# Patient Record
Sex: FEMALE | Race: WHITE | NOT HISPANIC OR LATINO | Employment: OTHER | URBAN - METROPOLITAN AREA
[De-identification: names, ages, dates, MRNs, and addresses within clinical notes are randomized per-mention and may not be internally consistent; named-entity substitution may affect disease eponyms.]

---

## 2021-12-29 ENCOUNTER — OFFICE VISIT (OUTPATIENT)
Dept: PHYSICAL THERAPY | Facility: CLINIC | Age: 61
End: 2021-12-29
Payer: COMMERCIAL

## 2021-12-29 DIAGNOSIS — R26.89 IMPAIRMENT OF BALANCE: ICD-10-CM

## 2021-12-29 DIAGNOSIS — R29.898 WEAKNESS OF RIGHT LOWER EXTREMITY: ICD-10-CM

## 2021-12-29 DIAGNOSIS — R26.9 GAIT ABNORMALITY: Primary | ICD-10-CM

## 2021-12-29 DIAGNOSIS — G89.29 CHRONIC PAIN OF RIGHT KNEE: ICD-10-CM

## 2021-12-29 DIAGNOSIS — M25.561 CHRONIC PAIN OF RIGHT KNEE: ICD-10-CM

## 2021-12-29 PROCEDURE — 97162 PT EVAL MOD COMPLEX 30 MIN: CPT | Performed by: PHYSICAL THERAPIST

## 2022-01-03 ENCOUNTER — OFFICE VISIT (OUTPATIENT)
Dept: PHYSICAL THERAPY | Facility: CLINIC | Age: 62
End: 2022-01-03
Payer: COMMERCIAL

## 2022-01-03 DIAGNOSIS — G89.29 CHRONIC PAIN OF RIGHT KNEE: ICD-10-CM

## 2022-01-03 DIAGNOSIS — R26.89 IMPAIRMENT OF BALANCE: ICD-10-CM

## 2022-01-03 DIAGNOSIS — R29.898 WEAKNESS OF RIGHT LOWER EXTREMITY: ICD-10-CM

## 2022-01-03 DIAGNOSIS — M25.561 CHRONIC PAIN OF RIGHT KNEE: ICD-10-CM

## 2022-01-03 DIAGNOSIS — R26.9 GAIT ABNORMALITY: Primary | ICD-10-CM

## 2022-01-03 PROCEDURE — 97110 THERAPEUTIC EXERCISES: CPT | Performed by: PHYSICAL THERAPIST

## 2022-01-03 PROCEDURE — 97112 NEUROMUSCULAR REEDUCATION: CPT | Performed by: PHYSICAL THERAPIST

## 2022-01-03 NOTE — PROGRESS NOTES
Daily Note     Today's date: 1/3/2022  Patient name: Merline Severance  : 1960  MRN: [id number]  Referring provider: Evette Garcia, PT  Dx:   Encounter Diagnosis     ICD-10-CM    1  Gait abnormality  R26 9    2  Impairment of balance  R26 89    3  Chronic pain of right knee  M25 561     G89 29    4  Weakness of right lower extremity  R29 898                   Subjective: Pt reports that she is feeling better since starting HEP  She states that she is very motivated as she wants to be able to walk w/ her grandson and would be unable to hold him at this time since he is 2 month old  Objective: See treatment diary below      Assessment: Initiated pt's plan of care this visit w/ good tolerance  Pt w/ some c/o moderate knee pain during bike and some standing ex, but reports that it was tolerable  Practiced ambulation w/ RW focusing on marching to clear foot  Pt also needed cueing to remember to keep RW in front of COG during chair transfers  Improved STS technique w/ cueing and practice  Patient would benefit from continued PT      Plan: Continue per plan of care  Access Code: [email or web address]     Daily Treatment Diary    EPOC: 2022  Precautions: FALL RISK;  Hx of MS affecting RLE  Co- Morbidities: Hx of MS affecting RLE    Manual                                                                                                                                               Exercise Diary  12/29  1/3                 THEREX            Pt ed            Progress Note            Recumbent Bike    10'                 Gastroc/ Soleus Stretch    30"x3 ea b/l                  HR/TR    15x/15x                 SLR flex/abd            LAQ                                    NEURO RE-ED            WS to R - fwd/lat  10"x10/10"x10          Standing HS curls                    Standing Marches    15x ea b/l                 STS    10x                 SLS                     clamshells  YTB 15x          TB resisted hip strengthening            TB resisted sidestepping            FTEO                     FTEC                                                                     GAIT TRAINING                     RW --> SPC once able    RW x 5' w/ cueing to inc  Hip/knee flexion                 Stairs                                                                                                                                 Modalities  12/29 1/3                   CP to R knee PRN NP  10'

## 2022-01-05 ENCOUNTER — APPOINTMENT (OUTPATIENT)
Dept: PHYSICAL THERAPY | Facility: CLINIC | Age: 62
End: 2022-01-05
Payer: COMMERCIAL

## 2022-01-06 ENCOUNTER — APPOINTMENT (OUTPATIENT)
Dept: PHYSICAL THERAPY | Facility: CLINIC | Age: 62
End: 2022-01-06
Payer: COMMERCIAL

## 2022-01-07 ENCOUNTER — APPOINTMENT (OUTPATIENT)
Dept: PHYSICAL THERAPY | Facility: CLINIC | Age: 62
End: 2022-01-07
Payer: COMMERCIAL

## 2022-01-10 ENCOUNTER — OFFICE VISIT (OUTPATIENT)
Dept: PHYSICAL THERAPY | Facility: CLINIC | Age: 62
End: 2022-01-10
Payer: COMMERCIAL

## 2022-01-10 DIAGNOSIS — M25.561 CHRONIC PAIN OF RIGHT KNEE: ICD-10-CM

## 2022-01-10 DIAGNOSIS — R26.89 IMPAIRMENT OF BALANCE: ICD-10-CM

## 2022-01-10 DIAGNOSIS — G89.29 CHRONIC PAIN OF RIGHT KNEE: ICD-10-CM

## 2022-01-10 DIAGNOSIS — R26.9 GAIT ABNORMALITY: Primary | ICD-10-CM

## 2022-01-10 DIAGNOSIS — R29.898 WEAKNESS OF RIGHT LOWER EXTREMITY: ICD-10-CM

## 2022-01-10 PROCEDURE — 97110 THERAPEUTIC EXERCISES: CPT | Performed by: PHYSICAL THERAPIST

## 2022-01-10 PROCEDURE — 97116 GAIT TRAINING THERAPY: CPT | Performed by: PHYSICAL THERAPIST

## 2022-01-10 PROCEDURE — 97112 NEUROMUSCULAR REEDUCATION: CPT | Performed by: PHYSICAL THERAPIST

## 2022-01-10 NOTE — PROGRESS NOTES
Daily Note     Today's date: 1/10/2022  Patient name: Kate Yusuf  : 1960  MRN: [id number]  Referring provider: Isabelle Garcia, PT  Dx:   Encounter Diagnosis     ICD-10-CM    1  Gait abnormality  R26 9    2  Impairment of balance  R26 89    3  Chronic pain of right knee  M25 561     G89 29    4  Weakness of right lower extremity  R29 898                   Subjective: Pt w/o any complaints to report upon arrival tx session  Objective: See treatment diary below      Assessment: Progressed ex this visit as below- pt fatigued post tx session but seemed to tolerate well  Discussed that pt may need to take more rest breaks at home if she is experiencing excessive fatigue  Plan: Continue per plan of care  Access Code: [email or web address]     Daily Treatment Diary    EPOC: 2022  Precautions: FALL RISK;  Hx of MS affecting RLE  Co- Morbidities: Hx of MS affecting RLE    Manual                                                                                                                                               Exercise Diary  12/29  1/3  1/10         THEREX         Pt ed         Progress Note         Recumbent Bike    10'  NP- resume nv         Gastroc/ Soleus Stretch    30"x3 ea b/l   30"x ea         HR/TR    15x/15x  15x/15x         SLR flex/abd         LAQ   15x w/ 1-2" hold- 2#                        NEURO RE-ED         WS to R - fwd/lat  10"x10/10"x10 10"x10/10"x10      Standing HS curls              Standing Marches    15x ea b/l  15x ea b/l         STS    10x  10x         SLS               clamshells  YTB 15x YTB 15x/15x      TB resisted hip strengthening         TB resisted sidestepping         FTEO               FTEC                                                   GAIT TRAINING               RW --> SPC once able    RW x 5' w/ cueing to inc  Hip/knee flexion           Stairs    4"15x ea LE Modalities  12/29 1/3  1/10                 CP to R knee PRN NP  10'  NP

## 2022-01-12 ENCOUNTER — OFFICE VISIT (OUTPATIENT)
Dept: PHYSICAL THERAPY | Facility: CLINIC | Age: 62
End: 2022-01-12
Payer: COMMERCIAL

## 2022-01-12 DIAGNOSIS — R26.89 IMPAIRMENT OF BALANCE: Primary | ICD-10-CM

## 2022-01-12 DIAGNOSIS — R29.898 WEAKNESS OF RIGHT LOWER EXTREMITY: ICD-10-CM

## 2022-01-12 DIAGNOSIS — M25.561 CHRONIC PAIN OF RIGHT KNEE: ICD-10-CM

## 2022-01-12 DIAGNOSIS — G89.29 CHRONIC PAIN OF RIGHT KNEE: ICD-10-CM

## 2022-01-12 DIAGNOSIS — R26.9 GAIT ABNORMALITY: ICD-10-CM

## 2022-01-12 PROCEDURE — 97112 NEUROMUSCULAR REEDUCATION: CPT | Performed by: PHYSICAL THERAPIST

## 2022-01-12 PROCEDURE — 97116 GAIT TRAINING THERAPY: CPT | Performed by: PHYSICAL THERAPIST

## 2022-01-12 PROCEDURE — 97110 THERAPEUTIC EXERCISES: CPT | Performed by: PHYSICAL THERAPIST

## 2022-01-12 NOTE — PROGRESS NOTES
Daily Note     Today's date: 2022  Patient name: Merline Severance  : 1960  MRN: [id number]  Referring provider: Kate Lewis, PT  Dx:   Encounter Diagnosis     ICD-10-CM    1  Impairment of balance  R26 89    2  Gait abnormality  R26 9    3  Chronic pain of right knee  M25 561     G89 29    4  Weakness of right lower extremity  R29 898                   Subjective: Pt w/o any complaints of pain upon arrival to tx session  Objective: See treatment diary below      Assessment: Progressed ex this visit w/ very good tolerance  Pt able to perform gait training using SPC this visit w/ CS from PT  She did have difficulty w/ sequencing SPC and feet which improved w/ practice/cueing  Pt instructed to continue using RW at outside of tx sessions as she is not yet safe using SPC on her own  Pt challenged using total gym but able to safely transition to/from platform- pt has her own total gym at home and would like to start using  Plan: Continue per plan of care  Access Code: [email or web address]     Daily Treatment Diary    EPOC: 2022  Precautions: FALL RISK;  Hx of MS affecting RLE  Co- Morbidities: Hx of MS affecting RLE    Manual                                                                                                                                               Exercise Diary  12/29  1/3  1/10  1/12       THEREX         Pt ed         Progress Note         Recumbent Bike    10'  NP- resume nv  10'       Gastroc/ Soleus Stretch    30"x3 ea b/l   30"x ea  30"x3 ea b/l        HR/TR    15x/15x  15x/15x  15x/15x       SLR flex/abd         LAQ   15x w/ 1-2" hold- 2# HEP                       NEURO RE-ED         WS to R - fwd/lat  10"x10/10"x10 10"x10/10"x10      Standing HS curls              Standing Marches    15x ea b/l  15x ea b/l 15x ea b/l        STS    10x  10x  10x       SLS               clamshells  YTB 15x YTB 15x/15x      TB resisted hip strengthening         TB resisted sidestepping FTEO               FTEC               Total Gym Squats    15x b/l; 10x u/l R/L; L 17     Total Gym HR    10 b/l; 5x u/l ea L 17                       GAIT TRAINING               RW --> SPC once able    RW x 5' w/ cueing to inc  Hip/knee flexion    SPC 3 laps and ~15' to mat table w/ CS       Stairs    4"15x ea LE                                                                                       Modalities  12/29 1/3  1/10                 CP to R knee PRN NP  10'  NP

## 2022-01-17 ENCOUNTER — APPOINTMENT (OUTPATIENT)
Dept: PHYSICAL THERAPY | Facility: CLINIC | Age: 62
End: 2022-01-17
Payer: COMMERCIAL

## 2022-01-19 ENCOUNTER — OFFICE VISIT (OUTPATIENT)
Dept: PHYSICAL THERAPY | Facility: CLINIC | Age: 62
End: 2022-01-19
Payer: COMMERCIAL

## 2022-01-19 DIAGNOSIS — R29.898 WEAKNESS OF RIGHT LOWER EXTREMITY: ICD-10-CM

## 2022-01-19 DIAGNOSIS — G89.29 CHRONIC PAIN OF RIGHT KNEE: ICD-10-CM

## 2022-01-19 DIAGNOSIS — R26.89 IMPAIRMENT OF BALANCE: Primary | ICD-10-CM

## 2022-01-19 DIAGNOSIS — M25.561 CHRONIC PAIN OF RIGHT KNEE: ICD-10-CM

## 2022-01-19 DIAGNOSIS — R26.9 GAIT ABNORMALITY: ICD-10-CM

## 2022-01-19 PROCEDURE — 97112 NEUROMUSCULAR REEDUCATION: CPT | Performed by: PHYSICAL THERAPIST

## 2022-01-19 PROCEDURE — 97116 GAIT TRAINING THERAPY: CPT | Performed by: PHYSICAL THERAPIST

## 2022-01-19 NOTE — PROGRESS NOTES
Daily Note     Today's date: 2022  Patient name: Zahra Cuellar  : 1960  MRN: [id number]  Referring provider: Monae Reeys PT  Dx:   Encounter Diagnosis     ICD-10-CM    1  Impairment of balance  R26 89    2  Gait abnormality  R26 9    3  Chronic pain of right knee  M25 561     G89 29    4  Weakness of right lower extremity  R29 898                   Subjective: Pt w/o any complaints to report upon arrival to tx session  She states that she has been very compliant w/ HEP and has been using her home Total Gym  Objective: See treatment diary below      Assessment: Initiated tx session w/ TM w/u this visit w/ CG from PT and cueing to "march" - pt fatigued after approx 3 5 minutes which may be due to vacuuming prior to tx session as well  Practiced gait training more w/ SPC this visit- pt does require CG and intermittent cueing for sequencing of SPC to avoid LOB, but seemed to improve w/ practice  Pt w/ very good technique performing STS today  Plan: Continue per plan of care  Access Code: [email or web address]     Daily Treatment Diary    EPOC: 2022  Precautions: FALL RISK;  Hx of MS affecting RLE  Co- Morbidities: Hx of MS affecting RLE    Manual                                                                                                                                               Exercise Diary  12/29  1/3  1/10  1/12  1/19     THEREX         Pt ed         Progress Note         Treadmill     3'    Recumbent Bike    10'  NP- resume nv  10'  d/c     Gastroc/ Soleus Stretch    30"x3 ea b/l   30"x ea  30"x3 ea b/l        HR/TR    15x/15x  15x/15x  15x/15x       SLR flex/abd         LAQ   15x w/ 1-2" hold- 2# HEP 2# 15x ea w/ 1-2" hold                      NEURO RE-ED         WS to R - fwd/lat  10"x10/10"x10 10"x10/10"x10      Standing HS curls              Standing Marches    15x ea b/l  15x ea b/l 15x ea b/l   15x ea b/l seated w/ 2# weight     STS    10x  10x  10x  15x     SLS clamshellance  YTB 15x YTB 15x/15x  YTB 15x/15x    TB resisted hip strengthening         TB resisted sidestepping         FTEO               FTEC               Total Gym Squats    15x b/l; 10x u/l R/L; L 17 15x  B/l 20 L20; *attempted u/l @ L20 but need to dec nv    Total Gym HR    10 b/l; 5x u/l ea L 17 10x b/l L 20 (dec for u/l NV)                      GAIT TRAINING               RW --> SPC once able    RW x 5' w/ cueing to inc  Hip/knee flexion    SPC 3 laps @ // bars and ~15' to mat table w/ CS 10' RW to/from waiting room and down ramp to car; 5' w/ SPC to/from bar to total gym     Stairs    4"15x ea LE    NV                                                                                   Modalities  12/29 1/3  1/10                 CP to R knee PRN NP  10'  NP

## 2022-01-20 ENCOUNTER — APPOINTMENT (OUTPATIENT)
Dept: PHYSICAL THERAPY | Facility: CLINIC | Age: 62
End: 2022-01-20
Payer: COMMERCIAL

## 2022-01-24 ENCOUNTER — OFFICE VISIT (OUTPATIENT)
Dept: PHYSICAL THERAPY | Facility: CLINIC | Age: 62
End: 2022-01-24
Payer: COMMERCIAL

## 2022-01-24 DIAGNOSIS — G89.29 CHRONIC PAIN OF RIGHT KNEE: ICD-10-CM

## 2022-01-24 DIAGNOSIS — R29.898 WEAKNESS OF RIGHT LOWER EXTREMITY: ICD-10-CM

## 2022-01-24 DIAGNOSIS — R26.89 IMPAIRMENT OF BALANCE: Primary | ICD-10-CM

## 2022-01-24 DIAGNOSIS — R26.9 GAIT ABNORMALITY: ICD-10-CM

## 2022-01-24 DIAGNOSIS — M25.561 CHRONIC PAIN OF RIGHT KNEE: ICD-10-CM

## 2022-01-24 PROCEDURE — 97116 GAIT TRAINING THERAPY: CPT | Performed by: PHYSICAL THERAPIST

## 2022-01-24 PROCEDURE — 97110 THERAPEUTIC EXERCISES: CPT | Performed by: PHYSICAL THERAPIST

## 2022-01-24 PROCEDURE — 97112 NEUROMUSCULAR REEDUCATION: CPT | Performed by: PHYSICAL THERAPIST

## 2022-01-24 NOTE — PROGRESS NOTES
Daily Note     Today's date: 2022  Patient name: Merari Batista  : 1960  MRN: [id number]  Referring provider: Kika Whittaker PT  Dx:   Encounter Diagnosis     ICD-10-CM    1  Impairment of balance  R26 89    2  Gait abnormality  R26 9    3  Chronic pain of right knee  M25 561     G89 29    4  Weakness of right lower extremity  R29 898                   Subjective: Pt states that she has been practicing walking w/ SPC inside her home  Objective: See treatment diary below      Assessment: Had pt use SPC only during tx session today- pt did very well requiring CS - CGA from PT and seemed to demonstrate improved R foot clearance  Pt does have more difficulty clearing foot by the end of tx session when more fatigued  Remainder of ex tolerated well  Pt able to transition from sit to stand using low chair this visit vs low mat which was an improvement  Had thorough discussion w/ pt about pacing activity to avoid extreme highs and lows in energy levels and to improve overall functionality  Pt reports understanding  Plan: Continue per plan of care  Access Code: [email or web address]     Daily Treatment Diary    EPOC: 2022  Precautions: FALL RISK;  Hx of MS affecting RLE  Co- Morbidities: Hx of MS affecting RLE    Manual                                                                                                                                               Exercise Diary  12/29  1/3  1/10  1/12  1/19  1/24      THEREX            Pt ed            Progress Note            Treadmill     3'       Recumbent Bike    10'  NP- resume nv  10'  d/c        Gastroc/ Soleus Stretch    30"x3 ea b/l   30"x ea  30"x3 ea b/l     30"x3 ea b/l       HR/TR    15x/15x  15x/15x  15x/15x   15x/15x       SLR flex/abd            LAQ   15x w/ 1-2" hold- 2# HEP 2# 15x ea w/ 1-2" hold 2# 15x ea w /1-2" hold                              NEURO RE-ED            WS to R - fwd/lat  10"x10/10"x10 10"x10/10"x10         Standing HS curls                 Standing Marches    15x ea b/l  15x ea b/l 15x ea b/l   15x ea b/l seated w/ 2# weight       STS    10x  10x  10x  15x  15x from chair      SLS                  clamshells  YTB 15x YTB 15x/15x  YTB 15x/15x       TB resisted hip strengthening      15x ea dir b/l       TB resisted sidestepping            FTEO                  FTEC                  Total Gym Squats    15x b/l; 10x u/l R/L; L 17 15x  B/l 20 L20; *attempted u/l @ L20 but need to dec nv       Total Gym HR    10 b/l; 5x u/l ea L 17 10x b/l L 20 (dec for u/l NV)                               GAIT TRAINING                  RW --> SPC once able    RW x 5' w/ cueing to inc  Hip/knee flexion    SPC 3 laps @ // bars and ~15' to mat table w/ CS 10' RW to/from waiting room and down ramp to car; 5' w/ SPC to/from bar to total gym T/o tx session      Stairs    4"15x ea LE    NV  4" x15 ea b/l                                                                                                    Modalities  12/29 1/3  1/10                 CP to R knee PRN NP  10'  NP

## 2022-01-26 ENCOUNTER — OFFICE VISIT (OUTPATIENT)
Dept: PHYSICAL THERAPY | Facility: CLINIC | Age: 62
End: 2022-01-26
Payer: COMMERCIAL

## 2022-01-26 DIAGNOSIS — M25.561 CHRONIC PAIN OF RIGHT KNEE: ICD-10-CM

## 2022-01-26 DIAGNOSIS — R26.89 IMPAIRMENT OF BALANCE: Primary | ICD-10-CM

## 2022-01-26 DIAGNOSIS — R29.898 WEAKNESS OF RIGHT LOWER EXTREMITY: ICD-10-CM

## 2022-01-26 DIAGNOSIS — G89.29 CHRONIC PAIN OF RIGHT KNEE: ICD-10-CM

## 2022-01-26 DIAGNOSIS — R26.9 GAIT ABNORMALITY: ICD-10-CM

## 2022-01-26 PROCEDURE — 97112 NEUROMUSCULAR REEDUCATION: CPT | Performed by: PHYSICAL THERAPIST

## 2022-01-26 PROCEDURE — 97116 GAIT TRAINING THERAPY: CPT | Performed by: PHYSICAL THERAPIST

## 2022-01-26 PROCEDURE — 97110 THERAPEUTIC EXERCISES: CPT | Performed by: PHYSICAL THERAPIST

## 2022-01-26 NOTE — PROGRESS NOTES
Daily Note     Today's date: 2022  Patient name: Kaci Pike  : 1960  MRN: [id number]  Referring provider: Ezequiel Ortiz PT  Dx:   Encounter Diagnosis     ICD-10-CM    1  Impairment of balance  R26 89    2  Gait abnormality  R26 9    3  Chronic pain of right knee  M25 561     G89 29    4  Weakness of right lower extremity  R29 898                   Subjective: Pt brought SPC to tx session today in conjunction w/ RW  Pt reports feeling ok for the remainder of the day following her LV; however was very fatigued the following day and was unable to exercise  Pt has difficulty determining if this was related to tx session or lack of sleep  Objective: See treatment diary below      Assessment: Pt able to demonstrate safe "step through" motion while ambulating w/ SPC towards end of tx session which is progress as pt as doing "step to" technique previously  Remainder of ex tolerated well  Plan: Continue per plan of care  Access Code: [email or web address]     Daily Treatment Diary    EPOC: 2022  Precautions: FALL RISK;  Hx of MS affecting RLE  Co- Morbidities: Hx of MS affecting RLE    Manual                                                                                                                                               Exercise Diary  12/29  1/3  1/10  1/12  1/19  1/24 1/26     THEREX            Pt ed            Progress Note            Treadmill     3'       Recumbent Bike    10'  NP- resume nv  10'  d/c        Gastroc/ Soleus Stretch    30"x3 ea b/l   30"x ea  30"x3 ea b/l     30"x3 ea b/l  30"x3 ea b/l      HR/TR    15x/15x  15x/15x  15x/15x   15x/15x  15x/15x     SLR flex/abd            LAQ   15x w/ 1-2" hold- 2# HEP 2# 15x ea w/ 1-2" hold 2# 15x ea w /1-2" hold 2# 15x ea w/ 1-2" hold                             NEURO RE-ED            WS to R - fwd/lat  10"x10/10"x10 10"x10/10"x10         Standing HS curls                 Standing Marches    15x ea b/l  15x ea b/l 15x ea b/l 15x ea b/l seated w/ 2# weight       STS    10x  10x  10x  15x  15x from chair 15x from chair     SLS                  clamshells  YTB 15x YTB 15x/15x  YTB 15x/15x       TB resisted hip strengthening      15x ea dir b/l  15x ea dir b/l      TB resisted sidestepping            FTEO                  FTEC                  Total Gym Squats    15x b/l; 10x u/l R/L; L 17 15x  B/l 20 L20; *attempted u/l @ L20 but need to dec nv       Total Gym HR    10 b/l; 5x u/l ea L 17 10x b/l L 20 (dec for u/l NV)                               GAIT TRAINING                  RW --> SPC once able    RW x 5' w/ cueing to inc  Hip/knee flexion    SPC 3 laps @ // bars and ~15' to mat table w/ CS 10' RW to/from waiting room and down ramp to car; 5' w/ SPC to/from bar to total gym T/o tx session T/o tx session "step to" and "step through"     Stairs    4"15x ea LE    NV  4" x15 ea b/l  4"x 15 ea                                                                                                  Modalities  12/29 1/3  1/10                 CP to R knee PRN NP  10'  NP

## 2022-01-31 ENCOUNTER — APPOINTMENT (OUTPATIENT)
Dept: PHYSICAL THERAPY | Facility: CLINIC | Age: 62
End: 2022-01-31
Payer: COMMERCIAL

## 2022-02-02 ENCOUNTER — EVALUATION (OUTPATIENT)
Dept: PHYSICAL THERAPY | Facility: CLINIC | Age: 62
End: 2022-02-02
Payer: COMMERCIAL

## 2022-02-02 DIAGNOSIS — R26.89 IMPAIRMENT OF BALANCE: Primary | ICD-10-CM

## 2022-02-02 DIAGNOSIS — R29.898 WEAKNESS OF RIGHT LOWER EXTREMITY: ICD-10-CM

## 2022-02-02 DIAGNOSIS — R26.9 GAIT ABNORMALITY: ICD-10-CM

## 2022-02-02 DIAGNOSIS — G89.29 CHRONIC PAIN OF RIGHT KNEE: ICD-10-CM

## 2022-02-02 DIAGNOSIS — M25.561 CHRONIC PAIN OF RIGHT KNEE: ICD-10-CM

## 2022-02-02 PROCEDURE — 97116 GAIT TRAINING THERAPY: CPT | Performed by: PHYSICAL THERAPIST

## 2022-02-02 PROCEDURE — 97110 THERAPEUTIC EXERCISES: CPT | Performed by: PHYSICAL THERAPIST

## 2022-02-02 NOTE — LETTER
February 3, 2022    Rosaline Muse MD  [address] [facility]  [address] [facility] [address]    Patient: Preethi Gregory   YOB: 1960   Date of Visit: 2022     Encounter Diagnosis     ICD-10-CM    1  Impairment of balance  R26 89    2  Gait abnormality  R26 9    3  Chronic pain of right knee  M25 561     G89 29    4  Weakness of right lower extremity  R29 898        Dear Dr Laurel Urbina:    Thank you for your recent referral of Preethi Gregory  Please review the attached evaluation summary from Catina's recent visit  Please verify that you agree with the plan of care by signing the attached order  If you have any questions or concerns, please do not hesitate to call  I sincerely appreciate the opportunity to share in the care of one of your patients and hope to have another opportunity to work with you in the near future  Sincerely,    Rodríguez Garcia, PT      Referring Provider:      I certify that I have read the below Plan of Care and certify the need for these services furnished under this plan of treatment while under my care  Rosaline Muse MD  [address] [facility]  [address] [facility] [address]  Via Fax: [phone]          PT Re-Evaluation     Today's date: 2022  Patient name: Preethi Gregory  : 1960  MRN: [id number]  Referring provider: Cruz Bess, *  Dx:   Encounter Diagnosis     ICD-10-CM    1  Impairment of balance  R26 89    2  Gait abnormality  R26 9    3  Chronic pain of right knee  M25 561     G89 29    4  Weakness of right lower extremity  R29 898                   Assessment  Assessment details: Preethi Gregory is a 57 y o  female being treated as an outpatient to [facility] w/ initial c/o R knee pain and LE weakness (R worse vs L)   Since IE, pt has made gains in ROM, strength, ambulatory function/gait quality, transfer ability, and activity tolerance/function, but continues to remain limited in these areas and from max function  Pt will continue to benefit from skilled PT services in order to max function to allow pt to achieve goals in PT  Thank you  Impairments: abnormal gait, abnormal muscle tone, abnormal or restricted ROM, abnormal movement, activity intolerance, impaired balance, impaired physical strength, pain with function and safety issue  Understanding of Dx/Px/POC: good   Prognosis: good    Goals  ST  Pain decreased by 25% in 4-6 weeks  -- Not Met  2  Strength increased by 1/2 to 1 muscle grade in all deficient muscle groups in 4-6 weeks  - Partially Met  3  Pt will be independent w/ initial HEP in 1-2 visits  - Met    LT  Decrease pain to 1-2/10 at worst by d/c - Not met  2  Strength increased to 5 for all deficient muscle groups by d/c - Partially Met  3  IADL performance increased to max function by d/c - Not Met  4  Recreational performance increased to max function by d/c - Not Met  5  Pt will safely ambulate t/o clinic using SPC by d/c  - Partially Met  6  Pt will safely negotiate stairs using SPC by d/c  - Not Met  7  TUG decreased to < or = 14" by d/c  - Not Met  8  5xSTS decreased to < or = 14" by d/c -Not Met    Plan  Planned modality interventions: cryotherapy  Other planned modality interventions: other modalities PRN  Planned therapy interventions: abdominal trunk stabilization, ADL retraining, IADL retraining, balance, flexibility, functional ROM exercises, gait training, graded exercise, home exercise program, manual therapy, neuromuscular re-education, patient education, postural training, strengthening, therapeutic exercise, therapeutic activities and transfer training  Other planned therapy interventions: other interventions PRN  Frequency: 1-2x/week    Duration in weeks: 4  Plan of Care beginning date: 2022  Plan of Care expiration date: 3/2/2022  Treatment plan discussed with: patient        Subjective Evaluation    History of Present Illness  Mechanism of injury: CURRENT LEVEL (2/2/2022)  Pt reports that she has noticed improved ability to perform sit <-> stand transfers since starting skilled PT tx as she can intermittently transfer from couch to standing position w/o b/l UE support  She adds that her bed mobility has been getting easier when she plans ahead her movements  Since IE, pt has been able to transition from  to [facility] during entire tx session  However, she continues to be frustrated that her R foot catches the floor during ambulation especially when fatigued  Pt is in the process of looking into getting another MAFO as she no longer has her old one in possession  She reports that static standing is very difficult for her as she feels significant weakness still present in RLE  Pt reports strong compliance w/ HEP  INITIAL LEVEL (12/29/2021)  Pt reports to IE via Direct Access using RW w/ c/o R knee pain and overall weakness  She reports having a fall approx 5 years ago as a result of trip and fall over dog which resulted in fractured R ankle (tx'd conservatively), R lisfranc ligament repair, and R menisectomy (2018)  Pt reports having skilled PT tx at that time, but feels that she did not push herself enough at home and admits that she did not keep up w/ all exercises  She does regularly ride her stationary bike and intermittently does squats  Pt also w/ hx of MS for the past approximate 30 years which she states mostly affects RLE including feeling "like a sock is over leg and foot" when questioned about sensation  Pt reports that she has had a MAFO for R ankle in the past, but no longer has in her possession  Prior to approximately 1 year ago, pt did not need RW, but started using again as she started to notice more weakness/knee pain  Pt reports that she has had "stupid falls" in the past year (>2 times), but cannot recall exact number  Pt does not have any major injuries from falls       Pt reports the most difficulty transitioning to/from bed and to/from chair/toilet  She reports that she also had difficulty clearing R foot during ambulation  Pt states that her biggest complaint is that she is unable to walk w/ grandson (2 month old)  In addition to MS, pt also reports being "totally paralyzed" approx  30 years ago, but was able to fully recover  She is unsure of the cause  Pain  Pain scale: R knee: 0/10  Pain scale at highest: R knee: 5/10 on average, but 8/10 at worst   Location: R knee  Relieving factors: ice, rest and medications (OTC ibuprofen PRN)  Exacerbated by: transition to/from chair, transition to/from bed, walking      Social Support  Steps to enter house: yes (3 MARIUSZ w/ rail (alternates b/w STS and reciprocating fashion))  Stairs in house: yes (1 FF steps to basement to ride stationary bike (alternates/w STS and reciprocating fashion) )   Lives in: multiple-level home  Lives with: spouse    Employment status: not working (Pt has not worked for years due to [employer])  Patient Goals  Patient goals for therapy: increased strength and improved balance (Partially Met)  Patient goal: Walk without rolling walker (Partially Met)        Objective     Active Range of Motion     Right Knee   Flexion: 120 degrees   Extension: 12 degrees     Right Ankle/Foot   Dorsiflexion (ke): 4 degrees   Plantar flexion: 60 degrees   Inversion: 25 degrees   Eversion: 10 degrees     Passive Range of Motion     Right Knee   Flexion: 130 degrees     Mobility   Patellar Mobility:     Right Knee   WFL: medial, lateral, superior and inferior    Strength/Myotome Testing     Right Knee   Flexion: 5  Extension: 5  Quadriceps contraction: good    Right Ankle/Foot   Dorsiflexion: 4+  Plantar flexion: 5  Inversion: 5  Eversion: 4+    Additional Strength Details  Hip Strength (R):   Flexion (assessed in supine via SLR): 4        Tests     Additional Tests Details  Palpation:  tenderness w/ palpation to m/l knee joint and posterior joint (assessed at IE and not reassesseed 2/2 due to time constraints)    Knee Special Tests (R- assessed at IE):  Lachman: negative  Reverse Lachman: negative  Varus/valgus Stress: negative  Daniela: negative          Gait:   RW: Pt ambulates using RW w/ difficulty clearing R foot only as she fatigues  Pt no longer unsteady when negotiating turns w/ RW and is able to maintain COG w/in RYAN  Pt able to safely reach back for chair prior to sitting w/o cueing from PT      SPC Pt ambulates using SPC w/ difficulty clearing R foot usually only as she fatigues  Pt requires CG- CS to perform "step to" motion but has also been able to demonstrate "step through motion" for several minutes during tx sessions  No significant unsteadiness observed performing turns  TUG:   Rollator Walker: 29" w/ gait as described above    SPC: 36" w/ SPC - no unsteadiness turning around cone     5x STS: 38", but pt able to complete w/o UE support for 4/5 reps which she was unable to do previously  Pt did not demonstrate LOB w/ initial stand as she did at IE        Daily Treatment Diary    EPOC: 3/2/2022  Precautions: FALL RISK;  Hx of MS affecting RLE  Co- Morbidities: Hx of MS affecting RLE    Manual                                                                                                                                               Exercise Diary  1/12 1/19 1/24 1/26 2/2 (modified ex due to time constraints w/ progress note)    THEREX         Pt ed         Progress Note     25'+ discussion on progress, MAFO    Treadmill w/ CGA  3'       Recumbent Bike  10'  d/c        Gastroc/ Soleus Stretch  30"x3 ea b/l     30"x3 ea b/l  30"x3 ea b/l      HR/TR  15x/15x   15x/15x  15x/15x     SLR flex/abd     NV! (unless doing as part of HEP)    LAQ HEP 2# 15x ea w/ 1-2" hold 2# 15x ea w /1-2" hold 2# 15x ea w/ 1-2" hold     Heel slides     NV! (unless doing as part of HEP)    shailesh ESCALANTE    NEURO RE-ED         WS to R - fwd/lat         Standing HS curls           Standing Marches 15x ea b/l   15x ea b/l seated w/ 2# weight       STS  10x  15x  15x from chair 15x from chair     SLS            clamshells  YTB 15x/15x       TB resisted hip strengthening   15x ea dir b/l  15x ea dir b/l      TB resisted sidestepping         FTEO            FTEC            Total Gym Squats 15x b/l; 10x u/l R/L; L 17 15x  B/l 20 L20; *attempted u/l @ L20 but need to dec nv   Pt has at home    Total Gym HR 10 b/l; 5x u/l ea L 17 10x b/l L 20 (dec for u/l NV)   Pt has at home                      GAIT TRAINING            RW --> SPC once able  SPC 3 laps @ // bars and ~15' to mat table w/ CS 10' RW to/from waiting room and down ramp to car; 5' w/ SPC to/from bar to total gym T/o tx session T/o tx session "step to" and "step through" [facility]- t/o tx session " step" and "step through"    Stairs    NV  4" x15 ea b/l  4"x 15 ea W/ SPC NV                Therapeutic Activity            5x STS + set up        3'    TUG + set up ( and [facility])        4'                   Modalities  12/29 1/3  1/10                 CP to R knee PRN NP  10'  NP

## 2022-02-02 NOTE — PROGRESS NOTES
PT Re-Evaluation     Today's date: 2022  Patient name: Gregory Wright  : 1960  MRN: [id number]  Referring provider: Yael Salinas, *  Dx:   Encounter Diagnosis     ICD-10-CM    1  Impairment of balance  R26 89    2  Gait abnormality  R26 9    3  Chronic pain of right knee  M25 561     G89 29    4  Weakness of right lower extremity  R29 898                   Assessment  Assessment details: Gregory Wright is a 57 y o  female being treated as an outpatient to [facility] w/ initial c/o R knee pain and LE weakness (R worse vs L)  Since IE, pt has made gains in ROM, strength, ambulatory function/gait quality, transfer ability, and activity tolerance/function, but continues to remain limited in these areas and from max function  Pt will continue to benefit from skilled PT services in order to max function to allow pt to achieve goals in PT  Thank you  Impairments: abnormal gait, abnormal muscle tone, abnormal or restricted ROM, abnormal movement, activity intolerance, impaired balance, impaired physical strength, pain with function and safety issue  Understanding of Dx/Px/POC: good   Prognosis: good    Goals  ST  Pain decreased by 25% in 4-6 weeks  -- Not Met  2  Strength increased by 1/2 to 1 muscle grade in all deficient muscle groups in 4-6 weeks  - Partially Met  3  Pt will be independent w/ initial HEP in 1-2 visits  - Met    LT  Decrease pain to 1-2/10 at worst by d/c - Not met  2  Strength increased to 5 for all deficient muscle groups by d/c - Partially Met  3  IADL performance increased to max function by d/c - Not Met  4  Recreational performance increased to max function by d/c - Not Met  5  Pt will safely ambulate t/o clinic using SPC by d/c  - Partially Met  6  Pt will safely negotiate stairs using SPC by d/c  - Not Met  7  TUG decreased to < or = 14" by d/c  - Not Met  8   5xSTS decreased to < or = 14" by d/c -Not Met    Plan  Planned modality interventions: cryotherapy  Other planned modality interventions: other modalities PRN  Planned therapy interventions: abdominal trunk stabilization, ADL retraining, IADL retraining, balance, flexibility, functional ROM exercises, gait training, graded exercise, home exercise program, manual therapy, neuromuscular re-education, patient education, postural training, strengthening, therapeutic exercise, therapeutic activities and transfer training  Other planned therapy interventions: other interventions PRN  Frequency: 1-2x/week  Duration in weeks: 4  Plan of Care beginning date: 2/2/2022  Plan of Care expiration date: 3/2/2022  Treatment plan discussed with: patient        Subjective Evaluation    History of Present Illness  Mechanism of injury: CURRENT LEVEL (2/2/2022)  Pt reports that she has noticed improved ability to perform sit <-> stand transfers since starting skilled PT tx as she can intermittently transfer from couch to standing position w/o b/l UE support  She adds that her bed mobility has been getting easier when she plans ahead her movements  Since IE, pt has been able to transition from  to [facility] during entire tx session  However, she continues to be frustrated that her R foot catches the floor during ambulation especially when fatigued  Pt is in the process of looking into getting another MAFO as she no longer has her old one in possession  She reports that static standing is very difficult for her as she feels significant weakness still present in RLE  Pt reports strong compliance w/ HEP  INITIAL LEVEL (12/29/2021)  Pt reports to IE via Direct Access using RW w/ c/o R knee pain and overall weakness  She reports having a fall approx 5 years ago as a result of trip and fall over dog which resulted in fractured R ankle (tx'd conservatively), R lisfranc ligament repair, and R menisectomy (2018)      Pt reports having skilled PT tx at that time, but feels that she did not push herself enough at home and admits that she did not keep up w/ all exercises  She does regularly ride her stationary bike and intermittently does squats  Pt also w/ hx of MS for the past approximate 30 years which she states mostly affects RLE including feeling "like a sock is over leg and foot" when questioned about sensation  Pt reports that she has had a MAFO for R ankle in the past, but no longer has in her possession  Prior to approximately 1 year ago, pt did not need RW, but started using again as she started to notice more weakness/knee pain  Pt reports that she has had "stupid falls" in the past year (>2 times), but cannot recall exact number  Pt does not have any major injuries from falls  Pt reports the most difficulty transitioning to/from bed and to/from chair/toilet  She reports that she also had difficulty clearing R foot during ambulation  Pt states that her biggest complaint is that she is unable to walk w/ grandson (2 month old)  In addition to MS, pt also reports being "totally paralyzed" approx  30 years ago, but was able to fully recover  She is unsure of the cause  Pain  Pain scale: R knee: 0/10  Pain scale at highest: R knee: 5/10 on average, but 8/10 at worst   Location: R knee  Relieving factors: ice, rest and medications (OTC ibuprofen PRN)  Exacerbated by: transition to/from chair, transition to/from bed, walking      Social Support  Steps to enter house: yes (3 MARIUSZ w/ rail (alternates b/w STS and reciprocating fashion))  Stairs in house: yes (1 FF steps to basement to ride stationary bike (alternates/w STS and reciprocating fashion) )   Lives in: multiple-level home  Lives with: spouse    Employment status: not working (Pt has not worked for years due to [employer] 86)  Patient Goals  Patient goals for therapy: increased strength and improved balance (Partially Met)  Patient goal: Walk without rolling walker (Partially Met)        Objective     Active Range of Motion     Right Knee   Flexion: 120 degrees   Extension: 12 degrees     Right Ankle/Foot   Dorsiflexion (ke): 4 degrees   Plantar flexion: 60 degrees   Inversion: 25 degrees   Eversion: 10 degrees     Passive Range of Motion     Right Knee   Flexion: 130 degrees     Mobility   Patellar Mobility:     Right Knee   WFL: medial, lateral, superior and inferior    Strength/Myotome Testing     Right Knee   Flexion: 5  Extension: 5  Quadriceps contraction: good    Right Ankle/Foot   Dorsiflexion: 4+  Plantar flexion: 5  Inversion: 5  Eversion: 4+    Additional Strength Details  Hip Strength (R):   Flexion (assessed in supine via SLR): 4        Tests     Additional Tests Details  Palpation:  tenderness w/ palpation to m/l knee joint and posterior joint (assessed at IE and not reassesseed 2/2 due to time constraints)    Knee Special Tests (R- assessed at IE):  Lachman: negative  Reverse Lachman: negative  Varus/valgus Stress: negative  Daniela: negative          Gait:   RW: Pt ambulates using RW w/ difficulty clearing R foot only as she fatigues  Pt no longer unsteady when negotiating turns w/ RW and is able to maintain COG w/in RYAN  Pt able to safely reach back for chair prior to sitting w/o cueing from PT      SPC Pt ambulates using SPC w/ difficulty clearing R foot usually only as she fatigues  Pt requires CG- CS to perform "step to" motion but has also been able to demonstrate "step through motion" for several minutes during tx sessions  No significant unsteadiness observed performing turns  TUG:   Rollator Walker: 29" w/ gait as described above    SPC: 36" w/ SPC - no unsteadiness turning around cone     5x STS: 38", but pt able to complete w/o UE support for 4/5 reps which she was unable to do previously  Pt did not demonstrate LOB w/ initial stand as she did at IE        Daily Treatment Diary    EPOC: 3/2/2022  Precautions: FALL RISK;  Hx of MS affecting RLE  Co- Morbidities: Hx of MS affecting RLE    Manual Exercise Diary  1/12 1/19 1/24 1/26 2/2 (modified ex due to time constraints w/ progress note)    THEREX         Pt ed         Progress Note     25'+ discussion on progress, MAFO    Treadmill w/ CGA  3'       Recumbent Bike  10'  d/c        Gastroc/ Soleus Stretch  30"x3 ea b/l     30"x3 ea b/l  30"x3 ea b/l      HR/TR  15x/15x   15x/15x  15x/15x     SLR flex/abd     NV! (unless doing as part of HEP)    LAQ HEP 2# 15x ea w/ 1-2" hold 2# 15x ea w /1-2" hold 2# 15x ea w/ 1-2" hold     Heel slides     NV! (unless doing as part of HEP)    shailesh ESCALANTE    NEURO RE-ED         WS to R - fwd/lat         Standing HS curls           Standing Marches 15x ea b/l   15x ea b/l seated w/ 2# weight       STS  10x  15x  15x from chair 15x from chair     SLS            shailesh  YTB 15x/15x       TB resisted hip strengthening   15x ea dir b/l  15x ea dir b/l      TB resisted sidestepping         FTEO            FTEC            Total Gym Squats 15x b/l; 10x u/l R/L; L 17 15x  B/l 20 L20; *attempted u/l @ L20 but need to dec nv   Pt has at home    Total Gym HR 10 b/l; 5x u/l ea L 17 10x b/l L 20 (dec for u/l NV)   Pt has at home                      GAIT TRAINING            RW --> SPC once able  SPC 3 laps @ // bars and ~15' to mat table w/ CS 10' RW to/from waiting room and down ramp to car; 5' w/ SPC to/from bar to total gym T/o tx session T/o tx session "step to" and "step through" [facility]- t/o tx session " step" and "step through"    Stairs    NV  4" x15 ea b/l  4"x 15 ea W/ SPC NV                Therapeutic Activity            5x STS + set up        3'    TUG + set up ( and [facility])        4'                   Modalities  12/29 1/3  1/10                 CP to R knee PRN NP  10'  NP

## 2022-02-07 ENCOUNTER — OFFICE VISIT (OUTPATIENT)
Dept: PHYSICAL THERAPY | Facility: CLINIC | Age: 62
End: 2022-02-07
Payer: COMMERCIAL

## 2022-02-07 DIAGNOSIS — R29.898 WEAKNESS OF RIGHT LOWER EXTREMITY: ICD-10-CM

## 2022-02-07 DIAGNOSIS — G89.29 CHRONIC PAIN OF RIGHT KNEE: Primary | ICD-10-CM

## 2022-02-07 DIAGNOSIS — M25.561 CHRONIC PAIN OF RIGHT KNEE: Primary | ICD-10-CM

## 2022-02-07 DIAGNOSIS — R26.89 IMPAIRMENT OF BALANCE: ICD-10-CM

## 2022-02-07 DIAGNOSIS — R26.9 GAIT ABNORMALITY: ICD-10-CM

## 2022-02-07 PROCEDURE — 97116 GAIT TRAINING THERAPY: CPT

## 2022-02-07 PROCEDURE — 97112 NEUROMUSCULAR REEDUCATION: CPT

## 2022-02-07 PROCEDURE — 97110 THERAPEUTIC EXERCISES: CPT

## 2022-02-07 NOTE — PROGRESS NOTES
Daily Note     Today's date: 2022  Patient name: Maicol Rizvi  : 1960  MRN: [id number]  Referring provider: Cathie Radford, PT  Dx:   Encounter Diagnosis     ICD-10-CM    1  Chronic pain of right knee  M25 561     G89 29    2  Weakness of right lower extremity  R29 898    3  Impairment of balance  R26 89    4  Gait abnormality  R26 9        Start Time: 1231  Stop Time: 1315  Total time in clinic (min): 44 minutes    Subjective: patient ambulates into clinic w/o b/l crutches and demonstrates decreased heel strike on R LE  Objective: See treatment diary below      Assessment: Cues during gait to increase toe off and then increase heel strike resulted in safer gait with less dragging of R foot  Discussed use of MOFO or discussing with MD  Added lateral cone step overs to work on dorsiflexion and progressed balance to work on proprioception and having patient understanding COG/RYAN  CS-CG to ensure patient safety t/o visit  Cues during STS at low mat to glue great toe into ground to avoid only lateral aspect of R foot having contact with ground  Patient denied any excess fatigue post therapy session and continued to have improved foot clearance leaving therapy  Plan: Continue with current POC to address pt deficits        Exercise Diary   (modified ex due to time constraints w/ progress note)    THEREX         Pt ed         Progress Note     25'+ discussion on progress, MAFO    Treadmill w/ CGA  3'       Recumbent Bike  10'  d/c        Gastroc/ Soleus Stretch  30"x3 ea b/l     30"x3 ea b/l  30"x3 ea b/l      HR/TR  15x/15x   15x/15x  15x/15x     SLR flex/abd     NV! (unless doing as part of HEP) x15/x15   LAQ HEP 2# 15x ea w/ 1-2" hold 2# 15x ea w /1-2" hold 2# 15x ea w/ 1-2" hold  2# x15 ea w/1-2" hold    Heel slides     NV! (unless doing as part of HEP) x15x   clamshells     NV x15 hookling red    NEURO RE-ED         WS to R - fwd/lat         Standing HS curls Standing Marches 15x ea b/l   15x ea b/l seated w/ 2# weight       STS  10x  15x  15x from chair 15x from chair  x10 from low mat w/cues to root great toe into ground   SLS            clamshells  YTB 15x/15x       TB resisted hip strengthening   15x ea dir b/l  15x ea dir b/l      TB resisted sidestepping         Hurdles      1 sarah x10 lat emphasis on dorsiflexion   FTEO         FTEO 15"x2, mod tandem 10"x2 b/l    FTEC            Total Gym Squats 15x b/l; 10x u/l R/L; L 17 15x  B/l 20 L20; *attempted u/l @ L20 but need to dec nv   Pt has at home    Total Gym HR 10 b/l; 5x u/l ea L 17 10x b/l L 20 (dec for u/l NV)   Pt has at home                      GAIT TRAINING            RW --> SPC once able  SPC 3 laps @ // bars and ~15' to mat table w/ CS 10' RW to/from waiting room and down ramp to car; 5' w/ SPC to/from bar to total gym T/o tx session T/o tx session "step to" and "step through" [facility]- t/o tx session " step" and "step through" B/l canes x 2 laps + t/o visit   Stairs    NV  4" x15 ea b/l  4"x 15 ea W/ SPC NV 6" x15 w/SPC               Therapeutic Activity            5x STS + set up        3'    TUG + set up ( and [facility])        4'

## 2022-02-09 ENCOUNTER — OFFICE VISIT (OUTPATIENT)
Dept: PHYSICAL THERAPY | Facility: CLINIC | Age: 62
End: 2022-02-09
Payer: COMMERCIAL

## 2022-02-09 DIAGNOSIS — R29.898 WEAKNESS OF RIGHT LOWER EXTREMITY: ICD-10-CM

## 2022-02-09 DIAGNOSIS — R26.89 IMPAIRMENT OF BALANCE: ICD-10-CM

## 2022-02-09 DIAGNOSIS — M25.561 CHRONIC PAIN OF RIGHT KNEE: Primary | ICD-10-CM

## 2022-02-09 DIAGNOSIS — R26.9 GAIT ABNORMALITY: ICD-10-CM

## 2022-02-09 DIAGNOSIS — G89.29 CHRONIC PAIN OF RIGHT KNEE: Primary | ICD-10-CM

## 2022-02-09 PROCEDURE — 97112 NEUROMUSCULAR REEDUCATION: CPT | Performed by: PHYSICAL MEDICINE & REHABILITATION

## 2022-02-09 PROCEDURE — 97530 THERAPEUTIC ACTIVITIES: CPT | Performed by: PHYSICAL MEDICINE & REHABILITATION

## 2022-02-09 PROCEDURE — 97116 GAIT TRAINING THERAPY: CPT | Performed by: PHYSICAL MEDICINE & REHABILITATION

## 2022-02-09 NOTE — PROGRESS NOTES
Daily Note     Today's date: 2022  Patient name: Tatiana Siegel  : 1960  MRN: [id number]  Referring provider: Marisabel Santos, PT  Dx:   Encounter Diagnosis     ICD-10-CM    1  Chronic pain of right knee  M25 561     G89 29    2  Weakness of right lower extremity  R29 898    3  Impairment of balance  R26 89    4  Gait abnormality  R26 9                   Subjective: Patient presents with B walking sticks, decreased active DF control during ambulation  Objective: See treatment diary below    Assessment: Trialed DF assist with wrap, patient with improvement in gait post wrap application  Patient requires VCs throughout for form maintenance, momentary rest break to reset LE position and posture following intermittent balance instability during ambulation  Pt requires intermittent CGA/Vaishali during activity  Patient challenged with activity as charted  Discussed different options for DF bracing to reduce foot drop, patient reporting she already has a Ruben brace and will bring it next session  Patient with improvement in gait pattern at end of session, increased foot drop with fatigue from gym to waiting room  Assess response and continue as able nv  Plan: Continue with current POC to address pt deficits        Exercise Diary  (modified ex due to time constraints w/ progress note)    THEREX         Pt ed         Progress Note     25'+ discussion on progress, MAFO    Treadmill w/ CGA         Recumbent Bike          Gastroc/ Soleus Stretch    30"x3 ea b/l  30"x3 ea b/l      HR/TR   15x/15x  15x/15x     SLR flex/abd     NV! (unless doing as part of HEP) x15/x15   LAQ   2# 15x ea w /1-2" hold 2# 15x ea w/ 1-2" hold  2# x15 ea w/1-2" hold    Heel slides     NV! (unless doing as part of HEP) x15x   clamshells     NV x15 hookling red    NEURO RE-ED         WS to R - fwd/lat         Standing HS curls         Standing Marches         STS Low mat 10x, independent   15x from chair 15x from chair  x10 from low mat w/cues to root great toe into ground   SLS          clamshells         TB resisted hip strengthening   15x ea dir b/l  15x ea dir b/l      TB resisted sidestepping         Hurdles      1 sarah x10 lat emphasis on dorsiflexion   FTEO       FTEO 15"x2, mod tandem 10"x2 b/l    FTEC          Total Gym Squats     Pt has at home    Total Gym HR     Pt has at home     Foot placement onto step 6" 15x (int Vaishali)         Cone tap 15x         GAIT TRAINING 2/9         RW --> SPC once able BUE support, W/ DF wrap assist, throughout session  T/o tx session T/o tx session "step to" and "step through" [facility]- t/o tx session " step" and "step through" B/l canes x 2 laps + t/o visit   Stairs    4" x15 ea b/l  4"x 15 ea W/ SPC NV 6" x15 w/SPC             Therapeutic Activity          5x STS + set up        3'    TUG + set up ( and [facility])        4'

## 2022-02-10 ENCOUNTER — TELEPHONE (OUTPATIENT)
Dept: OBGYN CLINIC | Facility: HOSPITAL | Age: 62
End: 2022-02-10

## 2022-02-10 NOTE — TELEPHONE ENCOUNTER
Patient states she has been doing physical therapy for her right knee  She states she is in a great deal of pain and it is interfering with her walking  She wants to know if she can be seen on her 2/17 for her knee as well as her left pinky finger  I advised that typically doctors would like a separate appointment for a different body part  She states it is hard for her to get out so making a second appointment would be difficult  She would like me to ask you about seeing her for both problems on 2/17  Please advise  Pt's genia [phone]    Thank you

## 2022-02-14 ENCOUNTER — OFFICE VISIT (OUTPATIENT)
Dept: PHYSICAL THERAPY | Facility: CLINIC | Age: 62
End: 2022-02-14
Payer: COMMERCIAL

## 2022-02-14 DIAGNOSIS — R26.89 IMPAIRMENT OF BALANCE: ICD-10-CM

## 2022-02-14 DIAGNOSIS — R26.9 GAIT ABNORMALITY: ICD-10-CM

## 2022-02-14 DIAGNOSIS — R29.898 WEAKNESS OF RIGHT LOWER EXTREMITY: ICD-10-CM

## 2022-02-14 DIAGNOSIS — G89.29 CHRONIC PAIN OF RIGHT KNEE: Primary | ICD-10-CM

## 2022-02-14 DIAGNOSIS — M25.561 CHRONIC PAIN OF RIGHT KNEE: Primary | ICD-10-CM

## 2022-02-14 PROCEDURE — 97112 NEUROMUSCULAR REEDUCATION: CPT | Performed by: PHYSICAL MEDICINE & REHABILITATION

## 2022-02-14 PROCEDURE — 97530 THERAPEUTIC ACTIVITIES: CPT | Performed by: PHYSICAL MEDICINE & REHABILITATION

## 2022-02-14 PROCEDURE — 97116 GAIT TRAINING THERAPY: CPT | Performed by: PHYSICAL MEDICINE & REHABILITATION

## 2022-02-14 NOTE — PROGRESS NOTES
Daily Note     Today's date: 2022  Patient name: Tatiana Siegel  : 1960  MRN: [id number]  Referring provider: Marisabel Santos, PT  Dx:   Encounter Diagnosis     ICD-10-CM    1  Chronic pain of right knee  M25 561     G89 29    2  Weakness of right lower extremity  R29 898    3  Impairment of balance  R26 89    4  Gait abnormality  R26 9                   Subjective: Patient notes she was unable to find her DF brace at home  Increased fatigue/ perceived LE weakness    Objective: See treatment diary below    Assessment: Good tolerance to intervention as charted with increased fatigue today vs  Last session  VCs for form maintenance throughout with varying carryover  Improved form with increased attention and visual input required for proper foot placement  Plan: Continue with current POC to address pt deficits        Exercise Diary  (modified ex due to time constraints w/ progress note)    [facility]        Pt ed         Progress Note     25'+ discussion on progress, MAFO    Treadmill w/ CGA         Recumbent Bike         Gastroc/ Soleus Stretch    30"x3 ea b/l      HR/TR    15x/15x     SLR flex/abd     NV! (unless doing as part of HEP) x15/x15   LAQ    2# 15x ea w/ 1-2" hold  2# x15 ea w/1-2" hold    Heel slides     NV! (unless doing as part of HEP) x15x   clamshells     NV x15 hookling red    NEURO RE-ED        WS to R - fwd/lat  Lat, 10x5"       Standing HS curls         Standing Marches         STS Low mat 10x, independent Low mat, 2x5  15x from chair  x10 from low mat w/cues to root great toe into ground   SLS         clamshells         TB resisted hip strengthening    15x ea dir b/l      TB resisted sidestepping         Hurdles      1 sarah x10 lat emphasis on dorsiflexion   FTEO      FTEO 15"x2, mod tandem 10"x2 b/l    FTEC         Total Gym Squats     Pt has at home    Total Gym HR  Seated Foot placement onto 2" step placed laterally (flex/abd)   Pt has at home     Foot placement onto step 6" 15x (int Vaishali) 4" today, 10x stand        Cone tap 15x  Seated 15x       GAIT TRAINING 2/9 2/14        --> [facility] once able BUE support, W/ DF wrap assist, throughout session BUE support, W/ DF wrap assist, throughout session  T/o tx session "step to" and "step through" [facility]- t/o tx session " step" and "step through" B/l canes x 2 laps + t/o visit   Stairs    4"x 15 ea W/ SPC NV 6" x15 w/SPC            Therapeutic Activity         5x STS + set up       3'    TUG + set up ( and [facility])       4'

## 2022-02-16 ENCOUNTER — OFFICE VISIT (OUTPATIENT)
Dept: PHYSICAL THERAPY | Facility: CLINIC | Age: 62
End: 2022-02-16
Payer: COMMERCIAL

## 2022-02-16 DIAGNOSIS — R26.9 GAIT ABNORMALITY: ICD-10-CM

## 2022-02-16 DIAGNOSIS — M25.561 CHRONIC PAIN OF RIGHT KNEE: Primary | ICD-10-CM

## 2022-02-16 DIAGNOSIS — R29.898 WEAKNESS OF RIGHT LOWER EXTREMITY: ICD-10-CM

## 2022-02-16 DIAGNOSIS — R26.89 IMPAIRMENT OF BALANCE: ICD-10-CM

## 2022-02-16 DIAGNOSIS — G89.29 CHRONIC PAIN OF RIGHT KNEE: Primary | ICD-10-CM

## 2022-02-16 PROCEDURE — 97112 NEUROMUSCULAR REEDUCATION: CPT | Performed by: PHYSICAL MEDICINE & REHABILITATION

## 2022-02-16 PROCEDURE — 97530 THERAPEUTIC ACTIVITIES: CPT | Performed by: PHYSICAL MEDICINE & REHABILITATION

## 2022-02-16 PROCEDURE — 97116 GAIT TRAINING THERAPY: CPT | Performed by: PHYSICAL MEDICINE & REHABILITATION

## 2022-02-16 NOTE — PROGRESS NOTES
Daily Note     Today's date: 2022  Patient name: Liz Savage  : 1960  MRN: [id number]  Referring provider: Mert Gomez, PT  Dx:   Encounter Diagnosis     ICD-10-CM    1  Chronic pain of right knee  M25 561     G89 29    2  Weakness of right lower extremity  R29 898    3  Impairment of balance  R26 89    4  Gait abnormality  R26 9                   Subjective: Patient offers no new complaints to begin session  Objective: See treatment diary below    Assessment: Good tolerance to intervention as charted, improved ability to self-correct and pace during ambulation  Continued positive response to DF wrap  Improved elbow pain and posture with cueing for reduced forward reaching with walking sticks during ambulation  Good tolerance to standing balance at mirror without UE support, cueing to reduce R knee and hip flexion with fair carryover  Challenged with standing cone taps  Continue to progress as able  Plan: Continue with current POC to address pt deficits        Exercise Diary  (modified ex due to time constraints w/ progress note)    [facility]       Pt ed         Progress Note     25'+ discussion on progress, MAFO    Treadmill w/ CGA         Recumbent Bike         Gastroc/ Soleus Stretch         HR/TR         SLR flex/abd     NV! (unless doing as part of HEP) x15/x15   LAQ      2# x15 ea w/1-2" hold    Heel slides     NV! (unless doing as part of HEP) x15x   clamshells     NV x15 hookling red    NEURO RE-ED       WS to R - fwd/lat  Lat, 10x5"       Standing HS curls   Stading balance no UE support, 5' total      Standing Marches         STS Low mat 10x, independent Low mat, 2x5 Low mat 2x5   x10 from low mat w/cues to root great toe into ground   SLS         clamshells         TB resisted hip strengthening         TB resisted sidestepping         Hurdles      1 sarah x10 lat emphasis on dorsiflexion   FTEO      FTEO 15"x2, mod tandem 10"x2 b/l FTEC         Total Gym Squats     Pt has at home    Total Gym HR  Seated Foot placement onto 2" step placed laterally (flex/abd)   Pt has at home     Foot placement onto step 6" 15x (int Vaishali) 4" today, 10x stand        Cone tap 15x  Seated 15x Standing 7x      GAIT TRAINING 2/9 2/14 2/16       --> SPC once able BUE support, W/ DF wrap assist, throughout session BUE support, W/ DF wrap assist, throughout session VCs for AD placement throughout session  SPC- t/o tx session " step" and "step through" B/l canes x 2 laps + t/o visit   Stairs     W/ SPC NV 6" x15 w/SPC            Therapeutic Activity         5x STS + set up       3'    TUG + set up ( and [facility])       4'

## 2022-02-21 ENCOUNTER — OFFICE VISIT (OUTPATIENT)
Dept: PHYSICAL THERAPY | Facility: CLINIC | Age: 62
End: 2022-02-21
Payer: COMMERCIAL

## 2022-02-21 DIAGNOSIS — G89.29 CHRONIC PAIN OF RIGHT KNEE: Primary | ICD-10-CM

## 2022-02-21 DIAGNOSIS — R26.9 GAIT ABNORMALITY: ICD-10-CM

## 2022-02-21 DIAGNOSIS — R26.89 IMPAIRMENT OF BALANCE: ICD-10-CM

## 2022-02-21 DIAGNOSIS — R29.898 WEAKNESS OF RIGHT LOWER EXTREMITY: ICD-10-CM

## 2022-02-21 DIAGNOSIS — M25.561 CHRONIC PAIN OF RIGHT KNEE: Primary | ICD-10-CM

## 2022-02-21 PROCEDURE — 97112 NEUROMUSCULAR REEDUCATION: CPT | Performed by: PHYSICAL MEDICINE & REHABILITATION

## 2022-02-21 PROCEDURE — 97116 GAIT TRAINING THERAPY: CPT | Performed by: PHYSICAL MEDICINE & REHABILITATION

## 2022-02-21 PROCEDURE — 97530 THERAPEUTIC ACTIVITIES: CPT | Performed by: PHYSICAL MEDICINE & REHABILITATION

## 2022-02-21 NOTE — PROGRESS NOTES
Daily Note     Today's date: 2022  Patient name: Tatiana Siegel  : 1960  MRN: [id number]  Referring provider: Marisabel Santos, PT  Dx:   Encounter Diagnosis     ICD-10-CM    1  Chronic pain of right knee  M25 561     G89 29    2  Weakness of right lower extremity  R29 898    3  Impairment of balance  R26 89    4  Gait abnormality  R26 9                   Subjective: Patient notes increased fatigue today, presents with R ankle brace in place and RW  Objective: See treatment diary below    Assessment: Good tolerance to intervention as charted, increased fatigue throughout today  VCs for foot and walker placement to avoid tripping and anterior trunk lean  Continue nv as able per pt tolerance and presentation  Assisted pt down ramp and to car given uneven terrain  Plan: Continue with current POC to address pt deficits        Exercise Diary    THEREX      Pt ed         Progress Note         Treadmill w/ CGA         Recumbent Bike         Gastroc/ Soleus Stretch         HR/TR         SLR flex/abd      x15/x15   LAQ      2# x15 ea w/1-2" hold    Heel slides      x15x   clamshells      x15 hookling red    NEURO RE-ED      WS to R - fwd/lat  Lat, 10x5"       Standing HS curls   Stading balance no UE support, 5' total      Standing Marches         STS Low mat 10x, independent Low mat, 2x5 Low mat 2x5 Low mat 3x5  x10 from low mat w/cues to root great toe into ground   SLS         clamshells         TB resisted hip strengthening         TB resisted sidestepping         Hurdles      1 sarah x10 lat emphasis on dorsiflexion   FTEO      FTEO 15"x2, mod tandem 10"x2 b/l    FTEC         Total Gym Squats         Total Gym HR  Seated Foot placement onto 2" step placed laterally (flex/abd)  Seated 4" 15x no RUE support      Foot placement onto step 6" 15x (int Vaishali) 4" today, 10x stand  4" step, 10x ea seated/stand      Cone tap 15x  Seated 15x Standing 7x      GAIT TRAINING 2/9 2/14 2/16 2/21     RW --> SPC once able BUE support, W/ DF wrap assist, throughout session BUE support, W/ DF wrap assist, throughout session VCs for AD placement throughout session 3 laps in clinic throughout w/ RW  B/l canes x 2 laps + t/o visit   Stairs      6" x15 w/SPC            Therapeutic Activity         5x STS + set up           TUG + set up (RW and SPC)

## 2022-02-23 ENCOUNTER — APPOINTMENT (OUTPATIENT)
Dept: PHYSICAL THERAPY | Facility: CLINIC | Age: 62
End: 2022-02-23
Payer: COMMERCIAL

## 2022-02-24 ENCOUNTER — OFFICE VISIT (OUTPATIENT)
Dept: PHYSICAL THERAPY | Facility: CLINIC | Age: 62
End: 2022-02-24
Payer: COMMERCIAL

## 2022-02-24 DIAGNOSIS — M25.561 CHRONIC PAIN OF RIGHT KNEE: Primary | ICD-10-CM

## 2022-02-24 DIAGNOSIS — R29.898 WEAKNESS OF RIGHT LOWER EXTREMITY: ICD-10-CM

## 2022-02-24 DIAGNOSIS — G89.29 CHRONIC PAIN OF RIGHT KNEE: Primary | ICD-10-CM

## 2022-02-24 DIAGNOSIS — R26.9 GAIT ABNORMALITY: ICD-10-CM

## 2022-02-24 DIAGNOSIS — R26.89 IMPAIRMENT OF BALANCE: ICD-10-CM

## 2022-02-24 PROCEDURE — 97112 NEUROMUSCULAR REEDUCATION: CPT

## 2022-02-24 PROCEDURE — 97116 GAIT TRAINING THERAPY: CPT

## 2022-02-24 NOTE — PROGRESS NOTES
Daily Note     Today's date: 2022  Patient name: Maicol Rizvi  : 1960  MRN: [id number]  Referring provider: Cathie Radford, PT  Dx:   Encounter Diagnosis     ICD-10-CM    1  Chronic pain of right knee  M25 561     G89 29    2  Weakness of right lower extremity  R29 898    3  Impairment of balance  R26 89    4  Gait abnormality  R26 9                   Subjective: patient reports most difficulty with prolonged standing and feels she shifts most of her weight onto her L LE  She is waiting on a script to get a brace to help her L foot drop  Objective: See treatment diary below      Assessment: Added weight shifting onto Humac to work on being able to feel all parts of R foot with good tolerance; then performed static balance to ensure even weight distribution  CG throughout visit to ensure patient safety; adjusted cane to be height of other cane to improve gait  Plan: Continue with current POC to address pt deficits        Exercise Diary     THEREX      Pt ed         Progress Note         Treadmill w/ CGA         Recumbent Bike         Gastroc/ Soleus Stretch         HR/TR         SLR flex/abd         LAQ         Heel slides         clacarlos         NEURO RE-ED     WS to R - fwd/lat  Lat, 10x5"   On Humac: 5" x10 shift to R, static balance no UE support 10" x5     Standing HS curls   Stading balance no UE support, 5' total      Standing Marches         STS Low mat 10x, independent Low mat, 2x5 Low mat 2x5 Low mat 3x5 Low mat 3x5    SLS         clamshells         TB resisted hip strengthening         TB resisted sidestepping         Hurdles         FTEO         FTEC         Total Gym Squats         Total Gym HR  Seated Foot placement onto 2" step placed laterally (flex/abd)  Seated 4" 15x no RUE support Seated foot placement onto 4" step x15 no UE support      Foot placement onto step 6" 15x (int Vaishali) 4" today, 10x stand  4" step, 10x ea seated/stand Standing foot placement w/hands on bar 4" step x15     Cone tap 15x  Seated 15x Standing 7x      GAIT TRAINING 2/9 2/14 2/16 2/21 2/24    RW --> SPC once able BUE support, W/ DF wrap assist, throughout session BUE support, W/ DF wrap assist, throughout session VCs for AD placement throughout session 3 laps in clinic throughout w/ RW T/o +3 laps in clinic w/rahul canes, adjustment of L cane     Stairs                  Therapeutic Activity         5x STS + set up           TUG + set up (RW and SPC)

## 2022-02-28 ENCOUNTER — OFFICE VISIT (OUTPATIENT)
Dept: PHYSICAL THERAPY | Facility: CLINIC | Age: 62
End: 2022-02-28
Payer: COMMERCIAL

## 2022-02-28 DIAGNOSIS — R29.898 WEAKNESS OF RIGHT LOWER EXTREMITY: ICD-10-CM

## 2022-02-28 DIAGNOSIS — R26.9 GAIT ABNORMALITY: ICD-10-CM

## 2022-02-28 DIAGNOSIS — R26.89 IMPAIRMENT OF BALANCE: ICD-10-CM

## 2022-02-28 DIAGNOSIS — G89.29 CHRONIC PAIN OF RIGHT KNEE: Primary | ICD-10-CM

## 2022-02-28 DIAGNOSIS — M25.561 CHRONIC PAIN OF RIGHT KNEE: Primary | ICD-10-CM

## 2022-02-28 PROCEDURE — 97116 GAIT TRAINING THERAPY: CPT

## 2022-02-28 PROCEDURE — 97112 NEUROMUSCULAR REEDUCATION: CPT

## 2022-02-28 NOTE — PROGRESS NOTES
Daily Note     Today's date: 2022  Patient name: Gregory Wright  : 1960  MRN: [id number]  Referring provider: Yael Salinas, *  Dx:   Encounter Diagnosis     ICD-10-CM    1  Chronic pain of right knee  M25 561     G89 29    2  Weakness of right lower extremity  R29 898    3  Impairment of balance  R26 89    4  Gait abnormality  R26 9                   Subjective: patient ambulates into therapy with walker and knee brace  She reports her R knee has been more bothersome lately and reports 4/10 pain  She has f/u with ortho on Monday  Objective: See treatment diary below      Assessment: Added LOS on Humac this visit to work on proprioception/balance, posture; some increased knee pain by end of set therefore held at that point but will be beneficial in future visits depending on knee pain  Able to perform foot placement in standing with R foot onto 4 inch step with less supination  Cues to increase hip flexion during gait resulted in less dragging of R foot and able to strike heel  Patient would continue to benefit from skilled PT  Plan: Continue with current POC to address pt deficits        Exercise Diary    THEREX      Pt ed         Progress Note         Treadmill w/ CGA         Recumbent Bike         Gastroc/ Soleus Stretch         HR/TR         SLR flex/abd      abd-x10 ea b/l to ensure correct HEP technique   LAQ         Heel slides         clamshells         NEURO RE-ED    WS to R - fwd/lat  Lat, 10x5"   On Humac: 5" x10 shift to R, static balance no UE support 10" x5  On Humac 5" x10 shift to R, static balance no UE support 10"x5, LOS lvl 2 10" hold x1 bout   Standing HS curls   Stading balance no UE support, 5' total      Standing Marches         STS Low mat 10x, independent Low mat, 2x5 Low mat 2x5 Low mat 3x5 Low mat 3x5 Low mat 3x5   SLS         clamshells         TB resisted hip strengthening TB resisted sidestepping         Hurdles         FTEO         FTEC         Total Gym Squats         Total Gym HR  Seated Foot placement onto 2" step placed laterally (flex/abd)  Seated 4" 15x no RUE support Seated foot placement onto 4" step x15 no UE support  Seated foot placement onto 4" step x15 no UE support     Foot placement onto step 6" 15x (int Vaishali) 4" today, 10x stand  4" step, 10x ea seated/stand Standing foot placement w/hands on bar 4" step x15 Standing foot placement w/hands on bar 4" step x15    Cone tap 15x  Seated 15x Standing 7x      GAIT TRAINING 2/9 2/14 2/16 2/21 2/24 2/28   RW --> SPC once able BUE support, W/ DF wrap assist, throughout session BUE support, W/ DF wrap assist, throughout session VCs for AD placement throughout session 3 laps in clinic throughout w/ RW T/o +3 laps in clinic w/rahul canes, adjustment of L cane  T/o+3 laps in clinic with walker   Stairs                  Therapeutic Activity         5x STS + set up           TUG + set up (RW and SPC)

## 2022-03-02 ENCOUNTER — EVALUATION (OUTPATIENT)
Dept: PHYSICAL THERAPY | Facility: CLINIC | Age: 62
End: 2022-03-02
Payer: COMMERCIAL

## 2022-03-02 DIAGNOSIS — G89.29 CHRONIC PAIN OF RIGHT KNEE: Primary | ICD-10-CM

## 2022-03-02 DIAGNOSIS — R26.89 IMPAIRMENT OF BALANCE: ICD-10-CM

## 2022-03-02 DIAGNOSIS — R26.9 GAIT ABNORMALITY: ICD-10-CM

## 2022-03-02 DIAGNOSIS — R29.898 WEAKNESS OF RIGHT LOWER EXTREMITY: ICD-10-CM

## 2022-03-02 DIAGNOSIS — M25.561 CHRONIC PAIN OF RIGHT KNEE: Primary | ICD-10-CM

## 2022-03-02 PROCEDURE — 97112 NEUROMUSCULAR REEDUCATION: CPT | Performed by: PHYSICAL MEDICINE & REHABILITATION

## 2022-03-02 PROCEDURE — 97140 MANUAL THERAPY 1/> REGIONS: CPT | Performed by: PHYSICAL MEDICINE & REHABILITATION

## 2022-03-02 PROCEDURE — 97116 GAIT TRAINING THERAPY: CPT | Performed by: PHYSICAL MEDICINE & REHABILITATION

## 2022-03-02 NOTE — LETTER
2022    Nciole Centeno MD  [address] [place] [address]    Patient: Michael Zelaya   YOB: 1960   Date of Visit: 3/2/2022     Encounter Diagnosis     ICD-10-CM    1  Chronic pain of right knee  M25 561     G89 29    2  Weakness of right lower extremity  R29 898    3  Impairment of balance  R26 89    4  Gait abnormality  R26 9        Dear Dr Jonathan Yee:    Thank you for your recent referral of Michael Zelaya  Please review the attached evaluation summary from Catina's recent visit  Please verify that you agree with the plan of care by signing the attached order  If you have any questions or concerns, please do not hesitate to call  I sincerely appreciate the opportunity to share in the care of one of your patients and hope to have another opportunity to work with you in the near future  Sincerely,    Kiet Menon, PT      Referring Provider:      I certify that I have read the below Plan of Care and certify the need for these services furnished under this plan of treatment while under my care  Nicole Centeno MD  [address] [place] [address]  Via Fax: [phone]          PT Re-Evaluation     Today's date: 3/2/2022  Patient name: Michael Zelaya  : 1960  MRN: [id number]  Referring provider: Dat Serrato, *  Dx:   Encounter Diagnosis     ICD-10-CM    1  Chronic pain of right knee  M25 561     G89 29    2  Weakness of right lower extremity  R29 898    3  Impairment of balance  R26 89    4  Gait abnormality  R26 9                   Assessment  Assessment details: Patient has participated consistently in therapy and has made subjective and objective improvements in functional status  Patient continues with gait abnormality, R knee pain, decreased R ankle control, and is at risk for falls based on functional testing  Patient has script for [facility] and will be securing this in the near future   I believe that controlling ankle position will allow for more neutral knee position during functional tasks, therefore leading to decreased stress and sxs in the R knee  Patient would benefit from continued skilled intervention given increased risk for falls and continued gait abnormality  Impairments: abnormal gait, abnormal muscle tone, abnormal or restricted ROM, abnormal movement, activity intolerance, impaired balance, impaired physical strength, pain with function and safety issue  Understanding of Dx/Px/POC: good   Prognosis: good    Goals  ST  Pain decreased by 25% in 4-6 weeks  -- Not Met  2  Strength increased by 1/2 to 1 muscle grade in all deficient muscle groups in 4-6 weeks  - Partially Met  3  Pt will be independent w/ initial HEP in 1-2 visits  - Met    LT  Decrease pain to 1-2/10 at worst by d/c - Not met  2  Strength increased to 5 for all deficient muscle groups by d/c - Partially Met  3  IADL performance increased to max function by d/c - Not Met  4  Recreational performance increased to max function by d/c - Not Met  5  Pt will safely ambulate t/o clinic using SPC by d/c  - Partially Met  6  Pt will safely negotiate stairs using SPC by d/c  - Not Met  7  TUG decreased to < or = 14" by d/c  - Not Met  8  5xSTS decreased to < or = 14" by d/c -Not Met    Plan  Planned modality interventions: cryotherapy  Other planned modality interventions: other modalities PRN  Planned therapy interventions: abdominal trunk stabilization, ADL retraining, IADL retraining, balance, flexibility, functional ROM exercises, gait training, graded exercise, home exercise program, manual therapy, neuromuscular re-education, patient education, postural training, strengthening, therapeutic exercise, therapeutic activities and transfer training  Other planned therapy interventions: other interventions PRN  Frequency: 1-2x/week    Duration in weeks: 6  Plan of Care beginning date: 3/2/2022  Plan of Care expiration date: 4/13/2022  Treatment plan discussed with: patient        Subjective Evaluation    History of Present Illness  Mechanism of injury: CURRENT LEVEL (2/2/2022)  Pt reports that she has noticed improved ability to perform sit <-> stand transfers since starting skilled PT tx as she can intermittently transfer from couch to standing position w/o b/l UE support  She adds that her bed mobility has been getting easier when she plans ahead her movements  Since IE, pt has been able to transition from  to [facility] during entire tx session  However, she continues to be frustrated that her R foot catches the floor during ambulation especially when fatigued  Pt is in the process of looking into getting another MAFO as she no longer has her old one in possession  She reports that static standing is very difficult for her as she feels significant weakness still present in RLE  Pt reports strong compliance w/ HEP  INITIAL LEVEL (12/29/2021)  Pt reports to IE via Direct Access using RW w/ c/o R knee pain and overall weakness  She reports having a fall approx 5 years ago as a result of trip and fall over dog which resulted in fractured R ankle (tx'd conservatively), R lisfranc ligament repair, and R menisectomy (2018)  Pt reports having skilled PT tx at that time, but feels that she did not push herself enough at home and admits that she did not keep up w/ all exercises  She does regularly ride her stationary bike and intermittently does squats  Pt also w/ hx of MS for the past approximate 30 years which she states mostly affects RLE including feeling "like a sock is over leg and foot" when questioned about sensation  Pt reports that she has had a MAFO for R ankle in the past, but no longer has in her possession  Prior to approximately 1 year ago, pt did not need RW, but started using again as she started to notice more weakness/knee pain       Pt reports that she has had "stupid falls" in the past year (>2 times), but cannot recall exact number  Pt does not have any major injuries from falls  Pt reports the most difficulty transitioning to/from bed and to/from chair/toilet  She reports that she also had difficulty clearing R foot during ambulation  Pt states that her biggest complaint is that she is unable to walk w/ grandson (2 month old)  In addition to MS, pt also reports being "totally paralyzed" approx  30 years ago, but was able to fully recover  She is unsure of the cause  Pain  Pain scale: R knee: 0/10  Pain scale at highest: R knee: 5/10 on average, but 8/10 at worst   Location: R knee  Relieving factors: ice, rest and medications (OTC ibuprofen PRN)  Exacerbated by: transition to/from chair, transition to/from bed, walking      Social Support  Steps to enter house: yes (3 MARIUSZ w/ rail (alternates b/w STS and reciprocating fashion))  Stairs in house: yes (1 FF steps to basement to ride stationary bike (alternates/w STS and reciprocating fashion) )   Lives in: multiple-level home  Lives with: spouse    Employment status: not working (Pt has not worked for years due to [employer])  Patient Goals  Patient goals for therapy: increased strength and improved balance (Partially Met)  Patient goal: Walk without rolling walker (Partially Met)        Objective     Active Range of Motion     Right Knee   Flexion: 120 degrees   Extension: 5 degrees     Right Ankle/Foot   Dorsiflexion (ke): 4 degrees   Plantar flexion: 60 degrees   Inversion: 25 degrees   Eversion: 10 degrees     Passive Range of Motion     Right Knee   Flexion: 130 degrees   Extension: 7 degrees     Mobility   Patellar Mobility:     Right Knee   WFL: medial, lateral, superior and inferior    Strength/Myotome Testing     Right Knee   Flexion: 5  Extension: 5  Quadriceps contraction: good    Right Ankle/Foot   Dorsiflexion: 4+  Plantar flexion: 5  Inversion: 5  Eversion: 4+    Additional Strength Details  Hip Strength (R):   Flexion (assessed in supine via SLR): 4        Tests     Additional Tests Details  Palpation: TTP over adductor tubercle, pes anserine, over medial patellar space    (-) ligamentous laxity testing, however discomfort with varus/valgus stress at 30 deg          IE:  Gait:   3/2: with walking sticks patient with increased R foot drop increased with fatigue, frequent VCs for form maintenance and safety awareness during gait and transfers  Improved gait pattern and decreased forward trunk flexion, lateral weight shift with switch to using one stick on L    RW: Pt ambulates using RW w/ difficulty clearing R foot only as she fatigues  Pt no longer unsteady when negotiating turns w/ RW and is able to maintain COG w/in RYAN  Pt able to safely reach back for chair prior to sitting w/o cueing from PT      SPC Pt ambulates using SPC w/ difficulty clearing R foot usually only as she fatigues  Pt requires CG- CS to perform "step to" motion but has also been able to demonstrate "step through motion" for several minutes during tx sessions  No significant unsteadiness observed performing turns  TUG:   Rollator Walker: 29" w/ gait as described above  3/2: w/ walking sticks 35 sec, 33 sec    SPC: 36" w/ SPC - no unsteadiness turning around cone     5x STS: 38", but pt able to complete w/o UE support for 4/5 reps which she was unable to do previously  Pt did not demonstrate LOB w/ initial stand as she did at IE   3/2: 32" with intermittent UE assist  - Patient intermittently allows RLE to rest in ER during sit, leading to increased valgus position of knee        Daily Treatment Diary    EPOC: 3/2/2022  Precautions: FALL RISK;  Hx of MS affecting RLE  Co- Morbidities: Hx of MS affecting RLE      Exercise Diary 3/2  2/16 2/21 2/24 2/28   THEREX 3/2  2/16 2/21     Pt ed RE, LH        Progress Note         Treadmill w/ CGA         Recumbent Bike         Gastroc/ Soleus Stretch         HR/TR         SLR flex/abd      abd-x10 ea b/l to ensure correct HEP technique   LAQ         Heel slides         shailesh         NEURO RE-ED 3/2  2/16 2/21 2/24 2/28   WS to R - fwd/lat     On Humac: 5" x10 shift to R, static balance no UE support 10" x5  On Humac 5" x10 shift to R, static balance no UE support 10"x5, LOS lvl 2 10" hold x1 bout   Standing HS curls   Stading balance no UE support, 5' total      Standing Marches         STS   Low mat 2x5 Low mat 3x5 Low mat 3x5 Low mat 3x5   SLS         shailesh         TB resisted hip strengthening         TB resisted sidestepping         Hurdles         FTEO         FTEC         Total Gym Squats         Total Gym HR    Seated 4" 15x no RUE support Seated foot placement onto 4" step x15 no UE support  Seated foot placement onto 4" step x15 no UE support        4" step, 10x ea seated/stand Standing foot placement w/hands on bar 4" step x15 Standing foot placement w/hands on bar 4" step x15      Standing 7x      GAIT TRAINING   2/16 2/21 2/24 2/28   RW --> SPC once able   VCs for AD placement throughout session 3 laps in clinic throughout w/ RW T/o +3 laps in clinic w/rahul canes, adjustment of L cane  T/o+3 laps in clinic with walker   Stairs                  Therapeutic Activity         5x STS + set up         TUG + set up (RW and SPC)

## 2022-03-02 NOTE — PROGRESS NOTES
PT Re-Evaluation     Today's date: 3/2/2022  Patient name: Michael Zelaya  : 1960  MRN: [id number]  Referring provider: Dat Serrato, *  Dx:   Encounter Diagnosis     ICD-10-CM    1  Chronic pain of right knee  M25 561     G89 29    2  Weakness of right lower extremity  R29 898    3  Impairment of balance  R26 89    4  Gait abnormality  R26 9                   Assessment  Assessment details: Patient has participated consistently in therapy and has made subjective and objective improvements in functional status  Patient continues with gait abnormality, R knee pain, decreased R ankle control, and is at risk for falls based on functional testing  Patient has script for [facility] and will be securing this in the near future  I believe that controlling ankle position will allow for more neutral knee position during functional tasks, therefore leading to decreased stress and sxs in the R knee  Patient would benefit from continued skilled intervention given increased risk for falls and continued gait abnormality  Impairments: abnormal gait, abnormal muscle tone, abnormal or restricted ROM, abnormal movement, activity intolerance, impaired balance, impaired physical strength, pain with function and safety issue  Understanding of Dx/Px/POC: good   Prognosis: good    Goals  ST  Pain decreased by 25% in 4-6 weeks  -- Not Met  2  Strength increased by 1/2 to 1 muscle grade in all deficient muscle groups in 4-6 weeks  - Partially Met  3  Pt will be independent w/ initial HEP in 1-2 visits  - Met    LT  Decrease pain to 1-2/10 at worst by d/c - Not met  2  Strength increased to 5 for all deficient muscle groups by d/c - Partially Met  3  IADL performance increased to max function by d/c - Not Met  4  Recreational performance increased to max function by d/c - Not Met  5  Pt will safely ambulate t/o clinic using SPC by d/c  - Partially Met  6   Pt will safely negotiate stairs using SPC by d/c  - Not Met  7  TUG decreased to < or = 14" by d/c  - Not Met  8  5xSTS decreased to < or = 14" by d/c -Not Met    Plan  Planned modality interventions: cryotherapy  Other planned modality interventions: other modalities PRN  Planned therapy interventions: abdominal trunk stabilization, ADL retraining, IADL retraining, balance, flexibility, functional ROM exercises, gait training, graded exercise, home exercise program, manual therapy, neuromuscular re-education, patient education, postural training, strengthening, therapeutic exercise, therapeutic activities and transfer training  Other planned therapy interventions: other interventions PRN  Frequency: 1-2x/week  Duration in weeks: 6  Plan of Care beginning date: 3/2/2022  Plan of Care expiration date: 4/13/2022  Treatment plan discussed with: patient        Subjective Evaluation    History of Present Illness  Mechanism of injury: CURRENT LEVEL (2/2/2022)  Pt reports that she has noticed improved ability to perform sit <-> stand transfers since starting skilled PT tx as she can intermittently transfer from couch to standing position w/o b/l UE support  She adds that her bed mobility has been getting easier when she plans ahead her movements  Since IE, pt has been able to transition from  to [facility] during entire tx session  However, she continues to be frustrated that her R foot catches the floor during ambulation especially when fatigued  Pt is in the process of looking into getting another MAFO as she no longer has her old one in possession  She reports that static standing is very difficult for her as she feels significant weakness still present in RLE  Pt reports strong compliance w/ HEP  INITIAL LEVEL (12/29/2021)  Pt reports to IE via Direct Access using  w/ c/o R knee pain and overall weakness    She reports having a fall approx 5 years ago as a result of trip and fall over dog which resulted in fractured R ankle (tx'd conservatively), R lisfranc ligament repair, and R menisectomy (2018)  Pt reports having skilled PT tx at that time, but feels that she did not push herself enough at home and admits that she did not keep up w/ all exercises  She does regularly ride her stationary bike and intermittently does squats  Pt also w/ hx of MS for the past approximate 30 years which she states mostly affects RLE including feeling "like a sock is over leg and foot" when questioned about sensation  Pt reports that she has had a MAFO for R ankle in the past, but no longer has in her possession  Prior to approximately 1 year ago, pt did not need RW, but started using again as she started to notice more weakness/knee pain  Pt reports that she has had "stupid falls" in the past year (>2 times), but cannot recall exact number  Pt does not have any major injuries from falls  Pt reports the most difficulty transitioning to/from bed and to/from chair/toilet  She reports that she also had difficulty clearing R foot during ambulation  Pt states that her biggest complaint is that she is unable to walk w/ grandson (2 month old)  In addition to MS, pt also reports being "totally paralyzed" approx  30 years ago, but was able to fully recover  She is unsure of the cause  Pain  Pain scale: R knee: 0/10  Pain scale at highest: R knee: 5/10 on average, but 8/10 at worst   Location: R knee  Relieving factors: ice, rest and medications (OTC ibuprofen PRN)  Exacerbated by: transition to/from chair, transition to/from bed, walking      Social Support  Steps to enter house: yes (3 MARIUSZ w/ rail (alternates b/w STS and reciprocating fashion))  Stairs in house: yes (1 FF steps to basement to ride stationary bike (alternates/w STS and reciprocating fashion) )   Lives in: multiple-level home  Lives with: spouse    Employment status: not working (Pt has not worked for years due to [employer] 86)  Patient Goals  Patient goals for therapy: increased strength and improved balance (Partially Met)  Patient goal: Walk without rolling walker (Partially Met)        Objective     Active Range of Motion     Right Knee   Flexion: 120 degrees   Extension: 5 degrees     Right Ankle/Foot   Dorsiflexion (ke): 4 degrees   Plantar flexion: 60 degrees   Inversion: 25 degrees   Eversion: 10 degrees     Passive Range of Motion     Right Knee   Flexion: 130 degrees   Extension: 7 degrees     Mobility   Patellar Mobility:     Right Knee   WFL: medial, lateral, superior and inferior    Strength/Myotome Testing     Right Knee   Flexion: 5  Extension: 5  Quadriceps contraction: good    Right Ankle/Foot   Dorsiflexion: 4+  Plantar flexion: 5  Inversion: 5  Eversion: 4+    Additional Strength Details  Hip Strength (R):   Flexion (assessed in supine via SLR): 4        Tests     Additional Tests Details  Palpation: TTP over adductor tubercle, pes anserine, over medial patellar space    (-) ligamentous laxity testing, however discomfort with varus/valgus stress at 30 deg          IE:  Gait:   3/2: with walking sticks patient with increased R foot drop increased with fatigue, frequent VCs for form maintenance and safety awareness during gait and transfers  Improved gait pattern and decreased forward trunk flexion, lateral weight shift with switch to using one stick on L    RW: Pt ambulates using RW w/ difficulty clearing R foot only as she fatigues  Pt no longer unsteady when negotiating turns w/ RW and is able to maintain COG w/in RYAN  Pt able to safely reach back for chair prior to sitting w/o cueing from PT      SPC Pt ambulates using SPC w/ difficulty clearing R foot usually only as she fatigues  Pt requires CG- CS to perform "step to" motion but has also been able to demonstrate "step through motion" for several minutes during tx sessions  No significant unsteadiness observed performing turns       TUG:   Rollator Walker: 29" w/ gait as described above  3/2: w/ walking sticks 35 sec, 33 sec    SPC: 36" w/ SPC - no unsteadiness turning around cone     5x STS: 38", but pt able to complete w/o UE support for 4/5 reps which she was unable to do previously  Pt did not demonstrate LOB w/ initial stand as she did at IE   3/2: 32" with intermittent UE assist  - Patient intermittently allows RLE to rest in ER during sit, leading to increased valgus position of knee        Daily Treatment Diary    EPOC: 3/2/2022  Precautions: FALL RISK;  Hx of MS affecting RLE  Co- Morbidities: Hx of MS affecting RLE      Exercise Diary 3/2  2/16 2/21 2/24 2/28   THEREX 3/2  2/16 2/21     Pt ed RE, LH        Progress Note         Treadmill w/ CGA         Recumbent Bike         Gastroc/ Soleus Stretch         HR/TR         SLR flex/abd      abd-x10 ea b/l to ensure correct HEP technique   LAQ         Heel slides         clamshells         NEURO RE-ED 3/2  2/16 2/21 2/24 2/28   WS to R - fwd/lat     On Humac: 5" x10 shift to R, static balance no UE support 10" x5  On Humac 5" x10 shift to R, static balance no UE support 10"x5, LOS lvl 2 10" hold x1 bout   Standing HS curls   Stading balance no UE support, 5' total      Standing Marches         STS   Low mat 2x5 Low mat 3x5 Low mat 3x5 Low mat 3x5   SLS         clamshells         TB resisted hip strengthening         TB resisted sidestepping         Hurdles         FTEO         FTEC         Total Gym Squats         Total Gym HR    Seated 4" 15x no RUE support Seated foot placement onto 4" step x15 no UE support  Seated foot placement onto 4" step x15 no UE support        4" step, 10x ea seated/stand Standing foot placement w/hands on bar 4" step x15 Standing foot placement w/hands on bar 4" step x15      Standing 7x      GAIT TRAINING   2/16 2/21 2/24 2/28   RW --> SPC once able   VCs for AD placement throughout session 3 laps in clinic throughout w/ RW T/o +3 laps in clinic w/rahul canes, adjustment of L cane  T/o+3 laps in clinic with walker   Stairs                  Therapeutic Activity         5x STS + set up         TUG + set up (RW and SPC)

## 2022-03-08 ENCOUNTER — TELEPHONE (OUTPATIENT)
Dept: NEUROLOGY | Facility: CLINIC | Age: 62
End: 2022-03-08

## 2022-03-08 ENCOUNTER — APPOINTMENT (OUTPATIENT)
Dept: PHYSICAL THERAPY | Facility: CLINIC | Age: 62
End: 2022-03-08
Payer: COMMERCIAL

## 2022-03-08 NOTE — TELEPHONE ENCOUNTER
Patient called regarding medical records  She has a new patient consult appt w/Dr RAMESH on 3/17/22  She states we need to send letter requesting her medical records to [facility]  Called [facility]  (p) [phone]  Medical Records Dept  Spoke lila/Ese  She confirmed if they receive a letter from new provider's office requesting patient's medical records, they will release them w/o patient signing a Fyae Chiles form  Please fax letter to:    (f) [phone]    Dr Pascal Repress - are you agreeable to sending letter requesting new patient's medical records?

## 2022-03-09 ENCOUNTER — APPOINTMENT (OUTPATIENT)
Dept: PHYSICAL THERAPY | Facility: CLINIC | Age: 62
End: 2022-03-09
Payer: COMMERCIAL

## 2022-03-09 NOTE — TELEPHONE ENCOUNTER
Letter generated and faxed to [address] [place] [address] Dept @ [phone]    Patient made aware

## 2022-03-10 ENCOUNTER — APPOINTMENT (OUTPATIENT)
Dept: PHYSICAL THERAPY | Facility: CLINIC | Age: 62
End: 2022-03-10
Payer: COMMERCIAL

## 2022-03-14 ENCOUNTER — TELEPHONE (OUTPATIENT)
Dept: NEUROLOGY | Facility: CLINIC | Age: 62
End: 2022-03-14

## 2022-03-14 NOTE — TELEPHONE ENCOUNTER
Reminder appt call     Patient is scheduled on 3/17/2022 @ 200 pm with an arrival time  145 pm in the [facility] location with Dr Catarina Wright       Patient confirmed appt

## 2022-03-15 ENCOUNTER — OFFICE VISIT (OUTPATIENT)
Dept: PHYSICAL THERAPY | Facility: CLINIC | Age: 62
End: 2022-03-15
Payer: COMMERCIAL

## 2022-03-15 DIAGNOSIS — R29.898 WEAKNESS OF RIGHT LOWER EXTREMITY: ICD-10-CM

## 2022-03-15 DIAGNOSIS — G89.29 CHRONIC PAIN OF RIGHT KNEE: Primary | ICD-10-CM

## 2022-03-15 DIAGNOSIS — M25.561 CHRONIC PAIN OF RIGHT KNEE: Primary | ICD-10-CM

## 2022-03-15 PROCEDURE — 97112 NEUROMUSCULAR REEDUCATION: CPT

## 2022-03-15 PROCEDURE — 97116 GAIT TRAINING THERAPY: CPT

## 2022-03-15 PROCEDURE — 97110 THERAPEUTIC EXERCISES: CPT

## 2022-03-15 NOTE — PROGRESS NOTES
Daily Note     Today's date: 3/15/2022  Patient name: Maicol Rizvi  : 1960  MRN: [id number]  Referring provider: Nanette Wellington, *  Dx:   Encounter Diagnosis     ICD-10-CM    1  Chronic pain of right knee  M25 561     G89 29    2  Weakness of right lower extremity  R29 898        Start Time: 1145  Stop Time: 1230  Total time in clinic (min): 45 minutes    Subjective: Pt reports her knee is really painful for her today and she feels a little more unsteady  Pt has an apt to get AFO made at the end of the month  Objective: See treatment diary below      Assessment: Tolerated treatment well  Patient would benefit from continued PT  Pt had no knee pain once ACE bandage was used to mimic AFO  Upon ambulating into clinic, pt had a difficult time lifting leg and toe kept catching the floor  Improved ambulation post ACE bandage was applied  Discussed the need for the AFO to help improve ambulation and help reduce knee pain  Worked on [employer] today focusing of limits of stability and weight shifting  Pt demonstrated increased difficulty w/ shifting weight fwd and to the side  No LOB noted  Plan: Continue per plan of care        Exercise Diary 3/15   2/21 2/24 2/28   THEREX         Pt ed AFO/knee pain 10'        Progress Note         Treadmill w/ CGA         Recumbent Bike         Gastroc/ Soleus Stretch         HR/TR         SLR flex/abd      abd-x10 ea b/l to ensure correct HEP technique   LAQ         Heel slides         clamshells         NEURO RE-ED       WS to R - fwd/lat Humac limits of stability/ weight shifting 10'    On Humac: 5" x10 shift to R, static balance no UE support 10" x5  On Humac 5" x10 shift to R, static balance no UE support 10"x5, LOS lvl 2 10" hold x1 bout   Standing HS curls         Standing Marches         STS Low mat 3x5   Low mat 3x5 Low mat 3x5 Low mat 3x5   SLS         clamshells         TB resisted hip strengthening         TB resisted sidestepping Hurdles         FTEO         FTEC         Total Gym Squats         Total Gym HR Seated foot placement onto 4" step x15 no UE support    Seated 4" 15x no RUE support Seated foot placement onto 4" step x15 no UE support  Seated foot placement onto 4" step x15 no UE support     Standing foot placement w/hands on bar 4" step x15   4" step, 10x ea seated/stand Standing foot placement w/hands on bar 4" step x15 Standing foot placement w/hands on bar 4" step x15            GAIT TRAINING    2/21 2/24 2/28   RW --> SPC once able Walking sticks 3 laps   3 laps in clinic throughout w/ RW T/o +3 laps in clinic w/rahul canes, adjustment of L cane  T/o+3 laps in clinic with walker   Stairs                  Therapeutic Activity         5x STS + set up         TUG + set up (RW and SPC)

## 2022-03-17 ENCOUNTER — OFFICE VISIT (OUTPATIENT)
Dept: NEUROLOGY | Facility: CLINIC | Age: 62
End: 2022-03-17
Payer: COMMERCIAL

## 2022-03-17 VITALS
BODY MASS INDEX: 32.44 KG/M2 | HEIGHT: 64 IN | WEIGHT: 190 LBS | SYSTOLIC BLOOD PRESSURE: 122 MMHG | TEMPERATURE: 97.4 F | DIASTOLIC BLOOD PRESSURE: 78 MMHG | HEART RATE: 74 BPM

## 2022-03-17 DIAGNOSIS — G35 MS (MULTIPLE SCLEROSIS) (HCC): ICD-10-CM

## 2022-03-17 DIAGNOSIS — R26.2 AMBULATORY DYSFUNCTION: Primary | ICD-10-CM

## 2022-03-17 DIAGNOSIS — G81.91 HEMIPARESIS OF RIGHT DOMINANT SIDE, UNSPECIFIED HEMIPARESIS ETIOLOGY (HCC): ICD-10-CM

## 2022-03-17 PROCEDURE — 99205 OFFICE O/P NEW HI 60 MIN: CPT | Performed by: PSYCHIATRY & NEUROLOGY
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
## 2022-03-21 ENCOUNTER — APPOINTMENT (OUTPATIENT)
Dept: PHYSICAL THERAPY | Facility: CLINIC | Age: 62
End: 2022-03-21
Payer: COMMERCIAL

## 2022-03-23 ENCOUNTER — APPOINTMENT (OUTPATIENT)
Dept: PHYSICAL THERAPY | Facility: CLINIC | Age: 62
End: 2022-03-23
Payer: COMMERCIAL

## 2022-03-24 ENCOUNTER — APPOINTMENT (OUTPATIENT)
Dept: RADIOLOGY | Facility: CLINIC | Age: 62
End: 2022-03-24
Payer: COMMERCIAL

## 2022-03-24 ENCOUNTER — OFFICE VISIT (OUTPATIENT)
Dept: OBGYN CLINIC | Facility: CLINIC | Age: 62
End: 2022-03-24
Payer: COMMERCIAL

## 2022-03-24 VITALS
DIASTOLIC BLOOD PRESSURE: 80 MMHG | BODY MASS INDEX: 32.44 KG/M2 | HEART RATE: 77 BPM | SYSTOLIC BLOOD PRESSURE: 125 MMHG | WEIGHT: 190 LBS | HEIGHT: 64 IN

## 2022-03-24 DIAGNOSIS — S62.627A CLOSED DISPLACED FRACTURE OF MIDDLE PHALANX OF LEFT LITTLE FINGER, INITIAL ENCOUNTER: ICD-10-CM

## 2022-03-24 DIAGNOSIS — S63.637A SPRAIN OF INTERPHALANGEAL JOINT OF LEFT LITTLE FINGER, INITIAL ENCOUNTER: ICD-10-CM

## 2022-03-24 DIAGNOSIS — M17.11 PRIMARY OSTEOARTHRITIS OF RIGHT KNEE: Primary | ICD-10-CM

## 2022-03-24 DIAGNOSIS — M17.11 PRIMARY OSTEOARTHRITIS OF RIGHT KNEE: ICD-10-CM

## 2022-03-24 PROCEDURE — 99204 OFFICE O/P NEW MOD 45 MIN: CPT | Performed by: FAMILY MEDICINE

## 2022-03-24 PROCEDURE — 73564 X-RAY EXAM KNEE 4 OR MORE: CPT

## 2022-03-24 PROCEDURE — 73140 X-RAY EXAM OF FINGER(S): CPT

## 2022-03-24 RX ORDER — MELOXICAM 15 MG/1
15 TABLET ORAL DAILY
Qty: 30 TABLET | Refills: 2 | Status: SHIPPED | OUTPATIENT
Start: 2022-03-24

## 2022-03-24 NOTE — PROGRESS NOTES
Assessment/Plan:  Assessment/Plan   Diagnoses and all orders for this visit:    Primary osteoarthritis of right knee  -     meloxicam (Mobic) 15 mg tablet; Take 1 tablet (15 mg total) by mouth daily  -     XR knee 4+ vw right injury; Future  -     Injection Procedure Prior Authorization; Future    Closed displaced fracture of middle phalanx of left little finger, initial encounter  -     XR finger left fifth digit-livia; Future    Sprain of interphalangeal joint of left little finger, initial encounter        57year-old left-hand dominant female with left knee pain more than 1 year duration and left little finger pain 6 months duration  Discussed with patient physical exam, radiographs, impression and plan  X-rays of the right knee noted for degenerative changes  X-rays left hand noted for small fracture volar base of the 5th middle phalanx  Physical exam left little finger noted for swelling at the PIP joint  She has tenderness at the volar aspect and dorsum of the 5th PIP joint  She has intact flexion and extension of the digit  There is no appreciable collateral ligament laxity  Physical exam right knee noted for tenderness medial joint line  She has full extension and flexion to 110°  Clinical impression is that she sustained fracture and sprain to left little finger and she is symptomatic from degenerative changes of the right knee  Regards to her left little finger since she has intact flexion and extension mechanism advised against immobilization at this time and there is no need for invasive management  She is to apply topical diclofenac gel 3 to 4 times a day for the next 10 days to help with swelling and continue actively moving the finger to prevent stiffness  Regards to her right knee since she has been on NSAIDs and has had corticosteroid injection I offered patient Visco injection to which she agreed  We will request for one-shot Visco injection to the right knee    She will return for injection once approved  In the interim she is to start taking meloxicam 15 mg once daily with food for 30 days and during that time not to take any ibuprofen or Aleve, but may take Tylenol  She is to start taking tumeric at least 1000 mg daily, tart cherry at least 1000 mg daily, and glucosamine-chondroitin 2 to 3 times a day  She may also apply topical CBD  Subjective:   Patient ID: Liz Savage is a 57 y o  female  Chief Complaint   Patient presents with    Left Hand - Pain    Right Knee - Pain       69-year-old left hand dominant female presents for evaluation of left knee pain more than 1 year duration and left little finger pain 6 months duration  Reports having jammed her left finger about 6 months ago  She had pain described as sudden in onset, localized to the PIP joint of the 5th digit, throbbing, nonradiating, associated swelling, worse with movement and bending of the finger, and improved resting  She self-treated with a splint, icing, and NSAIDs  Intense symptoms subsided and she was able to move the finger  She has still been having bothersome pain particularly with bending and extending the digit  She denies any locking or catching  She has been experiencing right knee pain for quite some time reports having had corticosteroid injection 1 year ago without any improvement  She has had pain described as generalized knee but worse at the medial and lateral aspects, nonradiating, worse with bearing weight and ambulating, and improved with resting  Hand Pain  This is a new problem  The current episode started more than 1 month ago  The problem occurs daily  The problem has been waxing and waning  Associated symptoms include arthralgias and joint swelling  Pertinent negatives include no abdominal pain, chest pain, chills, fever, numbness, rash, sore throat or weakness  Exacerbated by: Gripping  She has tried rest, NSAIDs and immobilization for the symptoms   The treatment provided moderate relief  Knee Pain  This is a chronic problem  The current episode started more than 1 year ago  The problem occurs daily  The problem has been gradually worsening  Associated symptoms include arthralgias and joint swelling  Pertinent negatives include no abdominal pain, chest pain, chills, fever, numbness, rash, sore throat or weakness  The symptoms are aggravated by standing and walking  She has tried rest and NSAIDs (Corticosteroid injection) for the symptoms  The treatment provided mild relief  The following portions of the patient's history were reviewed and updated as appropriate: She  has a past medical history of Multiple sclerosis ([facility] [address] )  She  has a past surgical history that includes Tracheostomy;  section; Knee surgery; and Foot surgery (Right)  Her family history includes Cancer in her mother; Heart disease in her father; Hypertension in her mother; Kidney disease in her mother  She  reports that she has quit smoking  She has quit using smokeless tobacco  She reports previous alcohol use  She reports current drug use  Drug: Marijuana  She is allergic to levofloxacin, metronidazole, and other       Review of Systems   Constitutional: Negative for chills and fever  HENT: Negative for sore throat  Eyes: Negative for visual disturbance  Respiratory: Negative for shortness of breath  Cardiovascular: Negative for chest pain  Gastrointestinal: Negative for abdominal pain  Genitourinary: Negative for flank pain  Musculoskeletal: Positive for arthralgias and joint swelling  Skin: Negative for rash and wound  Neurological: Negative for weakness and numbness  Hematological: Does not bruise/bleed easily  Psychiatric/Behavioral: Negative for self-injury         Objective:  Vitals:    22 1110   BP: 125/80   Pulse: 77   Weight: 86 2 kg (190 lb)   Height: 5' 4" (1 626 m)     Right Knee Exam     Muscle Strength   The patient has normal right knee strength  Tenderness   The patient is experiencing tenderness in the medial joint line  Range of Motion   Extension: normal   Flexion: 110     Other   Swelling: none      Left Hand Exam     Muscle Strength   The patient has normal left wrist strength  Other   Sensation: normal  Pulse: present    Comments:  Little finger   -  swelling at the PIP joint  She has tenderness at the volar aspect and dorsum of the 5th PIP joint  She has intact flexion and extension of the digit  There is no appreciable collateral ligament laxity  Strength/Myotome Testing     Left Wrist/Hand   Normal wrist strength      Physical Exam  Vitals and nursing note reviewed  Constitutional:       General: She is not in acute distress  Appearance: She is well-developed  She is not ill-appearing or diaphoretic  HENT:      Head: Normocephalic  Right Ear: External ear normal       Left Ear: External ear normal    Eyes:      Conjunctiva/sclera: Conjunctivae normal    Neck:      Trachea: No tracheal deviation  Cardiovascular:      Rate and Rhythm: Normal rate  Pulmonary:      Effort: Pulmonary effort is normal  No respiratory distress  Abdominal:      General: There is no distension  Musculoskeletal:         General: Swelling and tenderness present  No deformity or signs of injury  Skin:     General: Skin is warm and dry  Coloration: Skin is not jaundiced or pale  Neurological:      Mental Status: She is alert and oriented to person, place, and time  Psychiatric:         Mood and Affect: Mood normal          Behavior: Behavior normal          Thought Content: Thought content normal          Judgment: Judgment normal          I have personally reviewed pertinent films in PACS and my interpretation is X-rays of the right knee noted for degenerative changes  X-rays left hand noted for small fracture volar base of the 5th middle phalanx  Lee Steele

## 2022-03-30 ENCOUNTER — OFFICE VISIT (OUTPATIENT)
Dept: PHYSICAL THERAPY | Facility: CLINIC | Age: 62
End: 2022-03-30
Payer: COMMERCIAL

## 2022-03-30 DIAGNOSIS — R29.898 WEAKNESS OF RIGHT LOWER EXTREMITY: ICD-10-CM

## 2022-03-30 DIAGNOSIS — M25.561 CHRONIC PAIN OF RIGHT KNEE: Primary | ICD-10-CM

## 2022-03-30 DIAGNOSIS — R26.89 IMPAIRMENT OF BALANCE: ICD-10-CM

## 2022-03-30 DIAGNOSIS — R26.9 GAIT ABNORMALITY: ICD-10-CM

## 2022-03-30 DIAGNOSIS — G89.29 CHRONIC PAIN OF RIGHT KNEE: Primary | ICD-10-CM

## 2022-03-30 PROCEDURE — 97116 GAIT TRAINING THERAPY: CPT | Performed by: PHYSICAL MEDICINE & REHABILITATION

## 2022-03-30 PROCEDURE — 97530 THERAPEUTIC ACTIVITIES: CPT | Performed by: PHYSICAL MEDICINE & REHABILITATION

## 2022-03-30 PROCEDURE — 97112 NEUROMUSCULAR REEDUCATION: CPT | Performed by: PHYSICAL MEDICINE & REHABILITATION

## 2022-03-30 NOTE — PROGRESS NOTES
Daily Note     Today's date: 3/30/2022  Patient name: Dolores Kanner  : 1960  MRN: [id number]  Referring provider: Esme Coyne, *  Dx:   Encounter Diagnosis     ICD-10-CM    1  Chronic pain of right knee  M25 561     G89 29    2  Weakness of right lower extremity  R29 898    3  Impairment of balance  R26 89    4  Gait abnormality  R26 9                   Subjective: Patient returns to therapy with increased balance difficulty, ambulating with walking sticks  Objective: See treatment diary below    Assessment: Tolerated treatment well overall  (+) LE fatigue with intervention as charted  Frequent cueing for RLE placement with fair carryover  Patient would benefit from continued PT  Continue as able with progression of dynamic balance  Plan: Continue per plan of care        Exercise Diary 3/15 3/30  2/21 2/24 2/28   THEREX         Pt ed AFO/knee pain 10'        Progress Note         Treadmill w/ CGA         Recumbent Bike         Gastroc/ Soleus Stretch         HR/TR         SLR flex/abd      abd-x10 ea b/l to ensure correct HEP technique   LAQ         Heel slides         [facility]         NEURO RE-ED  3/30  2/21 2/24 2/28   WS to R - fwd/lat Humac limits of stability/ weight shifting 10'    On Humac: 5" x10 shift to R, static balance no UE support 10" x5  On Humac 5" x10 shift to R, static balance no UE support 10"x5, LOS lvl 2 10" hold x1 bout   Standing HS curls         Standing Marches  20x       STS Low mat 3x5 Low mat 2x5  Low mat 3x5 Low mat 3x5 Low mat 3x5   SLS  Lateral stepping 3 laps       clamshells  Walking march 3 laps       TB resisted hip strengthening         TB resisted sidestepping         Hurdles         FTEO         FTEC         Total Gym Squats         Total Gym HR Seated foot placement onto 4" step x15 no UE support    Seated 4" 15x no RUE support Seated foot placement onto 4" step x15 no UE support  Seated foot placement onto 4" step x15 no UE support Standing foot placement w/hands on bar 4" step x15 Standing 6" 15x  4" step, 10x ea seated/stand Standing foot placement w/hands on bar 4" step x15 Standing foot placement w/hands on bar 4" step x15            GAIT TRAINING  3/30  2/21 2/24 2/28   RW --> SPC once able Walking sticks 3 laps 3 laps w/ ACE wrap, 1-2 sticks  3 laps in clinic throughout w/ RW T/o +3 laps in clinic w/rahul canes, adjustment of L cane  T/o+3 laps in clinic with walker   Stairs                  Therapeutic Activity         5x STS + set up         TUG + set up (RW and SPC)

## 2022-04-04 ENCOUNTER — OFFICE VISIT (OUTPATIENT)
Dept: PHYSICAL THERAPY | Facility: CLINIC | Age: 62
End: 2022-04-04
Payer: COMMERCIAL

## 2022-04-04 DIAGNOSIS — M25.561 CHRONIC PAIN OF RIGHT KNEE: Primary | ICD-10-CM

## 2022-04-04 DIAGNOSIS — R29.898 WEAKNESS OF RIGHT LOWER EXTREMITY: ICD-10-CM

## 2022-04-04 DIAGNOSIS — R26.9 GAIT ABNORMALITY: ICD-10-CM

## 2022-04-04 DIAGNOSIS — G89.29 CHRONIC PAIN OF RIGHT KNEE: Primary | ICD-10-CM

## 2022-04-04 DIAGNOSIS — R26.89 IMPAIRMENT OF BALANCE: ICD-10-CM

## 2022-04-04 PROCEDURE — 97116 GAIT TRAINING THERAPY: CPT | Performed by: PHYSICAL MEDICINE & REHABILITATION

## 2022-04-04 PROCEDURE — 97530 THERAPEUTIC ACTIVITIES: CPT | Performed by: PHYSICAL MEDICINE & REHABILITATION

## 2022-04-04 PROCEDURE — 97112 NEUROMUSCULAR REEDUCATION: CPT | Performed by: PHYSICAL MEDICINE & REHABILITATION

## 2022-04-04 NOTE — PROGRESS NOTES
Daily Note     Today's date: 2022  Patient name: Ana Maria Olson  : 1960  MRN: [id number]  Referring provider: Frida Gonzalez, *  Dx:   Encounter Diagnosis     ICD-10-CM    1  Chronic pain of right knee  M25 561     G89 29    2  Weakness of right lower extremity  R29 898    3  Impairment of balance  R26 89    4  Gait abnormality  R26 9                   Subjective: Patient notes fatigue over the weekend, was able to complete HEP twice yesterday  Objective: See treatment diary below    Assessment: Tolerated treatment well overall  Decreased frequency of near-LOB this session vs  Last  Patient would benefit from continued PT  Continue as able with progression of dynamic balance  Plan to include Humac next visit for balance training  Plan: Continue per plan of care        Exercise Diary 3/15 3/30 4/4  2/24 2/28   THEREX         Pt ed AFO/knee pain 10'        Progress Note         Treadmill w/ CGA         Recumbent Bike         Gastroc/ Soleus Stretch         HR/TR         SLR flex/abd      abd-x10 ea b/l to ensure correct HEP technique   LAQ         Heel slides         [facility]         NE[facility]O RE-ED  3/30 4/4  2/24 2/28   WS to R - fwd/lat Humac limits of stability/ weight shifting 10'    On Humac: 5" x10 shift to R, static balance no UE support 10" x5  On Humac 5" x10 shift to R, static balance no UE support 10"x5, LOS lvl 2 10" hold x1 bout   Standing HS curls         Standing March  20x 10x ea      STS Low mat 3x5 Low mat 2x5 Low mat 2x5  Low mat 3x5 Low mat 3x5   SLS  Lateral stepping 3 laps       clamshells  Walking march 3 laps       TB resisted hip strengthening   Palloff press YTB 2x10 ea      TB resisted sidestepping   Leg press      Hurdles         FTEO         FTEC         Total Gym Squats         Total Gym HR Seated foot placement onto 4" step x15 no UE support     Seated foot placement onto 4" step x15 no UE support  Seated foot placement onto 4" step x15 no UE support Standing foot placement w/hands on bar 4" step x15 Standing 6" 15x 6" 10x  Standing foot placement w/hands on bar 4" step x15 Standing foot placement w/hands on bar 4" step x15            GAIT TRAINING  3/30 4/4  2/24 2/28   RW --> SPC once able Walking sticks 3 laps 3 laps w/ ACE wrap, 1-2 sticks   T/o +3 laps in clinic w/rahul canes, adjustment of L cane  T/o+3 laps in clinic with walker   Stairs                  Therapeutic Activity         5x STS + set up         TUG + set up (RW and SPC)

## 2022-04-06 ENCOUNTER — OFFICE VISIT (OUTPATIENT)
Dept: PHYSICAL THERAPY | Facility: CLINIC | Age: 62
End: 2022-04-06
Payer: COMMERCIAL

## 2022-04-06 DIAGNOSIS — M25.561 CHRONIC PAIN OF RIGHT KNEE: Primary | ICD-10-CM

## 2022-04-06 DIAGNOSIS — R29.898 WEAKNESS OF RIGHT LOWER EXTREMITY: ICD-10-CM

## 2022-04-06 DIAGNOSIS — R26.89 IMPAIRMENT OF BALANCE: ICD-10-CM

## 2022-04-06 DIAGNOSIS — R26.9 GAIT ABNORMALITY: ICD-10-CM

## 2022-04-06 DIAGNOSIS — G89.29 CHRONIC PAIN OF RIGHT KNEE: Primary | ICD-10-CM

## 2022-04-06 PROCEDURE — 97110 THERAPEUTIC EXERCISES: CPT | Performed by: PHYSICAL MEDICINE & REHABILITATION

## 2022-04-06 PROCEDURE — 97112 NEUROMUSCULAR REEDUCATION: CPT | Performed by: PHYSICAL MEDICINE & REHABILITATION

## 2022-04-06 PROCEDURE — 97530 THERAPEUTIC ACTIVITIES: CPT | Performed by: PHYSICAL MEDICINE & REHABILITATION

## 2022-04-06 NOTE — PROGRESS NOTES
Daily Note     Today's date: 2022  Patient name: Ana Maria Olson  : 1960  MRN: [id number]  Referring provider: Frida Gonzalez, *  Dx:   Encounter Diagnosis     ICD-10-CM    1  Chronic pain of right knee  M25 561     G89 29    2  Weakness of right lower extremity  R29 898    3  Impairment of balance  R26 89    4  Gait abnormality  R26 9                   Subjective: Patient offers no new complaints to begin session  Objective: See treatment diary below    Assessment: Tolerated treatment well overall  Positive response in terms of WBing with Humac activity  % WBing reveals increased weight shift to R vs  L, which when combined with increased R trunk shift during swing phase likely further reduced foot clearance  Frequent VCs during ambulation and dynamic balance activity for form maintenance ans safety awareness  (+) fatigue at end of session evidenced by decreased RLE control  Patient would benefit from continued PT  Continue as able  Plan: Continue per plan of care        Exercise Diary 3/15 3/30 4/4 4/6  2/28   THEREX         Pt ed AFO/knee pain 10'        Progress Note         Treadmill w/ CGA         Recumbent Bike         Gastroc/ Soleus Stretch         HR/TR         SLR flex/abd      abd-x10 ea b/l to ensure correct HEP technique   LAQ         Heel slides         clamshells         NEURO RE-ED  3/30 4/4 4/6  2/28   WS to R - fwd/lat Humac limits of stability/ weight shifting 10'   LOS, Weight shift,   On Humac 5" x10 shift to R, static balance no UE support 10"x5, LOS lvl 2 10" hold x1 bout   Standing HS curls    Rockerboard 20 taps A/P     Standing Marches  20x 10x ea      STS Low mat 3x5 Low mat 2x5 Low mat 2x5 Low mat 2x5  Low mat 3x5   SLS  Lateral stepping 3 laps  2 laps     clamshells  Walking march 3 laps  2 laps     TB resisted hip strengthening   Palloff press YTB 2x10 ea YTB 2x10 ea     TB resisted sidestepping   Leg press 75# 4x10     Hurdles         FTEO         FTEC Total Gym Squats         Total Gym HR Seated foot placement onto 4" step x15 no UE support      Seated foot placement onto 4" step x15 no UE support     Standing foot placement w/hands on bar 4" step x15 Standing 6" 15x 6" 10x   Standing foot placement w/hands on bar 4" step x15            GAIT TRAINING  3/30 4/4 4/6  2/28   RW --> SPC once able Walking sticks 3 laps 3 laps w/ ACE wrap, 1-2 sticks  throughout w/ 1 stick on L  T/o+3 laps in clinic with walker   Stairs                  Therapeutic Activity         5x STS + set up         TUG + set up (RW and SPC)

## 2022-04-11 ENCOUNTER — OFFICE VISIT (OUTPATIENT)
Dept: PHYSICAL THERAPY | Facility: CLINIC | Age: 62
End: 2022-04-11
Payer: COMMERCIAL

## 2022-04-11 DIAGNOSIS — M25.561 CHRONIC PAIN OF RIGHT KNEE: Primary | ICD-10-CM

## 2022-04-11 DIAGNOSIS — R26.89 IMPAIRMENT OF BALANCE: ICD-10-CM

## 2022-04-11 DIAGNOSIS — R29.898 WEAKNESS OF RIGHT LOWER EXTREMITY: ICD-10-CM

## 2022-04-11 DIAGNOSIS — G89.29 CHRONIC PAIN OF RIGHT KNEE: Primary | ICD-10-CM

## 2022-04-11 DIAGNOSIS — R26.9 GAIT ABNORMALITY: ICD-10-CM

## 2022-04-11 PROCEDURE — 97530 THERAPEUTIC ACTIVITIES: CPT | Performed by: PHYSICAL MEDICINE & REHABILITATION

## 2022-04-11 PROCEDURE — 97112 NEUROMUSCULAR REEDUCATION: CPT | Performed by: PHYSICAL MEDICINE & REHABILITATION

## 2022-04-11 PROCEDURE — 97110 THERAPEUTIC EXERCISES: CPT | Performed by: PHYSICAL MEDICINE & REHABILITATION

## 2022-04-11 NOTE — PROGRESS NOTES
Daily Note     Today's date: 2022  Patient name: Kizzy Archer  : 1960  MRN: [id number]  Referring provider: Prakash Perez, *  Dx:   Encounter Diagnosis     ICD-10-CM    1  Chronic pain of right knee  M25 561     G89 29    2  Weakness of right lower extremity  R29 898    3  Impairment of balance  R26 89    4  Gait abnormality  R26 9                   Subjective: Patient notes fall on Saturday, reports no pain or residual effects today  Objective: See treatment diary below      Assessment: Tolerated treatment well  Continued positive response to balance training  Patient demonstrated fatigue post treatment, exhibited good technique with therapeutic exercises and would benefit from continued PT  Continue as able  Plan: Continue per plan of care  Exercise Diary 3/15 3/30 4/4 4/6 4/11    THEREX         Pt ed AFO/knee pain 10'        Progress Note         Treadmill w/ CGA         Recumbent Bike         Gastroc/ Soleus Stretch         HR/TR         SLR flex/abd         LAQ         Heel slides         clamshells         NEURO RE-ED  3/30 4/4 4/6 4/11    WS to R - fwd/lat Humac limits of stability/ weight shifting 10'   LOS, Weight shift,  LOS, wt  Shift   WBing, 20 min total    Standing HS curls    Rockerboard 20 taps A/P 20 taps A/P    Standing Marches  20x 10x ea      STS Low mat 3x5 Low mat 2x5 Low mat 2x5 Low mat 2x5 Low mat 2x5    SLS  Lateral stepping 3 laps  2 laps     clamshells  Walking march 3 laps  2 laps     TB resisted hip strengthening   Palloff press YTB 2x10 ea YTB 2x10 ea     TB resisted sidestepping   Leg press 75# 4x10 85# 4x10    Hurdles         FTEO         FTEC         Total Gym Squats         Total Gym HR Seated foot placement onto 4" step x15 no UE support          Standing foot placement w/hands on bar 4" step x15 Standing 6" 15x 6" 10x  6" alt 15x ea             GAIT TRAINING  3/30 4/4 4/6 4/11    RW --> SPC once able Walking sticks 3 laps 3 laps w/ ACE wrap, 1-2 sticks  throughout w/ 1 stick on L throughout    Stairs                  Therapeutic Activity         5x STS + set up         TUG + set up (RW and SPC)

## 2022-04-13 ENCOUNTER — OFFICE VISIT (OUTPATIENT)
Dept: PHYSICAL THERAPY | Facility: CLINIC | Age: 62
End: 2022-04-13
Payer: COMMERCIAL

## 2022-04-13 DIAGNOSIS — G89.29 CHRONIC PAIN OF RIGHT KNEE: Primary | ICD-10-CM

## 2022-04-13 DIAGNOSIS — M25.561 CHRONIC PAIN OF RIGHT KNEE: Primary | ICD-10-CM

## 2022-04-13 DIAGNOSIS — R26.89 IMPAIRMENT OF BALANCE: ICD-10-CM

## 2022-04-13 DIAGNOSIS — R29.898 WEAKNESS OF RIGHT LOWER EXTREMITY: ICD-10-CM

## 2022-04-13 DIAGNOSIS — R26.9 GAIT ABNORMALITY: ICD-10-CM

## 2022-04-13 PROCEDURE — 97116 GAIT TRAINING THERAPY: CPT | Performed by: PHYSICAL MEDICINE & REHABILITATION

## 2022-04-13 PROCEDURE — 97112 NEUROMUSCULAR REEDUCATION: CPT | Performed by: PHYSICAL MEDICINE & REHABILITATION

## 2022-04-13 PROCEDURE — 97110 THERAPEUTIC EXERCISES: CPT | Performed by: PHYSICAL MEDICINE & REHABILITATION

## 2022-04-13 NOTE — PROGRESS NOTES
Daily Note     Today's date: 2022  Patient name: Washington Anaya  : 1960  MRN: [id number]  Referring provider: Ambrocio Hayes, *  Dx:   Encounter Diagnosis     ICD-10-CM    1  Chronic pain of right knee  M25 561     G89 29    2  Weakness of right lower extremity  R29 898    3  Impairment of balance  R26 89    4  Gait abnormality  R26 9                   Subjective: Patient notes some fatigue today  Objective: See treatment diary below      Assessment: Tolerated treatment well  Increased rest breaks secondary to LE fatigue today  Patient demonstrated fatigue post treatment, exhibited good technique with therapeutic exercises and would benefit from continued PT  Continue as able  Plan: Continue per plan of care  Exercise Diary 3/15 3/30 4/4 4/6 4/11 4/13   THEREX         Pt ed AFO/knee pain 10'        Progress Note         Treadmill w/ CGA         Recumbent Bike         Gastroc/ Soleus Stretch         HR/TR         SLR flex/abd         LAQ         Heel slides         clamshells         NEURO RE-ED  3/30 4/4 4/6 4/11 4/13   WS to R - fwd/lat Humac limits of stability/ weight shifting 10'   LOS, Weight shift,  LOS, wt  Shift  WBing, 20 min total LOS, wt  Shift   WBing, 20 min total   Standing HS curls    Rockerboard 20 taps A/P 20 taps A/P 20 taps   Standing Marches  20x 10x ea      STS Low mat 3x5 Low mat 2x5 Low mat 2x5 Low mat 2x5 Low mat 2x5 2x5   SLS  Lateral stepping 3 laps  2 laps     clamshells  Walking march 3 laps  2 laps     TB resisted hip strengthening   Palloff press YTB 2x10 ea YTB 2x10 ea     TB resisted sidestepping   Leg press 75# 4x10 85# 4x10 85# 4x10   Hurdles         FTEO         FTEC         Total Gym Squats         Total Gym HR Seated foot placement onto 4" step x15 no UE support          Standing foot placement w/hands on bar 4" step x15 Standing 6" 15x 6" 10x  6" alt 15x ea             GAIT TRAINING  3/30 4/4 4/6 4/11 4/13   RW --> SPC once able Walking sticks 3 laps 3 laps w/ ACE wrap, 1-2 sticks  throughout w/ 1 stick on L throughout throughout   Stairs                  Therapeutic Activity         5x STS + set up         TUG + set up (RW and SPC)

## 2022-04-18 ENCOUNTER — APPOINTMENT (OUTPATIENT)
Dept: PHYSICAL THERAPY | Facility: CLINIC | Age: 62
End: 2022-04-18
Payer: COMMERCIAL

## 2022-04-19 ENCOUNTER — PROCEDURE VISIT (OUTPATIENT)
Dept: OBGYN CLINIC | Facility: CLINIC | Age: 62
End: 2022-04-19
Payer: COMMERCIAL

## 2022-04-19 VITALS
WEIGHT: 190 LBS | HEART RATE: 73 BPM | BODY MASS INDEX: 32.44 KG/M2 | DIASTOLIC BLOOD PRESSURE: 80 MMHG | SYSTOLIC BLOOD PRESSURE: 133 MMHG | HEIGHT: 64 IN

## 2022-04-19 DIAGNOSIS — M17.11 PRIMARY OSTEOARTHRITIS OF RIGHT KNEE: Primary | ICD-10-CM

## 2022-04-19 PROCEDURE — 20610 DRAIN/INJ JOINT/BURSA W/O US: CPT | Performed by: FAMILY MEDICINE

## 2022-04-19 RX ORDER — BUPIVACAINE HYDROCHLORIDE 2.5 MG/ML
1 INJECTION, SOLUTION INFILTRATION; PERINEURAL
Status: COMPLETED | OUTPATIENT
Start: 2022-04-19 | End: 2022-04-19

## 2022-04-19 RX ORDER — LIDOCAINE HYDROCHLORIDE 10 MG/ML
3 INJECTION, SOLUTION INFILTRATION; PERINEURAL
Status: COMPLETED | OUTPATIENT
Start: 2022-04-19 | End: 2022-04-19

## 2022-04-19 RX ADMIN — BUPIVACAINE HYDROCHLORIDE 1 ML: 2.5 INJECTION, SOLUTION INFILTRATION; PERINEURAL at 13:33

## 2022-04-19 RX ADMIN — LIDOCAINE HYDROCHLORIDE 3 ML: 10 INJECTION, SOLUTION INFILTRATION; PERINEURAL at 13:33

## 2022-04-19 NOTE — PROGRESS NOTES
Assessment/Plan:  Assessment/Plan   Diagnoses and all orders for this visit:    Primary osteoarthritis of right knee  -     Large joint arthrocentesis: R knee      76-year-old female with osteoarthritis of right knee right knee pain more than 1 year duration  Clinical impression is that she is symptomatic from degenerative changes of the knee  She agreed to proceed with viscosupplementation  I administered Synvisc-One to the right knee without complication  She is advised to allow 3-4 weeks before she may notice full effects of Visco supplement  If Visco injection is to be repeated we must wait at least 6 months, however she may receive corticosteroid injections that are at least 3 months apart from each other  She will follow up as needed  Subjective:   Patient ID: Meg Kim is a 57 y o  female  Chief Complaint   Patient presents with    Right Knee - Follow-up, Pain       76-year-old female with osteoarthritis of right knee following for right knee pain more than 1 year duration  She was last seen by me nearly 4 weeks ago which point we requested for one-shot Visco injection  She was prescribed meloxicam 15 mg once daily and advised on taking supplements  She presents today for Visco injection to the right knee  She has pain that is generalized to the knee but worse at the medial and lateral aspects, nonradiating, worse with bearing weight and ambulating, and improved with resting  Knee Pain  This is a chronic problem  The current episode started more than 1 year ago  The problem occurs daily  The problem has been unchanged  Associated symptoms include arthralgias  Pertinent negatives include no joint swelling, numbness or weakness  The symptoms are aggravated by standing, walking and bending  She has tried rest (Supplements, topical diclofenac) for the symptoms  The treatment provided mild relief  Review of Systems   Musculoskeletal: Positive for arthralgias   Negative for joint swelling  Neurological: Negative for weakness and numbness  Objective:  Vitals:    04/19/22 1306   BP: 133/80   Pulse: 73   Weight: 86 2 kg (190 lb)   Height: 5' 4" (1 626 m)     Right Knee Exam     Other   Swelling: none            Physical Exam  Vitals and nursing note reviewed  Constitutional:       General: She is not in acute distress  Appearance: She is well-developed  She is not ill-appearing or diaphoretic  HENT:      Head: Normocephalic  Right Ear: External ear normal       Left Ear: External ear normal    Eyes:      Conjunctiva/sclera: Conjunctivae normal    Neck:      Trachea: No tracheal deviation  Cardiovascular:      Rate and Rhythm: Normal rate  Pulmonary:      Effort: Pulmonary effort is normal  No respiratory distress  Abdominal:      General: There is no distension  Skin:     General: Skin is warm and dry  Coloration: Skin is not jaundiced or pale  Neurological:      Mental Status: She is alert and oriented to person, place, and time  Psychiatric:         Mood and Affect: Mood normal          Behavior: Behavior normal          Thought Content: Thought content normal          Judgment: Judgment normal                  Large joint arthrocentesis: R knee  Universal Protocol:  Consent: Verbal consent obtained  Risks and benefits: risks, benefits and alternatives were discussed  Consent given by: patient  Time out: Immediately prior to procedure a "time out" was called to verify the correct patient, procedure, equipment, support staff and site/side marked as required    Patient understanding: patient states understanding of the procedure being performed  Patient consent: the patient's understanding of the procedure matches consent given  Procedure consent: procedure consent matches procedure scheduled  Relevant documents: relevant documents present and verified  Test results: test results available and properly labeled  Site marked: the operative site was marked  Radiology Images displayed and confirmed   If images not available, report reviewed: imaging studies available  Required items: required blood products, implants, devices, and special equipment available  Patient identity confirmed: verbally with patient    Supporting Documentation  Indications: pain   Procedure Details  Location: knee - R knee  Preparation: Patient was prepped and draped in the usual sterile fashion  Needle gauge: 21G 2"  Ultrasound guidance: no  Approach: anterolateral  Medications administered: 1 mL bupivacaine 0 25 %; 3 mL lidocaine 1 %; 48 mg hylan 48 MG/6ML  Specialty Pharmacy Supplied: received medications from pharmacy  Patient tolerance: patient tolerated the procedure well with no immediate complications  Dressing:  Sterile dressing applied

## 2022-04-25 ENCOUNTER — OFFICE VISIT (OUTPATIENT)
Dept: PHYSICAL THERAPY | Facility: CLINIC | Age: 62
End: 2022-04-25
Payer: COMMERCIAL

## 2022-04-25 DIAGNOSIS — G89.29 CHRONIC PAIN OF RIGHT KNEE: Primary | ICD-10-CM

## 2022-04-25 DIAGNOSIS — M25.561 CHRONIC PAIN OF RIGHT KNEE: Primary | ICD-10-CM

## 2022-04-25 DIAGNOSIS — R29.898 WEAKNESS OF RIGHT LOWER EXTREMITY: ICD-10-CM

## 2022-04-25 PROCEDURE — 97112 NEUROMUSCULAR REEDUCATION: CPT

## 2022-04-25 PROCEDURE — 97116 GAIT TRAINING THERAPY: CPT

## 2022-04-25 NOTE — PROGRESS NOTES
Daily Note     Today's date: 2022  Patient name: Naresh Caruso  : 1960  MRN: [id number]  Referring provider: Fide Valero, *  Dx:   Encounter Diagnosis     ICD-10-CM    1  Chronic pain of right knee  M25 561     G89 29    2  Weakness of right lower extremity  R29 898                   Subjective: pt reports her knee is bothering her and little bit of elbow  Objective: See treatment diary below      Assessment: Tolerated treatment well  Patient would benefit from continued PT  Pt had difficulty with R knee flexion and R ankle dorsiflexion during step taps on 8"  Pt ambulated 46' w/ SPC and CGA for stability and balance  Pt lost balance a few times, but was able to self-correct  Pt was able to complete a few HUMAC exercises w/ minimal UE support  Plan: Continue per plan of care  Exercise Diary    THEREX         Pt ed         Progress Note         Treadmill w/ CGA         Recumbent Bike         Gastroc/ Soleus Stretch         HR/TR         SLR flex/abd         LAQ         Heel slides         clamsjaclynls         NEURO RE-ED         WS to R - fwd/lat      LOS, wt  Shift  WBing, 20 min total   Standing HS curls 20 taps     20 taps   Standing Marches         STS 3x5     2x5   SLS         clamshells         TB resisted hip strengthening         TB resisted sidestepping 95# 4x10 Leg press     85# 4x10   Hurdles         FTEO         FTEC         Total Gym Squats         Total Gym HR         HUMAC 25' LOS, WS R-L, Mobility, WB                    GAIT TRAINING         RW --> SPC once able Throughout     throughout   Stairs                  Therapeutic Activity         5x STS + set up         TUG + set up (RW and SPC)

## 2022-04-27 ENCOUNTER — OFFICE VISIT (OUTPATIENT)
Dept: PHYSICAL THERAPY | Facility: CLINIC | Age: 62
End: 2022-04-27
Payer: COMMERCIAL

## 2022-04-27 DIAGNOSIS — R29.898 WEAKNESS OF RIGHT LOWER EXTREMITY: ICD-10-CM

## 2022-04-27 DIAGNOSIS — R26.9 GAIT ABNORMALITY: ICD-10-CM

## 2022-04-27 DIAGNOSIS — R26.89 IMPAIRMENT OF BALANCE: ICD-10-CM

## 2022-04-27 DIAGNOSIS — M25.561 CHRONIC PAIN OF RIGHT KNEE: Primary | ICD-10-CM

## 2022-04-27 DIAGNOSIS — G89.29 CHRONIC PAIN OF RIGHT KNEE: Primary | ICD-10-CM

## 2022-04-27 PROCEDURE — 97112 NEUROMUSCULAR REEDUCATION: CPT | Performed by: PHYSICAL MEDICINE & REHABILITATION

## 2022-04-27 PROCEDURE — 97110 THERAPEUTIC EXERCISES: CPT | Performed by: PHYSICAL MEDICINE & REHABILITATION

## 2022-04-27 NOTE — PROGRESS NOTES
Daily Note     Today's date: 2022  Patient name: Eben Alvarenga  : 1960  MRN: [id number]  Referring provider: Saniya Martínez, *  Dx:   Encounter Diagnosis     ICD-10-CM    1  Chronic pain of right knee  M25 561     G89 29    2  Weakness of right lower extremity  R29 898    3  Impairment of balance  R26 89    4  Gait abnormality  R26 9                   Subjective: Patient notes continued R elbow discomfort, fatigue, some R knee discomfort following injection last week  Objective: See treatment diary below    Assessment: Tolerated treatment well  Continued challenge with balance activity, fair carryover with more even WBing following Humac  Pt continues to require VCs for form maintenance and safety awareness  Patient would benefit from continued PT  Plan: Continue per plan of care  Exercise Diary    THEREX         Pt ed         Progress Note         Treadmill w/ CGA         Recumbent Bike         Gastroc/ Soleus Stretch         HR/TR         SLR flex/abd         LAQ         Heel slides         clamshells         NEURO RE-ED     WS to R - fwd/lat      LOS, wt  Shift   WBing, 20 min total   Standing HS curls 20 taps     20 taps   Standing Marches         STS 3x5     2x5   SLS         clamshells         TB resisted hip strengthening         TB resisted sidestepping 95# 4x10 Leg press 95# 4x10    85# 4x10   Hurdles         FTEO         FTEC         Total Gym Squats         Total Gym HR         HUMAC 25' LOS, WS R-L, Mobility, WB   25'                GAIT TRAINING         RW --> SPC once able Throughout     throughout   Stairs                  Therapeutic Activity         5x STS + set up         TUG + set up (RW and SPC)

## 2022-05-02 ENCOUNTER — APPOINTMENT (OUTPATIENT)
Dept: PHYSICAL THERAPY | Facility: CLINIC | Age: 62
End: 2022-05-02
Payer: COMMERCIAL

## 2022-05-04 ENCOUNTER — OFFICE VISIT (OUTPATIENT)
Dept: PHYSICAL THERAPY | Facility: CLINIC | Age: 62
End: 2022-05-04
Payer: COMMERCIAL

## 2022-05-04 DIAGNOSIS — R29.898 WEAKNESS OF RIGHT LOWER EXTREMITY: ICD-10-CM

## 2022-05-04 DIAGNOSIS — M25.561 CHRONIC PAIN OF RIGHT KNEE: Primary | ICD-10-CM

## 2022-05-04 DIAGNOSIS — R26.9 GAIT ABNORMALITY: ICD-10-CM

## 2022-05-04 DIAGNOSIS — G89.29 CHRONIC PAIN OF RIGHT KNEE: Primary | ICD-10-CM

## 2022-05-04 DIAGNOSIS — R26.89 IMPAIRMENT OF BALANCE: ICD-10-CM

## 2022-05-04 PROCEDURE — 97110 THERAPEUTIC EXERCISES: CPT | Performed by: PHYSICAL MEDICINE & REHABILITATION

## 2022-05-04 PROCEDURE — 97112 NEUROMUSCULAR REEDUCATION: CPT | Performed by: PHYSICAL MEDICINE & REHABILITATION

## 2022-05-04 NOTE — PROGRESS NOTES
Daily Note     Today's date: 2022  Patient name: Timbo Henry  : 1960  MRN: [id number]  Referring provider: Nichole Hendrix, *  Dx:   Encounter Diagnosis     ICD-10-CM    1  Chronic pain of right knee  M25 561     G89 29    2  Weakness of right lower extremity  R29 898    3  Impairment of balance  R26 89    4  Gait abnormality  R26 9                   Subjective: Patient presents with R ankle brace in place, one walking stick of ambulation held on L  Patient offers no new complaints to begin session  Objective: See treatment diary below    Assessment: Tolerated treatment well  Improved balance control initially on Humac, increased challenge with introduction of weight shift to L/R to allow march with contralateral LE  Increased frequency of near-LOB during ambulation at end of session, requiring intermittent CGA/Vaishali to avoid fall  Able to maintain form with ambulation for about 5 steps prior to decreased RLE control  Patient would benefit from continued PT  Plan: Continue per plan of care  Exercise Diary    THEREX         Pt ed         Progress Note         Treadmill w/ CGA         Recumbent Bike         Gastroc/ Soleus Stretch         HR/TR         SLR flex/abd         LAQ         Heel slides         clamshells         NEURO RE-ED     WS to R - fwd/lat      LOS, wt  Shift  WBing, 20 min total   Standing HS curls 20 taps     20 taps   Standing Marches         STS 3x5     2x5   SLS         clamshells         TB resisted hip strengthening         TB resisted sidestepping 95# 4x10 Leg press 95# 4x10 105# 3x10   85# 4x10   Hurdles         FTEO         FTEC         Total Gym Squats         Total Gym HR         HUMAC 25' LOS, WS R-L, Mobility, WB   25' 30' LOS, Wt shift, % WBing, wt   Shift with alt march               GAIT TRAINING      RW --> SPC once able Throughout  Throughout, 1 walking stick and CGA/Vaishali   throughout   Stairs Therapeutic Activity         5x STS + set up         TUG + set up (RW and SPC)

## 2022-05-13 ENCOUNTER — TELEPHONE (OUTPATIENT)
Dept: NEUROLOGY | Facility: CLINIC | Age: 62
End: 2022-05-13

## 2022-05-13 NOTE — TELEPHONE ENCOUNTER
Patient left vm stating she is having weakness in right leg and difficulty walking  Need to triage          Called and Left a message on pt's answering machine for a call back

## 2022-05-17 NOTE — TELEPHONE ENCOUNTER
Called and Left a message on pt's answering machine for a call back      Need to triage  Will try again

## 2022-05-19 NOTE — TELEPHONE ENCOUNTER
Spoke w/patient  She was having difficulty walking but she thinks it was because she was very fatigued  Patient states she feels better now and feels that she is at her baseline  Patient also has a UTI  She is currently on abx (cipro)  Discussed pseudo relapse due to current infection  Patient verbalized understanding and agreement       Patient advised we will be receiving a request for a MAFO brace from [id number]    Dr Esme Velez

## 2022-05-23 ENCOUNTER — EVALUATION (OUTPATIENT)
Dept: PHYSICAL THERAPY | Facility: CLINIC | Age: 62
End: 2022-05-23
Payer: COMMERCIAL

## 2022-05-23 DIAGNOSIS — R29.898 WEAKNESS OF RIGHT LOWER EXTREMITY: ICD-10-CM

## 2022-05-23 DIAGNOSIS — G89.29 CHRONIC PAIN OF RIGHT KNEE: Primary | ICD-10-CM

## 2022-05-23 DIAGNOSIS — M25.561 CHRONIC PAIN OF RIGHT KNEE: Primary | ICD-10-CM

## 2022-05-23 DIAGNOSIS — R26.9 GAIT ABNORMALITY: ICD-10-CM

## 2022-05-23 DIAGNOSIS — R26.89 IMPAIRMENT OF BALANCE: ICD-10-CM

## 2022-05-23 PROCEDURE — 97116 GAIT TRAINING THERAPY: CPT | Performed by: PHYSICAL MEDICINE & REHABILITATION

## 2022-05-23 PROCEDURE — 97112 NEUROMUSCULAR REEDUCATION: CPT | Performed by: PHYSICAL MEDICINE & REHABILITATION

## 2022-05-23 NOTE — PROGRESS NOTES
PT Re-Evaluation     Today's date: 2022  Patient name: Lincoln Hathaway  : 1960  MRN: [id number]  Referring provider: Shahid Resendiz, *  Dx:   Encounter Diagnosis     ICD-10-CM    1  Chronic pain of right knee  M25 561     G89 29    2  Weakness of right lower extremity  R29 898    3  Impairment of balance  R26 89    4  Gait abnormality  R26 9                 Assessment  Assessment details: Patient has participated consistently in therapy and had been making steady improvement towards functional goals prior to recent hiatus secondary to sickness, MS flare, decreased activity tolerance with fatigue  Patient HEP updated to reflect current mobility deficits as addressed with [facility] system over the past few weeks  Patient would benefit from continued intervention to address remaining mobility deficits and reduce risk for falls  Patient continues to be at significant risk for falls based on functional testing performed today with scores showing decline vs  Previous measurements  Impairments: abnormal gait, abnormal muscle tone, abnormal or restricted ROM, abnormal movement, activity intolerance, impaired balance, impaired physical strength, pain with function and safety issue  Understanding of Dx/Px/POC: good   Prognosis: good    Goals  ST  Pain decreased by 25% in 4-6 weeks  -- Not Met  2  Strength increased by 1/2 to 1 muscle grade in all deficient muscle groups in 4-6 weeks  - Partially Met  3  Pt will be independent w/ initial HEP in 1-2 visits  - Met    LT  Decrease pain to 1-2/10 at worst by d/c - Not met  2  Strength increased to 5 for all deficient muscle groups by d/c - Partially Met  3  IADL performance increased to max function by d/c - Not Met  4  Recreational performance increased to max function by d/c - Not Met  5  Pt will safely ambulate t/o clinic using SPC by d/c  - Partially Met  6  Pt will safely negotiate stairs using SPC by d/c  - Not Met  7   TUG decreased to < or = 14" by d/c  - Not Met  8  5xSTS decreased to < or = 14" by d/c -Not Met    Plan  Planned modality interventions: cryotherapy  Other planned modality interventions: other modalities PRN  Planned therapy interventions: abdominal trunk stabilization, ADL retraining, IADL retraining, balance, flexibility, functional ROM exercises, gait training, graded exercise, home exercise program, manual therapy, neuromuscular re-education, patient education, postural training, strengthening, therapeutic exercise, therapeutic activities and transfer training  Other planned therapy interventions: other interventions PRN  Frequency: 1-2x/week  Duration in weeks: 6  Plan of Care beginning date: 5/23/2022  Plan of Care expiration date: 7/4/2022  Treatment plan discussed with: patient      Subjective Evaluation    History of Present Illness  Mechanism of injury: CURRENT LEVEL  5/23: Patient notes improvement in R knee pain since injection  Intermittent 4/10 knee pain, decreased pain frequency overall  Patient notes increased safety awareness with mobility within home since beginning therapy  Patient notes recent perceived decline in function secondary to recent sickness and decreased frequency of HEP performance  Patient notes fall last night, this was the first fall she's had in 4 days  Unsure if she will proceed with custom MAFO at this time       (2/2/2022)  Pt reports that she has noticed improved ability to perform sit <-> stand transfers since starting skilled PT tx as she can intermittently transfer from couch to standing position w/o b/l UE support  She adds that her bed mobility has been getting easier when she plans ahead her movements  Since IE, pt has been able to transition from  to [facility] during entire tx session  However, she continues to be frustrated that her R foot catches the floor during ambulation especially when fatigued    Pt is in the process of looking into getting another MAFO as she no longer has her old one in possession  She reports that static standing is very difficult for her as she feels significant weakness still present in RLE  Pt reports strong compliance w/ HEP  INITIAL LEVEL (2021)  Pt reports to IE via Direct Access using RW w/ c/o R knee pain and overall weakness  She reports having a fall approx 5 years ago as a result of trip and fall over dog which resulted in fractured R ankle (tx'd conservatively), R lisfranc ligament repair, and R menisectomy (2018)  Pt reports having skilled PT tx at that time, but feels that she did not push herself enough at home and admits that she did not keep up w/ all exercises  She does regularly ride her stationary bike and intermittently does squats  Pt also w/ hx of MS for the past approximate 30 years which she states mostly affects RLE including feeling "like a sock is over leg and foot" when questioned about sensation  Pt reports that she has had a MAFO for R ankle in the past, but no longer has in her possession  Prior to approximately 1 year ago, pt did not need RW, but started using again as she started to notice more weakness/knee pain  Pt reports that she has had "stupid falls" in the past year (>2 times), but cannot recall exact number  Pt does not have any major injuries from falls  Pt reports the most difficulty transitioning to/from bed and to/from chair/toilet  She reports that she also had difficulty clearing R foot during ambulation  Pt states that her biggest complaint is that she is unable to walk w/ grandson (2 month old)  In addition to MS, pt also reports being "totally paralyzed" approx  30 years ago, but was able to fully recover  She is unsure of the cause  Pain  Current pain ratin  At worst pain ratin  Relieving factors: ice, rest and medications (OTC ibuprofen PRN)  Exacerbated by: transition to/from chair, transition to/from bed, walking      Social Support  Steps to enter house: yes (3 MARIUSZ w/ rail (alternates b/w STS and reciprocating fashion))  Stairs in house: yes (1 FF steps to basement to ride stationary bike (alternates/w STS and reciprocating fashion) )   Lives in: multiple-level home  Lives with: spouse    Employment status: not working (Pt has not worked for years due to [employer])  Patient Goals  Patient goals for therapy: increased strength and improved balance (Partially Met)  Patient goal: Walk without rolling walker (Partially Met)        Objective     Active Range of Motion     Right Knee   Flexion: 120 degrees   Extension: 5 degrees     Right Ankle/Foot   Dorsiflexion (ke): 4 degrees   Plantar flexion: 60 degrees   Inversion: 25 degrees   Eversion: 10 degrees     Passive Range of Motion     Right Knee   Flexion: 130 degrees   Extension: 7 degrees     Mobility   Patellar Mobility:     Right Knee   WFL: medial, lateral, superior and inferior    Strength/Myotome Testing     Right Knee   Flexion: 5  Extension: 5  Quadriceps contraction: good    Right Ankle/Foot   Dorsiflexion: 4+  Plantar flexion: 5  Inversion: 5  Eversion: 4+    Additional Strength Details  Hip Strength (R):   Flexion (assessed in supine via SLR): 4      Formal ankle strength testing np today 5/23 secondary to fatigue with functional testing    Tests     Additional Tests Details  Palpation: TTP over adductor tubercle, pes anserine, over medial patellar space- minimal TTP 5/23    (-) ligamentous laxity testing, however discomfort with varus/valgus stress at 30 deg- minimal discomfort 5/23          IE:  Gait:   5/23: Decreased speed and overall safety awareness, decreased R foot clearance overall, intermittent stepping while lifting walking stick    3/2: with walking sticks patient with increased R foot drop increased with fatigue, frequent VCs for form maintenance and safety awareness during gait and transfers   Improved gait pattern and decreased forward trunk flexion, lateral weight shift with switch to using one stick on L    RW: Pt ambulates using RW w/ difficulty clearing R foot only as she fatigues  Pt no longer unsteady when negotiating turns w/ RW and is able to maintain COG w/in RYAN  Pt able to safely reach back for chair prior to sitting w/o cueing from PT      SPC Pt ambulates using SPC w/ difficulty clearing R foot usually only as she fatigues  Pt requires CG- CS to perform "step to" motion but has also been able to demonstrate "step through motion" for several minutes during tx sessions  No significant unsteadiness observed performing turns  TUG:   Rollator Walker: 29" w/ gait as described above  3/2: w/ walking sticks 35 sec, 33 sec  5/23: 52 sec w/ walking sticks, 61 sec trial 2    SPC: 36" w/ SPC - no unsteadiness turning around cone     5x STS: 38", but pt able to complete w/o UE support for 4/5 reps which she was unable to do previously  Pt did not demonstrate LOB w/ initial stand as she did at IE   3/2: 32" with intermittent UE assist  - Patient intermittently allows RLE to rest in ER during sit, leading to increased valgus position of knee     5/23: Low mat table 17"  No UE use, decreased eccentric control  From chair: 64" use of UEs for last STS      General Comments: Ankle/Foot Comments   Balance: improved baseline WBing distribution on [employer] system, improved weight shift vs  Beginning sessions  - continues to require cueing for weight shift off on RLE during swing phase     Daily Treatment Diary    EPOC: 3/2/2022  Precautions: FALL RISK; Hx of MS affecting RLE  Co- Morbidities: Hx of MS affecting RLE    Exercise Diary 4/25 4/27 5/4 5/23 4/13   THEREX             RE, [facility] RE-ED  4/27 5/4 5/23 4/13   WS to R - fwd/lat      LOS, wt  Shift   WBing, 20 min total   Standing HS curls 20 taps     20 taps   Standing Marches         STS 3x5     2x5   SLS         clamshells         TB resisted hip strengthening         TB resisted sidestepping 95# 4x10 Leg press 95# 4x10 105# 3x10   85# 4x10   Hurdles         FTEO         FTEC         Total Gym Squats         Total Gym HR         HUMAC 25' LOS, WS R-L, Mobility, WB   25' 30' LOS, Wt shift, % WBing, wt   Shift with alt march 15' LOS, wt shift, % WBing, Shift to L w/ R march              GAIT TRAINING   5/4 4/13   RW --> SPC once able Throughout  Throughout, 1 walking stick and CGA/Vaishali   throughout   Stairs                  Therapeutic Activity         5x STS + set up         TUG + set up (RW and SPC)

## 2022-05-23 NOTE — LETTER
May 25, 2022    Heather Diez MD  [address]    Patient: Prashant Dela Cruz   YOB: 1960   Date of Visit: 2022     Encounter Diagnosis     ICD-10-CM    1  Chronic pain of right knee  M25 561     G89 29    2  Weakness of right lower extremity  R29 898    3  Impairment of balance  R26 89    4  Gait abnormality  R26 9        Dear Dr Benny Chin: Thank you for your recent referral of Prashant Dela Cruz  Please review the attached evaluation summary from Catina's recent visit  Please verify that you agree with the plan of care by signing the attached order  If you have any questions or concerns, please do not hesitate to call  I sincerely appreciate the opportunity to share in the care of one of your patients and hope to have another opportunity to work with you in the near future  Sincerely,    Jolene Hansen, PT      Referring Provider:      I certify that I have read the below Plan of Care and certify the need for these services furnished under this plan of treatment while under my care  Haether Diez MD  [address] [id number]  [place] [place]          PT Re-Evaluation     Today's date: 2022  Patient name: Prashant Dela Cruz  : 1960  MRN: [id number]  Referring provider: Gadiel Michel, *  Dx:   Encounter Diagnosis     ICD-10-CM    1  Chronic pain of right knee  M25 561     G89 29    2  Weakness of right lower extremity  R29 898    3  Impairment of balance  R26 89    4  Gait abnormality  R26 9                 Assessment  Assessment details: Patient has participated consistently in therapy and had been making steady improvement towards functional goals prior to recent hiatus secondary to sickness, MS flare, decreased activity tolerance with fatigue  Patient HEP updated to reflect current mobility deficits as addressed with [facility] system over the past few weeks   Patient would benefit from continued intervention to address remaining mobility deficits and reduce risk for falls  Patient continues to be at significant risk for falls based on functional testing performed today with scores showing decline vs  Previous measurements  Impairments: abnormal gait, abnormal muscle tone, abnormal or restricted ROM, abnormal movement, activity intolerance, impaired balance, impaired physical strength, pain with function and safety issue  Understanding of Dx/Px/POC: good   Prognosis: good    Goals  ST  Pain decreased by 25% in 4-6 weeks  -- Not Met  2  Strength increased by 1/2 to 1 muscle grade in all deficient muscle groups in 4-6 weeks  - Partially Met  3  Pt will be independent w/ initial HEP in 1-2 visits  - Met    LT  Decrease pain to 1-2/10 at worst by d/c - Not met  2  Strength increased to 5 for all deficient muscle groups by d/c - Partially Met  3  IADL performance increased to max function by d/c - Not Met  4  Recreational performance increased to max function by d/c - Not Met  5  Pt will safely ambulate t/o clinic using SPC by d/c  - Partially Met  6  Pt will safely negotiate stairs using SPC by d/c  - Not Met  7  TUG decreased to < or = 14" by d/c  - Not Met  8  5xSTS decreased to < or = 14" by d/c -Not Met    Plan  Planned modality interventions: cryotherapy  Other planned modality interventions: other modalities PRN  Planned therapy interventions: abdominal trunk stabilization, ADL retraining, IADL retraining, balance, flexibility, functional ROM exercises, gait training, graded exercise, home exercise program, manual therapy, neuromuscular re-education, patient education, postural training, strengthening, therapeutic exercise, therapeutic activities and transfer training  Other planned therapy interventions: other interventions PRN  Frequency: 1-2x/week    Duration in weeks: 6  Plan of Care beginning date: 2022  Plan of Care expiration date: 2022  Treatment plan discussed with: patient      Subjective Evaluation    History of Present Illness  Mechanism of injury: CURRENT LEVEL  5/23: Patient notes improvement in R knee pain since injection  Intermittent 4/10 knee pain, decreased pain frequency overall  Patient notes increased safety awareness with mobility within home since beginning therapy  Patient notes recent perceived decline in function secondary to recent sickness and decreased frequency of HEP performance  Patient notes fall last night, this was the first fall she's had in 4 days  Unsure if she will proceed with custom MAFO at this time       (2/2/2022)  Pt reports that she has noticed improved ability to perform sit <-> stand transfers since starting skilled PT tx as she can intermittently transfer from couch to standing position w/o b/l UE support  She adds that her bed mobility has been getting easier when she plans ahead her movements  Since IE, pt has been able to transition from  to [facility] during entire tx session  However, she continues to be frustrated that her R foot catches the floor during ambulation especially when fatigued  Pt is in the process of looking into getting another MAFO as she no longer has her old one in possession  She reports that static standing is very difficult for her as she feels significant weakness still present in RLE  Pt reports strong compliance w/ HEP  INITIAL LEVEL (12/29/2021)  Pt reports to IE via Direct Access using RW w/ c/o R knee pain and overall weakness  She reports having a fall approx 5 years ago as a result of trip and fall over dog which resulted in fractured R ankle (tx'd conservatively), R lisfranc ligament repair, and R menisectomy (2018)  Pt reports having skilled PT tx at that time, but feels that she did not push herself enough at home and admits that she did not keep up w/ all exercises  She does regularly ride her stationary bike and intermittently does squats         Pt also w/ hx of MS for the past approximate 30 years which she states mostly affects RLE including feeling "like a sock is over leg and foot" when questioned about sensation  Pt reports that she has had a MAFO for R ankle in the past, but no longer has in her possession  Prior to approximately 1 year ago, pt did not need RW, but started using again as she started to notice more weakness/knee pain  Pt reports that she has had "stupid falls" in the past year (>2 times), but cannot recall exact number  Pt does not have any major injuries from falls  Pt reports the most difficulty transitioning to/from bed and to/from chair/toilet  She reports that she also had difficulty clearing R foot during ambulation  Pt states that her biggest complaint is that she is unable to walk w/ grandson (2 month old)  In addition to MS, pt also reports being "totally paralyzed" approx  30 years ago, but was able to fully recover  She is unsure of the cause  Pain  Current pain ratin  At worst pain ratin  Relieving factors: ice, rest and medications (OTC ibuprofen PRN)  Exacerbated by: transition to/from chair, transition to/from bed, walking      Social Support  Steps to enter house: yes (3 MARIUSZ w/ rail (alternates b/w STS and reciprocating fashion))  Stairs in house: yes (1 FF steps to basement to ride stationary bike (alternates/w STS and reciprocating fashion) )   Lives in: multiple-level home  Lives with: spouse    Employment status: not working (Pt has not worked for years due to [employer] 86)  Patient Goals  Patient goals for therapy: increased strength and improved balance (Partially Met)  Patient goal: Walk without rolling walker (Partially Met)        Objective     Active Range of Motion     Right Knee   Flexion: 120 degrees   Extension: 5 degrees     Right Ankle/Foot   Dorsiflexion (ke): 4 degrees   Plantar flexion: 60 degrees   Inversion: 25 degrees   Eversion: 10 degrees     Passive Range of Motion     Right Knee   Flexion: 130 degrees Extension: 7 degrees     Mobility   Patellar Mobility:     Right Knee   WFL: medial, lateral, superior and inferior    Strength/Myotome Testing     Right Knee   Flexion: 5  Extension: 5  Quadriceps contraction: good    Right Ankle/Foot   Dorsiflexion: 4+  Plantar flexion: 5  Inversion: 5  Eversion: 4+    Additional Strength Details  Hip Strength (R):   Flexion (assessed in supine via SLR): 4      Formal ankle strength testing np today 5/23 secondary to fatigue with functional testing    Tests     Additional Tests Details  Palpation: TTP over adductor tubercle, pes anserine, over medial patellar space- minimal TTP 5/23    (-) ligamentous laxity testing, however discomfort with varus/valgus stress at 30 deg- minimal discomfort 5/23          IE:  Gait:   5/23: Decreased speed and overall safety awareness, decreased R foot clearance overall, intermittent stepping while lifting walking stick    3/2: with walking sticks patient with increased R foot drop increased with fatigue, frequent VCs for form maintenance and safety awareness during gait and transfers  Improved gait pattern and decreased forward trunk flexion, lateral weight shift with switch to using one stick on L    RW: Pt ambulates using RW w/ difficulty clearing R foot only as she fatigues  Pt no longer unsteady when negotiating turns w/ RW and is able to maintain COG w/in RYAN  Pt able to safely reach back for chair prior to sitting w/o cueing from PT      SPC Pt ambulates using SPC w/ difficulty clearing R foot usually only as she fatigues  Pt requires CG- CS to perform "step to" motion but has also been able to demonstrate "step through motion" for several minutes during tx sessions  No significant unsteadiness observed performing turns       TUG:   Rollator Walker: 29" w/ gait as described above  3/2: w/ walking sticks 35 sec, 33 sec  5/23: 52 sec w/ walking sticks, 61 sec trial 2    SPC: 36" w/ SPC - no unsteadiness turning around cone     5x STS: 38", but pt able to complete w/o UE support for 4/5 reps which she was unable to do previously  Pt did not demonstrate LOB w/ initial stand as she did at IE   3/2: 32" with intermittent UE assist  - Patient intermittently allows RLE to rest in ER during sit, leading to increased valgus position of knee     5/23: Low mat table 17"  No UE use, decreased eccentric control  From chair: 64" use of UEs for last STS      General Comments: Ankle/Foot Comments   Balance: improved baseline WBing distribution on HUMAC system, improved weight shift vs  Beginning sessions  - continues to require cueing for weight shift off on RLE during swing phase     Daily Treatment Diary    EPOC: 3/2/2022  Precautions: FALL RISK; Hx of MS affecting RLE  Co- Morbidities: Hx of MS affecting RLE    Exercise Diary 4/25 4/27 5/4 5/23 4/13   THEREX             RE, 36 [facility] RE-ED  4/27 5/4 5/23 4/13   WS to R - fwd/lat      LOS, wt  Shift  WBing, 20 min total   Standing HS curls 20 taps     20 taps   Standing Marches         STS 3x5     2x5   SLS         clamshells         TB resisted hip strengthening         TB resisted sidestepping 95# 4x10 Leg press 95# 4x10 105# 3x10   85# 4x10   Hurdles         FTEO         FTEC         Total Gym Squats         Total Gym HR         HUMAC 25' LOS, WS R-L, Mobility, WB   25' 30' LOS, Wt shift, % WBing, wt   Shift with alt march 15' LOS, wt shift, % WBing, Shift to L w/ R march              GAIT TRAINING   5/4 4/13   RW --> SPC once able Throughout  Throughout, 1 walking stick and CGA/Vaishali   throughout   Stairs                  Therapeutic Activity         5x STS + set up         TUG + set up (RW and SPC)

## 2022-05-25 ENCOUNTER — APPOINTMENT (OUTPATIENT)
Dept: PHYSICAL THERAPY | Facility: CLINIC | Age: 62
End: 2022-05-25
Payer: COMMERCIAL

## 2022-06-06 ENCOUNTER — TELEPHONE (OUTPATIENT)
Dept: OTHER | Facility: OTHER | Age: 62
End: 2022-06-06

## 2022-06-06 ENCOUNTER — TELEPHONE (OUTPATIENT)
Dept: NEUROLOGY | Facility: CLINIC | Age: 62
End: 2022-06-06

## 2022-06-06 NOTE — TELEPHONE ENCOUNTER
ADD-ON: 06/07/22 @ 9:30 AM with Dr Micaela Luu in [place]  Pt is requesting for sooner appointment

## 2022-06-07 ENCOUNTER — EVALUATION (OUTPATIENT)
Dept: PHYSICAL THERAPY | Facility: CLINIC | Age: 62
End: 2022-06-07
Payer: COMMERCIAL

## 2022-06-07 DIAGNOSIS — G35 MULTIPLE SCLEROSIS (HCC): Primary | ICD-10-CM

## 2022-06-07 DIAGNOSIS — G89.29 CHRONIC PAIN OF RIGHT KNEE: ICD-10-CM

## 2022-06-07 DIAGNOSIS — M25.561 CHRONIC PAIN OF RIGHT KNEE: ICD-10-CM

## 2022-06-07 DIAGNOSIS — R26.9 GAIT ABNORMALITY: ICD-10-CM

## 2022-06-07 DIAGNOSIS — R26.89 IMPAIRMENT OF BALANCE: ICD-10-CM

## 2022-06-07 PROCEDURE — 97530 THERAPEUTIC ACTIVITIES: CPT

## 2022-06-07 NOTE — TELEPHONE ENCOUNTER
Called patient  LMOM to give us a call back at the office to reschedule her appointment with Dr Trung Torres

## 2022-06-07 NOTE — TELEPHONE ENCOUNTER
Patient called to cancel her appt w/ Dr Justina España @ [phone]  Would like a call back on her home phone to reschedule

## 2022-06-07 NOTE — PROGRESS NOTES
PT Re-Evaluation     Today's date: 2022  Patient name: Flakita Moore  : 1960  MRN: [id number]  Referring provider: Cathy Marx, *  Dx:   Encounter Diagnosis     ICD-10-CM    1  Multiple sclerosis ([address] )  G35    2  Chronic pain of right knee  M25 561     G89 29    3  Impairment of balance  R26 89    4  Gait abnormality  R26 9        Start Time: 1545  Stop Time: 1630  Total time in clinic (min): 45 minutes    Assessment  Assessment details: Flakita Moore is a 57 y o  female presenting as a transfer from PT at [address] with long-term diagnosis of MS (>30 years)  Pt presents with associated impairments, including reduced gait speed and endurance, frequent falls and impaired balance, and reduced LE strength/power  Pt's ambulation is significantly impaired by R foot drop, and she would benefit from AFO usage  Next visit will include further functional mobility assessments and LE MMT assessment  Will benefit from skilled PT interventions for strength training, endurance/ambulation training, balance training, and functional task training to improve safety with functional mobility  Impairments: abnormal gait, abnormal movement, activity intolerance, impaired balance, impaired physical strength, lacks appropriate home exercise program, pain with function, safety issue and poor body mechanics    Symptom irritability: highUnderstanding of Dx/Px/POC: good   Prognosis: good    Goals    Short Term Goals: In 5 weeks, the patient will:  1  Initiate walking with SPC in PT/practice at home   2  Improve time for 5x STS by 5 seconds for improved functional mobility   3  Perform home exercise program with min(A) or supervision   4  Initiate practice walking with no AD     Long Term Goals: In 10 weeks, the patient will:  1  Improve score on FGA by 5 points to indicate clinically significant improvement in functional mobility   2   Improve gait speed for 0 1 m/s for improved community mobility   3  Increase FOTO to greater than predicted value  4  Demonstrate ability to perform home exercise program independently to maintain/continue progress towards goals         Plan  Planned therapy interventions: activity modification, ADL retraining, balance, balance/weight bearing training, body mechanics training, breathing training, coordination, functional ROM exercises, gait training, graded exercise, home exercise program, transfer training, therapeutic exercise, therapeutic activities, strengthening, self care, patient education, postural training and neuromuscular re-education  Frequency: 2x week  Duration in weeks: 10  Treatment plan discussed with: patient        Subjective  HPI: Pt decided to transfer from [facility] because they felt that a consultation from a neuro PT may be beneficial  Her MS/mobility was worsened due to a prior R leg injury [facility] fracture, etc )  She notes that when she is tired, she has difficulty picking up her feet  She notes that about 1 year ago she was walking without a walker  She recently got worse again due to illness that kept her from exercising  She also notes frequent falls but few injuries from these falls  Time since diagnosis/onset: Pt was diagnosed >30 years ago   Prior/current AD use: Pt uses a rolling walker  Falls: Frequent falls, pt notes she doesn't hurt herself  Pt notes she's usually rushing   Pain: R knee pain     Home Setup: Pt has a ranch house with a basement, but she has a main floor setup and her  goes downstairs if she needs something  Pt has a scooter that she uses at home  Social Support: Pt lives with her    ADL Assistance: Pt performs all hygenic tasks and dressing herself  Pt requires help with meal preparation due to limitations in standing tolerance         Objective       FGA  12/30   2 Minute Walk Test (ft): 123 ft    Gait Speed (ft/s): 6m/21s = 0 29 m/s   5x Sit To Stand (s): 22 75s, no UE   TUG: NV Transfers    Sit To Stand  (I)    Sit To Supine (I)         Gait Assessment: Pt ambulates with BUE reliance on RW, forward flexed posture, forefoot initial contact with occasional toe catching (R>L), increase supination/tibial internal rotation, and B reduced step length          Precautions: Fall risk        6/7            Manuals                                                                 Neuro Re-Ed                                                                                                        Ther Ex                                                                                                                     Ther Activity             Reassessment of objective measures, discussion of POC  40'                          Gait Training                                       Modalities

## 2022-06-20 ENCOUNTER — OFFICE VISIT (OUTPATIENT)
Dept: PHYSICAL THERAPY | Facility: CLINIC | Age: 62
End: 2022-06-20
Payer: COMMERCIAL

## 2022-06-20 DIAGNOSIS — M25.561 CHRONIC PAIN OF RIGHT KNEE: ICD-10-CM

## 2022-06-20 DIAGNOSIS — R26.89 IMPAIRMENT OF BALANCE: ICD-10-CM

## 2022-06-20 DIAGNOSIS — G89.29 CHRONIC PAIN OF RIGHT KNEE: ICD-10-CM

## 2022-06-20 DIAGNOSIS — R29.898 WEAKNESS OF RIGHT LOWER EXTREMITY: ICD-10-CM

## 2022-06-20 DIAGNOSIS — R26.9 GAIT ABNORMALITY: ICD-10-CM

## 2022-06-20 DIAGNOSIS — G35 MULTIPLE SCLEROSIS (HCC): Primary | ICD-10-CM

## 2022-06-20 PROCEDURE — 97110 THERAPEUTIC EXERCISES: CPT

## 2022-06-20 PROCEDURE — 97530 THERAPEUTIC ACTIVITIES: CPT

## 2022-06-20 PROCEDURE — 97112 NEUROMUSCULAR REEDUCATION: CPT

## 2022-06-20 NOTE — PROGRESS NOTES
Daily Note     Today's date: 2022  Patient name: Sulma Galicia  : 1960  MRN: [id number]  Referring provider: Shahid Sherman, *  Dx:   Encounter Diagnosis     ICD-10-CM    1  Multiple sclerosis ([address] )  G35    2  Chronic pain of right knee  M25 561     G89 29    3  Impairment of balance  R26 89    4  Weakness of right lower extremity  R29 898    5  Gait abnormality  R26 9        Start Time: 1212  Stop Time: 1305  Total time in clinic (min): 53 minutes    Subjective: Pt reported to her session using walking sticks  She apologized for missing her last appointment  She notes that she's been working on her core  Objective: See treatment diary below    TU 6s, performed with B walking sticks  - 36s with Coban wrap       Assessment: Tolerated treatment well  Discussed pt's current home exercises, and reviewed additional exercises that pt would be adding to her HEP (seated DF/eversion, STS)  Also discussed potential benefit to pt using an AFO, as she continues to be significantly unsafe during ambulation with large contribution to R toe drag  TUG assessment performed at this visit indicates fall risk and reduced functional mobility  Patient would benefit from continued PT to continue to improve strength, endurance, and functional mobility to improve safety at home and with ADL performance  Plan: Continue per plan of care        Precautions: Fall risk                   Manuals                                                                 Neuro Re-Ed             Seated DF   15x, HEP           Seated eversion  15x, HEP           Standing rows   NV           Overhead press  NV                                                  Ther Ex             Ambulation with walking sticks   15'            Sit to stand   1x5                                                                                          Ther Activity             Reassessment of objective measures, discussion of POC  36' 15'                         Gait Training                                       Modalities

## 2022-06-22 ENCOUNTER — OFFICE VISIT (OUTPATIENT)
Dept: PHYSICAL THERAPY | Facility: CLINIC | Age: 62
End: 2022-06-22
Payer: COMMERCIAL

## 2022-06-22 DIAGNOSIS — G89.29 CHRONIC PAIN OF RIGHT KNEE: ICD-10-CM

## 2022-06-22 DIAGNOSIS — M25.561 CHRONIC PAIN OF RIGHT KNEE: ICD-10-CM

## 2022-06-22 DIAGNOSIS — R26.89 IMPAIRMENT OF BALANCE: ICD-10-CM

## 2022-06-22 DIAGNOSIS — R29.898 WEAKNESS OF RIGHT LOWER EXTREMITY: ICD-10-CM

## 2022-06-22 DIAGNOSIS — R26.9 GAIT ABNORMALITY: ICD-10-CM

## 2022-06-22 DIAGNOSIS — G35 MULTIPLE SCLEROSIS (HCC): Primary | ICD-10-CM

## 2022-06-22 PROCEDURE — 97112 NEUROMUSCULAR REEDUCATION: CPT

## 2022-06-22 PROCEDURE — 97110 THERAPEUTIC EXERCISES: CPT

## 2022-06-22 NOTE — PROGRESS NOTES
Daily Note     Today's date: 2022  Patient name: Washington Anaya  : 1960  MRN: [id number]  Referring provider: Sudheer Gonsales, *  Dx:   Encounter Diagnosis     ICD-10-CM    1  Multiple sclerosis ([facility] [address] )  G35    2  Chronic pain of right knee  M25 561     G89 29    3  Impairment of balance  R26 89    4  Gait abnormality  R26 9    5  Weakness of right lower extremity  R29 898        Start Time: 1445  Stop Time: 1515  Total time in clinic (min): 30 minutes    Subjective: Pt reported that she was feeling a bit more stable today  Objective: See treatment diary below    TUG with walker: 31 9s       Assessment: Tolerated treatment well  Pt was able to demonstrate improved stability with RW vs walking sticks from last visit  She demonstrates persistent LLE weakness with significant R hip drop during L stance phase  Pt demonstrated some improvement in ambulation pattern with R DF assist during session  Patient demonstrated fatigue post treatment and would benefit from continued PT to continue to improve safety with functional mobility and reduce frequency of falls  Plan: Continue per plan of care        Precautions: Fall risk                  Manuals                                                                 Neuro Re-Ed             Seated DF   15x, HEP           Seated eversion  15x, HEP           Standing rows   NV           Overhead press  NV           Walking with reduced support    6x8 ft rail + HHA from PT                                    Ther Ex             Ambulation with walking sticks   15'            Sit to stand   1x5  1x10 with foam no UE           Standing hip abduction   2x10 B                                                                           Ther Activity             Reassessment of objective measures, discussion of POC  36'  15'                         Gait Training                                       Modalities

## 2022-06-27 ENCOUNTER — OFFICE VISIT (OUTPATIENT)
Dept: PHYSICAL THERAPY | Facility: CLINIC | Age: 62
End: 2022-06-27
Payer: COMMERCIAL

## 2022-06-27 DIAGNOSIS — R26.89 IMPAIRMENT OF BALANCE: ICD-10-CM

## 2022-06-27 DIAGNOSIS — R29.898 WEAKNESS OF RIGHT LOWER EXTREMITY: ICD-10-CM

## 2022-06-27 DIAGNOSIS — G35 MULTIPLE SCLEROSIS (HCC): Primary | ICD-10-CM

## 2022-06-27 DIAGNOSIS — R26.9 GAIT ABNORMALITY: ICD-10-CM

## 2022-06-27 DIAGNOSIS — M25.561 CHRONIC PAIN OF RIGHT KNEE: ICD-10-CM

## 2022-06-27 DIAGNOSIS — G89.29 CHRONIC PAIN OF RIGHT KNEE: ICD-10-CM

## 2022-06-27 PROCEDURE — 97110 THERAPEUTIC EXERCISES: CPT

## 2022-06-27 PROCEDURE — 97112 NEUROMUSCULAR REEDUCATION: CPT

## 2022-06-27 NOTE — PROGRESS NOTES
Daily Note     Today's date: 2022  Patient name: Reilly Stringer  : 1960  MRN: [id number]  Referring provider: Luis Galvan, *  Dx:   Encounter Diagnosis     ICD-10-CM    1  Multiple sclerosis ([facility] [address] )  G35    2  Weakness of right lower extremity  R29 898    3  Chronic pain of right knee  M25 561     G89 29    4  Impairment of balance  R26 89    5  Gait abnormality  R26 9        Start Time: 1230  Stop Time: 1315  Total time in clinic (min): 45 minutes    Subjective: Pt reported that she's been well, but she is having problems with her elbow  Pt reported that she hasn't had any falls since her last appointment  She has been trying to take a walk every day and feels like she's walking better  Objective: See treatment diary below      Assessment: Tolerated treatment well  She was able to demonstrate improved performance on STS, and responded well to cues to bring her RLE more posterior as she had tendency to place this foot more anteriorly and reduce WB  She was instructed in scapular and shoulder strengthening exercises to aid in reducing shoulder pain  Discussed that pt may benefit from OT for her elbow ROM deficits  Patient would benefit from continued PT to continue increasing safe ambulatory capacity  Plan: Continue per plan of care        Precautions: Fall risk                 Manuals                                                                 Neuro Re-Ed             Seated DF   15x, HEP  15x B          Seated eversion  15x, HEP           Standing rows   NV  2x10 BTB         Overhead press  NV           Walking with reduced support    6x8 ft rail + HHA from PT          Standing marches     2x20          Ther Ex             Ambulation with walking sticks   15'            Sit to stand   1x5  1x10 with foam no UE  2x8 with foam and no UE          Standing hip abduction   2x10 B 10x B         Shoulder IR/ER    8x B GTB Ther Activity             Reassessment of objective measures, discussion of POC  36'  15'                         Gait Training                                       Modalities                                         HEP: STS, walking, seated DF, standing marches, standing hip abduction

## 2022-06-30 ENCOUNTER — OFFICE VISIT (OUTPATIENT)
Dept: PHYSICAL THERAPY | Facility: CLINIC | Age: 62
End: 2022-06-30
Payer: COMMERCIAL

## 2022-06-30 DIAGNOSIS — M25.561 CHRONIC PAIN OF RIGHT KNEE: ICD-10-CM

## 2022-06-30 DIAGNOSIS — G89.29 CHRONIC PAIN OF RIGHT KNEE: ICD-10-CM

## 2022-06-30 DIAGNOSIS — R29.898 WEAKNESS OF RIGHT LOWER EXTREMITY: ICD-10-CM

## 2022-06-30 DIAGNOSIS — R26.89 IMPAIRMENT OF BALANCE: ICD-10-CM

## 2022-06-30 DIAGNOSIS — G35 MULTIPLE SCLEROSIS (HCC): Primary | ICD-10-CM

## 2022-06-30 DIAGNOSIS — R26.9 GAIT ABNORMALITY: ICD-10-CM

## 2022-06-30 PROCEDURE — 97110 THERAPEUTIC EXERCISES: CPT

## 2022-06-30 PROCEDURE — 97112 NEUROMUSCULAR REEDUCATION: CPT

## 2022-06-30 PROCEDURE — 97116 GAIT TRAINING THERAPY: CPT

## 2022-06-30 NOTE — PROGRESS NOTES
Daily Note     Today's date: 2022  Patient name: Alex Colon  : 1960  MRN: [id number]  Referring provider: Fitz Tirado, *  Dx:   Encounter Diagnosis     ICD-10-CM    1  Multiple sclerosis ([facility] [address] )  G35    2  Weakness of right lower extremity  R29 898    3  Chronic pain of right knee  M25 561     G89 29    4  Gait abnormality  R26 9    5  Impairment of balance  R26 89        Start Time: 1750  Stop Time: 1830  Total time in clinic (min): 40 minutes    Subjective: Pt reported that she's doing well  She reported that she hasn't had any falls since her last appointment, which makes at least a week since her last fall  Objective: See treatment diary below      Assessment: Today's session had large focus on ambulation pattern and education  Pt has tendency to ambulate with RW too far anterior, causing difficulty lifting RLE, instability, and increased fatigue  She was able to significantly improve gait pattern with cues during session  Her ambulation with UUE support improved significantly at this visit as well  Patient would benefit from continued PT to continue to improve safety with functional mobility  Plan: Continue per plan of care        Precautions: Fall risk                Manuals                                                                 Neuro Re-Ed             Seated DF   15x, HEP  15x B  20x B         Seated eversion  15x, HEP           Standing rows   NV  2x10 BTB         Overhead press  NV           Walking with reduced support    6x8 ft rail + HHA from PT  6x8 ft with railing        Standing marches     2x20          Ther Ex             Ambulation with walking sticks   15'            Sit to stand   1x5  1x10 with foam no UE  2x8 with foam and no UE  2x20 with foam and no UE         Standing hip abduction   2x10 B 10x B 3x10 B         Shoulder IR/ER    8x B GTB                                                             Ther Activity Reassessment of objective measures, discussion of POC  36'  15'                         Gait Training             Ambulation with RW      8'                      Modalities                                         HEP: STS, walking, seated DF, standing marches, standing hip abduction

## 2022-07-06 ENCOUNTER — APPOINTMENT (OUTPATIENT)
Dept: PHYSICAL THERAPY | Facility: CLINIC | Age: 62
End: 2022-07-06
Payer: COMMERCIAL

## 2022-07-07 ENCOUNTER — APPOINTMENT (OUTPATIENT)
Dept: PHYSICAL THERAPY | Facility: CLINIC | Age: 62
End: 2022-07-07
Payer: COMMERCIAL

## 2022-07-11 ENCOUNTER — OFFICE VISIT (OUTPATIENT)
Dept: PHYSICAL THERAPY | Facility: CLINIC | Age: 62
End: 2022-07-11
Payer: COMMERCIAL

## 2022-07-11 DIAGNOSIS — M25.561 CHRONIC PAIN OF RIGHT KNEE: ICD-10-CM

## 2022-07-11 DIAGNOSIS — G89.29 CHRONIC PAIN OF RIGHT KNEE: ICD-10-CM

## 2022-07-11 DIAGNOSIS — R26.9 GAIT ABNORMALITY: ICD-10-CM

## 2022-07-11 DIAGNOSIS — R29.898 WEAKNESS OF RIGHT LOWER EXTREMITY: ICD-10-CM

## 2022-07-11 DIAGNOSIS — R26.89 IMPAIRMENT OF BALANCE: ICD-10-CM

## 2022-07-11 DIAGNOSIS — G35 MULTIPLE SCLEROSIS (HCC): Primary | ICD-10-CM

## 2022-07-11 PROCEDURE — 97110 THERAPEUTIC EXERCISES: CPT

## 2022-07-11 PROCEDURE — 97112 NEUROMUSCULAR REEDUCATION: CPT

## 2022-07-11 NOTE — PROGRESS NOTES
Daily Note     Today's date: 2022  Patient name: Deepika Davis  : 1960  MRN: [id number]  Referring provider: Denny Carvalho, *  Dx:   Encounter Diagnosis     ICD-10-CM    1  Multiple sclerosis ([facility] [place] [address] )  G35    2  Weakness of right lower extremity  R29 898    3  Chronic pain of right knee  M25 561     G89 29    4  Impairment of balance  R26 89    5  Gait abnormality  R26 9        Start Time: 1052  Stop Time: 1145  Total time in clinic (min): 53 minutes    Subjective: Pt reported that she had a bit of a weird weekend  She does feel like her R leg is getting better, and getting a bit stronger  Objective: See treatment diary below      Assessment: Tolerated treatment well  Today's session included extensive conversations regarding pt's POC and as well as realistic short/long term goals  Pt has been noting some functional improvements and demonstrated significantly improved ease with STS transfers (as she is able to perform with no hands from regular chair height now), but sometimes feels like mentally she doesn't see the progress  Pt's HEP was reviewed for clarity and prioritization  She noted new goal of increasing the amount of time that she can stand  Patient would benefit from continued PT to continue increasing strength, static/dynamic balance, and safety with functional mobility  Plan: Continue per plan of care        Precautions: Fall risk               Manuals                                                                 Neuro Re-Ed             Seated DF   15x, HEP  15x B  20x B  10x B        Seated eversion  15x, HEP           Standing rows   NV  2x10 BTB         Overhead press  NV           Walking with reduced support    6x8 ft rail + HHA from PT  6x8 ft with railing        Standing marches     2x20          Review of HEP and discussion of goals/POC       SM 15'        Standing cone stacking       4'        Ther Ex             Ambulation with walking sticks   15'            Sit to stand   1x5  1x10 with foam no UE  2x8 with foam and no UE  2x20 with foam and no UE  3x8, no foam, no UE        Standing hip abduction   2x10 B 10x B 3x10 B  2x10 B        Shoulder IR/ER    8x B GTB         Seated bike       10'                                               Ther Activity                          Gait Training             Ambulation with RW      8'                      Modalities                                         HEP: STS, walking, seated DF, standing marches, standing hip abduction, seated bike

## 2022-07-13 ENCOUNTER — OFFICE VISIT (OUTPATIENT)
Dept: PHYSICAL THERAPY | Facility: CLINIC | Age: 62
End: 2022-07-13
Payer: COMMERCIAL

## 2022-07-13 DIAGNOSIS — G35 MULTIPLE SCLEROSIS (HCC): Primary | ICD-10-CM

## 2022-07-13 DIAGNOSIS — R26.9 GAIT ABNORMALITY: ICD-10-CM

## 2022-07-13 DIAGNOSIS — R29.898 WEAKNESS OF RIGHT LOWER EXTREMITY: ICD-10-CM

## 2022-07-13 DIAGNOSIS — R26.89 IMPAIRMENT OF BALANCE: ICD-10-CM

## 2022-07-13 DIAGNOSIS — M25.561 CHRONIC PAIN OF RIGHT KNEE: ICD-10-CM

## 2022-07-13 DIAGNOSIS — G89.29 CHRONIC PAIN OF RIGHT KNEE: ICD-10-CM

## 2022-07-13 PROCEDURE — 97110 THERAPEUTIC EXERCISES: CPT

## 2022-07-13 PROCEDURE — 97112 NEUROMUSCULAR REEDUCATION: CPT

## 2022-07-13 NOTE — PROGRESS NOTES
Daily Note     Today's date: 2022  Patient name: Gregory Wright  : 1960  MRN: [id number]  Referring provider: Lissett Casas, *  Dx:   Encounter Diagnosis     ICD-10-CM    1  Multiple sclerosis ([facility] [address] )  G35    2  Gait abnormality  R26 9    3  Weakness of right lower extremity  R29 898    4  Chronic pain of right knee  M25 561     G89 29    5  Impairment of balance  R26 89        Start Time: 1100  Stop Time: 1145  Total time in clinic (min): 45 minutes    Subjective: Pt was unsure if she had any falls or not since her last appointment, but continues to note that she's falling less  She also notes improved ability to DF her R toe (improved since just last week)  Objective: See treatment diary below      Assessment: Tolerated treatment well  This session focused lots on glute med strength and activation, with hip hike and standing hip abduction performed  She was able to significantly improve her gait pattern while walking by making an intentional effort to engage her contralateral glute muscles before picking up her leg (most notable difference with L glute squeeze and RLE advancement)  She notes she feels she hasn't walked without an AD with that much stability in years  Patient would benefit from continued PT to continue improving strength, balance, gait pattern, and safety with functional mobility  Plan: Continue per plan of care        Precautions: Fall risk              Manuals                                                                 Neuro Re-Ed             Seated DF   15x, HEP  15x B  20x B  10x B  20x B       Seated eversion  15x, HEP           Standing rows   NV  2x10 BTB         Overhead press  NV           Walking with reduced support    6x8 ft rail + HHA from PT  6x8 ft with railing  6x8 ft with railing       Standing marches     2x20          Review of HEP and discussion of goals/POC       SM 15'        Hip hike        30x B attempted Standing cone stacking       4'        Ambulation no AD       5' 3      Ther Ex             Ambulation with walking sticks   15'            Sit to stand   1x5  1x10 with foam no UE  2x8 with foam and no UE  2x20 with foam and no UE  3x8, no foam, no UE  3x8       Standing hip abduction   2x10 B 10x B 3x10 B  2x10 B  2x10 B      Shoulder IR/ER    8x B GTB         Seated bike       10'  10'                                              Ther Activity                          Gait Training             Ambulation with RW      8'                      Modalities                                         HEP: STS, walking, seated DF, standing marches, standing hip abduction, seated bike

## 2022-07-18 ENCOUNTER — APPOINTMENT (OUTPATIENT)
Dept: PHYSICAL THERAPY | Facility: CLINIC | Age: 62
End: 2022-07-18
Payer: COMMERCIAL

## 2022-07-20 ENCOUNTER — APPOINTMENT (OUTPATIENT)
Dept: PHYSICAL THERAPY | Facility: CLINIC | Age: 62
End: 2022-07-20
Payer: COMMERCIAL

## 2022-07-21 ENCOUNTER — APPOINTMENT (OUTPATIENT)
Dept: PHYSICAL THERAPY | Facility: CLINIC | Age: 62
End: 2022-07-21
Payer: COMMERCIAL

## 2022-07-25 ENCOUNTER — OFFICE VISIT (OUTPATIENT)
Dept: PHYSICAL THERAPY | Facility: CLINIC | Age: 62
End: 2022-07-25
Payer: COMMERCIAL

## 2022-07-25 DIAGNOSIS — R26.9 GAIT ABNORMALITY: ICD-10-CM

## 2022-07-25 DIAGNOSIS — G89.29 CHRONIC PAIN OF RIGHT KNEE: ICD-10-CM

## 2022-07-25 DIAGNOSIS — R26.89 IMPAIRMENT OF BALANCE: ICD-10-CM

## 2022-07-25 DIAGNOSIS — M25.561 CHRONIC PAIN OF RIGHT KNEE: ICD-10-CM

## 2022-07-25 DIAGNOSIS — G35 MULTIPLE SCLEROSIS (HCC): Primary | ICD-10-CM

## 2022-07-25 DIAGNOSIS — R29.898 WEAKNESS OF RIGHT LOWER EXTREMITY: ICD-10-CM

## 2022-07-25 PROCEDURE — 97110 THERAPEUTIC EXERCISES: CPT

## 2022-07-25 PROCEDURE — 97112 NEUROMUSCULAR REEDUCATION: CPT

## 2022-07-25 NOTE — PROGRESS NOTES
Daily Note     Today's date: 2022  Patient name: Sienna Beltran  : 1960  MRN: [id number]  Referring provider: Shira Freedman, *  Dx:   Encounter Diagnosis     ICD-10-CM    1  Multiple sclerosis ([facility] [address] )  G35    2  Impairment of balance  R26 89    3  Gait abnormality  R26 9    4  Weakness of right lower extremity  R29 898    5  Chronic pain of right knee  M25 561     G89 29        Start Time: 1100  Stop Time: 1145  Total time in clinic (min): 45 minutes    Subjective: Pt reported that her walking has significantly been impacted by the heat  Pt reported that she did have several falls (2ish) over the past week due to her impaired gait  Objective: See treatment diary below       Assessment: Tolerated treatment well  Significant time spent this session working on gait mechanics with glute activation  She was able to improve this with practice throughout the session  She did demonstrate improved ROM with standing hip abduction  She was able to initiate leg press at this visit, noting more difficulty with L than R  Patient would benefit from continued PT to continue elucidating current deficits and improving safety with ambulation and functional task performance  Plan: Continue per plan of care        Precautions: Fall risk             Manuals                                                                 Neuro Re-Ed             Seated DF   15x, HEP  15x B  20x B  10x B  20x B       Seated eversion  15x, HEP           Standing rows   NV  2x10 BTB         Overhead press  NV           Walking with reduced support    6x8 ft rail + HHA from PT  6x8 ft with railing  6x8 ft with railing  15' with railing      Standing marches     2x20          Review of HEP and discussion of goals/POC       SM 15'        Hip hike        30x B attempted 20x B      Standing cone stacking       4'        Ambulation no AD       5' 3       Ther Ex             Ambulation with walking sticks   15'            Sit to stand   1x5  1x10 with foam no UE  2x8 with foam and no UE  2x20 with foam and no UE  3x8, no foam, no UE  3x8  2x8      Standing hip abduction   2x10 B 10x B 3x10 B  2x10 B  2x10 B 2x10 B     Shoulder IR/ER    8x B GTB         Seated bike       10'  10'  10'                                             Ther Activity                          Gait Training             Ambulation with RW      8'                      Modalities                                         HEP: STS, walking, seated DF, standing marches, standing hip abduction, seated bike

## 2022-07-27 ENCOUNTER — OFFICE VISIT (OUTPATIENT)
Dept: PHYSICAL THERAPY | Facility: CLINIC | Age: 62
End: 2022-07-27
Payer: COMMERCIAL

## 2022-07-27 DIAGNOSIS — R26.9 GAIT ABNORMALITY: ICD-10-CM

## 2022-07-27 DIAGNOSIS — M25.561 CHRONIC PAIN OF RIGHT KNEE: ICD-10-CM

## 2022-07-27 DIAGNOSIS — R26.89 IMPAIRMENT OF BALANCE: ICD-10-CM

## 2022-07-27 DIAGNOSIS — G89.29 CHRONIC PAIN OF RIGHT KNEE: ICD-10-CM

## 2022-07-27 DIAGNOSIS — R29.898 WEAKNESS OF RIGHT LOWER EXTREMITY: ICD-10-CM

## 2022-07-27 DIAGNOSIS — G35 MULTIPLE SCLEROSIS (HCC): Primary | ICD-10-CM

## 2022-07-27 PROCEDURE — 97112 NEUROMUSCULAR REEDUCATION: CPT

## 2022-07-27 PROCEDURE — 97110 THERAPEUTIC EXERCISES: CPT

## 2022-07-27 NOTE — PROGRESS NOTES
Daily Note     Today's date: 2022  Patient name: Preethi Gregory  : 1960  MRN: [id number]  Referring provider: Emeli Monet, *  Dx:   Encounter Diagnosis     ICD-10-CM    1  Multiple sclerosis ([facility] [address] )  G35    2  Chronic pain of right knee  M25 561     G89 29    3  Impairment of balance  R26 89    4  Gait abnormality  R26 9    5  Weakness of right lower extremity  R29 898        Start Time: 1100  Stop Time: 1145  Total time in clinic (min): 45 minutes    Subjective: Pt reported that she feels like she's picking up her R leg better  She notes that many things are getting better at home, but some things are still hard (like making a bed)  She hasn't fallen at all since her last appointment  Objective: See treatment diary below      Assessment:  Pt tolerated treatment well  She was able to initiate step taps to work on functional stability and BLE hip flexion and clearance  She continues to struggle with glute activation prior to stepping to improve stance stability, but has improved her ability/AROM when completing hip hikes and standing hip abduction  Pt requested to continue working on trunk stability exercises to improve functional tasks such as making her bed, so this activity will be resumed next visit  Patient would benefit from continued PT to continue improving strength, coordination, and functional mobility capacity  Plan: Continue per plan of care        Precautions: Fall risk            Manuals                                                                 Neuro Re-Ed             Seated DF   15x, HEP  15x B  20x B  10x B  20x B       Seated eversion  15x, HEP           Standing rows   NV  2x10 BTB         Overhead press  NV           Walking with reduced support    6x8 ft rail + HHA from PT  6x8 ft with railing  6x8 ft with railing  15' with railing      Standing marches     2x20          Review of HEP and discussion of goals/POC SM 15'        Hip hike        30x B attempted 20x B  2x10 B     Standing cone stacking       4'    NV    Ambulation no AD       5' 3   50 ft     Step taps          2x20 B 6"    Ther Ex             Ambulation with walking sticks   15'            Sit to stand   1x5  1x10 with foam no UE  2x8 with foam and no UE  2x20 with foam and no UE  3x8, no foam, no UE  3x8  2x8  2x5     Standing hip abduction   2x10 B 10x B 3x10 B  2x10 B  2x10 B 2x10 B 2x10 B     Shoulder IR/ER    8x B GTB         Seated bike       10'  10'  10'  10'                                            Ther Activity                          Gait Training             Ambulation with RW      8'                      Modalities                                         HEP: STS, walking, seated DF, standing marches, standing hip abduction, seated bike

## 2022-08-01 ENCOUNTER — OFFICE VISIT (OUTPATIENT)
Dept: PHYSICAL THERAPY | Facility: CLINIC | Age: 62
End: 2022-08-01
Payer: COMMERCIAL

## 2022-08-01 DIAGNOSIS — M25.561 CHRONIC PAIN OF RIGHT KNEE: ICD-10-CM

## 2022-08-01 DIAGNOSIS — R26.89 IMPAIRMENT OF BALANCE: ICD-10-CM

## 2022-08-01 DIAGNOSIS — G89.29 CHRONIC PAIN OF RIGHT KNEE: ICD-10-CM

## 2022-08-01 DIAGNOSIS — R29.898 WEAKNESS OF RIGHT LOWER EXTREMITY: ICD-10-CM

## 2022-08-01 DIAGNOSIS — R26.9 GAIT ABNORMALITY: ICD-10-CM

## 2022-08-01 DIAGNOSIS — G35 MULTIPLE SCLEROSIS (HCC): Primary | ICD-10-CM

## 2022-08-01 PROCEDURE — 97112 NEUROMUSCULAR REEDUCATION: CPT

## 2022-08-01 PROCEDURE — 97110 THERAPEUTIC EXERCISES: CPT

## 2022-08-01 NOTE — PROGRESS NOTES
Daily Note     Today's date: 2022  Patient name: Varun Zuniga  : 1960  MRN: [id number]  Referring provider: Doreen Don, *  Dx:   Encounter Diagnosis     ICD-10-CM    1  Multiple sclerosis ([facility] [address] )  G35    2  Weakness of right lower extremity  R29 898    3  Chronic pain of right knee  M25 561     G89 29    4  Impairment of balance  R26 89    5  Gait abnormality  R26 9        Start Time: 1130  Stop Time: 1200  Total time in clinic (min): 30 minutes    Subjective: Pt reported that she was doing well  Objective: See treatment diary below       Assessment: Tolerated treatment well  She was able to incorporate more independent exercise at this visit  She continues to improve her ability to perform hip hikes and standing hip abduction  She also initiated additional practice of standing activities (simulating tasks like cooking/cleaning) as pt notes standing tolerance limits her with functional activities  Patient would benefit from continued PT to continue increasing strength, balance, endurance, and safety with functional activity performance  Plan: Continue per plan of care        Precautions: Fall risk           Manuals                                                                 Neuro Re-Ed             Seated DF   15x, HEP  15x B  20x B  10x B  20x B       Seated eversion  15x, HEP           Standing rows   NV  2x10 BTB         Overhead press  NV           Walking with reduced support    6x8 ft rail + HHA from PT  6x8 ft with railing  6x8 ft with railing  15' with railing      Standing marches     2x20          Review of HEP and discussion of goals/POC       SM 15'        Hip hike        30x B attempted 20x B  2x10 B  2x15 B    Standing cone stacking       4'    NV 4x10 cones   Ambulation no AD       5' 3   50 ft  10 ft   Step taps          2x20 B 6"    Standing table slide           2x10    Ther Ex             Ambulation with walking sticks   15'            Sit to stand   1x5  1x10 with foam no UE  2x8 with foam and no UE  2x20 with foam and no UE  3x8, no foam, no UE  3x8  2x8  2x5  2x5   Standing hip abduction   2x10 B 10x B 3x10 B  2x10 B  2x10 B 2x10 B 2x10 B  2x10 B   Shoulder IR/ER    8x B GTB         Seated bike       10'  10'  10'  10'  15'   LAQ          3x10 4#                             Ther Activity                          Gait Training             Ambulation with RW      8'                      Modalities                                         HEP: STS, walking, seated DF, standing marches, standing hip abduction, seated bike

## 2022-08-03 ENCOUNTER — OFFICE VISIT (OUTPATIENT)
Dept: PHYSICAL THERAPY | Facility: CLINIC | Age: 62
End: 2022-08-03
Payer: COMMERCIAL

## 2022-08-03 DIAGNOSIS — R26.9 GAIT ABNORMALITY: ICD-10-CM

## 2022-08-03 DIAGNOSIS — M25.561 CHRONIC PAIN OF RIGHT KNEE: ICD-10-CM

## 2022-08-03 DIAGNOSIS — G35 MULTIPLE SCLEROSIS (HCC): Primary | ICD-10-CM

## 2022-08-03 DIAGNOSIS — R29.898 WEAKNESS OF RIGHT LOWER EXTREMITY: ICD-10-CM

## 2022-08-03 DIAGNOSIS — G89.29 CHRONIC PAIN OF RIGHT KNEE: ICD-10-CM

## 2022-08-03 DIAGNOSIS — R26.89 IMPAIRMENT OF BALANCE: ICD-10-CM

## 2022-08-03 PROCEDURE — 97112 NEUROMUSCULAR REEDUCATION: CPT

## 2022-08-03 PROCEDURE — 97110 THERAPEUTIC EXERCISES: CPT

## 2022-08-03 NOTE — PROGRESS NOTES
Daily Note     Today's date: 8/3/2022  Patient name: Michael Zelaya  : 1960  MRN: [id number]  Referring provider: Rock Stiles, *  Dx:   Encounter Diagnosis     ICD-10-CM    1  Multiple sclerosis ([facility] [address] )  G35    2  Weakness of right lower extremity  R29 898    3  Chronic pain of right knee  M25 561     G89 29    4  Gait abnormality  R26 9    5  Impairment of balance  R26 89        Start Time: 1100  Stop Time: 1155  Total time in clinic (min): 55 minutes    Subjective: Pt reported that she had one fall after her last visit  Objective: See treatment diary below      Assessment: Tolerated treatment well  She was able to incorporate more practice of walking with no AD at this visit  Her performance with hip hikes continues to improve between visits  She was able to spend a more significant duration of time standing during this appointment to continue to work on standing endurance  Her ambulation became increasingly more unstable as she fatigued, with one LOB near end of session requiring min(A) to recover  Patient would benefit from continued PT to continue improving balance, endurance, gait pattern, and safety with functional mobility  Plan: Continue per plan of care        Precautions: Fall risk        6/7 6/13 6/22 6/27 6/30 7/11 7/13 7/25 7/27 8/1 8/3   Manuals                            Neuro Re-Ed              Seated DF   15x, HEP  15x B  20x B  10x B  20x B        Seated eversion  15x, HEP            Standing rows   NV  2x10 BTB       3x10 BTB   Overhead press  NV            Walking with reduced support    6x8 ft rail + HHA from PT  6x8 ft with railing  6x8 ft with railing  15' with railing       Standing marches     2x20           Review of HEP and discussion of goals/POC       SM 15'         Hip hike        30x B attempted 20x B  2x10 B  2x15 B  2x15 B   Standing cone stacking       4'    NV 4x10 cones 5'    Ambulation no AD       5' 3   50 ft  10 ft 35 ft x 4    Step taps 2x20 B 6"     Standing table slide           2x10     Ther Ex              Ambulation with walking sticks   15'             Sit to stand   1x5  1x10 with foam no UE  2x8 with foam and no UE  2x20 with foam and no UE  3x8, no foam, no UE  3x8  2x8  2x5  2x5 1x10    Standing hip abduction   2x10 B 10x B 3x10 B  2x10 B  2x10 B 2x10 B 2x10 B  2x10 B 2x10 B   Shoulder IR/ER    8x B GTB          Seated bike       10'  10'  10'  10'  15' 10'    LAQ          3x10 4#                                Ther Activity                            Gait Training              Ambulation with RW      8'                        Modalities                                            HEP: STS, walking, seated DF, standing marches, standing hip abduction, seated bike

## 2022-08-05 ENCOUNTER — EVALUATION (OUTPATIENT)
Dept: PHYSICAL THERAPY | Facility: CLINIC | Age: 62
End: 2022-08-05
Payer: COMMERCIAL

## 2022-08-05 DIAGNOSIS — M25.561 CHRONIC PAIN OF RIGHT KNEE: ICD-10-CM

## 2022-08-05 DIAGNOSIS — G89.29 CHRONIC PAIN OF RIGHT KNEE: ICD-10-CM

## 2022-08-05 DIAGNOSIS — R26.9 GAIT ABNORMALITY: ICD-10-CM

## 2022-08-05 DIAGNOSIS — R29.898 WEAKNESS OF RIGHT LOWER EXTREMITY: ICD-10-CM

## 2022-08-05 DIAGNOSIS — G35 MULTIPLE SCLEROSIS (HCC): Primary | ICD-10-CM

## 2022-08-05 DIAGNOSIS — R26.89 IMPAIRMENT OF BALANCE: ICD-10-CM

## 2022-08-05 PROCEDURE — 97110 THERAPEUTIC EXERCISES: CPT

## 2022-08-05 PROCEDURE — 97530 THERAPEUTIC ACTIVITIES: CPT

## 2022-08-05 NOTE — PROGRESS NOTES
Daily Note     Today's date: 2022  Patient name: Lavelle Zhao  : 1960  MRN: [id number]  Referring provider: Rosas Johnson, *  Dx:   Encounter Diagnosis     ICD-10-CM    1  Multiple sclerosis ([address] )  G35    2  Weakness of right lower extremity  R29 898    3  Impairment of balance  R26 89    4  Chronic pain of right knee  M25 561     G89 29    5  Gait abnormality  R26 9        Start Time: 1115  Stop Time: 1200  Total time in clinic (min): 45 minutes    Subjective: Pt reported that she is having lots of fatigue because she was dealing with her dog after it had surgery, and she was also noting some new L breast pain  Objective: See treatment diary below             FGA  NV   2 Minute Walk Test (ft): 123 ft  124 ft    Gait Speed (ft/s): 6m/21s = 0 29 m/s with RW 6m/20s = 0 3 m/s   5x Sit To Stand (s): 22 75s, no UE 19 55s   TUG: TU 6s, performed with B walking sticks RW: 27 1s   No AD: 55 9s         Assessment: Reassessment performed at this visit indicates that pt is continuing to make functional progress, with most notable progress in domains of functional mobility and fall risk (per TUG)  Additionally, she was able to perform some functional mobility assessments with no AD, which she was unable to do safely before  She continues to have persistent endurance deficits, largely due to R foot drag as she walks longer distances  Patient would benefit from continued PT for an additional 8 weeks to continue improving LE strength, endurance, gait pattern, and safety with functional mobility  Plan: Continue per plan of care        Precautions: Fall risk        6/7 6/13 6/22 6/27 6/30 7/11 7/13 7/25 7/27 8/1 8/3 8/5   Manuals                              Neuro Re-Ed               Seated DF   15x, HEP  15x B  20x B  10x B  20x B         Seated eversion  15x, HEP             Standing rows   NV  2x10 BTB       3x10 BTB    Overhead press  NV             Walking with reduced support    6x8 ft rail + HHA from PT  6x8 ft with railing  6x8 ft with railing  15' with railing        Standing marches     2x20            Review of HEP and discussion of goals/POC        15'          Hip hike        30x B attempted 20x B  2x10 B  2x15 B  2x15 B 3x5 B   Standing cone stacking       4'    NV 4x10 cones 5'     Ambulation no AD       5' 3   50 ft  10 ft 35 ft x 4     Step taps          2x20 B 6"      Standing table slide           2x10      Ther Ex               Ambulation with walking sticks   15'              Sit to stand   1x5  1x10 with foam no UE  2x8 with foam and no UE  2x20 with foam and no UE  3x8, no foam, no UE  3x8  2x8  2x5  2x5 1x10     Standing hip abduction   2x10 B 10x B 3x10 B  2x10 B  2x10 B 2x10 B 2x10 B  2x10 B 2x10 B 2x10 B   Shoulder IR/ER    8x B GTB           Seated bike       10'  10'  10'  10'  15' 10'     LAQ          3x10 4#                                   Ther Activity               Functional mobility assessments                Gait Training               Ambulation with RW      8'                          Modalities                                               HEP: STS, walking, seated DF, standing marches, standing hip abduction, seated bike

## 2022-08-08 ENCOUNTER — OFFICE VISIT (OUTPATIENT)
Dept: PHYSICAL THERAPY | Facility: CLINIC | Age: 62
End: 2022-08-08
Payer: COMMERCIAL

## 2022-08-08 DIAGNOSIS — G89.29 CHRONIC PAIN OF RIGHT KNEE: ICD-10-CM

## 2022-08-08 DIAGNOSIS — R26.89 IMPAIRMENT OF BALANCE: ICD-10-CM

## 2022-08-08 DIAGNOSIS — G35 MULTIPLE SCLEROSIS (HCC): Primary | ICD-10-CM

## 2022-08-08 DIAGNOSIS — M25.561 CHRONIC PAIN OF RIGHT KNEE: ICD-10-CM

## 2022-08-08 DIAGNOSIS — R29.898 WEAKNESS OF RIGHT LOWER EXTREMITY: ICD-10-CM

## 2022-08-08 DIAGNOSIS — R26.9 GAIT ABNORMALITY: ICD-10-CM

## 2022-08-08 PROCEDURE — 97112 NEUROMUSCULAR REEDUCATION: CPT

## 2022-08-08 PROCEDURE — 97110 THERAPEUTIC EXERCISES: CPT

## 2022-08-08 NOTE — PROGRESS NOTES
Daily Note     Today's date: 2022  Patient name: Concepción Ferrari  : 1960  MRN: [id number]  Referring provider: Georgiann Bamberger, *  Dx:   Encounter Diagnosis     ICD-10-CM    1  Multiple sclerosis ([facility] [address] )  G35    2  Weakness of right lower extremity  R29 898    3  Gait abnormality  R26 9    4  Impairment of balance  R26 89    5  Chronic pain of right knee  M25 561     G89 29        Start Time: 1105  Stop Time: 1150  Total time in clinic (min): 45 minutes    Subjective: Pt reported that she had been really fatigued this weekend from the heat and being outside  She noted she's had no falls since her session on Friday  Objective: See treatment diary below      Assessment: Tolerated treatment well  She continues to improve her ambulation with no AD as well as her ability to perform hip hikes/standing hip abduction  Her form with STS transfers continues to improve in stability as well  She initiated the leg press at this visit (unilaterally) to continue to challenge LE strengthening  She would benefit from re-introduction of standing marches at next session to continue to challenge LE flexion strength and single leg stability  Patient would benefit from continued PT to continue to improve safety and endurance with functional mobility capacity  Plan: Continue per plan of care        Precautions: Fall risk        6/30 7/11 7/13 7/25 7/27 8/1 8/3 8/5 8/8   Manuals                        Neuro Re-Ed            Seated DF  20x B  10x B  20x B          Seated eversion            Standing rows        3x10 BTB  3x10 BTB   Overhead press            Walking with reduced support  6x8 ft with railing  6x8 ft with railing  15' with railing         Standing marches          NV    Review of HEP and discussion of goals/POC   SM 15'           Hip hike    30x B attempted 20x B  2x10 B  2x15 B  2x15 B 3x5 B 2x10 B   Standing cone stacking   4'    NV 4x10 cones 5'      Ambulation no AD   5' 3   50 ft  10 ft 35 ft x 4      Step taps      2x20 B 6"       Standing table slide       2x10       Ther Ex            Ambulation with walking sticks             Sit to stand  2x20 with foam and no UE  3x8, no foam, no UE  3x8  2x8  2x5  2x5 1x10   2x10    Standing hip abduction 3x10 B  2x10 B  2x10 B 2x10 B 2x10 B  2x10 B 2x10 B 2x10 B 2x10 B   Shoulder IR/ER            Seated bike   10'  10'  10'  10'  15' 10'   7'    LAQ      3x10 4#      Leg press          2x12 B (RLE 85#, LLE 75#)               Ther Activity            Functional mobility assessments         SM    Gait Training            Ambulation with RW  8'                        Modalities                                      HEP: STS, walking, seated DF, standing marches, standing hip abduction, seated bike

## 2022-08-10 ENCOUNTER — OFFICE VISIT (OUTPATIENT)
Dept: PHYSICAL THERAPY | Facility: CLINIC | Age: 62
End: 2022-08-10
Payer: COMMERCIAL

## 2022-08-10 DIAGNOSIS — R26.9 GAIT ABNORMALITY: ICD-10-CM

## 2022-08-10 DIAGNOSIS — G35 MULTIPLE SCLEROSIS (HCC): Primary | ICD-10-CM

## 2022-08-10 DIAGNOSIS — M25.561 CHRONIC PAIN OF RIGHT KNEE: ICD-10-CM

## 2022-08-10 DIAGNOSIS — R29.898 WEAKNESS OF RIGHT LOWER EXTREMITY: ICD-10-CM

## 2022-08-10 DIAGNOSIS — G89.29 CHRONIC PAIN OF RIGHT KNEE: ICD-10-CM

## 2022-08-10 DIAGNOSIS — R26.89 IMPAIRMENT OF BALANCE: ICD-10-CM

## 2022-08-10 PROCEDURE — 97112 NEUROMUSCULAR REEDUCATION: CPT

## 2022-08-10 PROCEDURE — 97110 THERAPEUTIC EXERCISES: CPT

## 2022-08-10 NOTE — PROGRESS NOTES
Daily Note     Today's date: 8/10/2022  Patient name: Conor Dueñas  : 1960  MRN: [id number]  Referring provider: Marilou Schmitt, *  Dx:   Encounter Diagnosis     ICD-10-CM    1  Multiple sclerosis ([facility] [address] )  G35    2  Chronic pain of right knee  M25 561     G89 29    3  Weakness of right lower extremity  R29 898    4  Gait abnormality  R26 9    5  Impairment of balance  R26 89        Start Time: 1104  Stop Time: 1145  Total time in clinic (min): 41 minutes    Subjective: Pt reported that she's continuing to feel stronger  Objective: See treatment diary below      Assessment: Tolerated treatment well  She continues to note subjective improvements and is able to perform most exercises with increasing stability  She progressed resistance for standing rows while maintaining good form  Leg press continues to achieve adequate challenge and fatigue at current level  Standing marches were resumed, with addition of standing hamstring curl  Pt was instructed to take breaks as form deteriorated with RLE when fatigue increased  Patient would benefit from continued PT to continue strengthening, gait training, balance/postural stability training, and ADL training to assist in improving safety with functional mobility  Plan: Continue per plan of care        Precautions: Fall risk        6/30 7/11 7/13 7/25 7/27 8/1 8/3 8/5 8/8 8/10   Manuals                          Neuro Re-Ed             Seated DF  20x B  10x B  20x B           Seated eversion             Standing rows        3x10 BTB  3x10 BTB 3x10 black TB   Overhead press             Walking with reduced support  6x8 ft with railing  6x8 ft with railing  15' with railing          Standing marches          NV  2x8 B   Standing hamstring curls          2x8 B   Review of HEP and discussion of goals/POC   SM 15'            Hip hike    30x B attempted 20x B  2x10 B  2x15 B  2x15 B 3x5 B 2x10 B 2x10 B   Standing cone stacking   4'    NV 4x10 cones 5' 5'    Ambulation no AD   5' 3   50 ft  10 ft 35 ft x 4       Step taps      2x20 B 6"        Standing table slide       2x10        Ther Ex             Ambulation with walking sticks              Sit to stand  2x20 with foam and no UE  3x8, no foam, no UE  3x8  2x8  2x5  2x5 1x10   2x10  2x10    Standing hip abduction 3x10 B  2x10 B  2x10 B 2x10 B 2x10 B  2x10 B 2x10 B 2x10 B 2x10 B 2x10 B   Shoulder IR/ER             Seated bike   10'  10'  10'  10'  15' 10'   7'  10'    LAQ      3x10 4#       Leg press          2x12 B (RLE 85#, LLE 75#) 2x12 B (RLE 85#, LLE 75#)                Ther Activity             Functional mobility assessments         SM     Gait Training             Ambulation with RW  8'                          Modalities                                         HEP: STS, walking, seated DF, standing marches, standing hip abduction, seated bike

## 2022-08-12 ENCOUNTER — OFFICE VISIT (OUTPATIENT)
Dept: PHYSICAL THERAPY | Facility: CLINIC | Age: 62
End: 2022-08-12
Payer: COMMERCIAL

## 2022-08-12 DIAGNOSIS — M25.561 CHRONIC PAIN OF RIGHT KNEE: ICD-10-CM

## 2022-08-12 DIAGNOSIS — R26.9 GAIT ABNORMALITY: ICD-10-CM

## 2022-08-12 DIAGNOSIS — R26.89 IMPAIRMENT OF BALANCE: ICD-10-CM

## 2022-08-12 DIAGNOSIS — G35 MULTIPLE SCLEROSIS (HCC): Primary | ICD-10-CM

## 2022-08-12 DIAGNOSIS — G89.29 CHRONIC PAIN OF RIGHT KNEE: ICD-10-CM

## 2022-08-12 DIAGNOSIS — R29.898 WEAKNESS OF RIGHT LOWER EXTREMITY: ICD-10-CM

## 2022-08-12 PROCEDURE — 97112 NEUROMUSCULAR REEDUCATION: CPT

## 2022-08-12 PROCEDURE — 97110 THERAPEUTIC EXERCISES: CPT

## 2022-08-12 NOTE — PROGRESS NOTES
Daily Note     Today's date: 2022  Patient name: Maicol Rizvi  : 1960  MRN: [id number]  Referring provider: Ben Barakat, *  Dx:   Encounter Diagnosis     ICD-10-CM    1  Multiple sclerosis ([facility] [address] )  G35    2  Impairment of balance  R26 89    3  Chronic pain of right knee  M25 561     G89 29    4  Weakness of right lower extremity  R29 898    5  Gait abnormality  R26 9        Start Time: 1115  Stop Time: 1200  Total time in clinic (min): 45 minutes    Subjective: Pt reported that she has been doing well  She noted feeling poor in the mornings most mornings but it improves throughout the day  Objective: See treatment diary below      Assessment: Tolerated treatment well  She was able to continue practice of ambulation with no AD as well as increasing the weight of the leg press  Her form for hip hikes and standing hip abduction continues to improve, and concentrated hip strengthening should be continued  Patient would benefit from continued PT to continue strengthening, neuromuscular reeducation, gait training, and functional task practice to improve capacity for/safety with functional mobility  Plan: Continue per plan of care        Precautions: Fall risk         8/3 8 8/8 8/10 8/12   Manuals                            Neuro Re-Ed              Seated DF  20x B  10x B  20x B            Seated eversion              Standing rows        3x10 BTB  3x10 BTB 3x10 black TB    Overhead press              Walking with reduced support  6x8 ft with railing  6x8 ft with railing  15' with railing           Standing marches          NV  2x8 B    Standing hamstring curls          2x8 B    Review of HEP and discussion of goals/POC   SM 15'             Hip hike    30x B attempted 20x B  2x10 B  2x15 B  2x15 B 3x5 B 2x10 B 2x10 B 2x10 B   Standing cone stacking   4'    NV 4x10 cones 5'    5'     Ambulation no AD   5' 3   50 ft  10 ft 35 ft x 4     70 ft    Step taps 2x20 B 6"         Standing table slide       2x10         Ther Ex              Ambulation with walking sticks               Sit to stand  2x20 with foam and no UE  3x8, no foam, no UE  3x8  2x8  2x5  2x5 1x10   2x10  2x10  2x10    Standing hip abduction 3x10 B  2x10 B  2x10 B 2x10 B 2x10 B  2x10 B 2x10 B 2x10 B 2x10 B 2x10 B 2x10 B   Shoulder IR/ER              Seated bike   10'  10'  10'  10'  15' 10'   7'  10'  10'    LAQ      3x10 4#        Leg press          2x12 B (RLE 85#, LLE 75#) 2x12 B (RLE 85#, LLE 75#) 2x10 B (RLE 1x10 85#, 1x10 95#, LLE 2x10 95#)                 Ther Activity              Functional mobility assessments         SM      Gait Training              Ambulation with RW  8'                            Modalities                                            HEP: STS, walking, seated DF, standing marches, standing hip abduction, seated bike

## 2022-08-15 ENCOUNTER — TELEPHONE (OUTPATIENT)
Dept: NEUROLOGY | Facility: CLINIC | Age: 62
End: 2022-08-15

## 2022-08-15 NOTE — TELEPHONE ENCOUNTER
Provider will be out of the office on 08/19/2022  Please reschedule appt  Provider no longer goes to [facility]

## 2022-08-17 ENCOUNTER — APPOINTMENT (OUTPATIENT)
Dept: PHYSICAL THERAPY | Facility: CLINIC | Age: 62
End: 2022-08-17
Payer: COMMERCIAL

## 2022-08-22 ENCOUNTER — OFFICE VISIT (OUTPATIENT)
Dept: PHYSICAL THERAPY | Facility: CLINIC | Age: 62
End: 2022-08-22
Payer: COMMERCIAL

## 2022-08-22 ENCOUNTER — TELEPHONE (OUTPATIENT)
Dept: NEUROLOGY | Facility: CLINIC | Age: 62
End: 2022-08-22

## 2022-08-22 DIAGNOSIS — R26.2 AMBULATORY DYSFUNCTION: ICD-10-CM

## 2022-08-22 DIAGNOSIS — M25.561 CHRONIC PAIN OF RIGHT KNEE: ICD-10-CM

## 2022-08-22 DIAGNOSIS — R26.89 IMPAIRMENT OF BALANCE: ICD-10-CM

## 2022-08-22 DIAGNOSIS — G35 MULTIPLE SCLEROSIS (HCC): Primary | ICD-10-CM

## 2022-08-22 DIAGNOSIS — R29.898 WEAKNESS OF RIGHT LOWER EXTREMITY: ICD-10-CM

## 2022-08-22 DIAGNOSIS — R20.0 NUMBNESS AND TINGLING OF BOTH FEET: ICD-10-CM

## 2022-08-22 DIAGNOSIS — G89.29 CHRONIC PAIN OF RIGHT KNEE: ICD-10-CM

## 2022-08-22 DIAGNOSIS — M70.31 BURSITIS OF OTHER BURSA OF RIGHT ELBOW: ICD-10-CM

## 2022-08-22 DIAGNOSIS — R26.9 GAIT ABNORMALITY: ICD-10-CM

## 2022-08-22 DIAGNOSIS — R20.2 NUMBNESS AND TINGLING OF BOTH FEET: ICD-10-CM

## 2022-08-22 DIAGNOSIS — G35 MS (MULTIPLE SCLEROSIS) (HCC): Primary | ICD-10-CM

## 2022-08-22 PROCEDURE — 97112 NEUROMUSCULAR REEDUCATION: CPT

## 2022-08-22 PROCEDURE — 97530 THERAPEUTIC ACTIVITIES: CPT

## 2022-08-22 PROCEDURE — 97110 THERAPEUTIC EXERCISES: CPT

## 2022-08-22 RX ORDER — DIAZEPAM 5 MG/1
TABLET ORAL
Qty: 4 TABLET | Refills: 0 | Status: SHIPPED | OUTPATIENT
Start: 2022-08-22

## 2022-08-22 NOTE — TELEPHONE ENCOUNTER
Patient left vm stating she was having symptoms  Need to triage  Spoke w/patient  She reports increased weakness w/right leg  This has been occurring since Saturday  Patient does have hx of injury on right side  Going to PT for this injury  States she is making progress w/PT  Last PT 8/12/22  She has next PT session this AM  Patient not sure if weakness d/t previous injury or MS  Intermittent numbness of left thumb  Increased b/l foot numbness  L > R  Varies in severity  Denies any new fatigue  Denies bowel/bladder changes  Denies recent illness  UTI appx 4 months ago  Does have hx of UTIs  Denies drug use  No diabetes hx  DMT - none; recently taken off of Gilen[place] (4/2022)     MRI b/t/c - no imaging in years per patient    [phone] to leave detailed msg  Dr Samara Lloyd - please advise

## 2022-08-22 NOTE — TELEPHONE ENCOUNTER
Called CS w/patient on the line  Maria Dolores w/[facility]  Patient scheduled for MRI b/c on 8/23/22 @ 3pm at [facility]

## 2022-08-22 NOTE — PROGRESS NOTES
Daily Note     Today's date: 2022  Patient name: Amy Escudero  : 1960  MRN: [id number]  Referring provider: Christina Upton, *  Dx:   Encounter Diagnosis     ICD-10-CM    1  Multiple sclerosis ([facility] [address] )  G35    2  Impairment of balance  R26 89    3  Chronic pain of right knee  M25 561     G89 29    4  Weakness of right lower extremity  R29 898    5  Gait abnormality  R26 9                   Subjective: Pt notes increased R sided symptoms which she believes is related to MS  Pt has spoken w/ neurologist regarding this  Objective: See treatment diary below      Assessment: Tolerated treatment well  Pt w/ questions regarding difficulty donning pants  Upon quick movement assessment determined likely source of difficulty is weak R hip ER; given hip ER strengthening exercises as a result of these findings, included repeated movement and bridges  Pt also noted to have increased R lateral shift/lean w/ standing and more pronounced w/ ambulation and TB rows  Tactile postural cues given during standing TB rows today and posture retraining at wall also completed  Patient demonstrated fatigue post treatment and would benefit from continued PT      Plan: Continue per plan of care        Precautions: Fall risk         8/3 8 8/8 8/10 8/12   Manuals                            Neuro Re-Ed              Seated DF   10x B  20x B            Seated eversion              Standing rows  3x10 Blue TB       3x10 BTB  3x10 BTB 3x10 black TB    Overhead press              Walking with reduced support    6x8 ft with railing  15' with railing           Standing marches          NV  2x8 B    Standing hamstring curls          2x8 B    Review of HEP and discussion of goals/POC   SM 15'             Hip hike  2x10 B  30x B attempted 20x B  2x10 B  2x15 B  2x15 B 3x5 B 2x10 B 2x10 B 2x10 B   Standing cone stacking   4'    NV 4x10 cones 5'    5'     Ambulation no AD T/o session  5' 3   50 ft  10 ft 35 ft x 4     70 ft    Step taps      2x20 B 6"         Lateral trunk glides  5"x15             Standing table slide       2x10         Ther Ex              Ambulation with walking sticks               Sit to stand  2x10 0 UE 3x8, no foam, no UE  3x8  2x8  2x5  2x5 1x10   2x10  2x10  2x10    Standing hip abduction 2x10 B 2x10 B  2x10 B 2x10 B 2x10 B  2x10 B 2x10 B 2x10 B 2x10 B 2x10 B 2x10 B   Shoulder IR/ER              Seated bike  10' 10'  10'  10'  10'  15' 10'   7'  10'  10'    LAQ      3x10 4#        Leg press          2x12 B (RLE 85#, LLE 75#) 2x12 B (RLE 85#, LLE 75#) 2x10 B (RLE 1x10 85#, 1x10 95#, LLE 2x10 95#)   Seated hip ER 2x10              Lunge on foam to tap knee to mirror 2x10                           Ther Activity              Functional mobility assessments         SM      Gait Training              Ambulation with RW                             Modalities                                            HEP: STS, walking, seated DF, standing marches, standing hip abduction, seated bike

## 2022-08-22 NOTE — TELEPHONE ENCOUNTER
Considering patient described new symptoms since LOV, MRI brain and C-spine will be offered in STAT manner   Diazepam 10 mg was offered for spasms/claustrophobia    Please help scheduling     Lumbar x-ray will be offered for described numbness

## 2022-08-22 NOTE — TELEPHONE ENCOUNTER
Spoke w/patient  Advised her of note below  Patient verbalized understanding  She is unable to schedule STAT MRIs at this time  She is currently not driving and has to make sure  will be available to drive her to appt  Patient requested I call back in one hour (3pm)

## 2022-08-23 ENCOUNTER — HOSPITAL ENCOUNTER (OUTPATIENT)
Dept: MRI IMAGING | Facility: HOSPITAL | Age: 62
Discharge: HOME | End: 2022-08-23
Attending: PSYCHIATRY & NEUROLOGY
Payer: COMMERCIAL

## 2022-08-23 DIAGNOSIS — G35 MS (MULTIPLE SCLEROSIS) (HCC): ICD-10-CM

## 2022-08-23 PROCEDURE — G1004 CDSM NDSC: HCPCS

## 2022-08-23 PROCEDURE — 70553 MRI BRAIN STEM W/O & W/DYE: CPT

## 2022-08-23 PROCEDURE — 72156 MRI NECK SPINE W/O & W/DYE: CPT

## 2022-08-23 PROCEDURE — A9585 GADOBUTROL INJECTION: HCPCS | Performed by: PSYCHIATRY & NEUROLOGY

## 2022-08-23 RX ADMIN — GADOBUTROL 8 ML: 604.72 INJECTION INTRAVENOUS at 15:13

## 2022-08-24 ENCOUNTER — OFFICE VISIT (OUTPATIENT)
Dept: PHYSICAL THERAPY | Facility: CLINIC | Age: 62
End: 2022-08-24
Payer: COMMERCIAL

## 2022-08-24 DIAGNOSIS — G35 MULTIPLE SCLEROSIS (HCC): Primary | ICD-10-CM

## 2022-08-24 DIAGNOSIS — R26.9 GAIT ABNORMALITY: ICD-10-CM

## 2022-08-24 DIAGNOSIS — R29.898 WEAKNESS OF RIGHT LOWER EXTREMITY: ICD-10-CM

## 2022-08-24 DIAGNOSIS — M25.561 CHRONIC PAIN OF RIGHT KNEE: ICD-10-CM

## 2022-08-24 DIAGNOSIS — G89.29 CHRONIC PAIN OF RIGHT KNEE: ICD-10-CM

## 2022-08-24 DIAGNOSIS — R26.89 IMPAIRMENT OF BALANCE: ICD-10-CM

## 2022-08-24 PROCEDURE — 97112 NEUROMUSCULAR REEDUCATION: CPT

## 2022-08-24 PROCEDURE — 97110 THERAPEUTIC EXERCISES: CPT

## 2022-08-24 NOTE — TELEPHONE ENCOUNTER
Alondra Aparicio MD  P Neurology [facility] Clinical Team 3  Please review abnormal MRI with the patient- no acute demyelination, no signs of stroke or other pathology described, will discuss in detail during follow up visit

## 2022-08-24 NOTE — PROGRESS NOTES
Daily Note     Today's date: 2022  Patient name: America Chaidez  : 1960  MRN: [id number]  Referring provider: Lizeth Hayes, *  Dx:   Encounter Diagnosis     ICD-10-CM    1  Multiple sclerosis ([address] )  G35    2  Impairment of balance  R26 89    3  Chronic pain of right knee  M25 561     G89 29    4  Weakness of right lower extremity  R29 898    5  Gait abnormality  R26 9                   Subjective: Pt pleased w/ MRI results showing no progression in MS anatomically  Objective: See treatment diary below      Assessment: Tolerated treatment well  Pt w/ increased fatigue this afternoon compared to previous sessions  Performed TB rows and shoulder extension seated at end of session as RLE fatigue became more pronounced  Improved performance w/ UE exercises in sitting compared to standing  Patient demonstrated fatigue post treatment and would benefit from continued PT      Plan: Continue per plan of care  Precautions: Fall risk        8/22 8/24 7/13 7/25 7/27 8/1 8/3 8/5 8/8 8/10 8/12   Manuals                            Neuro Re-Ed              Seated DF    20x B            Seated eversion              Standing rows  3x10 Blue TB  seated 3x10 GTB     3x10 BTB  3x10 BTB 3x10 black TB    Shoulder ext    seated 3x10 R YTB L GTB            Overhead press              Walking with reduced support    6x8 ft with railing  15' with railing           Standing marches   2x10 B       NV  2x8 B    Standing hamstring curls  2x10 B        2x8 B    Review of HEP and discussion of goals/POC               Hip hike  2x10 B  30x B attempted 20x B  2x10 B  2x15 B  2x15 B 3x5 B 2x10 B 2x10 B 2x10 B   Standing cone stacking      NV 4x10 cones 5'    5'     Ambulation no AD T/o session Not safe to perform today 5' 3   50 ft  10 ft 35 ft x 4     70 ft    Step taps   x20 B   2x20 B 6"         Lateral trunk glides  5"x15             Standing table slide       2x10         Ther Ex              Ambulation with walking sticks               Sit to stand  2x10 0 UE  3x8  2x8  2x5  2x5 1x10   2x10  2x10  2x10    Standing hip abduction 2x10 B  2x10 B 2x10 B 2x10 B  2x10 B 2x10 B 2x10 B 2x10 B 2x10 B 2x10 B   Shoulder IR/ER              Seated bike  10' 10' w/ R foot wrapped 10'  10'  10'  15' 10'   7'  10'  10'    LAQ      3x10 4#        Leg press          2x12 B (RLE 85#, LLE 75#) 2x12 B (RLE 85#, LLE 75#) 2x10 B (RLE 1x10 85#, 1x10 95#, LLE 2x10 95#)   Seated hip ER 2x10              Lunge on foam to tap knee to mirror 2x10             TKE  2x10 Black TB R only            Ther Activity              Functional mobility assessments         SM      Gait Training              Ambulation with RW                             Modalities                                            HEP: STS, walking, seated DF, standing marches, standing hip abduction, seated bike

## 2022-08-24 NOTE — TELEPHONE ENCOUNTER
Spoke w/patient  Advised her of results and recommendations below  Patient verbalized understanding  Patient currently in PT  She has bursitis in right elbow  PT said they could address elbow issue but would require a referral for OT  Dr Royal Flores - are you agreeable to referral for OT?

## 2022-08-29 ENCOUNTER — APPOINTMENT (OUTPATIENT)
Dept: PHYSICAL THERAPY | Facility: CLINIC | Age: 62
End: 2022-08-29
Payer: COMMERCIAL

## 2022-08-31 ENCOUNTER — APPOINTMENT (OUTPATIENT)
Dept: PHYSICAL THERAPY | Facility: CLINIC | Age: 62
End: 2022-08-31
Payer: COMMERCIAL

## 2022-09-06 ENCOUNTER — OFFICE VISIT (OUTPATIENT)
Dept: PHYSICAL THERAPY | Facility: CLINIC | Age: 62
End: 2022-09-06
Payer: COMMERCIAL

## 2022-09-06 DIAGNOSIS — G35 MULTIPLE SCLEROSIS (HCC): Primary | ICD-10-CM

## 2022-09-06 DIAGNOSIS — R29.898 WEAKNESS OF RIGHT LOWER EXTREMITY: ICD-10-CM

## 2022-09-06 DIAGNOSIS — G89.29 CHRONIC PAIN OF RIGHT KNEE: ICD-10-CM

## 2022-09-06 DIAGNOSIS — R26.9 GAIT ABNORMALITY: ICD-10-CM

## 2022-09-06 DIAGNOSIS — M25.561 CHRONIC PAIN OF RIGHT KNEE: ICD-10-CM

## 2022-09-06 DIAGNOSIS — R26.89 IMPAIRMENT OF BALANCE: ICD-10-CM

## 2022-09-06 PROCEDURE — 97112 NEUROMUSCULAR REEDUCATION: CPT

## 2022-09-06 PROCEDURE — 97530 THERAPEUTIC ACTIVITIES: CPT

## 2022-09-06 PROCEDURE — 97110 THERAPEUTIC EXERCISES: CPT

## 2022-09-06 NOTE — PROGRESS NOTES
Daily Note     Today's date: 2022  Patient name: Belén Westbrook  : 1960  MRN: [id number]  Referring provider: Nilesh Paez, *  Dx:   Encounter Diagnosis     ICD-10-CM    1  Multiple sclerosis ([facility] [address] )  G35    2  Impairment of balance  R26 89    3  Chronic pain of right knee  M25 561     G89 29    4  Weakness of right lower extremity  R29 898    5  Gait abnormality  R26 9                   Subjective: Notes having "bad day" yesterday  Had to bump down the stairs on buttock while visiting son  Objective: See treatment diary below      Assessment: Tolerated treatment well  Pt w/ improved tolerance for therapy compared to LV  Pt continues w/ questions and concerns regarding "backslide" w/ symptoms and "having good days and bad days"; continued w/ pt education in these areas  Session today focused heavily on quality of gait and interventions targeted increasing R knee flexion and heel strike  Concluded w/ seated postural strengthening tasks  Patient demonstrated fatigue post treatment and would benefit from continued PT      Plan: Continue per plan of care  Precautions: Fall risk        8/22 8/24 9/6 7/25 7/27 8/1 8/3 8/5 8/8 8/10 8/12   Manuals                            Neuro Re-Ed              Seated DF               Seated eversion              Standing rows  3x10 Blue TB  seated 3x10 GTB seated on foam 3x10 BTB    3x10 BTB  3x10 BTB 3x10 black TB    Shoulder ext    seated 3x10 R YTB L GTB seated on foam 3x10 BTB           Overhead press              Walking with reduced support     15' with railing           Standing marches   2x10 B       NV  2x8 B    Standing hamstring curls  2x10 B        2x8 B    Review of HEP and discussion of goals/POC    10'           Hip hike  2x10 B   20x B  2x10 B  2x15 B  2x15 B 3x5 B 2x10 B 2x10 B 2x10 B   Standing cone stacking      NV 4x10 cones 5'    5'     Ambulation no AD T/o session Not safe to perform today    50 ft  10 ft 35 ft x 4     70 ft    Step taps   x20 B x20 B  2x20 B 6"         Lateral trunk glides  5"x15             Over raised cane, stationary knee flexion, heel strike, rock forward, pull back   x20 B w/ support @ bar @ mirror           Stepping over raised canes    x5 laps           Standing table slide       2x10         Ther Ex              Ambulation with walking sticks               Sit to stand  2x10 0 UE   2x8  2x5  2x5 1x10   2x10  2x10  2x10    Standing hip abduction 2x10 B   2x10 B 2x10 B  2x10 B 2x10 B 2x10 B 2x10 B 2x10 B 2x10 B   Shoulder IR/ER              Seated bike  10' 10' w/ R foot wrapped 12' w/ R foot wrapped 10'  10'  15' 10'   7'  10'  10'    LAQ      3x10 4#        Leg press          2x12 B (RLE 85#, LLE 75#) 2x12 B (RLE 85#, LLE 75#) 2x10 B (RLE 1x10 85#, 1x10 95#, LLE 2x10 95#)   Seated hip ER 2x10              Lunge on foam to tap knee to mirror 2x10             TKE  2x10 Black TB R only            Ther Activity              Functional mobility assessments         SM      Gait Training              Ambulation with RW    100' w/ emphasis on R heel strike and knee flexion                         Modalities                                            HEP: STS, walking, seated DF, standing marches, standing hip abduction, seated bike

## 2022-09-07 ENCOUNTER — APPOINTMENT (OUTPATIENT)
Dept: OCCUPATIONAL THERAPY | Facility: CLINIC | Age: 62
End: 2022-09-07
Payer: COMMERCIAL

## 2022-09-07 ENCOUNTER — APPOINTMENT (OUTPATIENT)
Dept: PHYSICAL THERAPY | Facility: CLINIC | Age: 62
End: 2022-09-07
Payer: COMMERCIAL

## 2022-09-12 ENCOUNTER — OFFICE VISIT (OUTPATIENT)
Dept: PHYSICAL THERAPY | Facility: CLINIC | Age: 62
End: 2022-09-12
Payer: COMMERCIAL

## 2022-09-12 DIAGNOSIS — R26.89 IMPAIRMENT OF BALANCE: ICD-10-CM

## 2022-09-12 DIAGNOSIS — M25.561 CHRONIC PAIN OF RIGHT KNEE: ICD-10-CM

## 2022-09-12 DIAGNOSIS — R26.9 GAIT ABNORMALITY: ICD-10-CM

## 2022-09-12 DIAGNOSIS — G89.29 CHRONIC PAIN OF RIGHT KNEE: ICD-10-CM

## 2022-09-12 DIAGNOSIS — G35 MULTIPLE SCLEROSIS (HCC): Primary | ICD-10-CM

## 2022-09-12 DIAGNOSIS — R29.898 WEAKNESS OF RIGHT LOWER EXTREMITY: ICD-10-CM

## 2022-09-12 PROCEDURE — 97530 THERAPEUTIC ACTIVITIES: CPT

## 2022-09-12 PROCEDURE — 97112 NEUROMUSCULAR REEDUCATION: CPT

## 2022-09-12 PROCEDURE — 97110 THERAPEUTIC EXERCISES: CPT

## 2022-09-12 NOTE — PROGRESS NOTES
Daily Note     Today's date: 2022  Patient name: Conor Dueñas  : 1960  MRN: [id number]  Referring provider: Marilou Schmitt, *  Dx:   Encounter Diagnosis     ICD-10-CM    1  Multiple sclerosis ([facility] [address] )  G35    2  Impairment of balance  R26 89    3  Chronic pain of right knee  M25 561     G89 29    4  Weakness of right lower extremity  R29 898    5  Gait abnormality  R26 9                   Subjective: Pt w/ no new complaints  Objective: See treatment diary below      Assessment: Tolerated treatment well  Session focused heavily on B quad strengthening  Identified HA and gastroc tightness w/ split stance exercise and performed stretches accordingly  Handout for stretches to be given NV  Patient demonstrated fatigue post treatment and would benefit from continued PT      Plan: Continue per plan of care  Precautions: Fall risk        8/22 8/24 9/6 9/12 7/27 8/1 8/3 8/5 8/8 8/10 8/12   Manuals                            Neuro Re-Ed              Seated DF               Seated eversion              Standing rows  3x10 Blue TB  seated 3x10 GTB seated on foam 3x10 BTB resume NV   3x10 BTB  3x10 BTB 3x10 black TB    Shoulder ext    seated 3x10 R YTB L GTB seated on foam 3x10 BTB resume NV          Overhead press              Walking with reduced support               Standing marches   2x10 B       NV  2x8 B    Standing hamstring curls  2x10 B        2x8 B    Review of HEP and discussion of goals/POC    10'           Hip hike  2x10 B    2x10 B  2x15 B  2x15 B 3x5 B 2x10 B 2x10 B 2x10 B   Standing cone stacking      NV 4x10 cones 5'    5'     Ambulation no AD T/o session Not safe to perform today   50 ft  10 ft 35 ft x 4     70 ft    Step taps   x20 B x20 B  2x20 B 6"         Lateral trunk glides  5"x15             Over raised cane, stationary knee flexion, heel strike, rock forward, pull back   x20 B w/ support @ bar @ mirror           Stepping over raised canes    x5 laps           Standing table slide       2x10         Ther Ex              Ambulation with walking sticks               Sit to stand  2x10 0 UE   x10 0UE 2x5  2x5 1x10   2x10  2x10  2x10    Standing hip abduction 2x10 B    2x10 B  2x10 B 2x10 B 2x10 B 2x10 B 2x10 B 2x10 B   Shoulder IR/ER              Seated bike  10' 10' w/ R foot wrapped 12' w/ R foot wrapped 12' no strap needed 10'  15' 10'   7'  10'  10'    LAQ      3x10 4#        Leg press     DL 3x10 85#     2x12 B (RLE 85#, LLE 75#) 2x12 B (RLE 85#, LLE 75#) 2x10 B (RLE 1x10 85#, 1x10 95#, LLE 2x10 95#)   Seated hip ER 2x10              Split stance/semi-lunge w/ knee bend    2x10 B          Lunge on foam to tap knee to mirror 2x10             Seated HS stretch    2x30" B          Calf stretch    runner's stretch 2x30"          TKE  2x10 Black TB R only  NV          Ther Activity              Functional mobility assessments         SM      Gait Training              Ambulation with RW    100' w/ emphasis on R heel strike and knee flexion 100' w/ emphasis on R heel strike and knee flexion                        Modalities                                            HEP: STS, walking, seated DF, standing marches, standing hip abduction, seated bike

## 2022-09-14 ENCOUNTER — OFFICE VISIT (OUTPATIENT)
Dept: PHYSICAL THERAPY | Facility: CLINIC | Age: 62
End: 2022-09-14
Payer: COMMERCIAL

## 2022-09-14 DIAGNOSIS — M25.561 CHRONIC PAIN OF RIGHT KNEE: ICD-10-CM

## 2022-09-14 DIAGNOSIS — R26.9 GAIT ABNORMALITY: ICD-10-CM

## 2022-09-14 DIAGNOSIS — R26.89 IMPAIRMENT OF BALANCE: ICD-10-CM

## 2022-09-14 DIAGNOSIS — G35 MULTIPLE SCLEROSIS (HCC): Primary | ICD-10-CM

## 2022-09-14 DIAGNOSIS — R29.898 WEAKNESS OF RIGHT LOWER EXTREMITY: ICD-10-CM

## 2022-09-14 DIAGNOSIS — G89.29 CHRONIC PAIN OF RIGHT KNEE: ICD-10-CM

## 2022-09-14 PROCEDURE — 97112 NEUROMUSCULAR REEDUCATION: CPT

## 2022-09-14 PROCEDURE — 97110 THERAPEUTIC EXERCISES: CPT

## 2022-09-14 PROCEDURE — 97530 THERAPEUTIC ACTIVITIES: CPT

## 2022-09-14 NOTE — PROGRESS NOTES
PT Discharge    Today's date: 2022  Patient name: Sienna Beltran  : 1960  MRN: [id number]  Referring provider: Shira Freedman, *  Dx:   Encounter Diagnosis     ICD-10-CM    1  Multiple sclerosis ([facility] [address] )  G35    2  Impairment of balance  R26 89    3  Chronic pain of right knee  M25 561     G89 29    4  Weakness of right lower extremity  R29 898    5  Gait abnormality  R26 9                   Assessment/Plan     ASSESSMENT   Pt presents to PT for scheduled treatment session  Following discussion w/ patient regarding scheduled RE next week and POC decision made to d/c following this visit  Pt has been consistently attending therapy at various [facility] locations for approx  9 months  Improvements made to balance, gait speed, and functional mobility over this course of time, however progress had begun to slow over last 2 months with no significant changes in objective measures since last PN/RE  Given these findings and length of time pt has been in therapy she is recommended for d/c to HEP  Pt agreeable and plans to join gym to work on her strength  GOALS  Short Term Goals: In 5 weeks, the patient will:  1  Initiate walking with SPC in PT/practice at home      Partially MET  2  Improve time for 5x STS by 5 seconds for improved functional mobility    MET  3  Perform home exercise program with min(A) or supervision    MET  4  Initiate practice walking with no AD   MET    Long Term Goals: In 10 weeks, the patient will:  1  Improve score on FGA by 5 points to indicate clinically significant improvement in functional mobility   NOT MET  2  Improve gait speed for 0 1 m/s for improved community mobility   MET  3  Increase FOTO to greater than predicted value  MET  4   Demonstrate ability to perform home exercise program independently to maintain/continue progress towards goals    MET    Subjective    Objective      Outcome measure    FGA  NV    2 Minute Walk Test (ft): 123 ft 124 ft  106 ft   Gait Speed (ft/s): 6m/21s = 0 29 m/s with RW 6m/20s = 0 3 m/s 6m/20s = 0 3 m/s   5x Sit To Stand (s): 22 75s, no UE 19 55s 23 3 sec   TUG: TU 6s, performed with B walking sticks RW: 27 1s   No AD: 55 9s  RW: 27 3sec   No AD: NT          Precautions: Fall risk        8/22 8/24 9/6 9/12 9/14 8/1 8/3 8/5 8/8 8/10 8/12   Manuals                            Neuro Re-Ed              Seated DF               Seated eversion              Standing rows  3x10 Blue TB  seated 3x10 GTB seated on foam 3x10 BTB resume NV   3x10 BTB  3x10 BTB 3x10 black TB    Shoulder ext    seated 3x10 R YTB L GTB seated on foam 3x10 BTB resume NV          Overhead press              Walking with reduced support               Standing marches   2x10 B       NV  2x8 B    Standing hamstring curls  2x10 B        2x8 B    Review of HEP and discussion of goals/POC    10'           Hip hike  2x10 B     2x15 B  2x15 B 3x5 B 2x10 B 2x10 B 2x10 B   Standing cone stacking       4x10 cones 5'    5'     Ambulation no AD T/o session Not safe to perform today    10 ft 35 ft x 4     70 ft    Step taps   x20 B x20 B           Lateral trunk glides  5"x15             Over raised cane, stationary knee flexion, heel strike, rock forward, pull back   x20 B w/ support @ bar @ mirror           Stepping over raised canes    x5 laps           Standing table slide       2x10         Ther Ex              Ambulation with walking sticks               Sit to stand  2x10 0 UE   x10 0UE  2x5 1x10   2x10  2x10  2x10    Standing hip abduction 2x10 B     2x10 B 2x10 B 2x10 B 2x10 B 2x10 B 2x10 B   Shoulder IR/ER              Seated bike  10' 10' w/ R foot wrapped 12' w/ R foot wrapped 12' no strap needed 10' no strap 15' 10'   7'  10'  10'    LAQ      3x10 4#        Leg press     DL 3x10 85#     2x12 B (RLE 85#, LLE 75#) 2x12 B (RLE 85#, LLE 75#) 2x10 B (RLE 1x10 85#, 1x10 95#, LLE 2x10 95#)   Seated hip ER 2x10              Split stance/semi-lunge w/ knee bend 2x10 B          Lunge on foam to tap knee to mirror 2x10             Seated HS stretch    2x30" B          Calf stretch    runner's stretch 2x30"          TKE  2x10 Black TB R only  NV          Ther Activity              Functional mobility assessments      30'   SM      Gait Training              Ambulation with RW    100' w/ emphasis on R heel strike and knee flexion 100' w/ emphasis on R heel strike and knee flexion                        Modalities                                            HEP: STS, walking, seated DF, standing marches, standing hip abduction, seated bike

## 2022-09-19 ENCOUNTER — APPOINTMENT (OUTPATIENT)
Dept: PHYSICAL THERAPY | Facility: CLINIC | Age: 62
End: 2022-09-19
Payer: COMMERCIAL

## 2022-09-21 ENCOUNTER — APPOINTMENT (OUTPATIENT)
Dept: PHYSICAL THERAPY | Facility: CLINIC | Age: 62
End: 2022-09-21
Payer: COMMERCIAL

## 2022-09-26 ENCOUNTER — APPOINTMENT (OUTPATIENT)
Dept: PHYSICAL THERAPY | Facility: CLINIC | Age: 62
End: 2022-09-26
Payer: COMMERCIAL

## 2022-09-29 ENCOUNTER — APPOINTMENT (OUTPATIENT)
Dept: PHYSICAL THERAPY | Facility: CLINIC | Age: 62
End: 2022-09-29
Payer: COMMERCIAL

## 2022-10-17 ENCOUNTER — OFFICE VISIT (OUTPATIENT)
Dept: OBGYN CLINIC | Facility: CLINIC | Age: 62
End: 2022-10-17
Payer: MEDICARE

## 2022-10-17 VITALS
BODY MASS INDEX: 32.44 KG/M2 | HEART RATE: 68 BPM | HEIGHT: 64 IN | DIASTOLIC BLOOD PRESSURE: 84 MMHG | WEIGHT: 190 LBS | SYSTOLIC BLOOD PRESSURE: 123 MMHG

## 2022-10-17 DIAGNOSIS — M17.11 PRIMARY OSTEOARTHRITIS OF RIGHT KNEE: Primary | ICD-10-CM

## 2022-10-17 DIAGNOSIS — M22.2X1 PATELLOFEMORAL SYNDROME OF RIGHT KNEE: ICD-10-CM

## 2022-10-17 PROCEDURE — 99213 OFFICE O/P EST LOW 20 MIN: CPT | Performed by: FAMILY MEDICINE

## 2022-10-17 RX ORDER — MELOXICAM 15 MG/1
15 TABLET ORAL DAILY
Qty: 30 TABLET | Refills: 5 | Status: SHIPPED | OUTPATIENT
Start: 2022-10-17

## 2022-10-17 NOTE — PROGRESS NOTES
Assessment/Plan:  Assessment/Plan   Diagnoses and all orders for this visit:    Primary osteoarthritis of right knee  -     meloxicam (Mobic) 15 mg tablet; Take 1 tablet (15 mg total) by mouth daily  -     Injection Procedure Prior Authorization; Future    Patellofemoral syndrome of right knee  -     Brace      59-year-old female with osteoarthritis of right knee with right knee pain more than 18 months duration  Discussed with patient physical exam, impression and plan  She has full extension and flexion to 110°  Clinical impression is that she is symptomatic from degenerative changes  She has noted improvement with Visco injection lasting more than 5 months  I Offered patient repeat Visco injection to which she agreed  We will request for one-shot Visco injection to right knee  I  Offered patient anesthetic/steroid injection in the interim which she declined  She may continue with taking meloxicam 15 mg once daily  I will refer for patellar stabilizing brace to wear during physical activity  She will return for Visco injection once approved  Subjective:   Patient ID: Tatiana Siegel is a 57 y o  female  Chief Complaint   Patient presents with   • Right Knee - Follow-up, Pain       59-year-old female with osteoarthritis of right knee following up for right knee pain more than 18 months duration  She was last seen by me nearly 6 months ago which point she was given Synvisc-One to the right knee  She reports having had significant improvement and was without pain for more than 5 months  Pain in right knee has been gradually worsening over the past few weeks  She has pain described as generalized to the knee but worse at the medial and lateral aspects, nonradiating, worse with bearing weight and ambulating, associated with weakness and instability, and improved with resting  She has been applying topical diclofenac gel to help with pain  Knee Pain  This is a chronic problem   The current episode started more than 1 year ago  The problem occurs daily  The problem has been gradually worsening  Associated symptoms include arthralgias and joint swelling  Pertinent negatives include no numbness or weakness  The symptoms are aggravated by standing, walking and twisting  She has tried rest and position changes (Topical diclofenac) for the symptoms  The treatment provided mild relief  Review of Systems   Musculoskeletal: Positive for arthralgias and joint swelling  Neurological: Negative for weakness and numbness  Objective:  Vitals:    10/17/22 1349   BP: 123/84   Pulse: 68   Weight: 86 2 kg (190 lb)   Height: 5' 4" (1 626 m)     Right Knee Exam     Range of Motion   Extension: normal   Flexion: 110     Other   Swelling: mild            Physical Exam  Vitals and nursing note reviewed  Constitutional:       General: She is not in acute distress  Appearance: She is well-developed  She is not ill-appearing or diaphoretic  HENT:      Head: Normocephalic  Right Ear: External ear normal       Left Ear: External ear normal    Eyes:      Conjunctiva/sclera: Conjunctivae normal    Neck:      Trachea: No tracheal deviation  Cardiovascular:      Rate and Rhythm: Normal rate  Pulmonary:      Effort: Pulmonary effort is normal  No respiratory distress  Abdominal:      General: There is no distension  Skin:     General: Skin is warm and dry  Coloration: Skin is not jaundiced or pale  Neurological:      Mental Status: She is alert and oriented to person, place, and time  Psychiatric:         Mood and Affect: Mood normal          Behavior: Behavior normal          Thought Content:  Thought content normal          Judgment: Judgment normal

## 2022-10-26 ENCOUNTER — OFFICE VISIT (OUTPATIENT)
Dept: NEUROLOGY | Facility: CLINIC | Age: 62
End: 2022-10-26
Payer: MEDICARE

## 2022-10-26 VITALS
BODY MASS INDEX: 31.65 KG/M2 | HEART RATE: 72 BPM | SYSTOLIC BLOOD PRESSURE: 120 MMHG | DIASTOLIC BLOOD PRESSURE: 80 MMHG | WEIGHT: 190 LBS | HEIGHT: 65 IN | TEMPERATURE: 97.7 F

## 2022-10-26 DIAGNOSIS — G35 MS (MULTIPLE SCLEROSIS) (HCC): Primary | ICD-10-CM

## 2022-10-26 DIAGNOSIS — F43.23 ADJUSTMENT DISORDER WITH MIXED ANXIETY AND DEPRESSED MOOD: ICD-10-CM

## 2022-10-26 DIAGNOSIS — R26.2 AMBULATORY DYSFUNCTION: ICD-10-CM

## 2022-10-26 DIAGNOSIS — G81.91 HEMIPARESIS OF RIGHT DOMINANT SIDE, UNSPECIFIED HEMIPARESIS ETIOLOGY (HCC): ICD-10-CM

## 2022-10-26 PROCEDURE — 99215 OFFICE O/P EST HI 40 MIN: CPT | Performed by: PSYCHIATRY & NEUROLOGY
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
## 2022-10-31 ENCOUNTER — TELEPHONE (OUTPATIENT)
Dept: OBGYN CLINIC | Facility: CLINIC | Age: 62
End: 2022-10-31

## 2022-10-31 NOTE — TELEPHONE ENCOUNTER
I received an email from out Arnel Lin team that I can schedule her R  KNEE, SYNVISC-ONE, BUY AND BILL  I l/m for her to please return my call if she would like to schedule an appt  ,Waiting for a call back

## 2022-11-01 ENCOUNTER — TELEPHONE (OUTPATIENT)
Dept: NEUROLOGY | Facility: CLINIC | Age: 62
End: 2022-11-01

## 2022-11-01 NOTE — TELEPHONE ENCOUNTER
Elke Leiva  I need a prescription for my AFO to go to the [facility]  My phone number is [phone]  Thank you  AFO script faxed to [phone]    Pt made aware  She is also calling re: the psychology referral  Advised to call Sin Trinh at [phone] to schedule an appt     Referral faxed to [phone]

## 2022-11-02 NOTE — TELEPHONE ENCOUNTER
I left another message for her to see if she would like to schedule her VISCO inj   Waiting for a call back

## 2022-11-07 NOTE — TELEPHONE ENCOUNTER
Pt left VM  We are at the [facility] and they don't have the prescription,if you can resented,I would appreciate    # [phone]

## 2022-11-08 NOTE — TELEPHONE ENCOUNTER
She l/m on my answer machine to call her  I did and and the person hung up on me, I called back and I Left another message to see if she would like to schedule her VISCO inj   Waiting for a call back

## 2022-12-06 ENCOUNTER — PROCEDURE VISIT (OUTPATIENT)
Dept: OBGYN CLINIC | Facility: CLINIC | Age: 62
End: 2022-12-06

## 2022-12-06 VITALS
DIASTOLIC BLOOD PRESSURE: 79 MMHG | SYSTOLIC BLOOD PRESSURE: 143 MMHG | WEIGHT: 190 LBS | HEART RATE: 68 BPM | BODY MASS INDEX: 31.65 KG/M2 | HEIGHT: 65 IN

## 2022-12-06 DIAGNOSIS — M17.11 PRIMARY OSTEOARTHRITIS OF RIGHT KNEE: Primary | ICD-10-CM

## 2022-12-06 RX ORDER — BUPIVACAINE HYDROCHLORIDE 2.5 MG/ML
1 INJECTION, SOLUTION INFILTRATION; PERINEURAL
Status: COMPLETED | OUTPATIENT
Start: 2022-12-06 | End: 2022-12-06

## 2022-12-06 RX ADMIN — BUPIVACAINE HYDROCHLORIDE 1 ML: 2.5 INJECTION, SOLUTION INFILTRATION; PERINEURAL at 11:23

## 2022-12-06 NOTE — PROGRESS NOTES
Assessment/Plan:  Assessment/Plan   Diagnoses and all orders for this visit:    Primary osteoarthritis of right knee  -     Large joint arthrocentesis: R knee        68-year-old female with arthritis of the right knee with right knee pain more than 20 months duration  Clinical impression is that she is manic from degenerative changes  She agreed to proceed with viscosupplementation  I administered Synvisc 1 to the right knee without complication  She will follow-up as needed  Subjective:   Patient ID: Conor Dueñas is a 57 y o  female  Chief Complaint   Patient presents with   • Right Knee - Pain, Follow-up       68-year-old female with osteoarthritis of the right knee following up for right knee pain more than 20 months duration  She was last seen by me more than 7 weeks ago at which point we requested for Visco injection  She presents today for Visco injection of the right knee  She has been on meloxicam 15 mg once daily  She has been symptomatic with pain described as generalized to the knee but worse at the medial and lateral aspects, generalized to the knee worse at the medial and lateral aspects, none radiating, worse with bearing weight and ambulating, and improved with resting  Knee Pain  This is a chronic problem  The current episode started more than 1 year ago  The problem occurs daily  The problem has been gradually worsening  Associated symptoms include arthralgias and joint swelling  Pertinent negatives include no numbness or weakness  The symptoms are aggravated by standing and walking  She has tried rest and NSAIDs for the symptoms  The treatment provided mild relief  Review of Systems   Musculoskeletal: Positive for arthralgias and joint swelling  Neurological: Negative for weakness and numbness         Objective:  Vitals:    12/06/22 1055   BP: 143/79   Pulse: 68   Weight: 86 2 kg (190 lb)   Height: 5' 5" (1 651 m)     Ortho Exam    Physical Exam  Vitals and nursing note reviewed  Constitutional:       General: She is not in acute distress  Appearance: She is well-developed  She is not ill-appearing or diaphoretic  HENT:      Head: Normocephalic  Right Ear: External ear normal       Left Ear: External ear normal    Eyes:      Conjunctiva/sclera: Conjunctivae normal    Neck:      Trachea: No tracheal deviation  Cardiovascular:      Rate and Rhythm: Normal rate  Pulmonary:      Effort: Pulmonary effort is normal  No respiratory distress  Abdominal:      General: There is no distension  Musculoskeletal:         General: Tenderness present  Skin:     General: Skin is warm and dry  Coloration: Skin is not jaundiced or pale  Neurological:      Mental Status: She is alert and oriented to person, place, and time  Psychiatric:         Mood and Affect: Mood and affect and mood normal          Behavior: Behavior normal          Thought Content: Thought content normal          Judgment: Judgment normal              Large joint arthrocentesis: R knee  Universal Protocol:  Consent: Verbal consent obtained  Risks and benefits: risks, benefits and alternatives were discussed  Consent given by: patient  Time out: Immediately prior to procedure a "time out" was called to verify the correct patient, procedure, equipment, support staff and site/side marked as required  Patient understanding: patient states understanding of the procedure being performed  Patient consent: the patient's understanding of the procedure matches consent given  Procedure consent: procedure consent matches procedure scheduled  Relevant documents: relevant documents present and verified  Test results: test results available and properly labeled  Site marked: the operative site was marked  Radiology Images displayed and confirmed   If images not available, report reviewed: imaging studies available  Required items: required blood products, implants, devices, and special equipment available  Patient identity confirmed: verbally with patient    Supporting Documentation  Indications: pain   Procedure Details  Location: knee - R knee  Preparation: Patient was prepped and draped in the usual sterile fashion  Needle gauge: 21G 2"  Ultrasound guidance: no  Approach: anterolateral  Medications administered: 48 mg hylan 48 MG/6ML; 1 mL bupivacaine 0 25 % (3 mL mepivacaine 1%, NDC 60042-430-27, LOT [id number], EXP 05/2025)    Patient tolerance: patient tolerated the procedure well with no immediate complications  Dressing:  Sterile dressing applied

## 2023-01-11 ENCOUNTER — TELEPHONE (OUTPATIENT)
Dept: NEUROLOGY | Facility: CLINIC | Age: 63
End: 2023-01-11

## 2023-01-11 NOTE — TELEPHONE ENCOUNTER
Called and spoke with patient  Said that she understands and is going to find a new neurologist  Patient disconnected the call

## 2023-01-11 NOTE — TELEPHONE ENCOUNTER
Pt ;eft vm stating that she called this am for an antibiotic that she needs desperately   Asking if we got in touch with the dr to prescribe it  [phone]

## 2023-01-11 NOTE — TELEPHONE ENCOUNTER
Spoke with pt, advised that our office does not treat UTIs  Advised pt to go to PCP or urgent care  Pt then states at last office visit Dr George told her she would treat UTI for pt if needed until pt gets established with new PCP  Pt states she moved recently and has not yet gotten new PCP  Asking for Dr George to prescribe antibiotics  Please advise

## 2023-01-11 NOTE — TELEPHONE ENCOUNTER
Reviewed Dr Darcy Martinez response with the patient  Pt became very upset and began to raise her voice  States Dr George told her during her last visit that she will prescribe an antibiotic  States she now has a fever  States it is ridiculous as she was told at her last visit that she would prescribe antibiotics  States she will need to find a new doctor (neurologist)  Pt notes she is unhappy that she is just now being advised to go to urgent care  States she should have been told earlier today  I did advise that per our original discussion I did advise her to see UC or PCP for this  Pt requested message to Dr George at that time  I also advised pt that I called her at 12:19pm and left voicemail as no one picked up the phone  Pt denied receiving this and states she would prefer call instead of voicemail in the future  I advised we did call and will call in the future but voicemail is left if pt does not  the phone  Pt states due to the time our office got back to her, it is too late to go to urgent care  States her fever is now up to 80 F and she cannot be expected to go out with a bladder infection and MS "in this climate " States there is no reason Dr George cannot prescribe her antibiotics  I did advise several times that Dr George is a neurologist and does not treat bladder infections  Also advised that in order to treat a bladder infection, testing is needed first to confirm infection prior to prescribing antibiotics  Pt states she has had these infections for over 30 years  Also used at home test strips which confirmed infection  Pt is requesting antibiotics to be prescribed tonight  Asking for 2-5 days of antibiotics and she will plan on urgent care evaluation tomorrow  Pt again states she does not appreciate a physician that tells her one thing and does another   States she specifically asked about being prescribed antibiotics at last office visit and Dr George said yes  I do not see this mentioned in office note  Dr George- Pt is requesting that you prescribe antibiotics today for at least 2-5 days  States it is too late to go to urgent care  Please see above  Pt would like call back today  TT sent to provider

## 2023-01-11 NOTE — TELEPHONE ENCOUNTER
I would offer evaluation at [facility] where patient can receive UA again and based on results may proceed with Antibiotics for UTI if cliniclally advised    Please help guiding the patient with PCP as it takes no longer than 2 weeks to find PCP in the are

## 2023-01-11 NOTE — TELEPHONE ENCOUNTER
Patient is to proceed with ER/UC as now patient is with a fever - concern for sepsis is raising  I will not be able to offer any further recommendations or treatment options as patient needs a full blood work, including blood and urine cultures to provide correct antibacterial regimen

## 2023-01-11 NOTE — TELEPHONE ENCOUNTER
Received VM transcription:    Jena Bakari [birth year]  I was up all night with a bladder infection  I really need some Cipro, if you could call me back, I'd appreciate it  [phone]  Thank you

## 2023-01-11 NOTE — TELEPHONE ENCOUNTER
Brock cardona: This is Ana Maria Soto  Birth date is [birth year]  I left you a message  I have MS  I have a bladder infection which is bad  So if you could call me back, I would appreciate it  Thank you   Elie   [phone]

## 2023-01-14 ENCOUNTER — OFFICE VISIT (OUTPATIENT)
Dept: URGENT CARE | Facility: CLINIC | Age: 63
End: 2023-01-14

## 2023-01-14 VITALS
TEMPERATURE: 96.4 F | BODY MASS INDEX: 33.28 KG/M2 | HEART RATE: 78 BPM | RESPIRATION RATE: 18 BRPM | OXYGEN SATURATION: 98 % | WEIGHT: 200 LBS

## 2023-01-14 DIAGNOSIS — N39.0 URINARY TRACT INFECTION WITHOUT HEMATURIA, SITE UNSPECIFIED: Primary | ICD-10-CM

## 2023-01-14 LAB
SL AMB  POCT GLUCOSE, UA: ABNORMAL
SL AMB LEUKOCYTE ESTERASE,UA: ABNORMAL
SL AMB POCT BILIRUBIN,UA: ABNORMAL
SL AMB POCT BLOOD,UA: ABNORMAL
SL AMB POCT CLARITY,UA: ABNORMAL
SL AMB POCT COLOR,UA: YELLOW
SL AMB POCT KETONES,UA: ABNORMAL
SL AMB POCT NITRITE,UA: ABNORMAL
SL AMB POCT PH,UA: 6
SL AMB POCT SPECIFIC GRAVITY,UA: 1.02
SL AMB POCT URINE PROTEIN: ABNORMAL
SL AMB POCT UROBILINOGEN: ABNORMAL

## 2023-01-14 RX ORDER — CIPROFLOXACIN 500 MG/1
500 TABLET, FILM COATED ORAL EVERY 12 HOURS SCHEDULED
Qty: 20 TABLET | Refills: 0 | Status: SHIPPED | OUTPATIENT
Start: 2023-01-14 | End: 2023-01-24

## 2023-01-14 NOTE — PROGRESS NOTES
[id number]        NAME: Steven Kamara is a 60 y o  female  : 1960    MRN: [id number]  DATE: 2023  TIME: 3:10 PM    Assessment and Plan   Urinary tract infection without hematuria, site unspecified [N39 0]  1  Urinary tract infection with hematuria  POCT urine dip    ciprofloxacin (CIPRO) 500 mg tablet        Urine dip is (+) for  Large amount of leukocytes and blood   Urine culture will be sent    Patient Instructions   Patient Instructions   1  Drink lots of fluids, especially cranberry juice  2  F/u with PCP in 7-14 days; you should have a repeat urinalysis  3  Proceed to the ER if symptoms get worse, fever, chills, vomiting, etc   4  Take Cipro as directed    Patient states that the antibiotic that usually works for her is Cipro and she is requesting that at this time  Follow up with PCP in 3-5 days  Proceed to  ER if symptoms worsen  Chief Complaint     Chief Complaint   Patient presents with   • Possible UTI     UTI symptoms started 3 days, burning, urgency, fever, abdominal pain, nauseous  Had urine dip done at home this morning  States it was positive for leukocytes  History of Present Illness       59-year-old female with a chief complaint of pressure and burning on urination over the past several days  Patient has a history of MS and states she gets UTIs once or twice a year  Patient denies any fever, chills, vomiting, nausea, back pain or other complaints  Review of Systems   Review of Systems   Constitutional: Negative for chills and fever  HENT: Negative for congestion and rhinorrhea  Eyes: Negative for discharge and visual disturbance  Respiratory: Negative for shortness of breath and wheezing  Cardiovascular: Negative for chest pain and palpitations  Gastrointestinal: Negative for abdominal pain and vomiting  Endocrine: Negative for polydipsia and polyuria     Genitourinary: Positive for decreased urine volume, difficulty urinating, dysuria, frequency, hematuria and urgency  Musculoskeletal: Negative for arthralgias, gait problem and neck stiffness  Skin: Negative for rash and wound  Neurological: Negative for dizziness and headaches  Psychiatric/Behavioral: Negative for confusion and suicidal ideas           Current Medications       Current Outpatient Medications:   •  ciprofloxacin (CIPRO) 500 mg tablet, Take 1 tablet (500 mg total) by mouth every 12 (twelve) hours for 10 days, Disp: 20 tablet, Rfl: 0  •  meloxicam (Mobic) 15 mg tablet, Take 1 tablet (15 mg total) by mouth daily, Disp: 30 tablet, Rfl: 5  •  Cholecalciferol 25 MCG (1000 UT) tablet, Take by mouth (Patient not taking: Reported on 1/14/2023), Disp: , Rfl:   •  diazepam (VALIUM) 5 mg tablet, Take 2 tab 60 min prior to MRI with 1-2 tabs 20 min prior to scheduled imaging (Patient not taking: Reported on 10/26/2022), Disp: 4 tablet, Rfl: 0  •  fingolimod (GILENYA) 0 5 MG capsule, Take 0 5 mg by mouth daily (Patient not taking: Reported on 10/17/2022), Disp: , Rfl:   •  IBUPROFEN PO, Take by mouth (Patient not taking: Reported on 1/14/2023), Disp: , Rfl:   •  MULTIPLE VITAMIN PO, Take by mouth (Patient not taking: Reported on 10/26/2022), Disp: , Rfl:   •  Multiple Vitamins-Minerals (PRESERVISION AREDS 2+MULTI VIT PO), Take 1 capsule by mouth (Patient not taking: Reported on 1/14/2023), Disp: , Rfl:     Current Allergies     Allergies as of 01/14/2023 - Reviewed 01/14/2023   Allergen Reaction Noted   • Levofloxacin Nausea Only 05/05/2021   • Metronidazole Nausea Only 05/05/2021   • Other Allergic Rhinitis 12/29/2021            The following portions of the patient's history were reviewed and updated as appropriate: allergies, current medications, past family history, past medical history, past social history, past surgical history and problem list      Past Medical History:   Diagnosis Date   • Multiple sclerosis ([facility] [address] )        Past Surgical History:   Procedure Laterality Date   •  SECTION     • FOOT SURGERY Right    • KNEE SURGERY      R meniscus   • TRACHEOSTOMY         Family History   Problem Relation Age of Onset   • Cancer Mother    • Kidney disease Mother    • Hypertension Mother    • Heart disease Father          Medications have been verified  Objective   Pulse 78   Temp (!) 96 4 °F (35 8 °C)   Resp 18   Wt 90 7 kg (200 lb)   SpO2 98%   BMI 33 28 kg/m²        Physical Exam     Physical Exam  Vitals and nursing note reviewed  Constitutional:       Appearance: Normal appearance  HENT:      Head: Normocephalic and atraumatic  Eyes:      Extraocular Movements: Extraocular movements intact  Cardiovascular:      Rate and Rhythm: Normal rate  Pulmonary:      Effort: Pulmonary effort is normal    Abdominal:      General: Abdomen is flat  Palpations: Abdomen is soft  Tenderness: There is no right CVA tenderness or left CVA tenderness  Musculoskeletal:      Cervical back: Normal range of motion  Skin:     General: Skin is warm and dry  Neurological:      Mental Status: She is alert     Psychiatric:         Mood and Affect: Mood normal

## 2023-01-14 NOTE — PATIENT INSTRUCTIONS
Drink lots of fluids, especially cranberry juice  F/u with PCP in 7-14 days; you should have a repeat urinalysis  Proceed to the ER if symptoms get worse, fever, chills, vomiting, etc   Take Cipro as directed

## 2023-02-27 ENCOUNTER — OFFICE VISIT (OUTPATIENT)
Dept: URGENT CARE | Facility: CLINIC | Age: 63
End: 2023-02-27

## 2023-02-27 VITALS
TEMPERATURE: 97.6 F | HEART RATE: 97 BPM | BODY MASS INDEX: 32.62 KG/M2 | OXYGEN SATURATION: 97 % | WEIGHT: 196 LBS | RESPIRATION RATE: 20 BRPM

## 2023-02-27 DIAGNOSIS — J40 BRONCHITIS: Primary | ICD-10-CM

## 2023-02-27 RX ORDER — AZITHROMYCIN 250 MG/1
TABLET, FILM COATED ORAL
Qty: 6 TABLET | Refills: 0 | Status: SHIPPED | OUTPATIENT
Start: 2023-02-27 | End: 2023-03-03

## 2023-02-27 RX ORDER — ALBUTEROL SULFATE 90 UG/1
2 AEROSOL, METERED RESPIRATORY (INHALATION) EVERY 6 HOURS PRN
Qty: 8.5 G | Refills: 0 | Status: SHIPPED | OUTPATIENT
Start: 2023-02-27 | End: 2023-03-10 | Stop reason: SDUPTHER

## 2023-02-27 RX ORDER — PREDNISONE 20 MG/1
20 TABLET ORAL DAILY
Qty: 5 TABLET | Refills: 0 | Status: SHIPPED | OUTPATIENT
Start: 2023-02-27 | End: 2023-03-04

## 2023-02-27 NOTE — PROGRESS NOTES
[id number]        NAME: Janneth Doran is a 60 y o  female  : 1960    MRN: [id number]  DATE: 2023  TIME: 1:16 PM    Assessment and Plan   Bronchitis [J40]  1  Bronchitis  azithromycin (ZITHROMAX) 250 mg tablet    predniSONE 20 mg tablet    albuterol (ProAir HFA) 90 mcg/act inhaler            Patient Instructions       Follow up with PCP in 3-5 days  Proceed to  ER if symptoms worsen  Chief Complaint     Chief Complaint   Patient presents with   • Cough     Bronchitis since few days         History of Present Illness       Patient is a 61 yo female who presents for evaluation of cough x 5 or so days  She has been taking OTC cough suppressants without much relief  She states she has been wheezing at night  She denies any fevers, SOB or difficulty breathing, chest pain or chest tightness, weakness, fatigue  Review of Systems   Review of Systems   Constitutional: Negative for chills, fatigue and fever  HENT: Negative for congestion, ear discharge, ear pain, postnasal drip, rhinorrhea, sinus pressure, sinus pain and sore throat  Respiratory: Positive for cough  Negative for chest tightness, shortness of breath and wheezing  Cardiovascular: Negative for chest pain and palpitations  Musculoskeletal: Negative for arthralgias and myalgias  Neurological: Negative for weakness  Psychiatric/Behavioral: Negative for confusion           Current Medications       Current Outpatient Medications:   •  albuterol (ProAir HFA) 90 mcg/act inhaler, Inhale 2 puffs every 6 (six) hours as needed for wheezing, Disp: 8 5 g, Rfl: 0  •  azithromycin (ZITHROMAX) 250 mg tablet, Take 2 tablets today then 1 tablet daily x 4 days, Disp: 6 tablet, Rfl: 0  •  predniSONE 20 mg tablet, Take 1 tablet (20 mg total) by mouth daily for 5 days, Disp: 5 tablet, Rfl: 0  •  Cholecalciferol 25 MCG (1000 UT) tablet, Take by mouth (Patient not taking: Reported on 2023), Disp: , Rfl:   •  diazepam (VALIUM) 5 mg tablet, Take 2 tab 60 min prior to MRI with 1-2 tabs 20 min prior to scheduled imaging (Patient not taking: Reported on 10/26/2022), Disp: 4 tablet, Rfl: 0  •  fingolimod (GILENYA) 0 5 MG capsule, Take 0 5 mg by mouth daily (Patient not taking: Reported on 10/17/2022), Disp: , Rfl:   •  IBUPROFEN PO, Take by mouth (Patient not taking: Reported on 2023), Disp: , Rfl:   •  meloxicam (Mobic) 15 mg tablet, Take 1 tablet (15 mg total) by mouth daily, Disp: 30 tablet, Rfl: 5  •  MULTIPLE VITAMIN PO, Take by mouth (Patient not taking: Reported on 10/26/2022), Disp: , Rfl:   •  Multiple Vitamins-Minerals (PRESERVISION AREDS 2+MULTI VIT PO), Take 1 capsule by mouth (Patient not taking: Reported on 2023), Disp: , Rfl:     Current Allergies     Allergies as of 2023 - Reviewed 2023   Allergen Reaction Noted   • Levofloxacin Nausea Only 2021   • Metronidazole Nausea Only 2021   • Other Allergic Rhinitis 2021            The following portions of the patient's history were reviewed and updated as appropriate: allergies, current medications, past family history, past medical history, past social history, past surgical history and problem list      Past Medical History:   Diagnosis Date   • Multiple sclerosis ([address] )        Past Surgical History:   Procedure Laterality Date   •  SECTION     • FOOT SURGERY Right    • KNEE SURGERY      R meniscus   • TRACHEOSTOMY         Family History   Problem Relation Age of Onset   • Cancer Mother    • Kidney disease Mother    • Hypertension Mother    • Heart disease Father          Medications have been verified  Objective   Pulse 97   Temp 97 6 °F (36 4 °C)   Resp 20   Wt 88 9 kg (196 lb)   SpO2 97%   BMI 32 62 kg/m²        Physical Exam     Physical Exam  Constitutional:       General: She is not in acute distress  Appearance: She is not toxic-appearing  HENT:      Nose: Nose normal  No congestion        Mouth/Throat: Mouth: Mucous membranes are moist       Pharynx: Oropharynx is clear  No posterior oropharyngeal erythema  Cardiovascular:      Rate and Rhythm: Normal rate  Heart sounds: Normal heart sounds  Pulmonary:      Effort: Pulmonary effort is normal       Breath sounds: Normal breath sounds  Comments: Scattered expiratory rhonchi in bilateral upper lung fields  No wheezing  SPO2 97%  Skin:     General: Skin is warm and dry  Neurological:      Mental Status: She is alert

## 2023-03-06 ENCOUNTER — OFFICE VISIT (OUTPATIENT)
Dept: FAMILY MEDICINE CLINIC | Facility: CLINIC | Age: 63
End: 2023-03-06

## 2023-03-06 ENCOUNTER — TELEPHONE (OUTPATIENT)
Dept: ADMINISTRATIVE | Facility: OTHER | Age: 63
End: 2023-03-06

## 2023-03-06 ENCOUNTER — APPOINTMENT (OUTPATIENT)
Dept: RADIOLOGY | Facility: CLINIC | Age: 63
End: 2023-03-06

## 2023-03-06 VITALS
WEIGHT: 207.5 LBS | TEMPERATURE: 97.5 F | HEART RATE: 86 BPM | OXYGEN SATURATION: 97 % | HEIGHT: 63 IN | DIASTOLIC BLOOD PRESSURE: 74 MMHG | BODY MASS INDEX: 36.77 KG/M2 | SYSTOLIC BLOOD PRESSURE: 120 MMHG

## 2023-03-06 DIAGNOSIS — Z76.89 ENCOUNTER TO ESTABLISH CARE: Primary | ICD-10-CM

## 2023-03-06 DIAGNOSIS — Z13.29 THYROID DISORDER SCREEN: ICD-10-CM

## 2023-03-06 DIAGNOSIS — M25.571 CHRONIC PAIN OF RIGHT ANKLE: ICD-10-CM

## 2023-03-06 DIAGNOSIS — Z12.11 COLON CANCER SCREENING: ICD-10-CM

## 2023-03-06 DIAGNOSIS — J40 BRONCHITIS: ICD-10-CM

## 2023-03-06 DIAGNOSIS — G89.29 CHRONIC PAIN OF RIGHT ANKLE: ICD-10-CM

## 2023-03-06 DIAGNOSIS — Z12.31 ENCOUNTER FOR SCREENING MAMMOGRAM FOR MALIGNANT NEOPLASM OF BREAST: ICD-10-CM

## 2023-03-06 DIAGNOSIS — Z11.59 NEED FOR HEPATITIS C SCREENING TEST: ICD-10-CM

## 2023-03-06 DIAGNOSIS — F41.1 GENERALIZED ANXIETY DISORDER: ICD-10-CM

## 2023-03-06 DIAGNOSIS — E55.9 VITAMIN D DEFICIENCY: ICD-10-CM

## 2023-03-06 DIAGNOSIS — F33.9 RECURRENT MAJOR DEPRESSIVE DISORDER, REMISSION STATUS UNSPECIFIED (HCC): ICD-10-CM

## 2023-03-06 DIAGNOSIS — G35 MULTIPLE SCLEROSIS (HCC): ICD-10-CM

## 2023-03-06 DIAGNOSIS — G81.91 HEMIPARESIS OF RIGHT DOMINANT SIDE, UNSPECIFIED HEMIPARESIS ETIOLOGY (HCC): ICD-10-CM

## 2023-03-06 DIAGNOSIS — Z11.4 SCREENING FOR HIV (HUMAN IMMUNODEFICIENCY VIRUS): ICD-10-CM

## 2023-03-06 PROBLEM — E78.5 HYPERLIPIDEMIA: Status: ACTIVE | Noted: 2021-07-28

## 2023-03-06 PROBLEM — F41.9 ANXIETY: Status: ACTIVE | Noted: 2021-05-05

## 2023-03-06 PROBLEM — F32.A DEPRESSION: Status: ACTIVE | Noted: 2021-05-05

## 2023-03-06 RX ORDER — DEXTROMETHORPHAN HYDROBROMIDE AND PROMETHAZINE HYDROCHLORIDE 15; 6.25 MG/5ML; MG/5ML
5 SOLUTION ORAL 4 TIMES DAILY PRN
Qty: 240 ML | Refills: 0 | Status: SHIPPED | OUTPATIENT
Start: 2023-03-06

## 2023-03-06 RX ORDER — CIPROFLOXACIN 500 MG/1
500 TABLET, FILM COATED ORAL EVERY 12 HOURS SCHEDULED
Qty: 14 TABLET | Refills: 0 | Status: SHIPPED | OUTPATIENT
Start: 2023-03-06 | End: 2023-03-13

## 2023-03-06 RX ORDER — METHYLPREDNISOLONE 4 MG/1
TABLET ORAL
Qty: 21 EACH | Refills: 0 | Status: SHIPPED | OUTPATIENT
Start: 2023-03-06

## 2023-03-06 RX ORDER — BUPROPION HYDROCHLORIDE 150 MG/1
150 TABLET ORAL EVERY MORNING
Qty: 30 TABLET | Refills: 5 | Status: SHIPPED | OUTPATIENT
Start: 2023-03-06 | End: 2023-09-02

## 2023-03-06 NOTE — ASSESSMENT & PLAN NOTE
Referral to in network neurologist given  Patient reports that she has not been on any medications for her MS for some time

## 2023-03-06 NOTE — TELEPHONE ENCOUNTER
----- Message from Raymundo Jenkins MA sent at 3/6/2023  9:58 AM EST -----  Regarding: Mammo  03/06/23 9:58 AM    Hello, our patient attached above has had Mammogram completed/performed  Please assist in updating the patient chart by pulling the Care Everywhere (CE) document  The date of service is 9/1/22       Thank you,  Vidhya Lopez  [facility]

## 2023-03-06 NOTE — ASSESSMENT & PLAN NOTE
Patient is a 11-year-old female with past medical history of depression, MS and vitamin D deficiency presenting today to establish care  Concerns today regarding recent diagnosis of bronchitis and worsening symptoms over the weekend

## 2023-03-06 NOTE — PROGRESS NOTES
Name: Deshawn Villarreal      : 1960      MRN: [id number]  Encounter Provider: Kris Townsend DO  Encounter Date: 3/6/2023   Encounter department: [facility]     1  Encounter to establish care  Assessment & Plan:  Patient is a 79-year-old female with past medical history of depression, MS and vitamin D deficiency presenting today to establish care  Concerns today regarding recent diagnosis of bronchitis and worsening symptoms over the weekend  2  Colon cancer screening  -     Occult Blood, Fecal Immunochemical; Future    3  Need for hepatitis C screening test  -     Hepatitis C Antibody ([facility]); Future    4  Screening for HIV (human immunodeficiency virus)  -     HIV 1/2 AG/AB w Reflex [facility] for 2 yr old and above; Future    5  Encounter for screening mammogram for malignant neoplasm of breast  -     Mammo screening bilateral w 3d & cad; Future; Expected date: 2023    6  Chronic pain of right ankle  -     Ambulatory Referral to Physical Therapy; Future    7  Generalized anxiety disorder  -     buPROPion (WELLBUTRIN XL) 150 mg 24 hr tablet; Take 1 tablet (150 mg total) by mouth every morning    8  Thyroid disorder screen  -     TSH, 3rd generation with Free T4 reflex; Future  -     Lipid panel; Future    9  Vitamin D deficiency  Assessment & Plan:  Check vitamin D level  Orders:  -     Vitamin D 25 hydroxy; Future    10  Multiple sclerosis [facility])  Assessment & Plan:  Referral to in network neurologist given  Patient reports that she has not been on any medications for her MS for some time  Orders:  -     Ambulatory Referral to Neurology; Future    11  Bronchitis  Assessment & Plan:  Start Cipro 500 mg twice daily x7 days  Medrol Dosepak  Phenergan for cough      Orders:  -     ciprofloxacin (CIPRO) 500 mg tablet;  Take 1 tablet (500 mg total) by mouth every 12 (twelve) hours for 7 days  -     XR chest pa & lateral; Future; Expected date: 03/06/2023  -     methylPREDNISolone 4 MG tablet therapy pack; Use as directed on package  -     Promethazine-DM (PHENERGAN-DM) 6 25-15 mg/5 mL oral syrup; Take 5 mL by mouth 4 (four) times a day as needed for cough    12  Recurrent major depressive disorder, remission status unspecified ([facility] )  Assessment & Plan:  PHQ-9 score elevated today to 26  Patient does report a lot of her feelings surround poor function as her health has declined over the past few years    Past patient on Wellbutrin, will restart Wellbutrin today at 150 mg daily, will address titrating up at next visit  13  Hemiparesis of right dominant side, unspecified hemiparesis etiology ([facility] )    BMI Counseling: Body mass index is 36 76 kg/m²  The BMI is above normal  Nutrition recommendations include moderation in carbohydrate intake  Exercise recommendations include strength training exercises  Rationale for BMI follow-up plan is due to patient being overweight or obese  Depression Screening and Follow-up Plan: Patient's depression screening was positive with a PHQ-2 score of 5  Their PHQ-9 score was 26  Patient assessed for underlying major depression  Brief counseling provided and recommend additional follow-up/re-evaluation next office visit  Depression likely due to other medical condition  Will treat underlying condition  Subjective      Patient is a 23-year-old female with past medical history of MS presents today to establish care  Patient reports that her MS has been in remission and she has not been on medication for some time  Previously had neurologist locally but would like referral to someone within network  She reports that she has been recently been sick with UTI and bronchitis  Went to urgent care and given antibiotic as well as steroid, patient reports that her symptoms have not improved at all and she has been feeling worse  Still having cough and congestion      Review of Systems   Constitutional: Negative for chills and fever  HENT: Positive for congestion  Respiratory: Positive for cough  Negative for shortness of breath  Cardiovascular: Negative for chest pain and palpitations  Gastrointestinal: Negative for abdominal pain, constipation, diarrhea, nausea and vomiting  Neurological: Negative for dizziness and headaches  Psychiatric/Behavioral:        Depressed Mood       Current Outpatient Medications on File Prior to Visit   Medication Sig   • albuterol (ProAir HFA) 90 mcg/act inhaler Inhale 2 puffs every 6 (six) hours as needed for wheezing   • Cholecalciferol 25 MCG (1000 UT) tablet Take by mouth   • IBUPROFEN PO Take by mouth   • meloxicam (Mobic) 15 mg tablet Take 1 tablet (15 mg total) by mouth daily   • fingolimod (GILENYA) 0 5 MG capsule Take 0 5 mg by mouth daily (Patient not taking: Reported on 10/17/2022)   • [DISCONTINUED] diazepam (VALIUM) 5 mg tablet Take 2 tab 60 min prior to MRI with 1-2 tabs 20 min prior to scheduled imaging (Patient not taking: Reported on 10/26/2022)   • [DISCONTINUED] MULTIPLE VITAMIN PO Take by mouth (Patient not taking: Reported on 10/26/2022)   • [DISCONTINUED] Multiple Vitamins-Minerals (PRESERVISION AREDS 2+MULTI VIT PO) Take 1 capsule by mouth (Patient not taking: Reported on 1/14/2023)       Objective     /74 (BP Location: Left arm, Patient Position: Sitting, Cuff Size: Large)   Pulse 86   Temp 97 5 °F (36 4 °C) (Temporal)   Ht 5' 3" (1 6 m)   Wt 94 1 kg (207 lb 8 oz)   SpO2 97%   BMI 36 76 kg/m²     Physical Exam  Vitals reviewed  Constitutional:       Appearance: Normal appearance  HENT:      Head: Normocephalic and atraumatic  Mouth/Throat:      Mouth: Mucous membranes are moist       Pharynx: No oropharyngeal exudate or posterior oropharyngeal erythema  Eyes:      Extraocular Movements: Extraocular movements intact  Cardiovascular:      Rate and Rhythm: Normal rate and regular rhythm        Heart sounds: Normal heart sounds  Pulmonary:      Breath sounds: Wheezing and rhonchi present  Comments: Coughing exacerbated by deep breathing   Musculoskeletal:      Comments: Brace on right ankle, patient ambulating with walker  Neurological:      General: No focal deficit present  Mental Status: She is alert and oriented to person, place, and time     Psychiatric:         Mood and Affect: Mood normal          Behavior: Behavior normal           Del Smith DO

## 2023-03-06 NOTE — ASSESSMENT & PLAN NOTE
PHQ-9 score elevated today to 26  Patient does report a lot of her feelings surround poor function as her health has declined over the past few years    Past patient on Wellbutrin, will restart Wellbutrin today at 150 mg daily, will address titrating up at next visit

## 2023-03-07 NOTE — TELEPHONE ENCOUNTER
Upon review of the In Basket request we were able to locate, review, and update the patient chart as requested for Mammogram     Any additional questions or concerns should be emailed to the Practice Liaisons via the appropriate education email address, please do not reply via In Basket      Thank you  Gage Ibrahim MA

## 2023-03-08 ENCOUNTER — TELEPHONE (OUTPATIENT)
Dept: FAMILY MEDICINE CLINIC | Facility: CLINIC | Age: 63
End: 2023-03-08

## 2023-03-08 NOTE — TELEPHONE ENCOUNTER
Patient informed     ----- Message from Shruthi Matt DO sent at 3/8/2023 11:25 AM EST -----  CXR looks good, no signs of Pneumonia  Patient should continue prescribed medications for bronchitis

## 2023-03-10 DIAGNOSIS — J40 BRONCHITIS: ICD-10-CM

## 2023-03-10 RX ORDER — ALBUTEROL SULFATE 90 UG/1
2 AEROSOL, METERED RESPIRATORY (INHALATION) EVERY 6 HOURS PRN
Qty: 8.5 G | Refills: 0 | Status: SHIPPED | OUTPATIENT
Start: 2023-03-10

## 2023-03-15 ENCOUNTER — APPOINTMENT (OUTPATIENT)
Dept: LAB | Facility: CLINIC | Age: 63
End: 2023-03-15

## 2023-03-15 DIAGNOSIS — Z12.11 COLON CANCER SCREENING: ICD-10-CM

## 2023-03-15 LAB — HEMOCCULT STL QL IA: NEGATIVE

## 2023-03-16 ENCOUNTER — TELEPHONE (OUTPATIENT)
Dept: FAMILY MEDICINE CLINIC | Facility: CLINIC | Age: 63
End: 2023-03-16

## 2023-03-16 NOTE — TELEPHONE ENCOUNTER
Patient informed     ----- Message from Yvonne Montgomery DO sent at 3/16/2023  7:24 AM EDT -----  Fecal occult negative, please remind patient to complete blood work before next visit

## 2023-03-20 ENCOUNTER — APPOINTMENT (OUTPATIENT)
Dept: LAB | Facility: CLINIC | Age: 63
End: 2023-03-20

## 2023-03-20 ENCOUNTER — TELEPHONE (OUTPATIENT)
Dept: OTHER | Facility: OTHER | Age: 63
End: 2023-03-20

## 2023-03-20 DIAGNOSIS — E55.9 VITAMIN D DEFICIENCY: ICD-10-CM

## 2023-03-20 DIAGNOSIS — Z11.4 SCREENING FOR HIV (HUMAN IMMUNODEFICIENCY VIRUS): ICD-10-CM

## 2023-03-20 DIAGNOSIS — Z13.29 THYROID DISORDER SCREEN: ICD-10-CM

## 2023-03-20 DIAGNOSIS — Z11.59 NEED FOR HEPATITIS C SCREENING TEST: ICD-10-CM

## 2023-03-20 LAB
25(OH)D3 SERPL-MCNC: 20 NG/ML (ref 30–100)
CHOLEST SERPL-MCNC: 227 MG/DL
HCV AB SER QL: NORMAL
HDLC SERPL-MCNC: 49 MG/DL
HIV 1+2 AB+HIV1 P24 AG SERPL QL IA: NORMAL
HIV 2 AB SERPL QL IA: NORMAL
HIV1 AB SERPL QL IA: NORMAL
HIV1 P24 AG SERPL QL IA: NORMAL
LDLC SERPL CALC-MCNC: 159 MG/DL (ref 0–100)
NONHDLC SERPL-MCNC: 178 MG/DL
TRIGL SERPL-MCNC: 93 MG/DL
TSH SERPL DL<=0.05 MIU/L-ACNC: 2.71 UIU/ML (ref 0.45–4.5)

## 2023-03-21 ENCOUNTER — OFFICE VISIT (OUTPATIENT)
Dept: FAMILY MEDICINE CLINIC | Facility: CLINIC | Age: 63
End: 2023-03-21

## 2023-03-21 ENCOUNTER — EVALUATION (OUTPATIENT)
Dept: PHYSICAL THERAPY | Facility: CLINIC | Age: 63
End: 2023-03-21

## 2023-03-21 VITALS
DIASTOLIC BLOOD PRESSURE: 72 MMHG | SYSTOLIC BLOOD PRESSURE: 118 MMHG | BODY MASS INDEX: 36.68 KG/M2 | WEIGHT: 207 LBS | HEART RATE: 79 BPM | HEIGHT: 63 IN | OXYGEN SATURATION: 98 % | TEMPERATURE: 97.8 F

## 2023-03-21 DIAGNOSIS — E55.9 VITAMIN D DEFICIENCY: ICD-10-CM

## 2023-03-21 DIAGNOSIS — G89.29 CHRONIC PAIN OF RIGHT ANKLE: Primary | ICD-10-CM

## 2023-03-21 DIAGNOSIS — Z00.00 MEDICARE ANNUAL WELLNESS VISIT, SUBSEQUENT: Primary | ICD-10-CM

## 2023-03-21 DIAGNOSIS — F32.A DEPRESSION, UNSPECIFIED DEPRESSION TYPE: ICD-10-CM

## 2023-03-21 DIAGNOSIS — M25.571 CHRONIC PAIN OF RIGHT ANKLE: Primary | ICD-10-CM

## 2023-03-21 RX ORDER — ERGOCALCIFEROL 1.25 MG/1
50000 CAPSULE ORAL WEEKLY
Qty: 4 CAPSULE | Refills: 1 | Status: SHIPPED | OUTPATIENT
Start: 2023-03-21

## 2023-03-21 NOTE — ASSESSMENT & PLAN NOTE
Patient to return for Pap smear in 2 weeks, fecal occult negative, mammogram up-to-date  Routine blood work also up-to-date

## 2023-03-21 NOTE — PROGRESS NOTES
Assessment and Plan:     Problem List Items Addressed This Visit        Other    Depression     Patient's mood and thoughts related to decrease in functional status given medical history and recent relocation to this area  She is currently on Wellbutrin 150 mg, reports improvement in symptoms at this time although it has only been 2 weeks  Patient to follow-up at 4 to 6 weeks for dose increase  Vitamin D deficiency    Relevant Medications    ergocalciferol (VITAMIN D2) 50,000 units    Medicare annual wellness visit, subsequent - Primary     Patient to return for Pap smear in 2 weeks, fecal occult negative, mammogram up-to-date  Routine blood work also up-to-date  Depression Screening and Follow-up Plan: Their PHQ-9 score was 15  Patient assessed for underlying major depression  Brief counseling provided and recommend additional follow-up/re-evaluation next office visit  Depression likely due to other medical condition  Will treat underlying condition  Preventive health issues were discussed with patient, and age appropriate screening tests were ordered as noted in patient's After Visit Summary  Personalized health advice and appropriate referrals for health education or preventive services given if needed, as noted in patient's After Visit Summary  History of Present Illness:     Patient presents for a Medicare Wellness Visit    Patient presents today for Medicare annual wellness visit  Reports that she is not happy regarding her overall functional status given her diagnosis of MS and decreased functionality  Patient states that she will be trialing physical therapy starting today and is hoping for success  Patient Care Team:  Meri [place], DO as PCP - General (Family Medicine)     Review of Systems:     Review of Systems   Constitutional: Negative for chills and fever  HENT: Negative for congestion and rhinorrhea      Respiratory: Negative for cough and shortness of breath  Cardiovascular: Negative for chest pain and palpitations  Gastrointestinal: Negative for abdominal pain, constipation, diarrhea, nausea and vomiting  Neurological: Negative for dizziness and headaches  Problem List:     Patient Active Problem List   Diagnosis   • Multiple sclerosis ([place] [address] )   • Ambulatory dysfunction   • Hemiparesis of right dominant side (HCC)   • Anxiety   • Depression   • Generalized anxiety disorder   • Hyperlipidemia   • Hypothyroid   • Vitamin D deficiency   • Recurrent major depressive disorder, remission status unspecified ([place] [address] )   • Medicare annual wellness visit, subsequent   • Bronchitis      Past Medical and Surgical History:     Past Medical History:   Diagnosis Date   • Multiple sclerosis ([place] [address] )      Past Surgical History:   Procedure Laterality Date   •  SECTION     • FOOT SURGERY Right    • KNEE SURGERY      R meniscus   • TRACHEOSTOMY        Family History:     Family History   Problem Relation Age of Onset   • Cancer Mother    • Kidney disease Mother    • Hypertension Mother    • Heart disease Father       Social History:     Social History     Socioeconomic History   • Marital status: /Civil Union     Spouse name: None   • Number of children: None   • Years of education: None   • Highest education level: None   Occupational History   • None   Tobacco Use   • Smoking status: Former     Passive exposure: Past   • Smokeless tobacco: Former   Vaping Use   • Vaping Use: Former   • Substances: THC, CBD   Substance and Sexual Activity   • Alcohol use: Not Currently   • Drug use: Yes     Types: Marijuana   • Sexual activity: None   Other Topics Concern   • None   Social History Narrative   • None     Social Determinants of Health     Financial Resource Strain: Low Risk    • Difficulty of Paying Living Expenses: Not hard at all   Food Insecurity: Not on file   Transportation Needs: No Transportation Needs   • Lack of Transportation (Medical):  No   • Lack of Transportation (Non-Medical): No   Physical Activity: Not on file   Stress: Not on file   Social Connections: Not on file   Intimate Partner Violence: Not on file   Housing Stability: Not on file      Medications and Allergies:     Current Outpatient Medications   Medication Sig Dispense Refill   • albuterol (ProAir HFA) 90 mcg/act inhaler Inhale 2 puffs every 6 (six) hours as needed for wheezing 8 5 g 0   • buPROPion (WELLBUTRIN XL) 150 mg 24 hr tablet Take 1 tablet (150 mg total) by mouth every morning 30 tablet 5   • Cholecalciferol 25 MCG (1000 UT) tablet Take by mouth     • ergocalciferol (VITAMIN D2) 50,000 units Take 1 capsule (50,000 Units total) by mouth once a week 4 capsule 1   • IBUPROFEN PO Take by mouth     • meloxicam (Mobic) 15 mg tablet Take 1 tablet (15 mg total) by mouth daily 30 tablet 5   • Promethazine-DM (PHENERGAN-DM) 6 25-15 mg/5 mL oral syrup Take 5 mL by mouth 4 (four) times a day as needed for cough 240 mL 0   • fingolimod (GILENYA) 0 5 MG capsule Take 0 5 mg by mouth daily (Patient not taking: Reported on 10/17/2022)       No current facility-administered medications for this visit       Allergies   Allergen Reactions   • Levofloxacin Nausea Only   • Metronidazole Nausea Only   • Other Allergic Rhinitis     seasonal      Immunizations:     Immunization History   Administered Date(s) Administered   • COVID-19 MODERNA VACC 0 5 ML IM 03/17/2021, 04/14/2021   • INFLUENZA 09/08/2016, 09/28/2017, 10/02/2018, 10/01/2019, 09/17/2020   • Pneumococcal Polysaccharide PPV23 03/08/2017   • Tdap 03/26/2018      Health Maintenance:         Topic Date Due   • Cervical Cancer Screening  Never done   • Breast Cancer Screening: Mammogram  09/01/2023   • Colorectal Cancer Screening  03/15/2024   • HIV Screening  Completed   • Hepatitis C Screening  Completed         Topic Date Due   • COVID-19 Vaccine (3 - Booster for Moderna series) 06/09/2021   • Influenza Vaccine (1) 09/01/2022      Medicare Screening Tests and Risk Assessments:     Ramo Ham is here for her Subsequent Wellness visit  Health Risk Assessment:   Patient rates overall health as excellent  Patient feels that their physical health rating is slightly better  Patient is dissatisfied with their life  Eyesight was rated as same  Hearing was rated as same  Patient feels that their emotional and mental health rating is slightly worse  Patients states they are never, rarely angry  Patient states they are often unusually tired/fatigued  Pain experienced in the last 7 days has been some  Patient's pain rating has been 4/10  Patient states that she has experienced no weight loss or gain in last 6 months  Depression Screening:   PHQ-9 Score: 15      Fall Risk Screening: In the past year, patient has experienced: history of falling in past year    Number of falls: 2 or more  Injured during fall?: No    Feels unsteady when standing or walking?: Yes    Worried about falling?: No      Urinary Incontinence Screening:   Patient has leaked urine accidently in the last six months  Home Safety:  Patient has trouble with stairs inside or outside of their home  Patient has working smoke alarms and has working carbon monoxide detector  Home safety hazards include: none  Nutrition:   Current diet is Limited junk food  Medications:   Patient is currently taking over-the-counter supplements  OTC medications include: see medication list  Patient is able to manage medications  Activities of Daily Living (ADLs)/Instrumental Activities of Daily Living (IADLs):   Walk and transfer into and out of bed and chair?: Yes  Dress and groom yourself?: Yes    Bathe or shower yourself?: Yes    Feed yourself?  Yes  Do your laundry/housekeeping?: Yes  Manage your money, pay your bills and track your expenses?: Yes  Make your own meals?: Yes    Do your own shopping?: Yes    Previous Hospitalizations:   Any hospitalizations or ED visits within the last 12 months?: No Advance Care Planning:   Living will: No    Advanced directive: No    Five wishes given: Yes      PREVENTIVE SCREENINGS      Cardiovascular Screening:    General: Screening Not Indicated and History Lipid Disorder      Diabetes Screening:     General: Screening Not Indicated      Colorectal Cancer Screening:     General: Screening Current      Breast Cancer Screening:     General: Screening Current      Cervical Cancer Screening:    General: Risks and Benefits Discussed    Due for: Cervical Pap Smear      Osteoporosis Screening:    General: Screening Not Indicated      Abdominal Aortic Aneurysm (AAA) Screening:        General: Screening Not Indicated      Lung Cancer Screening:     General: Screening Not Indicated      Hepatitis C Screening:    General: Screening Current    Screening, Brief Intervention, and Referral to Treatment (SBIRT)    Screening      AUDIT-C Screenin) How often did you have a drink containing alcohol in the past year? 2 to 4 times a month  2) How many drinks did you have on a typical day when you were drinking in the past year? 1 to 2  3) How often did you have 6 or more drinks on one occasion in the past year? never    AUDIT-C Score: 2  Interpretation: Score 0-2 (female): Negative screen for alcohol misuse    No results found  Physical Exam:     /72 (BP Location: Left arm, Patient Position: Sitting, Cuff Size: Large)   Pulse 79   Temp 97 8 °F (36 6 °C) (Temporal)   Ht 5' 3" (1 6 m)   Wt 93 9 kg (207 lb)   SpO2 98%   BMI 36 67 kg/m²     Physical Exam  Vitals reviewed  Constitutional:       Appearance: Normal appearance  HENT:      Head: Normocephalic and atraumatic  Eyes:      Extraocular Movements: Extraocular movements intact  Cardiovascular:      Rate and Rhythm: Normal rate and regular rhythm  Heart sounds: Normal heart sounds  Pulmonary:      Effort: Pulmonary effort is normal    Neurological:      Mental Status: She is alert        Motor: Weakness present  Gait: Gait abnormal       Comments: Right-sided weakness, ambulating with walker     Psychiatric:         Mood and Affect: Mood normal          Behavior: Behavior normal           Silverio Painting DO

## 2023-03-21 NOTE — PATIENT INSTRUCTIONS
Medicare Preventive Visit Patient Instructions  Thank you for completing your Welcome to Medicare Visit or Medicare Annual Wellness Visit today  Your next wellness visit will be due in one year (3/21/2024)  The screening/preventive services that you may require over the next 5-10 years are detailed below  Some tests may not apply to you based off risk factors and/or age  Screening tests ordered at today's visit but not completed yet may show as past due  Also, please note that scanned in results may not display below  Preventive Screenings:  Service Recommendations Previous Testing/Comments   Colorectal Cancer Screening  * Colonoscopy    * Fecal Occult Blood Test (FOBT)/Fecal Immunochemical Test (FIT)  * Fecal DNA/Cologuard Test  * Flexible Sigmoidoscopy Age: 39-70 years old   Colonoscopy: every 10 years (may be performed more frequently if at higher risk)  OR  FOBT/FIT: every 1 year  OR  Cologuard: every 3 years  OR  Sigmoidoscopy: every 5 years  Screening may be recommended earlier than age 39 if at higher risk for colorectal cancer  Also, an individualized decision between you and your healthcare provider will decide whether screening between the ages of 74-80 would be appropriate  Colonoscopy: Not on file  FOBT/FIT: 03/15/2023  Cologuard: Not on file  Sigmoidoscopy: Not on file    Screening Current     Breast Cancer Screening Age: 36 years old  Frequency: every 1-2 years  Not required if history of left and right mastectomy Mammogram: 09/01/2022    Screening Current   Cervical Cancer Screening Between the ages of 21-29, pap smear recommended once every 3 years  Between the ages of 33-67, can perform pap smear with HPV co-testing every 5 years     Recommendations may differ for women with a history of total hysterectomy, cervical cancer, or abnormal pap smears in past  Pap Smear: Not on file        Hepatitis C Screening Once for adults born between 1945 and 1965  More frequently in patients at high risk for Hepatitis C Hep C Antibody: 03/20/2023    Screening Current   Diabetes Screening 1-2 times per year if you're at risk for diabetes or have pre-diabetes Fasting glucose: No results in last 5 years (No results in last 5 years)  A1C: No results in last 5 years (No results in last 5 years)      Cholesterol Screening Once every 5 years if you don't have a lipid disorder  May order more often based on risk factors  Lipid panel: 03/20/2023    Screening Not Indicated  History Lipid Disorder     Other Preventive Screenings Covered by Medicare:  1  Abdominal Aortic Aneurysm (AAA) Screening: covered once if your at risk  You're considered to be at risk if you have a family history of AAA  2  Lung Cancer Screening: covers low dose CT scan once per year if you meet all of the following conditions: (1) Age 50-69; (2) No signs or symptoms of lung cancer; (3) Current smoker or have quit smoking within the last 15 years; (4) You have a tobacco smoking history of at least 20 pack years (packs per day multiplied by number of years you smoked); (5) You get a written order from a healthcare provider  3  Glaucoma Screening: covered annually if you're considered high risk: (1) You have diabetes OR (2) Family history of glaucoma OR (3)  aged 48 and older OR (3)  American aged 72 and older  3  Osteoporosis Screening: covered every 2 years if you meet one of the following conditions: (1) You're estrogen deficient and at risk for osteoporosis based off medical history and other findings; (2) Have a vertebral abnormality; (3) On glucocorticoid therapy for more than 3 months; (4) Have primary hyperparathyroidism; (5) On osteoporosis medications and need to assess response to drug therapy  · Last bone density test (DXA Scan): Not on file  5  HIV Screening: covered annually if you're between the age of 12-76  Also covered annually if you are younger than 13 and older than 72 with risk factors for HIV infection   For pregnant patients, it is covered up to 3 times per pregnancy  Immunizations:  Immunization Recommendations   Influenza Vaccine Annual influenza vaccination during flu season is recommended for all persons aged >= 6 months who do not have contraindications   Pneumococcal Vaccine   * Pneumococcal conjugate vaccine = PCV13 (Prevnar 13), PCV15 (Vaxneuvance), PCV20 (Prevnar 20)  * Pneumococcal polysaccharide vaccine = PPSV23 (Pneumovax) Adults 25-60 years old: 1-3 doses may be recommended based on certain risk factors  Adults 72 years old: 1-2 doses may be recommended based off what pneumonia vaccine you previously received   Hepatitis B Vaccine 3 dose series if at intermediate or high risk (ex: diabetes, end stage renal disease, liver disease)   Tetanus (Td) Vaccine - COST NOT COVERED BY MEDICARE PART B Following completion of primary series, a booster dose should be given every 10 years to maintain immunity against tetanus  Td may also be given as tetanus wound prophylaxis  Tdap Vaccine - COST NOT COVERED BY MEDICARE PART B Recommended at least once for all adults  For pregnant patients, recommended with each pregnancy  Shingles Vaccine (Shingrix) - COST NOT COVERED BY MEDICARE PART B  2 shot series recommended in those aged 48 and above     Health Maintenance Due:      Topic Date Due   • Cervical Cancer Screening  Never done   • Breast Cancer Screening: Mammogram  09/01/2023   • Colorectal Cancer Screening  03/15/2024   • HIV Screening  Completed   • Hepatitis C Screening  Completed     Immunizations Due:      Topic Date Due   • COVID-19 Vaccine (3 - Booster for Moderna series) 06/09/2021   • Influenza Vaccine (1) 09/01/2022     Advance Directives   What are advance directives? Advance directives are legal documents that state your wishes and plans for medical care  These plans are made ahead of time in case you lose your ability to make decisions for yourself   Advance directives can apply to any medical decision, such as the treatments you want, and if you want to donate organs  What are the types of advance directives? There are many types of advance directives, and each state has rules about how to use them  You may choose a combination of any of the following:  · Living will: This is a written record of the treatment you want  You can also choose which treatments you do not want, which to limit, and which to stop at a certain time  This includes surgery, medicine, IV fluid, and tube feedings  · Durable power of  for healthcare [facility] [place]): This is a written record that states who you want to make healthcare choices for you when you are unable to make them for yourself  This person, called a proxy, is usually a family member or a friend  You may choose more than 1 proxy  · Do not resuscitate (DNR) order:  A DNR order is used in case your heart stops beating or you stop breathing  It is a request not to have certain forms of treatment, such as CPR  A DNR order may be included in other types of advance directives  · Medical directive: This covers the care that you want if you are in a coma, near death, or unable to make decisions for yourself  You can list the treatments you want for each condition  Treatment may include pain medicine, surgery, blood transfusions, dialysis, IV or tube feedings, and a ventilator (breathing machine)  · Values history: This document has questions about your views, beliefs, and how you feel and think about life  This information can help others choose the care that you would choose  Why are advance directives important? An advance directive helps you control your care  Although spoken wishes may be used, it is better to have your wishes written down  Spoken wishes can be misunderstood, or not followed  Treatments may be given even if you do not want them  An advance directive may make it easier for your family to make difficult choices about your care     Urinary Incontinence   Urinary incontinence (UI)  is when you lose control of your bladder  UI develops because your bladder cannot store or empty urine properly  The 3 most common types of UI are stress incontinence, urge incontinence, or both  Medicines:   · May be given to help strengthen your bladder control  Report any side effects of medication to your healthcare provider  Do pelvic muscle exercises often:  Your pelvic muscles help you stop urinating  Squeeze these muscles tight for 5 seconds, then relax for 5 seconds  Gradually work up to squeezing for 10 seconds  Do 3 sets of 15 repetitions a day, or as directed  This will help strengthen your pelvic muscles and improve bladder control  Train your bladder:  Go to the bathroom at set times, such as every 2 hours, even if you do not feel the urge to go  You can also try to hold your urine when you feel the urge to go  For example, hold your urine for 5 minutes when you feel the urge to go  As that becomes easier, hold your urine for 10 minutes  Self-care:   · Keep a UI record  Write down how often you leak urine and how much you leak  Make a note of what you were doing when you leaked urine  · Drink liquids as directed  You may need to limit the amount of liquid you drink to help control your urine leakage  Do not drink any liquid right before you go to bed  Limit or do not have drinks that contain caffeine or alcohol  · Prevent constipation  Eat a variety of high-fiber foods  Good examples are high-fiber cereals, beans, vegetables, and whole-grain breads  Walking is the best way to trigger your intestines to have a bowel movement  · Exercise regularly and maintain a healthy weight  Weight loss and exercise will decrease pressure on your bladder and help you control your leakage  · Use a catheter as directed  to help empty your bladder  A catheter is a tiny, plastic tube that is put into your bladder to drain your urine     · Go to behavior therapy as directed  Behavior therapy may be used to help you learn to control your urge to urinate  Weight Management   Why it is important to manage your weight:  Being overweight increases your risk of health conditions such as heart disease, high blood pressure, type 2 diabetes, and certain types of cancer  It can also increase your risk for osteoarthritis, sleep apnea, and other respiratory problems  Aim for a slow, steady weight loss  Even a small amount of weight loss can lower your risk of health problems  How to lose weight safely:  A safe and healthy way to lose weight is to eat fewer calories and get regular exercise  You can lose up about 1 pound a week by decreasing the number of calories you eat by 500 calories each day  Healthy meal plan for weight management:  A healthy meal plan includes a variety of foods, contains fewer calories, and helps you stay healthy  A healthy meal plan includes the following:  · Eat whole-grain foods more often  A healthy meal plan should contain fiber  Fiber is the part of grains, fruits, and vegetables that is not broken down by your body  Whole-grain foods are healthy and provide extra fiber in your diet  Some examples of whole-grain foods are whole-wheat breads and pastas, oatmeal, brown rice, and bulgur  · Eat a variety of vegetables every day  Include dark, leafy greens such as spinach, kale, amador greens, and mustard greens  Eat yellow and orange vegetables such as carrots, sweet potatoes, and winter squash  · Eat a variety of fruits every day  Choose fresh or canned fruit (canned in its own juice or light syrup) instead of juice  Fruit juice has very little or no fiber  · Eat low-fat dairy foods  Drink fat-free (skim) milk or 1% milk  Eat fat-free yogurt and low-fat cottage cheese  Try low-fat cheeses such as mozzarella and other reduced-fat cheeses  · Choose meat and other protein foods that are low in fat    Choose beans or other legumes such as split peas or lentils  Choose fish, skinless poultry (chicken or turkey), or lean cuts of red meat (beef or pork)  Before you cook meat or poultry, cut off any visible fat  · Use less fat and oil  Try baking foods instead of frying them  Add less fat, such as margarine, sour cream, regular salad dressing and mayonnaise to foods  Eat fewer high-fat foods  Some examples of high-fat foods include french fries, doughnuts, ice cream, and cakes  · Eat fewer sweets  Limit foods and drinks that are high in sugar  This includes candy, cookies, regular soda, and sweetened drinks  Exercise:  Exercise at least 30 minutes per day on most days of the week  Some examples of exercise include walking, biking, dancing, and swimming  You can also fit in more physical activity by taking the stairs instead of the elevator or parking farther away from stores  Ask your healthcare provider about the best exercise plan for you  © Copyright [employer] 2018 Information is for End User's use only and may not be sold, redistributed or otherwise used for commercial purposes   All illustrations and images included in CareNotes® are the copyrighted property of [employer]  or [address] [place], [place] [address]

## 2023-03-21 NOTE — ASSESSMENT & PLAN NOTE
Patient's mood and thoughts related to decrease in functional status given medical history and recent relocation to this area  She is currently on Wellbutrin 150 mg, reports improvement in symptoms at this time although it has only been 2 weeks  Patient to follow-up at 4 to 6 weeks for dose increase

## 2023-03-21 NOTE — PROGRESS NOTES
PT Evaluation     Today's date: 3/21/2023  Patient name: Lg Baron  : 1960  MRN: [id number]  Referring provider: Namita Subramanian,*  Dx:   Encounter Diagnosis     ICD-10-CM    1  Chronic pain of right ankle  M25 571 Ambulatory Referral to Physical Therapy    G89 29           Start Time: 1500  Stop Time: 1545  Total time in clinic (min): 45 minutes    Assessment  Assessment details: Pt is a 59 y/o female presenting to physical therapy with R ankle pain and stiffness  Upon examination, pt presents with impairments of decreased strength, decreased ROM, and increased pain of pt's R ankle resulting in limitations of self-care/ADLs, household activities, ambulation, and stair negotiation  Pt plan of care will focus on improving strength and ROM of pt's R ankle as well as decreasing pain and improving tolerance to functional activities  Pt plan will also focus on normalizing pt's gait pattern with use of least restrictive device  Pt would benefit from skilled physical therapy to improve pt's overall functional mobility  Impairments: abnormal gait, abnormal or restricted ROM, activity intolerance, impaired physical strength, lacks appropriate home exercise program and pain with function  Functional limitations: self-care/ADLs, household activities, ambulation, and stair negotiationUnderstanding of Dx/Px/POC: good   Prognosis: fair    Goals  STG - 4 weeks  1  Pt will demonstrate WFL AROM of her R ankle in all planes to facilitate performance of ambuation  2  Pt will demonstrate 3/5 gross strength or better of her R ankle in all planes to facilitate a return to her prior level of function  LTG - 8 weeks  1  Pt will be able to ambulate 200' with least restrictive to improve pt overall functional mobility  2  Pt's FOTO score will improve from 34 to 55 or better to facilitate a return to pt's prior level of function        Plan  Patient would benefit from: PT eval and skilled physical therapy  Planned modality interventions: cryotherapy, thermotherapy: hydrocollator packs and unattended electrical stimulation  Planned therapy interventions: activity modification, ADL training, behavior modification, flexibility, functional ROM exercises, gait training, graded exercise, home exercise program, joint mobilization, manual therapy, massage, Degroot taping, neuromuscular re-education, patient education, self care, strengthening, stretching, therapeutic activities and therapeutic exercise  Frequency: 2x week  Duration in weeks: 8  Plan of Care beginning date: 3/21/2023  Plan of Care expiration date: 2023  Treatment plan discussed with: patient        Subjective Evaluation    History of Present Illness  Mechanism of injury: Pt is a 61 y/o female presenting to physical therapy with R ankle pain and stiffness  Pt reports her R ankle issues started in  when her dog caused her to fracture her R ankle and lisfranc  Pt reports having surgery on her lisfranc and to repair her arch in   Pt reports going to physical therapy at [facility] to work on her walking which helped but she stopped in August  Pt reports she is having difficulty maintaining contact of the inside of the foot on the ground overall  Pt reports she is having difficulty driving and walking because of being unable to lift her foot  Pt reports she is also having difficulty getting out of the car and bath  Pt reports she is unable to move her ankle normally due to stiffness  Pt reports she is having difficulty with her balance and requires use of a walker     Pain  Current pain ratin  At best pain ratin  At worst pain ratin  Location: R ankle  Relieving factors: rest  Aggravating factors: standing, walking and stair climbing  Progression: worsening    Treatments  Previous treatment: physical therapy  Current treatment: physical therapy  Patient Goals  Patient goal: return to normalcy with her life        Objective Tenderness     Additional Tenderness Details  No tenderness to palpation    Active Range of Motion     Right Ankle/Foot   Dorsiflexion (ke): 0 degrees   Plantar flexion: 20 degrees   Inversion: 15 degrees   Eversion: 10 degrees     Strength/Myotome Testing     Right Hip   Planes of Motion   Flexion: 2+  Extension: 3-  Abduction: 3-  Adduction: 3-    Right Knee   Flexion: 3-  Extension: 3-    Right Ankle/Foot   Dorsiflexion: 2+  Plantar flexion: 3-  Inversion: 3-  Eversion: 3-  Great toe flexion: 4  Great toe extension: 4    General Comments: Ankle/Foot Comments   Pt currently ambulating with toe dragging of her R ankle and decreased R foot clearance   Pt also ambulating with a RW             Precautions: Multiple Sclerosis      Manuals 3/21            PROM R ankle MATT            Subtalar mobs MATT                                      Neuro Re-Ed             Education on POC, diagnosis, and HEP 5'            Ankle MREs 1x10 ea                                                                             Ther Ex             Ankle tband PF 2x10 grn            Ankle circles 20x                                                                                          Ther Activity                                       Gait Training                                       Modalities

## 2023-03-27 ENCOUNTER — OFFICE VISIT (OUTPATIENT)
Dept: PHYSICAL THERAPY | Facility: CLINIC | Age: 63
End: 2023-03-27

## 2023-03-27 DIAGNOSIS — G89.29 CHRONIC PAIN OF RIGHT ANKLE: Primary | ICD-10-CM

## 2023-03-27 DIAGNOSIS — M25.571 CHRONIC PAIN OF RIGHT ANKLE: Primary | ICD-10-CM

## 2023-03-27 NOTE — PROGRESS NOTES
"Daily Note     Today's date: 3/27/2023  Patient name: Emma Farley  : 1960  MRN: [id number]  Referring provider: Abbie Shah,*  Dx:   Encounter Diagnosis     ICD-10-CM    1  Chronic pain of right ankle  M25 571     G89 29           Start Time: 930  Stop Time: 1015  Total time in clinic (min): 45 minutes    Subjective: Pt reports she continues to have difficulty picking up her R foot when she is walking  Objective: See treatment diary below      Assessment: Tolerated treatment well  Patient demonstrated fatigue post treatment, exhibited good technique with therapeutic exercises and would benefit from continued PT  Initiated hip strengthening into pt's program today secondary to hip weakness causing the pt to toe drag during ambulation  Pt demonstrated significantly during standing marches at today's session  Pt required min verbal cues to facilitate performance of proper technique  Assess pt response to treatment at next visit  Plan: Continue per plan of care        Precautions: Multiple Sclerosis      Manuals 3/21 3/27           PROM R ankle MATT MATT           Subtalar mobs MATT MATT                                     Neuro Re-Ed             Pt education 5' 4'           Ankle MREs 1x10 ea 1x10 ea           Foot taps on box  2x10 4\"           BAPS board (fwd/bwd, inv/ev, circles)  20x ea           Seated heel raise  2x10                                     Ther Ex             Ankle tband PF 2x10 grn 2x10 grn           Ankle circles 20x 20x           Seated marches  2x10           Standing marches  2x10           Standing hip abd  2x10                                                  Ther Activity                                       Gait Training                                       Modalities                                            "

## 2023-03-28 ENCOUNTER — APPOINTMENT (OUTPATIENT)
Dept: PHYSICAL THERAPY | Facility: CLINIC | Age: 63
End: 2023-03-28

## 2023-04-03 ENCOUNTER — OFFICE VISIT (OUTPATIENT)
Dept: PHYSICAL THERAPY | Facility: CLINIC | Age: 63
End: 2023-04-03

## 2023-04-03 DIAGNOSIS — G89.29 CHRONIC PAIN OF RIGHT ANKLE: Primary | ICD-10-CM

## 2023-04-03 DIAGNOSIS — M25.571 CHRONIC PAIN OF RIGHT ANKLE: Primary | ICD-10-CM

## 2023-04-03 NOTE — PROGRESS NOTES
"Daily Note     Today's date: 4/3/2023  Patient name: Bianca Whittaker  : 1960  MRN: [id number]  Referring provider: Lg Pang,*  Dx:   Encounter Diagnosis     ICD-10-CM    1  Chronic pain of right ankle  M25 571     G89 29                      Subjective: Patient presents to PT wearing an ankle brace but not AFO  She states she is slowly feeling a little stronger  Objective: See treatment diary below      Assessment: Tolerated treatment well  Progressed MRE to PRE with dorsiflex and inv  Patient cont to lack full eversion due to weakness so cont with MRE  Frequent reminders to keep walker close to her and utilize for safety  STS introduced today for functional strengthening  She was able to complete with Vaishali-CGA for slow ecc control and safety  L lateral lean with standing hip abd in order to perform motion  Patient demonstrated fatigue post treatment and would benefit from continued PT      Plan: Progress treatment as tolerated  Patient could benefit from lateral him strengthening to reduce gait compensations        Precautions: Multiple Sclerosis      Manuals 3/21 3/27 4/3          PROM R ankle MATT MATT CARLY          Subtalar mobs MATT MATT                                     Neuro Re-Ed             Pt education 5' 4'           Ankle MREs 1x10 ea 1x10 ea 1x10 eversion          Foot taps on box  2x10 4\" 2x10 4\" L only          BAPS board (fwd/bwd, inv/ev, circles)  20x ea 20x ea fwd/bwd inv/ev          Seated heel raise  2x10 2x10                                    Ther Ex             Ankle tband PF 2x10 grn 2x10 grn 2x10 grn           Ankle TB dorsiflex   2x10  OTB          Ankle TB inv/eversion   2x10 inv only 2x10          Ankle circles 20x 20x 20x ea          Seated marches  2x10 2x10           Standing marches  2x10 1x10 B           Standing hip abd  2x10 2x10          Supine SLR   HOT elevated 2x5          Clamshells    NV                       Ther Activity             STS    2x5  Foam " No UE                        Gait Training                                       Modalities

## 2023-04-04 ENCOUNTER — ANNUAL EXAM (OUTPATIENT)
Dept: FAMILY MEDICINE CLINIC | Facility: CLINIC | Age: 63
End: 2023-04-04

## 2023-04-04 VITALS
TEMPERATURE: 97.3 F | HEART RATE: 92 BPM | WEIGHT: 206 LBS | BODY MASS INDEX: 36.5 KG/M2 | OXYGEN SATURATION: 96 % | DIASTOLIC BLOOD PRESSURE: 68 MMHG | HEIGHT: 63 IN | SYSTOLIC BLOOD PRESSURE: 116 MMHG

## 2023-04-04 DIAGNOSIS — Z01.419 WELL WOMAN EXAM WITH ROUTINE GYNECOLOGICAL EXAM: Primary | ICD-10-CM

## 2023-04-04 DIAGNOSIS — Z12.4 CERVICAL CANCER SCREENING: ICD-10-CM

## 2023-04-04 NOTE — PROGRESS NOTES
"Harish Ortiz Che is a 60 y o  woman who comes in today for a  pap smear only  Her most recent annual exam was on 3/21/2023  Her most recent Pap smear is unknown, patient reports no hx of abnormal pap smears  Patient is post menopausal and denies any vaginal itching or discharge    The following portions of the patient's history were reviewed and updated as appropriate: allergies, current medications, past family history, past medical history, past social history, past surgical history and problem list     Review of Systems  Pertinent items are noted in HPI       Objective     /68   Pulse 92   Temp (!) 97 3 °F (36 3 °C) (Temporal)   Ht 5' 3\" (1 6 m)   Wt 93 4 kg (206 lb)   SpO2 96%   BMI 36 49 kg/m²   Pelvic Exam: external genitalia normal, vagina normal without discharge, cervix normal in appearance and exam obscured by obesity  Pap smear obtained  Assessment/Plan     Screening pap smear  Well woman exam with routine gynecological exam  Thin Prep with Independent HPV testing sent   Will f/u pending results      Demetra North DO    "

## 2023-04-05 ENCOUNTER — APPOINTMENT (OUTPATIENT)
Dept: PHYSICAL THERAPY | Facility: CLINIC | Age: 63
End: 2023-04-05

## 2023-04-06 LAB
HPV HR 12 DNA CVX QL NAA+PROBE: NEGATIVE
HPV16 DNA CVX QL NAA+PROBE: NEGATIVE
HPV18 DNA CVX QL NAA+PROBE: NEGATIVE

## 2023-04-07 ENCOUNTER — OFFICE VISIT (OUTPATIENT)
Dept: PHYSICAL THERAPY | Facility: CLINIC | Age: 63
End: 2023-04-07

## 2023-04-07 DIAGNOSIS — M25.571 CHRONIC PAIN OF RIGHT ANKLE: Primary | ICD-10-CM

## 2023-04-07 DIAGNOSIS — G89.29 CHRONIC PAIN OF RIGHT ANKLE: Primary | ICD-10-CM

## 2023-04-07 NOTE — PROGRESS NOTES
"Daily Note     Today's date: 2023  Patient name: Angelica Hendrix  : 1960  MRN: [id number]  Referring provider: Marita Nissen,*  Dx:   Encounter Diagnosis     ICD-10-CM    1  Chronic pain of right ankle  M25 571     G89 29           Start Time: 1145  Stop Time: 1230  Total time in clinic (min): 45 minutes    Subjective: Pt reports she continues to have difficulty with walking particularly lifting her RLE  Objective: See treatment diary below      Assessment: Tolerated treatment well  Patient demonstrated fatigue post treatment, exhibited good technique with therapeutic exercises and would benefit from continued PT  Pt was able to progress today with inclusion of SL leg press into pt's exercise program  Pt continues to demonstrate toe dragging of her RLE secondary to inadequate strength of her R hip flexor  Pt required min verbal cues to facilitate performance of proper technique  Assess pt response to treatment at next visit  Plan: Continue per plan of care        Precautions: Multiple Sclerosis      Manuals 3/21 3/27 4/3 4/7         PROM R ankle MATT MATT CARLY          Subtalar mobs MATT MATT                                     Neuro Re-Ed             Pt education 5' 4'  3'         Ankle MREs 1x10 ea 1x10 ea 1x10 eversion 1x10         Foot taps on box  2x10 4\" 2x10 4\" L only 2x10 4\" L only         BAPS board (fwd/bwd, inv/ev, circles)  20x ea 20x ea fwd/bwd inv/ev          Seated heel raise  2x10 2x10 2x10                                   Ther Ex             Nustep for LE strengthening    7'          Ankle TB dorsiflex   2x10  OTB          Ankle TB inv/eversion   2x10 inv only 2x10          Ankle circles 20x 20x 20x ea          Seated marches  2x10 2x10  2x10         Standing marches  2x10 1x10 B           Standing hip abd  2x10 2x10 2x10         Supine SLR   HOT elevated 2x5 2x10 HOT elevated         Clamshells    NV          SL leg press    2x10 45#         Ther Activity             STS " 2x5  Foam   No UE  2x10 foam no UE                      Gait Training             Ambulation in gym    2x 100'         Cuing for proper gait pattern    3'         Modalities

## 2023-04-24 ENCOUNTER — OFFICE VISIT (OUTPATIENT)
Dept: PHYSICAL THERAPY | Facility: CLINIC | Age: 63
End: 2023-04-24

## 2023-04-24 DIAGNOSIS — G89.29 CHRONIC PAIN OF RIGHT ANKLE: Primary | ICD-10-CM

## 2023-04-24 DIAGNOSIS — M25.571 CHRONIC PAIN OF RIGHT ANKLE: Primary | ICD-10-CM

## 2023-04-24 NOTE — PROGRESS NOTES
"Daily Note     Today's date: 2023  Patient name: Phuong Whitley  : 1960  MRN: [id number]  Referring provider: Janett Rosario,*  Dx:   Encounter Diagnosis     ICD-10-CM    1  Chronic pain of right ankle  M25 571     G89 29           Start Time: 930  Stop Time: 1015  Total time in clinic (min): 45 minutes    Subjective: Pt reports she was able to watch her grandson over the weekend, however, she continues to have difficulty with walking and certain activities with her grandson  Objective: See treatment diary below      Assessment: Tolerated treatment well  Patient demonstrated fatigue post treatment, exhibited good technique with therapeutic exercises and would benefit from continued PT  Pt was able to progress today with inclusion of hip hikes into pt's exercise program  Continued to focus on ambulating with proper foot clearance at today's session  Pt required min verbal cues to facilitate performance of proper technique  Assess pt response to treatment at next visit  Plan: Continue per plan of care        Precautions: Multiple Sclerosis      Manuals 3/21 3/27 4/3 4/7 4/10 4/12 4/17 4/19 4/24    PROM R ankle MATT MATT CARLY  MATT        Subtalar mobs MATT MATT   MATT                                  Neuro Re-Ed             Pt education 5' 4'  3' 3' 3' 3' 3' 3'    Ankle MREs 1x10 ea 1x10 ea 1x10 eversion 1x10 1x10 1x10 1x10 1x10 1x10    Foot taps on box  2x10 4\" 2x10 4\" L only 2x10 4\"  2x10 4\" 2x10 4\" 2x10 4\" 2x10 4\" 2x10 4\"    BAPS board (fwd/bwd, inv/ev, circles)  20x ea 20x ea fwd/bwd inv/ev          Seated toe raise  2x10 2x10 2x10 2x10 2x10 3x10 3x10 3x10    Hip hikes         2x10 4\"                 Ther Ex             Nustep for LE strengthening    7'  7' 7' 7' 7' 7'    Ankle TB dorsiflex   2x10  OTB          Ankle TB inv/eversion   2x10 inv only 2x10          Ankle circles 20x 20x 20x ea          Seated marches  2x10 2x10  2x10 2x10 2x10 3x10 3x10 3x10    Standing marches  2x10 1x10 B     " Standing hip abd  2x10 2x10 2x10 2x10 2x10 2x10 2x10 2x10    Supine SLR   HOT elevated 2x5 2x10 HOT elevated 2x10 HOT elevated 3x10 HOT elevated 3x10 HOT elevated 3x10 HOT elevated HEP    Clamshells    NV          SL leg press    2x10 45# 3x10 45# 2x10 95# 2x10 95# 2x10 85# 2x10 85#    Ther Activity             STS    2x5  Foam   No UE  2x10 foam no UE 1x10 no UE 1x10 foam  2x10 no UE 2x10 no Ue 2x10 no UE 2x10 no UE    Mini squats      2x10 2x10 2x10 2x10    Gait Training             Ambulation in gym    2x 100' 2x 100' 2x 100'  2x 100' 2x 100' 2x 100'    Cuing for proper gait pattern    3' 3' 3' 3' 3' 3'    Modalities

## 2023-04-26 ENCOUNTER — OFFICE VISIT (OUTPATIENT)
Dept: PHYSICAL THERAPY | Facility: CLINIC | Age: 63
End: 2023-04-26

## 2023-04-26 DIAGNOSIS — M25.571 CHRONIC PAIN OF RIGHT ANKLE: Primary | ICD-10-CM

## 2023-04-26 DIAGNOSIS — G89.29 CHRONIC PAIN OF RIGHT ANKLE: Primary | ICD-10-CM

## 2023-04-26 NOTE — PROGRESS NOTES
"Daily Note     Today's date: 2023  Patient name: Elease Crigler  : 1960  MRN: [id number]  Referring provider: Julián Chaudhry,*  Dx:   Encounter Diagnosis     ICD-10-CM    1  Chronic pain of right ankle  M25 571     G89 29           Start Time: 930  Stop Time: 1015  Total time in clinic (min): 45 minutes    Subjective: Pt reports she is feeling stronger in her RLE specifically when she does the total gym at home  Objective: See treatment diary below      Assessment: Tolerated treatment well  Patient demonstrated fatigue post treatment, exhibited good technique with therapeutic exercises and would benefit from continued PT  Pt was able to progress today with inclusion of tandem walking and biodex balance machine into pt's exercise program  Pt demonstrated improved toe and foot clearance with mod verbal cues at today's session  Plan: Continue per plan of care        Precautions: Multiple Sclerosis      Manuals 3/21 3/27 4/3 4/7 4/10 4/12 4/17 4/19 4/24 4/26   PROM R ankle MATT MATT CARLY  MATT        Subtalar mobs MATT MATT   MATT                                  Neuro Re-Ed             Pt education 5' 4'  3' 3' 3' 3' 3' 3' 3'   biodex motor control          2x lvl 3   Foot taps on box  2x10 4\" 2x10 4\" L only 2x10 4\"  2x10 4\" 2x10 4\" 2x10 4\" 2x10 4\" 2x10 4\" 2x10 4\"   Tandem walking          3x   biodex weight shift fwd/bwd, lat          1 5' lvl 3   Hip hikes         2x10 4\" 2x10 4\"                Ther Ex             Nustep for LE strengthening    7'  7' 7' 7' 7' 7' 7'   Ankle TB dorsiflex   2x10  OTB          Ankle TB inv/eversion   2x10 inv only 2x10          Ankle circles 20x 20x 20x ea          Seated marches  2x10 2x10  2x10 2x10 2x10 3x10 3x10 3x10 3x10   Standing toe raises          2x10   Standing hip abd  2x10 2x10 2x10 2x10 2x10 2x10 2x10 2x10    Supine SLR   HOT elevated 2x5 2x10 HOT elevated 2x10 HOT elevated 3x10 HOT elevated 3x10 HOT elevated 3x10 HOT elevated HEP    Heel raises     " 2x10   SL leg press    2x10 45# 3x10 45# 2x10 95# 2x10 95# 2x10 85# 2x10 85# 2x10 95#   Ther Activity             STS    2x5  Foam   No UE  2x10 foam no UE 1x10 no UE 1x10 foam  2x10 no UE 2x10 no Ue 2x10 no UE 2x10 no UE 2x10 no UE   Mini squats      2x10 2x10 2x10 2x10    Gait Training             Ambulation in gym    2x 100' 2x 100' 2x 100'  2x 100' 2x 100' 2x 100' 2x 100'   Cuing for proper gait pattern    3' 3' 3' 3' 3' 3' 3'   Modalities

## 2023-05-01 ENCOUNTER — OFFICE VISIT (OUTPATIENT)
Dept: FAMILY MEDICINE CLINIC | Facility: CLINIC | Age: 63
End: 2023-05-01

## 2023-05-01 VITALS
DIASTOLIC BLOOD PRESSURE: 80 MMHG | TEMPERATURE: 97.7 F | HEART RATE: 94 BPM | SYSTOLIC BLOOD PRESSURE: 110 MMHG | OXYGEN SATURATION: 97 % | WEIGHT: 209 LBS | HEIGHT: 63 IN | BODY MASS INDEX: 37.03 KG/M2

## 2023-05-01 DIAGNOSIS — R30.0 DYSURIA: Primary | ICD-10-CM

## 2023-05-01 DIAGNOSIS — E55.9 VITAMIN D DEFICIENCY: ICD-10-CM

## 2023-05-01 LAB
SL AMB  POCT GLUCOSE, UA: ABNORMAL
SL AMB LEUKOCYTE ESTERASE,UA: ABNORMAL
SL AMB POCT BILIRUBIN,UA: ABNORMAL
SL AMB POCT BLOOD,UA: ABNORMAL
SL AMB POCT CLARITY,UA: ABNORMAL
SL AMB POCT COLOR,UA: YELLOW
SL AMB POCT KETONES,UA: ABNORMAL
SL AMB POCT NITRITE,UA: ABNORMAL
SL AMB POCT PH,UA: 5.5
SL AMB POCT SPECIFIC GRAVITY,UA: >=1.03
SL AMB POCT URINE PROTEIN: >=300
SL AMB POCT UROBILINOGEN: 0.2

## 2023-05-01 RX ORDER — ERGOCALCIFEROL 1.25 MG/1
50000 CAPSULE ORAL WEEKLY
Qty: 4 CAPSULE | Refills: 1 | Status: SHIPPED | OUTPATIENT
Start: 2023-05-01

## 2023-05-01 RX ORDER — NITROFURANTOIN 25; 75 MG/1; MG/1
100 CAPSULE ORAL 2 TIMES DAILY
Qty: 10 CAPSULE | Refills: 0 | Status: CANCELLED | OUTPATIENT
Start: 2023-05-01 | End: 2023-05-06

## 2023-05-01 RX ORDER — CIPROFLOXACIN 500 MG/1
500 TABLET, FILM COATED ORAL EVERY 12 HOURS SCHEDULED
Qty: 14 TABLET | Refills: 0 | Status: SHIPPED | OUTPATIENT
Start: 2023-05-01 | End: 2023-05-08

## 2023-05-01 NOTE — ASSESSMENT & PLAN NOTE
Positive blood, leuks and nitrite  Patient does report dysuria, frequency and urgency  Patient reports having 2 UTIs per year best treated with Cipro  We will send urine for culture, will treat with Cipro 500 mg twice daily x7 days, will follow-up pending susceptibility and sensitivities

## 2023-05-01 NOTE — PROGRESS NOTES
Name: Lázaro Carl      : 1960      MRN: [id number]  Encounter Provider: Kane Whatley DO  Encounter Date: 2023   Encounter department: [address]     1  Dysuria  Assessment & Plan:  Positive blood, leuks and nitrite  Patient does report dysuria, frequency and urgency  Patient reports having 2 UTIs per year best treated with Cipro  We will send urine for culture, will treat with Cipro 500 mg twice daily x7 days, will follow-up pending susceptibility and sensitivities  Orders:  -     POCT urine dip auto non-scope  -     ciprofloxacin (CIPRO) 500 mg tablet; Take 1 tablet (500 mg total) by mouth every 12 (twelve) hours for 7 days  -     Urine culture; Future  -     Urine culture    2  Vitamin D deficiency  -     ergocalciferol (VITAMIN D2) 50,000 units; Take 1 capsule (50,000 Units total) by mouth once a week         Subjective      Urinary Tract Infection: Patient complains of burning with urination, dysuria, frequency and urgency She has had symptoms for 3 days  Patient also complains of No additional symptoms  Patient denies back pain, fever, stomach ache and vaginal discharge  Patient does have a history of recurrent UTI  Patient does not have a history of pyelonephritis  Review of Systems   Constitutional: Negative for chills and fever  HENT: Negative for congestion  Respiratory: Negative for cough and shortness of breath  Cardiovascular: Negative for chest pain and palpitations  Gastrointestinal: Negative for abdominal pain, constipation, diarrhea, nausea and vomiting  Neurological: Negative for dizziness and headaches         Current Outpatient Medications on File Prior to Visit   Medication Sig    buPROPion (WELLBUTRIN XL) 150 mg 24 hr tablet TAKE 1 TABLET BY MOUTH EVERY DAY IN THE MORNING    IBUPROFEN PO Take by mouth    meloxicam (Mobic) 15 mg tablet Take 1 tablet (15 mg total) by mouth daily    "[DISCONTINUED] ergocalciferol (VITAMIN D2) 50,000 units Take 1 capsule (50,000 Units total) by mouth once a week (Patient not taking: Reported on 5/1/2023)       Objective     /80 (BP Location: Left arm, Patient Position: Sitting, Cuff Size: Large)   Pulse 94   Temp 97 7 °F (36 5 °C) (Temporal)   Ht 5' 3\" (1 6 m)   Wt 94 8 kg (209 lb)   SpO2 97%   BMI 37 02 kg/m²     Physical Exam  Vitals reviewed  Constitutional:       Appearance: Normal appearance  HENT:      Head: Normocephalic and atraumatic  Mouth/Throat:      Mouth: Mucous membranes are moist    Eyes:      Extraocular Movements: Extraocular movements intact  Cardiovascular:      Rate and Rhythm: Normal rate and regular rhythm  Heart sounds: Normal heart sounds  Pulmonary:      Effort: Pulmonary effort is normal       Breath sounds: Normal breath sounds  Abdominal:      Palpations: Abdomen is soft  Neurological:      General: No focal deficit present  Mental Status: She is alert and oriented to person, place, and time     Psychiatric:         Mood and Affect: Mood normal          Behavior: Behavior normal        Chantel Das DO  "

## 2023-05-04 LAB — BACTERIA UR CULT: ABNORMAL

## 2023-05-21 DIAGNOSIS — E55.9 VITAMIN D DEFICIENCY: ICD-10-CM

## 2023-05-21 RX ORDER — ERGOCALCIFEROL 1.25 MG/1
CAPSULE ORAL
Qty: 12 CAPSULE | Refills: 1 | Status: SHIPPED | OUTPATIENT
Start: 2023-05-21

## 2023-05-30 ENCOUNTER — TELEPHONE (OUTPATIENT)
Dept: FAMILY MEDICINE CLINIC | Facility: CLINIC | Age: 63
End: 2023-05-30

## 2023-05-30 DIAGNOSIS — G81.91 HEMIPARESIS OF RIGHT DOMINANT SIDE, UNSPECIFIED HEMIPARESIS ETIOLOGY (HCC): Primary | ICD-10-CM

## 2023-06-03 PROBLEM — Z01.419 WELL WOMAN EXAM WITH ROUTINE GYNECOLOGICAL EXAM: Status: RESOLVED | Noted: 2023-03-06 | Resolved: 2023-06-03

## 2023-06-05 ENCOUNTER — EVALUATION (OUTPATIENT)
Age: 63
End: 2023-06-05
Payer: COMMERCIAL

## 2023-06-05 DIAGNOSIS — G81.91 HEMIPARESIS OF RIGHT DOMINANT SIDE, UNSPECIFIED HEMIPARESIS ETIOLOGY (HCC): ICD-10-CM

## 2023-06-05 DIAGNOSIS — G35 MS (MULTIPLE SCLEROSIS) (HCC): Primary | ICD-10-CM

## 2023-06-05 PROCEDURE — 97530 THERAPEUTIC ACTIVITIES: CPT

## 2023-06-05 PROCEDURE — 97163 PT EVAL HIGH COMPLEX 45 MIN: CPT

## 2023-06-05 NOTE — PROGRESS NOTES
PT Evaluation        POC expires Auth Status Total   Visits  Start date  Expiration date PT/OT + Visit Limit? Co-Insurance   23 required [facility] 23 no No                                           Visit/Unit Tracking  AUTH Status: n/a Date               Visits  Authed: submitted Used                Remaining                       Today's date: 2023  Patient name: Clifton Arroyo  : 1960  MRN: [id number]  Referring provider: Roselyn Ramirez  Dx:   Encounter Diagnosis     ICD-10-CM    1  MS [facility]multiple sclerosis) ([facility] )  G35           Assessment  Assessment details: Patient is a 60 y o  Female who presents to skilled outpatient PT with a dx of RR MS and frequent falls  Upon initial evaluation, pt presents with gait dysfunction, dec LE strength (R<L), dec balance, dec righting reactions, dec sensation, dec coordination, and dec cardiovascular endurance  She is below age-related norms  Pt demo improved RLE clearance when ambulating with R AFO compared to without utilizing RW  Significant amount of time spent completing patient education on POC, HEP, bracing, and prognosis based on diagnosis and presentation  She will benefit from skilled OP PT to improve above impairments, maximize functional mobility independence and dec risk for falls  Will see pt 2x/week  Pt agreeable  NV complete 6-2mwt and ABC  Impairments: Abnormal coordination, Abnormal gait, Abnormal muscle tone, Abnormal or restricted ROM, Activity intolerance, Impaired balance, Impaired physical strength, Lacks appropriate HEP, Poor posture, Poor body mechanics, Pain with function, Safety issue, Weight-bearing intolerance, Abnormal movement, Difficulty understanding, Abnormal muscle firing  Understanding of Dx/Px/POC: Good  Prognosis: Good    Patient verbalized understanding of POC  Please contact me if you have any questions or recommendations   Thank you for the referral and the opportunity to share in [address] [place] [address] care  Plan  Plan details:  Will see pt 2x/week  Patient would benefit from: PT Eval, Skilled PT and OT Eval  Planned modality interventions: Biofeedback, Cryotherapy, TENS, Thermotherapy  Planned therapy interventions: Abdominal trunk stabilization, ADL training, Balance, Balance/WB training, Breathing training, Body mechanics training, Coordination, Functional ROM exercises, Gait training, HEP, Joint Mobilization, Manual Therapy, Degroot taping, Motor coordination training, Neuromuscular re-education, Patient education, Postural training, Strengthening, Stretching, Therapeutic activities, Therapeutic exercises, Therapeutic training, Transfer training, Activity modification, Work reintegration  Frequency: 2x/wk  Duration in weeks: 12  Plan of Care beginning date: 6/5/23  Plan of Care expiration date: 12 weeks - 8/28/2023  Treatment plan discussed with: Patient and Family      Goals  Short Term Goals (4 weeks):    - Patient will improve time on TUG by 2 9 seconds to facilitate improved safety in all ambulation  - Patient will be independent in basic HEP 2-3 weeks  - Patient will improve 5xSTS score by 2 3 seconds to promote improved LE functional strength needed for ADLs  - Pt will improve ABC to >/= 67%    Long Term Goals (12 weeks):  - Patient will be independent in a comprehensive home exercise program  - Patient will improve gait speed by 0 18 m/s to improve safety with community ambulation  - Patient will improve MIJARES by 6 points to facilitate return to safe independent ambulation  - Patient will improve scoring on FGA by 4 points to progress safety with dynamic tasks  - Patient will be able to demonstrate HT in gait without veering  - Patient will improve 6 Minute Walk Test score by 190 feet to promote improved cardiovascular endurance  - Patient will report 50% reduction in near falls in order to improve safety with functional tasks "and reduce his risk for falls  - Patient will report going on walks at least 3 days per week to promote independence and improved cardiovascular endurance  - Patient will be able to ascend/descend stairs reciprocally without UE assist to promote independence and safety with ADLs  - Patient will report 50% reduction in near falls when ambulating on uneven terrain    Subjective  History of Present Illness  Mechanism of injury:   Pt reports that she was making progress after seeing neuro PT  She went to the gym and was working on her strength  Pt reports she uses the RW  She watches her grandchild 2x/week  She has reported fatigue that carries over days due to the MS  She uses her total gym and bike at home and stretches  She has good and bad days but it can change day to day  She recently had a medication change- she has f/u with neurologist  Pt wants to walk and be as functional as possible, get \"her life back  \" She feels that its not necessarily from a relapse  Independent with ADLs, requires assist with  iADLs of cleaning  She has R AFO that she received \"3-4 months ago\" and has not been wearing because she does not feel that it is helping    Primary AD: RW    Pain  R knee    Social Support  Stairs in house: FF with HR to laundry in basement  Lives with: , dogs, cats, chickens    Objective   - R Hip Flexion: 2+/5  - L Hip Flexion: 4-/5  - R Knee Extension: 4-/5 - L Knee Extension: 4-/5  - R Knee Flexion: 4/5  - L Knee Flexion: 4/5  - R Ankle DF: 3-/5  - L Ankle DF: 4/5  - R Ankle PF: 4-/5  - L Ankle PF: 4/5    Sensation  - Light touch: numbness and tingling in both feet; WFL; RLE differentiation     Coordination  - Dysmetria: WFL   - Dysdiadochokinesia: WFL    Outcome Measures Initial Eval  6/5        5xSTS 37 56 sec RW        TUG 25 51 sec RW with brace            10 meter 0 86 m/s RW        6MWT NV           Precautions: fall risk  Past Medical History:   Diagnosis Date   • Multiple sclerosis ([facility] )      "

## 2023-06-12 ENCOUNTER — APPOINTMENT (OUTPATIENT)
Age: 63
End: 2023-06-12
Payer: COMMERCIAL

## 2023-06-13 ENCOUNTER — APPOINTMENT (OUTPATIENT)
Age: 63
End: 2023-06-13
Payer: COMMERCIAL

## 2023-06-19 ENCOUNTER — OFFICE VISIT (OUTPATIENT)
Age: 63
End: 2023-06-19
Payer: COMMERCIAL

## 2023-06-19 DIAGNOSIS — G35 MS (MULTIPLE SCLEROSIS) (HCC): Primary | ICD-10-CM

## 2023-06-19 DIAGNOSIS — G81.91 HEMIPARESIS OF RIGHT DOMINANT SIDE, UNSPECIFIED HEMIPARESIS ETIOLOGY (HCC): ICD-10-CM

## 2023-06-19 PROCEDURE — 97112 NEUROMUSCULAR REEDUCATION: CPT

## 2023-06-19 NOTE — PROGRESS NOTES
Daily Note     Today's date: 2023  Patient name: David Mckinney  : 1960  MRN: [id number]  Referring provider: Alisa Cabrera,*  Dx:   Encounter Diagnosis     ICD-10-CM    1  MS (multiple sclerosis) ([facility] )  G35       2  Hemiparesis of right dominant side, unspecified hemiparesis etiology ([facility])  G81 91                    Subjective: Pt reports she feels like she is dragging her foot more with the brace due to it being too heavy      Objective: See treatment diary below    NMR  SOLO // bars  High knee marching 4 laps  Walking along side of the foam beam 4 laps  Hip hiking RLE 3x10  Hip abduction 2x10   Seated toe raises 2x10  Seated eversion/inversion 2x10  Ambulating in // bars without brace- 5 laps noting fatigue and dec RLE clearance     NMES trialed- unable to initiated full contraction- D/c until larger pads arrive    Patient education: brace, HEP provided      Assessment: Pt tolerated treatment well  Initiated tx in [place] step which pt tolerated well  Noted inc fatigue and dec step clearance on RLE  Will continue to progress as able  Significant amount of time spent completing patient education on NMES, bracing, POC, and HEP  Physical handouts provided  Patient would benefit from continued PT      Plan: Continue per plan of care  POC expires Auth Status Total   Visits  Start date  Expiration date PT/OT + Visit Limit?  Co-Insurance   23 required [facility] 23 no No                                           Visit/Unit Tracking  AUTH Status: n/a Date              Visits  Authed: submitted Used                Remaining

## 2023-06-20 DIAGNOSIS — G81.91 HEMIPARESIS OF RIGHT DOMINANT SIDE, UNSPECIFIED HEMIPARESIS ETIOLOGY (HCC): Primary | ICD-10-CM

## 2023-06-26 ENCOUNTER — OFFICE VISIT (OUTPATIENT)
Age: 63
End: 2023-06-26
Payer: COMMERCIAL

## 2023-06-26 ENCOUNTER — EVALUATION (OUTPATIENT)
Age: 63
End: 2023-06-26
Payer: COMMERCIAL

## 2023-06-26 DIAGNOSIS — G35 MS (MULTIPLE SCLEROSIS) (HCC): ICD-10-CM

## 2023-06-26 DIAGNOSIS — G35 MS (MULTIPLE SCLEROSIS) (HCC): Primary | ICD-10-CM

## 2023-06-26 DIAGNOSIS — G81.91 HEMIPARESIS OF RIGHT DOMINANT SIDE, UNSPECIFIED HEMIPARESIS ETIOLOGY (HCC): Primary | ICD-10-CM

## 2023-06-26 DIAGNOSIS — G81.91 HEMIPARESIS OF RIGHT DOMINANT SIDE, UNSPECIFIED HEMIPARESIS ETIOLOGY (HCC): Chronic | ICD-10-CM

## 2023-06-26 PROCEDURE — 97112 NEUROMUSCULAR REEDUCATION: CPT

## 2023-06-26 PROCEDURE — 97530 THERAPEUTIC ACTIVITIES: CPT

## 2023-06-26 PROCEDURE — 97166 OT EVAL MOD COMPLEX 45 MIN: CPT

## 2023-06-26 NOTE — PROGRESS NOTES
Daily Note     Today's date: 2023  Patient name: Gerard Martinez  : 1960  MRN: [id number]  Referring provider: Blair Calhoun,*  Dx:   Encounter Diagnosis     ICD-10-CM    1  Hemiparesis of right dominant side, unspecified hemiparesis etiology ([facility] )  G81 91       2  [facility]  G35                    Subjective: Pt reports she is continuing to have difficulty balance and turning  Did not bring AFO  Objective: See treatment diary below    NMR  SOLO loop  RW 2 laps  Walking sticks 2 laps  Stair  up & over 3 laps  No AD gait training 2 laps    Patient education: brace, HEP provided, safety, bathroom transfers (2x) VC for RW use      Assessment: Pt tolerated treatment well  Continued tx in SOLO step which pt tolerated well  Noted inc fatigue and dec step clearance on RLE without aFO  Utilized walking sticks, as they are pt goal to ambulate with them or with LRAD  Noted dec safety awareness when transferring to bathroom with RW use  Pt has been doing HEP as prescribed  Will continue to progress as able  Patient would benefit from continued PT  Plan: Continue per plan of care  POC expires Auth Status Total   Visits  Start date  Expiration date PT/OT + Visit Limit?  Co-Insurance   23 required Sha[facility] Semen 23 no No                                           Visit/Unit Tracking  AUTH Status: n/a Date             Visits  Authed: submitted Used                Remaining [As Noted in HPI] : as noted in HPI [Negative] : Heme/Lymph

## 2023-06-26 NOTE — PROGRESS NOTES
OCCUPATIONAL THERAPY INITIAL EVALUATION    Today's Date: 2023  Patient Name: David Mckinney  : 1960  MRN: [id number]  Referring Provider: Kelin Norris  Dx: MS (multiple sclerosis) [facility]) Lakeisha CLAY ANALYSIS:    Pt is a left hand dominant 60year old female presenting to OP OT s/p R side weakness secondary to chronic MS   Pt currently reports difficulty with handwriting, typing, donning R side of leg into pants, opening/managing containers, tolerating IADLs for longer periods of time in standing, caring for pets, reaching and lifting heavier items  Pt has recently been doing light exercise with weights as tolerated   Pt reports slight memory impairments, however continues to be safe and functional in the home  No visual impairments reported   Pt has hx of frequent falls  Reports onset of greater weakness since COVID outbreak and fractures/surgeries in R foot   Pt is demonstrating deficits based on clinical observation and the following assessment pt demonstrating the following: Impaired bilateral  strength and impaired 2 point, 3 point, and lateral pinch strength in B UEs  AROM tested seated and grossly WFL at this time  MMT testing performed and pt with 4/5 strength on R and L side  Impaired bilateral FMC and in hand manipulation, greater in R then L side  Pt request to focus on R side weakness as this was patients dominant side prior to onset of weakness  Recommend OP OT 1-2x/wk for 8-12 weeks with focus on aforementioned deficits to maximize functional performance and improve QOL    Findings and recommendations discussed with pt, and they are in agreement  Educated pt on charges of insurance, POC, goal creation, and OP OT services  POC expires Auth Status Total   Visits  Start date  Expiration date PT/OT + Visit Limit?  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  Pt uses RW for ambulation and transfers inside and outside the home  Pt has shower seat and standard toilet  Has grab bars in bathroom  Pt is independent with ADLs  Pt reports  assists with IADLs due to difficulty  Pt receives assistance with household cleaning, meal prep  Pt is managing all medication independently  Pt has R knee brace, does not wear at all times-only with knee pain  Owns AFO, has not been wearing however will follow up with PT on this  PATIENT GOAL: \"to become totally independent\"     HISTORY OF PRESENT ILLNESS:   Pt is a 60 y o  female who was referred to Occupational Therapy s/p  MS (multiple sclerosis) ([facility] ) Chava Finnegan Pt presents to outpatient OT s/p chronic MS ~30 years ago  Pt has had short bouts of inpatient OT  Pt has not had any OP OT services frequently       PMH:   Past Medical History:   Diagnosis Date   • Multiple sclerosis ([facility] )        Past Surgical Hx:   Past Surgical History:   Procedure Laterality Date   •  SECTION     • FOOT SURGERY Right    • KNEE SURGERY      R meniscus   • TRACHEOSTOMY          Pain:  Location: none, pt reports no pain        Objective  Impairment Observations:    NIKOLAS RIGGS Comments           UPPER EXTREMITY FUNCTION   Impaired Intact Dominant Hand: R UE pre MS, however uses L UE more frequently      /PINCH STRENGTH             Dynamometer    - Gross Grasp 45 lbs 40 lbs abnormal - norm R UE: 55lbs   Pinch Meter     - Pincer 5 lbs 2 lbs abnormal - R UE norm: 10lbs     - Tripod 7 lbs 4 lbs abnormal - R UE norm: 14 8lbs     " - Lateral 10 lbs  5 lbs abnormal - R UE norm: 15 5 lbs      AROM (Seated)         WFL R and L UE    Elevation      Shoulder FF      Shoulder Ext      Shoulder Abd      Shoulder Add      Horizontal Abd      Horizontal Add      External rotation       Elbow Flex      Elbow Ext      Pronation      Supination      Wrist Flex      Wrist Ext      Digit Flex      Digit Ex      Composite Grasp      Hook Grasp        MMT             Shoulder FF 4/5 4/5    Shoulder Ext 4/5 4/5    Shoulder Abduction 4/5 4/5    Shoulder Adduction 4/5 4/5    Elbow Flex 4/5 4/5    Elbow Ext 4/5 4/5    Wrist Flex 4/5 4/5    Wrist Ext 4/5 4/5    Gross Grasp 4/5 4/5      COORDINATION      Opposition intact intact    Finger to Nose intact intact    Rapid Alternating Movement Slower, purposeful  intact    9 Hole Peg Test-  assesses dexterity/fine motor coordination  35 87 seconds 27 53 seconds abnormal    Pt demonstrates decreased FMC compared to norms for age/sex (18 99 seconds)    Fxnl Dexterity Test-assesses patient's ability to use the hand for daily tasks requiring a 3-jaw haydee prehension between the fingers and the thumb  59 52 seconds 56 seconds abnormal    Pt demonstrates decreased dexterity compared to norms for age/sex (30 seconds for R UE, 31 seconds for L UE)                       SHORT TERM GOALS (4-6 weeks )    GOAL  STATUS ON IE  GOAL STATUS    Pt will demo with G tolerance to standing exercise  x 8 minutes with minimal rest breaks required for increased engagement in life roles and weekly exercise regimen  Established      Pt will increase R UE  strength by 1lb to complete  IADLs 45lbs  Established      Pt will increase R UE tripod pinch strength by 1lbs to complete functional ADLs and IADLs with increased independence   7lbs Established      Pt will demonstrate improved R UE FMC as evidenced by performing 9 hole peg test with RUE in 32 seconds or less in order to increase independence with IADLs   35 87 seconds Established Pt will demonstrate improved R UE dexterity as evidenced by performing functional dexterity test with RUE in 57 seconds or less in order to increase independence with IADLs   59 52 seconds Established      LONG TERM GOALS( 8-12 weeks)    GOAL  STATUS ON IE  GOAL STATUS     Pt will increase R UE proximal shoulder strength to 4+/5  through the use of strengthening exercises and HEP for improving performance during IADLs    4/5 Established       Pt will increase R UE distal forearm strength to 4+/5  through the use of strengthening exercises and HEP for improving performance during IADLs   4/5 Established      Pt will demo with G tolerance to standing exercise  x 10 minutes with minimal rest breaks required for increased engagement in life roles and weekly exercise regimen  Established      Pt will increase R UE  strength by 2lbs to complete  IADLs 45lbs Established      Pt will increase R UE tripod pinch strength by 2lbs to complete functional ADLs and IADLs with increased independence   7lbs Established      Pt will increase L UE tripod pinch strength by 2lbs to complete functional ADLs and IADLs with increased independence   4lbs Established      Pt will demo with G carryover of Home Exercise Program to improve functional progression towards goals in plan of care and for improved functional use of UE  Established      Pt will demonstrate improved R UE FMC as evidenced by performing 9 hole peg test with RUE in 30 seconds or less in order to increase independence with IADLs 35 87 seconds Established      Pt will demonstrate improved R UE dexterity as evidenced by performing functional dexterity test with RUE in 55 seconds or less in order to increase independence with IADLs 59 52 seconds Established            OTHER PLANNED THERAPY INTERVENTIONS:   Supine, seated, and in stance neuro re-ed  Tricep AG  NMES/FES  FMC/prehension  Timed Trials  Manual tx  Hand to target  Sensory re-ed  Seated functional reach: crossing midline  Supine place and hold  WBearing strategies   Closed chain activities  Open chain activities  Internal and external memory aides  Multimatrix for saccades/ visual clutter/attention  Hypersensitivity strategies education  Multi-modal environment  Sustained/alternating/divided attention  Work stations with timed transitions  Temporal Awareness  Memory and mental manipulation  Auditory processing with immediate recall  Memory retention with immediate and delayed recall  Edu on cog/vision apps

## 2023-06-27 ENCOUNTER — APPOINTMENT (OUTPATIENT)
Age: 63
End: 2023-06-27
Payer: COMMERCIAL

## 2023-06-27 ENCOUNTER — TELEPHONE (OUTPATIENT)
Dept: NEUROLOGY | Facility: CLINIC | Age: 63
End: 2023-06-27

## 2023-07-07 ENCOUNTER — OFFICE VISIT (OUTPATIENT)
Age: 63
End: 2023-07-07
Payer: COMMERCIAL

## 2023-07-07 DIAGNOSIS — G81.91 HEMIPARESIS OF RIGHT DOMINANT SIDE, UNSPECIFIED HEMIPARESIS ETIOLOGY (HCC): Primary | ICD-10-CM

## 2023-07-07 DIAGNOSIS — G35 MS (MULTIPLE SCLEROSIS) (HCC): ICD-10-CM

## 2023-07-07 PROCEDURE — 97112 NEUROMUSCULAR REEDUCATION: CPT

## 2023-07-07 PROCEDURE — 97530 THERAPEUTIC ACTIVITIES: CPT

## 2023-07-07 NOTE — PROGRESS NOTES
Daily Note     Today's date: 2023  Patient name: Jeanmarie Spaulding  : 1960  MRN: [id number]  Referring provider: Katherin Justin,*  Dx:   Encounter Diagnosis     ICD-10-CM    1. Hemiparesis of right dominant side, unspecified hemiparesis etiology ([address])  G81.91       2. MS (multiple sclerosis) ([address])  G35                    Subjective: Pt reports she has difficulty with RLE when getting out of bed in the morning feeling like she needs to "warm up" but reports no difficulty in the middle of the night with ambulating to the bathroom. Objective: See treatment diary below    NMR  SOLO loop 3 minutes on and 2 minutes off  Walking sticks   High knee marching with wlaking sticks  Side stepping with walking sticks  Step taps 4" B/L UE  Trunk rotation P MB     Patient education: brace, HEP provided, safety, bathroom transfers (2x) VC for RW use      Assessment: Pt tolerated treatment well. Continued tx in SOLO step due to mutliple LOB. Initiated 3 min on 2 min off ratio to improve overall fatigue. Pt to continue ratio at home for HEP. Pt agreeable. Inc difficulty with RLE step taps on stair  at 2' unable to complete without rest break. Will continue to progress as able. Patient would benefit from continued PT. Plan: Continue per plan of care. POC expires Auth Status Total   Visits  Start date  Expiration date PT/OT + Visit Limit?  Co-Insurance   23 required [facility] 23 no No                                           Visit/Unit Tracking  AUTH Status: n/a Date             Visits  Authed: submitted Used                Remaining

## 2023-07-07 NOTE — PROGRESS NOTES
POC expires Auth Status Total   Visits  Start date  Expiration date PT/OT + Visit Limit? Co-Insurance    BOMN    BOMN Yes; 30 dollars                                            Visit/Unit Tracking  AUTH Status: Miguel Ibarra Date              Visits  Authed:  Used 1 1              Remaining  11 10               PLAN OF CARE START: 23  PLAN OF CARE END: 2023  PROGRESS NOTE DUE:  (progress note on schedule)  FREQUENCY: 1-2x/week for 8/12 weeks   PRECAUTIONS: fall/safety   DIAGNOSIS: MS, R side weakness   VISITS: 2 of 12    Daily Note     Today's date: 2023  Patient name: Ulises Pal  : 1960  MRN: [id number]  Referring provider: David Arzola  Dx:   Encounter Diagnoses   Name Primary? • Hemiparesis of right dominant side, unspecified hemiparesis etiology ([address]) Yes   • MS (multiple sclerosis) ([address])                     Subjective: Pt reports no changes since last visit      Objective: See treatment below. NMRE:   *reach,grasp,release of red,yellow green resistive clothespin bucket<>vertical dowel using R UE 3 point pinch. Performed overhead reach and squeezed 3x prior to release. 2# weight added to R UE for increased challenge and to provide proprioceptive input. Multiple rest breaks required. TA:   *complex Qbitz seated tabeltop using R UE only with emphasis on coordination of R UE during placement of blocks and problem solving. Required min-mod VC to problem solve. Benefits from only showing 1/4-1/2 of image to recreate structure. Assessment: Tolerated treatment well. Pt would benefit from continued OT services to address endurance, core stability, R UE  strength, proximal strength, R UE FMC and in hand manipulation skills. Review energy conservation techniques. Plan: Continued skilled OT per POC.

## 2023-07-10 ENCOUNTER — OFFICE VISIT (OUTPATIENT)
Age: 63
End: 2023-07-10
Payer: COMMERCIAL

## 2023-07-10 DIAGNOSIS — G35 MS (MULTIPLE SCLEROSIS) (HCC): Primary | ICD-10-CM

## 2023-07-10 DIAGNOSIS — G81.91 HEMIPARESIS OF RIGHT DOMINANT SIDE, UNSPECIFIED HEMIPARESIS ETIOLOGY (HCC): ICD-10-CM

## 2023-07-10 PROCEDURE — 97112 NEUROMUSCULAR REEDUCATION: CPT

## 2023-07-10 PROCEDURE — 97530 THERAPEUTIC ACTIVITIES: CPT

## 2023-07-10 NOTE — PROGRESS NOTES
Progress Note        POC expires Auth Status Total   Visits  Start date  Expiration date PT/OT + Visit Limit? Co-Insurance   23 required [facility] 23 no No                                           Visit/Unit Tracking  AUTH Status: n/a Date - IE 6/19 6/26  7/7 7/10-PN                  Visits  Authed: submitted Used                             Remaining                                     Today's date: 7/10/2023  Patient name: Sarah Hernandez  : 1960  MRN: [id number]  Referring provider: [facility]  Dx:   Encounter Diagnosis     ICD-10-CM    1. MS (multiple sclerosis) ([address])  G35       2. Hemiparesis of right dominant side, unspecified hemiparesis etiology ([address])  G81.91           Assessment  Assessment details: Patient is a 60 y.o. Female who presents to skilled outpatient PT with a dx of RR MS and frequent falls. Since initial evaluation, pt presents with gait dysfunction, dec LE strength (R<L), dec balance, dec righting reactions, dec sensation, dec coordination, and dec cardiovascular endurance. She is below age-related norms and is at an increased risk for falls. She has made improvements in 5xSTS, TUG, and gait speed with RW. Continue to recommend pt to wear R AFO to allow for improved LE clearance and energy conservation. Significant amount of time spent completing patient education on POC, HEP, bracing, diagnosis, energy conservationand prognosis based on diagnosis and presentation. She will benefit from skilled OP PT to improve above impairments, maximize functional mobility independence and dec risk for falls. Will see pt 2x/week. Pt agreeable.      Impairments: Abnormal coordination, Abnormal gait, Abnormal muscle tone, Abnormal or restricted ROM, Activity intolerance, Impaired balance, Impaired physical strength, Lacks appropriate HEP, Poor posture, Poor body mechanics, Pain with function, Safety issue, Weight-bearing intolerance, Abnormal movement, Difficulty understanding, Abnormal muscle firing  Understanding of Dx/Px/POC: Good  Prognosis: Good    Patient verbalized understanding of POC. Please contact me if you have any questions or recommendations. Thank you for the referral and the opportunity to share in [address] [place] [address]. Plan  Plan details:  Will see pt 2x/week  Patient would benefit from: PT Eval, Skilled PT and OT Eval  Planned modality interventions: Biofeedback, Cryotherapy, TENS, Thermotherapy  Planned therapy interventions: Abdominal trunk stabilization, ADL training, Balance, Balance/WB training, Breathing training, Body mechanics training, Coordination, Functional ROM exercises, Gait training, HEP, Joint Mobilization, Manual Therapy, Degroot taping, Motor coordination training, Neuromuscular re-education, Patient education, Postural training, Strengthening, Stretching, Therapeutic activities, Therapeutic exercises, Therapeutic training, Transfer training, Activity modification, Work reintegration  Frequency: 2x/wk  Duration in weeks: 12  Plan of Care beginning date: 6/5/23  Plan of Care expiration date: 12 weeks - 08/28/2023  Treatment plan discussed with: Patient and Family      Goals  Short Term Goals (4 weeks):    - Patient will improve time on TUG by 2.9 seconds to facilitate improved safety in all ambulation- ongoing  - Patient will be independent in basic HEP 2-3 weeks- ongoing  - Patient will improve 5xSTS score by 2.3 seconds to promote improved LE functional strength needed for ADLs- ongoing  - Pt will improve ABC to >/= 67%- ongoing    Long Term Goals (12 weeks):  - Patient will be independent in a comprehensive home exercise program   - Patient will improve gait speed by 0.18 m/s to improve safety with community ambulation  - Patient will improve scoring on FGA by 4 points to progress safety with dynamic tasks  - Patient will be able to demonstrate HT in gait without veering  - Patient will improve 6 Minute Walk Test score by 190 feet to promote improved cardiovascular endurance  - Patient will report 50% reduction in near falls in order to improve safety with functional tasks and reduce his risk for falls  - Patient will report going on walks at least 3 days per week to promote independence and improved cardiovascular endurance  - Patient will be able to ascend/descend stairs reciprocally without UE assist to promote independence and safety with ADLs  - Patient will report 50% reduction in near falls when ambulating on uneven terrain    Subjective  History of Present Illness  Mechanism of injury:   Pt reports that she was making progress after seeing neuro PT. She went to the gym and was working on her strength. Pt reports she uses the RW. She watches her grandchild 2x/week. She has reported fatigue that carries over days due to the MS. She uses her total gym and bike at home and stretches. She has good and bad days but it can change day to day. She recently had a medication change- she has f/u with neurologist. Pt wants to walk and be as functional as possible, get "her life back." She feels that its not necessarily from a relapse. Independent with ADLs, requires assist with  iADLs of cleaning. She has R AFO that she received "3-4 months ago" and has not been wearing because she does not feel that it is helping. Update 7/10/23:   Pt is ambulating with walking sticks and no AFO donned. She reports no falls but believes balance is still her "issue" Her goals are to maximize functional mobility independence with LRAD and to drive.    Primary AD: RW    Pain  R knee    Social Support  Stairs in house: FF with HR to laundry in basement  Lives with: , dogs, cats, chickens    Objective   - R Hip Flexion: 2+/5  - L Hip Flexion: 4-/5  - R Knee Extension: 4-/5 - L Knee Extension: 4-/5  - R Knee Flexion: 4/5  - L Knee Flexion: 4/5  - R Ankle DF: 3-/5  - L Ankle DF: 4/5  - R Ankle PF: 4-/5  - L Ankle PF: 4/5    Sensation  - Light touch: numbness and tingling in both feet; WFL; RLE differentiation     Coordination  - Dysmetria: WFL   - Dysdiadochokinesia: WFL    Outcome Measures Initial Eval  6/5 7/10       5xSTS 37.56 sec RW 24.98s RW       TUG 25.51 sec RW with brace     23.8s   with RW and without brace       10 meter 0.86 m/s RW        6MWT NV        ABC  29.398%          Precautions: fall risk  Past Medical History:   Diagnosis Date   • Multiple sclerosis ([address] [facility])

## 2023-07-17 ENCOUNTER — OFFICE VISIT (OUTPATIENT)
Age: 63
End: 2023-07-17
Payer: COMMERCIAL

## 2023-07-17 DIAGNOSIS — G81.91 HEMIPARESIS OF RIGHT DOMINANT SIDE, UNSPECIFIED HEMIPARESIS ETIOLOGY (HCC): ICD-10-CM

## 2023-07-17 DIAGNOSIS — G81.91 HEMIPARESIS OF RIGHT DOMINANT SIDE, UNSPECIFIED HEMIPARESIS ETIOLOGY (HCC): Primary | ICD-10-CM

## 2023-07-17 DIAGNOSIS — G35 MS (MULTIPLE SCLEROSIS) (HCC): ICD-10-CM

## 2023-07-17 DIAGNOSIS — G35 MS (MULTIPLE SCLEROSIS) (HCC): Primary | ICD-10-CM

## 2023-07-17 PROCEDURE — 97530 THERAPEUTIC ACTIVITIES: CPT

## 2023-07-17 PROCEDURE — 97112 NEUROMUSCULAR REEDUCATION: CPT

## 2023-07-17 NOTE — PROGRESS NOTES
Daily Note     Today's date: 2023  Patient name: Junior Landon  : 1960  MRN: [id number]  Referring provider: Celina Hough,*  Dx:   Encounter Diagnosis     ICD-10-CM    1. MS (multiple sclerosis) ([address])  G35       2. Hemiparesis of right dominant side, unspecified hemiparesis etiology (HCC)  G81.91                    Subjective: Pt reports concern about length of apts/progress- thinks she may be in a relapse    Objective: See treatment diary below    NMR  SOLO loop 3 minutes on and 2 minutes off  High knee marching // bars   Side stepping with walking sticks   Step taps 4" B/L UE  Trunk rotation P MB     Patient education: session length/benefits to PT, RRMS    Assessment: Pt tolerated treatment well. Continued therapy with 3:2 ratio on/off exercises which pt tolerated well. Significant amount of time spent completing patient education on POC, exercises, and prognosis. Will continue to progress as able. Patient would benefit from continued PT. Plan: Continue per plan of care. POC expires Auth Status Total   Visits  Start date  Expiration date PT/OT + Visit Limit?  Co-Insurance   23 required [facility] [place] 23 no No                                           Visit/Unit Tracking  AUTH Status: n/a Date 6/5 6/19 6/26 7/6 7/10  7/17         Visits  Authed: submitted Used                Remaining

## 2023-07-17 NOTE — PROGRESS NOTES
POC expires Auth Status Total   Visits  Start date  Expiration date PT/OT + Visit Limit? Co-Insurance    BOMN    BOMN Yes; 30 dollars                                            Visit/Unit Tracking  AUTH Status: Adali Star Date             Visits  Authed:  Used 1 1 1             Remaining  11 10 9              PLAN OF CARE START: 23  PLAN OF CARE END: 2023  PROGRESS NOTE DUE:  (progress note on schedule)  FREQUENCY: 1-2x/week for 8/12 weeks   PRECAUTIONS: fall/safety   DIAGNOSIS: MS, R side weakness   VISITS: 3 of 12    Daily Note     Today's date: 2023  Patient name: Edouard Arriaza  : 1960  MRN: [id number]  Referring provider: James Rowell  Dx:   Encounter Diagnoses   Name Primary? • Hemiparesis of right dominant side, unspecified hemiparesis etiology ([address]) Yes   • MS (multiple sclerosis) ([address])                     Subjective: Pt reports no changes since last visit    Objective: See treatment below. NMRE:   *reviewed and provided pt with written handout of various functional coordination activities to perform in the home with RUE. Pt verbalized understanding. *provided with pre-handwriting worksheets as HEP to focus on [facility]. Educated in upgrades and downgrades for exercise. *performed the following exercises seated EOM on marilee disc focused on core stability, core strength, sitting balance, and proximal stability:   -modified russian twists, using 2kg weighted ball   -BUE shoulder flexion/extension using 2kg  weighted ball   -PNF D2 pattern using 2kg weighted ball     TA:   R UE THERAPUTTY HEP:   Therapist reviewed and provided visual demonstration of HEP using green putty. Pt demonstrated exercises back to therapist with good understanding and demonstration. Provided pt with written description and image of exercises. Instructed to discontinue with any discomfort or irritation. Pt verbalized understanding.  Reviewed the following exercises: Access Code: [email or web address]  URL: [email or web address]  Exercises  - Tip Pinch with Putty  - 1 x daily - 5 x weekly - 3 sets - 10 reps  - 3-Point Pinch with Putty  - 1 x daily - 5 x weekly - 3 sets - 10 reps  - Thumb Opposition with Putty  - 1 x daily - 5 x weekly - 3 sets - 10 reps  - Key Pinch with Putty  - 1 x daily - 5 x weekly - 3 sets - 10 reps  - Putty Squeezes  - 1 x daily - 5 x weekly - 3 sets - 10 reps    Assessment: Tolerated treatment well. Pt would benefit from continued OT services to address endurance, core stability, R UE  strength, proximal strength, R UE 3 point pinch, R UE FMC and in hand manipulation skills. Review energy conservation techniques. Plan: Continued skilled OT per POC.

## 2023-07-19 ENCOUNTER — OFFICE VISIT (OUTPATIENT)
Age: 63
End: 2023-07-19
Payer: COMMERCIAL

## 2023-07-19 DIAGNOSIS — G35 MS (MULTIPLE SCLEROSIS) (HCC): Primary | ICD-10-CM

## 2023-07-19 DIAGNOSIS — G81.91 HEMIPARESIS OF RIGHT DOMINANT SIDE, UNSPECIFIED HEMIPARESIS ETIOLOGY (HCC): ICD-10-CM

## 2023-07-19 PROCEDURE — 97112 NEUROMUSCULAR REEDUCATION: CPT

## 2023-07-19 NOTE — PROGRESS NOTES
Daily Note     Today's date: 2023  Patient name: Janice Painting  : 1960  MRN: [id number]  Referring provider: Bhargavi Hernandez,*  Dx:   Encounter Diagnosis     ICD-10-CM    1. MS (multiple sclerosis) ([address])  G35       2. Hemiparesis of right dominant side, unspecified hemiparesis etiology ([address])  G81.91                    Subjective: Pt reports she tried 3:2 for exercises at home. Objective: See treatment diary below    NMR  SOLO loop 3 minutes on and 2 minutes off  High knee marching // bars   Hurdles fwd/lat  Fwd/Bwd with RW  Walking sticks   Step taps 4" B/L UE    Patient education: RRMS    Assessment: Pt tolerated treatment well. Continued therapy with 3:2 ratio on/off exercises which pt tolerated well. Trialed hurdles in // bars which went well, at 2' into circuit training noted dec R knee flexion and clearance. Significant amount of time spent completing patient education on POC, exercises, and prognosis. Will continue to progress as able. Patient would benefit from continued PT. Plan: Continue per plan of care. POC expires Auth Status Total   Visits  Start date  Expiration date PT/OT + Visit Limit?  Co-Insurance   23 required [facility] 23 no No                                           Visit/Unit Tracking  AUTH Status: n/a Date 6/5 6/19 6/26 7/6 7/10  7/17 7/19        Visits  Authed: submitted Used                Remaining

## 2023-07-24 ENCOUNTER — OFFICE VISIT (OUTPATIENT)
Age: 63
End: 2023-07-24
Payer: COMMERCIAL

## 2023-07-24 DIAGNOSIS — G81.91 HEMIPARESIS OF RIGHT DOMINANT SIDE, UNSPECIFIED HEMIPARESIS ETIOLOGY (HCC): ICD-10-CM

## 2023-07-24 DIAGNOSIS — G35 MS (MULTIPLE SCLEROSIS) (HCC): Primary | ICD-10-CM

## 2023-07-24 PROCEDURE — 97112 NEUROMUSCULAR REEDUCATION: CPT

## 2023-07-24 PROCEDURE — 97110 THERAPEUTIC EXERCISES: CPT

## 2023-07-24 NOTE — PROGRESS NOTES
Daily Note     Today's date: 2023  Patient name: Sarah Hernandez  : 1960  MRN: [id number]  Referring provider: Sofía Ivey,*  Dx:   Encounter Diagnosis     ICD-10-CM    1. MS (multiple sclerosis) ([address])  G35       2. Hemiparesis of right dominant side, unspecified hemiparesis etiology ([address])  G81.91         Start Time: 1100  Stop Time: 1153  Total time in clinic (min): 53 minutes    Subjective: Pt reports she tried 3:2 for exercises at home. Objective: See treatment diary below    NMR  SOLO loop 3 minutes on and 2 minutes off  High knee marching with poles  Fwd/Bwd with RW  Lateral walking with poles  Walking sticks   Step up and over on foam with walking poles    TE: NuStep 8' l2- HR monitored RPE 5-6    Patient education: RRMS    Assessment: Pt tolerated treatment well. Continued therapy with 3:2 ratio on/off exercises which pt tolerated well. Pt demo good clearance on foam step ups with walking sticks. Patient education included swelling on RLE, relapse and neurologist f/u. Significant amount of time spent completing patient education on POC, exercises, and prognosis. Will continue to progress as able. Patient would benefit from continued PT. Plan: Continue per plan of care. POC expires Auth Status Total   Visits  Start date  Expiration date PT/OT + Visit Limit?  Co-Insurance   23 required [facility] [place] 23 no No                                           Visit/Unit Tracking  AUTH Status: n/a Date 6/5 6/19 6/26 7/6 7/10  7/17 7/19 7/24       Visits  Authed: submitted Used                Remaining

## 2023-07-26 ENCOUNTER — OFFICE VISIT (OUTPATIENT)
Age: 63
End: 2023-07-26
Payer: COMMERCIAL

## 2023-07-26 DIAGNOSIS — G81.91 HEMIPARESIS OF RIGHT DOMINANT SIDE, UNSPECIFIED HEMIPARESIS ETIOLOGY (HCC): Primary | ICD-10-CM

## 2023-07-26 DIAGNOSIS — G81.91 HEMIPARESIS OF RIGHT DOMINANT SIDE, UNSPECIFIED HEMIPARESIS ETIOLOGY (HCC): ICD-10-CM

## 2023-07-26 DIAGNOSIS — G35 MS (MULTIPLE SCLEROSIS) (HCC): ICD-10-CM

## 2023-07-26 DIAGNOSIS — G35 MS (MULTIPLE SCLEROSIS) (HCC): Primary | ICD-10-CM

## 2023-07-26 PROCEDURE — 97112 NEUROMUSCULAR REEDUCATION: CPT

## 2023-07-26 NOTE — PROGRESS NOTES
Daily Note     Today's date: 2023  Patient name: Sharon Burton  : 1960  MRN: [id number]  Referring provider: Yana Monteiro,*  Dx:   Encounter Diagnosis     ICD-10-CM    1. MS (multiple sclerosis) ([address])  G35       2. Hemiparesis of right dominant side, unspecified hemiparesis etiology ([address])  G81.91                    Subjective: Pt reports she tried 3:2 for exercises at home. Objective: See treatment diary below    NMR  SOLO loop 3 minutes on and 2 minutes off  High knee marching with poles  Fwd/Bwd with RW  Lateral walking with poles  Walking sticks   Step up and over on foam with walking poles  Neuro boxin,2,3,4,5,6 x 4 bouts    Patient education: RRMS    Assessment: Pt tolerated treatment well. Continued therapy with 3:2 ratio on/off exercises which pt tolerated well. Initiated tx for neuro boxing which pt demo good technique and coordination. Continue per POC. Significant amount of time spent completing patient education on POC, exercises, and prognosis. Will continue to progress as able. Patient would benefit from continued PT. Plan: Continue per plan of care. POC expires Auth Status Total   Visits  Start date  Expiration date PT/OT + Visit Limit?  Co-Insurance   23 required [facility] [place] 23 no No                                           Visit/Unit Tracking  AUTH Status: n/a Date 6/5 6/19 6/26 7/6 7/10  7/17 7/19 7/24 7/26      Visits  Authed: submitted Used                Remaining

## 2023-07-26 NOTE — PROGRESS NOTES
POC expires Auth Status Total   Visits  Start date  Expiration date PT/OT + Visit Limit? Co-Insurance    BOMN    BOMN Yes; 30 dollars                                            Visit/Unit Tracking  AUTH Status: Lynn Passy Date            Visits  Authed:  Used 1 1 1 1            Remaining  11 10 9 8             PLAN OF CARE START: 23  PLAN OF CARE END: 2023  PROGRESS NOTE DUE:  (progress note on schedule)  FREQUENCY: 1-2x/week for 8/12 weeks   PRECAUTIONS: fall/safety   DIAGNOSIS: MS, R side weakness   VISITS: 4 of 12    Daily Note     Today's date: 2023  Patient name: Madyson Hull  : 1960  MRN: [id number]  Referring provider: Hermila Clifton  Dx:   Encounter Diagnoses   Name Primary? • Hemiparesis of right dominant side, unspecified hemiparesis etiology ([address]) Yes   • MS (multiple sclerosis) ([address])                     Subjective: Pt reports no changes since last visit    Objective: See treatment below. NMRE:   *reach,grasp,release of small marble R side towel>spider web grid in ascending letter/number order with R UE red resistive clothespin 3 point pinch. 2# weight added to R UE for increased challenge to promote muscle recruitment and to provide proprioceptive input. Emphasis on coordination, sequencing, UE strength. Performed seated tabletop. ~10% dropping noted, VC for technique    *reach,grasp, release of small marble grid>table on R towel by grasping 2 at a time and release/grasping 1 at a time via finger<>palmar translation and stabilization. 2# weight added to R UE for increased challenge to promote muscle recruitment and to provide proprioceptive input. VC for technique. *occonnor pegboard seated tabletop-reach,grasp,release of thin metal pegs towel on R side>vertically placed board using R UE 2 point pinch. Performed overhead reach prior to release.  2# weight added to R UE for increased challenge to promote muscle recruitment and to provide proprioceptive input. VC for pacing, form, and technique. Rest breaks required for fatigue, ~10-15% dropping noted. Assessment: Tolerated treatment well. Pt would benefit from continued OT services to address endurance, core stability, R UE  strength, proximal strength, R UE 3 point pinch, R UE FMC and in hand manipulation skills. Review energy conservation techniques. Plan: Continued skilled OT per POC.

## 2023-07-31 ENCOUNTER — OFFICE VISIT (OUTPATIENT)
Dept: FAMILY MEDICINE CLINIC | Facility: CLINIC | Age: 63
End: 2023-07-31
Payer: COMMERCIAL

## 2023-07-31 VITALS
OXYGEN SATURATION: 96 % | BODY MASS INDEX: 37.43 KG/M2 | WEIGHT: 211.25 LBS | TEMPERATURE: 97.8 F | SYSTOLIC BLOOD PRESSURE: 100 MMHG | DIASTOLIC BLOOD PRESSURE: 64 MMHG | HEIGHT: 63 IN | HEART RATE: 103 BPM

## 2023-07-31 DIAGNOSIS — J45.20 MILD INTERMITTENT ASTHMA, UNSPECIFIED WHETHER COMPLICATED: ICD-10-CM

## 2023-07-31 DIAGNOSIS — R39.15 URINARY URGENCY: Primary | ICD-10-CM

## 2023-07-31 DIAGNOSIS — G35 MULTIPLE SCLEROSIS (HCC): ICD-10-CM

## 2023-07-31 LAB
BACTERIA UR QL AUTO: ABNORMAL /HPF
BILIRUB UR QL STRIP: NEGATIVE
CLARITY UR: ABNORMAL
COLOR UR: ABNORMAL
GLUCOSE UR STRIP-MCNC: NEGATIVE MG/DL
HGB UR QL STRIP.AUTO: ABNORMAL
KETONES UR STRIP-MCNC: NEGATIVE MG/DL
LEUKOCYTE ESTERASE UR QL STRIP: ABNORMAL
MUCOUS THREADS UR QL AUTO: ABNORMAL
NITRITE UR QL STRIP: POSITIVE
NON-SQ EPI CELLS URNS QL MICRO: ABNORMAL /HPF
PH UR STRIP.AUTO: 5.5 [PH]
PROT UR STRIP-MCNC: ABNORMAL MG/DL
RBC #/AREA URNS AUTO: ABNORMAL /HPF
SL AMB  POCT GLUCOSE, UA: ABNORMAL
SL AMB LEUKOCYTE ESTERASE,UA: ABNORMAL
SL AMB POCT BILIRUBIN,UA: ABNORMAL
SL AMB POCT BLOOD,UA: ABNORMAL
SL AMB POCT CLARITY,UA: ABNORMAL
SL AMB POCT COLOR,UA: YELLOW
SL AMB POCT KETONES,UA: ABNORMAL
SL AMB POCT NITRITE,UA: ABNORMAL
SL AMB POCT PH,UA: 5.5
SL AMB POCT SPECIFIC GRAVITY,UA: 1.02
SL AMB POCT URINE PROTEIN: 100
SL AMB POCT UROBILINOGEN: 0.2
SP GR UR STRIP.AUTO: 1.01 (ref 1–1.03)
UROBILINOGEN UR STRIP-ACNC: <2 MG/DL
WBC #/AREA URNS AUTO: ABNORMAL /HPF

## 2023-07-31 PROCEDURE — 81003 URINALYSIS AUTO W/O SCOPE: CPT | Performed by: STUDENT IN AN ORGANIZED HEALTH CARE EDUCATION/TRAINING PROGRAM

## 2023-07-31 PROCEDURE — 87186 SC STD MICRODIL/AGAR DIL: CPT | Performed by: STUDENT IN AN ORGANIZED HEALTH CARE EDUCATION/TRAINING PROGRAM

## 2023-07-31 PROCEDURE — 81001 URINALYSIS AUTO W/SCOPE: CPT | Performed by: STUDENT IN AN ORGANIZED HEALTH CARE EDUCATION/TRAINING PROGRAM

## 2023-07-31 PROCEDURE — 99213 OFFICE O/P EST LOW 20 MIN: CPT | Performed by: STUDENT IN AN ORGANIZED HEALTH CARE EDUCATION/TRAINING PROGRAM

## 2023-07-31 PROCEDURE — 87086 URINE CULTURE/COLONY COUNT: CPT | Performed by: STUDENT IN AN ORGANIZED HEALTH CARE EDUCATION/TRAINING PROGRAM

## 2023-07-31 PROCEDURE — 87077 CULTURE AEROBIC IDENTIFY: CPT | Performed by: STUDENT IN AN ORGANIZED HEALTH CARE EDUCATION/TRAINING PROGRAM

## 2023-07-31 RX ORDER — CIPROFLOXACIN 500 MG/1
500 TABLET, FILM COATED ORAL EVERY 12 HOURS SCHEDULED
Qty: 10 TABLET | Refills: 0 | Status: SHIPPED | OUTPATIENT
Start: 2023-07-31 | End: 2023-08-05

## 2023-07-31 RX ORDER — PREDNISONE 20 MG/1
TABLET ORAL
Qty: 14 TABLET | Refills: 0 | Status: SHIPPED | OUTPATIENT
Start: 2023-07-31 | End: 2023-08-12

## 2023-07-31 RX ORDER — LORATADINE 10 MG/1
10 TABLET ORAL DAILY
Qty: 30 TABLET | Refills: 1 | Status: SHIPPED | OUTPATIENT
Start: 2023-07-31

## 2023-07-31 RX ORDER — ALBUTEROL SULFATE 90 UG/1
2 AEROSOL, METERED RESPIRATORY (INHALATION) EVERY 6 HOURS PRN
Qty: 18 G | Refills: 0 | Status: SHIPPED | OUTPATIENT
Start: 2023-07-31 | End: 2023-08-09 | Stop reason: SDUPTHER

## 2023-07-31 NOTE — PROGRESS NOTES
Name: Madyson Hull      : 1960      MRN: [id number]  Encounter Provider: Hermila Clifton DO  Encounter Date: 2023   Encounter department: [address] [facility]     1. Urinary urgency  Assessment & Plan:  Point-of-care urine showing moderate blood, small leukocytes and negative nitrite. With history of recurrent UTI. Patient reports 2 days of urgency and frequency. Has been historically treated well with Cipro. We will check urine culture and will treat with Cipro 500 twice daily x5 days. Orders:  -     ciprofloxacin (CIPRO) 500 mg tablet; Take 1 tablet (500 mg total) by mouth every 12 (twelve) hours for 5 days  -     UA w Reflex to Microscopic w Reflex to Culture - Clinic Collect  -     POCT urine dip auto non-scope    2. Mild intermittent asthma, unspecified whether complicated  Assessment & Plan:  Start antihistamine Claritin 10 mg every morning, albuterol rescue inhaler every 6 hours as needed. Orders:  -     albuterol (Ventolin HFA) 90 mcg/act inhaler; Inhale 2 puffs every 6 (six) hours as needed for wheezing  -     loratadine (CLARITIN) 10 mg tablet; Take 1 tablet (10 mg total) by mouth daily    3. Multiple sclerosis (HCC)  -     predniSONE 20 mg tablet; Take 2 tablets (40 mg total) by mouth daily for 4 days, THEN 1 tablet (20 mg total) daily for 4 days, THEN 0.5 tablets (10 mg total) daily for 4 days. Subjective      Urinary Tract Infection   This is a new problem. The current episode started in the past 7 days. The problem occurs every urination. The problem has been gradually worsening. The patient is experiencing no pain. There has been no fever. There is no history of pyelonephritis. Associated symptoms include frequency and urgency. Pertinent negatives include no chills. She has tried nothing for the symptoms. Review of Systems   Constitutional: Negative for chills and fever.    Cardiovascular: Negative for chest pain and palpitations. Genitourinary: Positive for frequency and urgency. Neurological: Negative for dizziness and headaches. Current Outpatient Medications on File Prior to Visit   Medication Sig   • IBUPROFEN PO Take by mouth   • meloxicam (Mobic) 15 mg tablet Take 1 tablet (15 mg total) by mouth daily   • buPROPion (WELLBUTRIN XL) 150 mg 24 hr tablet TAKE 1 TABLET BY MOUTH EVERY DAY IN THE MORNING (Patient not taking: Reported on 7/31/2023)   • [DISCONTINUED] ergocalciferol (VITAMIN D2) 50,000 units TAKE 1 CAPSULE BY MOUTH ONE TIME PER WEEK (Patient not taking: Reported on 7/31/2023)       Objective     /64 (BP Location: Left arm, Patient Position: Sitting, Cuff Size: Large)   Pulse 103   Temp 97.8 °F (36.6 °C) (Temporal)   Ht 5' 3" (1.6 m)   Wt 95.8 kg (211 lb 4 oz)   SpO2 96%   BMI 37.42 kg/m²     Physical Exam  Vitals reviewed. Constitutional:       Appearance: Normal appearance. HENT:      Head: Normocephalic and atraumatic. Eyes:      Extraocular Movements: Extraocular movements intact. Cardiovascular:      Rate and Rhythm: Normal rate and regular rhythm. Heart sounds: Normal heart sounds. Pulmonary:      Effort: Pulmonary effort is normal.      Breath sounds: Normal breath sounds. Musculoskeletal:      Right lower leg: Edema present. Left lower leg: Edema present. Neurological:      General: No focal deficit present. Mental Status: She is alert and oriented to person, place, and time.       Gait: Gait abnormal.      Comments: Ambulating with walker   Psychiatric:         Mood and Affect: Mood normal.         Behavior: Behavior normal.          Amari Meza DO

## 2023-07-31 NOTE — ASSESSMENT & PLAN NOTE
Point-of-care urine showing moderate blood, small leukocytes and negative nitrite. With history of recurrent UTI. Patient reports 2 days of urgency and frequency. Has been historically treated well with Cipro. We will check urine culture and will treat with Cipro 500 twice daily x5 days.

## 2023-08-02 ENCOUNTER — OFFICE VISIT (OUTPATIENT)
Age: 63
End: 2023-08-02
Payer: COMMERCIAL

## 2023-08-02 DIAGNOSIS — G81.91 HEMIPARESIS OF RIGHT DOMINANT SIDE, UNSPECIFIED HEMIPARESIS ETIOLOGY (HCC): ICD-10-CM

## 2023-08-02 DIAGNOSIS — G35 MS (MULTIPLE SCLEROSIS) (HCC): Primary | ICD-10-CM

## 2023-08-02 DIAGNOSIS — G81.91 HEMIPARESIS OF RIGHT DOMINANT SIDE, UNSPECIFIED HEMIPARESIS ETIOLOGY (HCC): Primary | ICD-10-CM

## 2023-08-02 DIAGNOSIS — G35 MS (MULTIPLE SCLEROSIS) (HCC): ICD-10-CM

## 2023-08-02 LAB — BACTERIA UR CULT: ABNORMAL

## 2023-08-02 PROCEDURE — 97530 THERAPEUTIC ACTIVITIES: CPT

## 2023-08-02 PROCEDURE — 97112 NEUROMUSCULAR REEDUCATION: CPT

## 2023-08-02 NOTE — PROGRESS NOTES
Daily Note     Today's date: 2023  Patient name: Treva Garcia  : 1960  MRN: [id number]  Referring provider: Tiff Paulino,*  Dx:   Encounter Diagnosis     ICD-10-CM    1. MS (multiple sclerosis) ([address])  G35       2. Hemiparesis of right dominant side, unspecified hemiparesis etiology ([address])  G81.91                    Subjective: Pt     Objective: See treatment diary below    NMR  SOLO loop 3 minutes on and 2 minutes off  Walking sticks   High knee marching with holding tidal tanks #10  Fwd/bwd walking // bars holding tidal tanks#10  Side stepping // bars holding tidal tank #10  Step taps 4" B/L UE  Neuro boxin,2,3,4,5,6 x 4 bouts  Mini squats    Patient education: brace, HEP provided, safety, bathroom transfers (2x) VC for RW use      Assessment: Pt tolerated treatment well. Continued 3 min on 2 min off ratio to improve overall fatigue. tx for neuro boxing which pt demo good technique and coordination. Pt agreeable. Will continue to progress as able. Patient would benefit from continued PT. NV progress note      Plan: Continue per plan of care. POC expires Auth Status Total   Visits  Start date  Expiration date PT/OT + Visit Limit?  Co-Insurance   23 required [facility] 23 no No                                         Visit/Unit Tracking  AUTH Status: n/a Date 6/5 6/19 6/26 7/6 7/10  7/17 7/19 7/24 7/26  8       Visits  Authed: submitted Used                             Remaining

## 2023-08-02 NOTE — PROGRESS NOTES
POC expires Auth Status Total   Visits  Start date  Expiration date PT/OT + Visit Limit? Co-Insurance    BOMN    BOMN Yes; 30 dollars                                            Visit/Unit Tracking  AUTH Status: Dennis Bowden Date           Visits  Authed:  Used 1 1 1 1 1           Remaining  11 10 9 8 7            PLAN OF CARE START: 23  PLAN OF CARE END: 2023  PROGRESS NOTE DUE:  (progress note on schedule)  FREQUENCY: 1-2x/week for 8/12 weeks   PRECAUTIONS: fall/safety   DIAGNOSIS: MS, R side weakness   VISITS: 5 of 12    Daily Note     Today's date: 2023  Patient name: Tomas Paniagua  : 1960  MRN: [id number]  Referring provider: Godfrey Goetz  Dx:   Encounter Diagnoses   Name Primary? • Hemiparesis of right dominant side, unspecified hemiparesis etiology ([address]) Yes   • MS (multiple sclerosis) ([address])                     Subjective: Pt reports no changes since last visit    Objective: See treatment below. NMRE:   *Keen IO pegboard seated tabletop. Board placed on vertical surface. 2# weight added to R UE for increased challenge to promote muscle recruitment and to provide proprioceptive input.   -reach,grasp,release of thin metal pegs towel on R side>board 2 point pinch. Performed 180 degrees of rotation 3x prior to release. 10% dropping noted   -reach,grasp,release of small cylindrical peg towel on R side>board 2 point pinch. Performed finger<>palmar translation prior to release. VC for in hand manipulation techniques, up to 50% dropping noted, and increased time. TA:   *jhony-puttycisers- seated performed the following x15 reps counter clockwise followed by clockwise using green theraputty with R UE. Focused on distal/proximal UE strength, /pinch strength for increased independence with ADLs. VC for technique  -key powell  -cap powell  -peg powell    *pinch and pull 6/6 small cylindrical pegs green putty>bin using R UE 2 point pinch.  Focused on pinch strength, distal UE strength, and ROM. Performed seated tabletop. VC for technique    Assessment: Tolerated treatment well. Pt would benefit from continued OT services to address endurance, core stability, R UE  strength, proximal strength, R UE 3 point pinch, R UE FMC and in hand manipulation skills. Review energy conservation techniques. Plan: Continued skilled OT per POC.

## 2023-08-03 ENCOUNTER — TELEPHONE (OUTPATIENT)
Dept: FAMILY MEDICINE CLINIC | Facility: CLINIC | Age: 63
End: 2023-08-03

## 2023-08-03 NOTE — TELEPHONE ENCOUNTER
Patient informed       ----- Message from Dayami Mace DO sent at 8/3/2023  7:33 AM EDT -----  Urine culture confirms Ecoli UTI and antibiotic prescribed is appropriate.

## 2023-08-07 ENCOUNTER — OFFICE VISIT (OUTPATIENT)
Age: 63
End: 2023-08-07
Payer: COMMERCIAL

## 2023-08-07 ENCOUNTER — OFFICE VISIT (OUTPATIENT)
Dept: OBGYN CLINIC | Facility: CLINIC | Age: 63
End: 2023-08-07
Payer: COMMERCIAL

## 2023-08-07 VITALS
HEIGHT: 63 IN | HEART RATE: 85 BPM | DIASTOLIC BLOOD PRESSURE: 71 MMHG | WEIGHT: 211 LBS | SYSTOLIC BLOOD PRESSURE: 125 MMHG | BODY MASS INDEX: 37.39 KG/M2

## 2023-08-07 DIAGNOSIS — G35 MS (MULTIPLE SCLEROSIS) (HCC): Primary | ICD-10-CM

## 2023-08-07 DIAGNOSIS — G81.91 HEMIPARESIS OF RIGHT DOMINANT SIDE, UNSPECIFIED HEMIPARESIS ETIOLOGY (HCC): ICD-10-CM

## 2023-08-07 DIAGNOSIS — M17.11 PRIMARY OSTEOARTHRITIS OF RIGHT KNEE: Primary | ICD-10-CM

## 2023-08-07 PROCEDURE — 97112 NEUROMUSCULAR REEDUCATION: CPT

## 2023-08-07 PROCEDURE — 97530 THERAPEUTIC ACTIVITIES: CPT

## 2023-08-07 PROCEDURE — 99213 OFFICE O/P EST LOW 20 MIN: CPT | Performed by: FAMILY MEDICINE

## 2023-08-07 RX ORDER — MELOXICAM 15 MG/1
15 TABLET ORAL DAILY
Qty: 30 TABLET | Refills: 5 | Status: SHIPPED | OUTPATIENT
Start: 2023-08-07

## 2023-08-07 NOTE — PROGRESS NOTES
Daily Note     Today's date: 2023  Patient name: Glenna Morales  : 1960  MRN: [id number]  Referring provider: Trish Barcenas,*  Dx:   Encounter Diagnosis     ICD-10-CM    1. MS (multiple sclerosis) ([address])  G35       2. Hemiparesis of right dominant side, unspecified hemiparesis etiology ([facility])  G81.91                    Subjective: Pt reports she fell this morning when she was going to take the dogs out, she did not have her brace on and cannot recall if she had her RW or not. Objective: See treatment diary below    NMR  SOLO 3 minutes on and 2 minutes off  Walking sticks   Gait with solo step 2 repetitions no AD  High knee marching with walking sticks0  Side stepping holding walking sticks  STS with tidal tank 10#   Trunk rotation tidal tank 20#     Patient education: brace, HEP provided, safety, bathroom transfers (2x) VC for RW use      Assessment: Pt tolerated treatment well. Continued 3 min on 2 min off ratio to improve overall fatigue. tx for neuro boxing which pt demo good technique and coordination. Pt demo good technique with trunk rotations and progressed to 20# tidal tank. Pt educated on fall risk, safety and to f/uw [facility] PCP. Will continue to progress as able. Patient would benefit from continued PT. NV progress note      Plan: Continue per plan of care. POC expires Auth Status Total   Visits  Start date  Expiration date PT/OT + Visit Limit?  Co-Insurance   23 required [facility] [place] 23 no No                                         Visit/Unit Tracking  AUTH Status: n/a Date 6/5 6/19 6/26 7/6 7/10  7/17 7/19 7/24 7/26  8/2  8/7     Visits  Authed: submitted Used                             Remaining

## 2023-08-07 NOTE — PROGRESS NOTES
Assessment/Plan:  Assessment/Plan   Diagnoses and all orders for this visit:    Primary osteoarthritis of right knee  -     meloxicam (MOBIC) 15 mg tablet; Take 1 tablet (15 mg total) by mouth daily  -     Ambulatory Referral to Pain Management; Future  -     Injection Procedure Prior Authorization; Future        58-year-old female with osteoarthritis of the right knee with right knee pain of more than 3 years duration. Clinical impression is that she has symptoms of degenerative changes. She reports having had significant relief of symptoms with previous visco injection so we will request for repeat 1 shot visco injection of the right knee. In the interim she is to take meloxicam 15 mg once daily. In interim I will refer her to pain management for evaluation and consideration of genicular nerve block. She will return for Visco injection once approved. Subjective:   Patient ID: Anai Dias is a 60 y.o. female. Chief Complaint   Patient presents with   • Right Knee - Follow-up, Pain, Swelling       58-year-old female with osteoarthritis of the right knee following up for right knee pain more than 3 years duration. She was last seen by me 8 months ago at which point she was given Synvisc 1 to the right knee. Reports that following visco injection she did not experience any relief or improvement in symptoms. She had received visco injection approximately 6 months ago and states that injection provided significant relief lasting about 6 months. She reports having pain described as generalized to the knee but worse to the anterior aspects, worse with activity, associated instability, and improved resting. She does report that taking meloxicam helps control her pain. Knee Pain  This is a chronic problem. The current episode started more than 1 year ago. The problem occurs daily. The problem has been gradually worsening.  Associated symptoms include arthralgias, joint swelling, numbness and weakness. The symptoms are aggravated by standing and walking. She has tried rest and NSAIDs for the symptoms. The treatment provided mild relief. Review of Systems   Musculoskeletal: Positive for arthralgias and joint swelling. Neurological: Positive for weakness and numbness. Objective:  Vitals:    08/07/23 1440   BP: 125/71   Pulse: 85   Weight: 95.7 kg (211 lb)   Height: 5' 3" (1.6 m)     Right Knee Exam     Range of Motion   Extension: normal     Other   Swelling: mild            Physical Exam  Vitals and nursing note reviewed. Constitutional:       General: She is not in acute distress. Appearance: She is well-developed. She is not ill-appearing or diaphoretic. HENT:      Head: Normocephalic and atraumatic. Right Ear: External ear normal.      Left Ear: External ear normal.   Eyes:      Conjunctiva/sclera: Conjunctivae normal.   Neck:      Trachea: No tracheal deviation. Cardiovascular:      Rate and Rhythm: Normal rate. Pulmonary:      Effort: Pulmonary effort is normal. No respiratory distress. Abdominal:      General: There is no distension. Musculoskeletal:         General: Swelling present. Skin:     General: Skin is warm and dry. Coloration: Skin is not jaundiced or pale. Neurological:      Mental Status: She is alert and oriented to person, place, and time. Psychiatric:         Mood and Affect: Mood normal.         Behavior: Behavior normal.         Thought Content:  Thought content normal.         Judgment: Judgment normal.

## 2023-08-07 NOTE — PROGRESS NOTES
POC expires Auth Status Total   Visits  Start date  Expiration date PT/OT + Visit Limit? Co-Insurance    BOMN    BOMN Yes; 30 dollars                                            Visit/Unit Tracking  AUTH Status: Andriy Spark Date          Visits  Authed:  Used 1 1 1 1 1 1          Remaining  11 10 9 8 7 6           PLAN OF CARE START: 23  PLAN OF CARE END: 2023  PROGRESS NOTE DUE:  (progress note on schedule)  FREQUENCY: 1-2x/week for 8/12 weeks   PRECAUTIONS: fall/safety   DIAGNOSIS: MS, R side weakness   VISITS: 6 of 12    Daily Note     Today's date: 2023  Patient name: Geena Hart  : 1960  MRN: [id number]  Referring provider: Leora Bahena  Dx:   Encounter Diagnoses   Name Primary? • MS (multiple sclerosis) ([address]) Yes   • Hemiparesis of right dominant side, unspecified hemiparesis etiology ([address])                     Subjective: Pt reports no changes since last visit. Pt reports fall this AM while care taking for dogs, did not hit head. Objective: See treatment below. NMRE:       TA:   *Patient Education, reviewed the following and provided handout:   - Understanding Energy Conservation, able to verbalize examples of incorporation into daily routines     *reviewed the following lora chi exercises with emphasis on upright posture, sitting balance, breathing, and technique for motor control. Provided with packet on instructed of exercise   -relaxing the neck   -raising and lowering shoulders  -painting the wall   -two full moons   -brushing the air   -opening the chest   -parting the clouds   -scooping ice cream   -rolling the balloon  -turning the balloon     NMRE:   *DANIELA tweezer pegboard seated tabletop-reach,grasp,release of thin metal peg towel>vertically placed board using R UE lateral pinch with tweezers.   Performed x1 row with tweezers, due to fatigue and multiple drops downgraded task to using 2 point pinch no tweezers ~25-50% dropping noted. Required VC and hand over hand assist for accurate placement of tweezers in hand. Max VC for decreased compensatory strategies. Assessment: Tolerated treatment well. Pt would benefit from continued OT services to address endurance, core stability, R UE  strength, proximal strength, R UE 3 point pinch, R UE FMC and in hand manipulation skills. Review lora chi exercises. Plan: Continued skilled OT per POC.

## 2023-08-08 ENCOUNTER — TELEPHONE (OUTPATIENT)
Dept: FAMILY MEDICINE CLINIC | Facility: CLINIC | Age: 63
End: 2023-08-08

## 2023-08-08 DIAGNOSIS — J40 BRONCHITIS: Primary | ICD-10-CM

## 2023-08-08 RX ORDER — PREDNISONE 20 MG/1
TABLET ORAL
Qty: 20 TABLET | Refills: 0 | Status: SHIPPED | OUTPATIENT
Start: 2023-08-08 | End: 2023-08-20

## 2023-08-08 NOTE — TELEPHONE ENCOUNTER
Patient called to inform you that her wheezing has gotten increasingly worst. Wanted to know if you'd like her to come in to be evaluated.

## 2023-08-08 NOTE — TELEPHONE ENCOUNTER
Returned patient call, will start prednisone taper.   Patient to monitor symptoms over the next 24 to 48 hours if worsening will come in for same-day visit

## 2023-08-09 ENCOUNTER — OFFICE VISIT (OUTPATIENT)
Age: 63
End: 2023-08-09
Payer: COMMERCIAL

## 2023-08-09 DIAGNOSIS — G35 MS (MULTIPLE SCLEROSIS) (HCC): Primary | ICD-10-CM

## 2023-08-09 DIAGNOSIS — G81.91 HEMIPARESIS OF RIGHT DOMINANT SIDE, UNSPECIFIED HEMIPARESIS ETIOLOGY (HCC): ICD-10-CM

## 2023-08-09 DIAGNOSIS — J45.20 MILD INTERMITTENT ASTHMA, UNSPECIFIED WHETHER COMPLICATED: ICD-10-CM

## 2023-08-09 PROCEDURE — 97110 THERAPEUTIC EXERCISES: CPT

## 2023-08-09 PROCEDURE — 97112 NEUROMUSCULAR REEDUCATION: CPT

## 2023-08-09 RX ORDER — ALBUTEROL SULFATE 90 UG/1
2 AEROSOL, METERED RESPIRATORY (INHALATION) EVERY 6 HOURS PRN
Qty: 18 G | Refills: 0 | Status: SHIPPED | OUTPATIENT
Start: 2023-08-09

## 2023-08-09 NOTE — PROGRESS NOTES
Re-evaluation/Progress Note        POC expires Auth Status Total   Visits  Start date  Expiration date PT/OT + Visit Limit? Co-Insurance   23 required BOMN 23 no No   23                                    Visit/Unit Tracking  AUTH Status: n/a Date 6/5 6/19 6/26 7/6 7/10  7/17 7/19 7/24 7/26  8/2  8/7  8/9   Visits  Authed: submitted Used                             Remaining                                  Today's date: 2023  Patient name: Kizzy Sylvester  : 1960  MRN: [id number]  Referring provider: Franco Fuentes  Dx:   Encounter Diagnosis     ICD-10-CM    1. MS (multiple sclerosis) ([address])  G35       2. Hemiparesis of right dominant side, unspecified hemiparesis etiology ([address])  G81.91           Assessment  Assessment details: Patient is a 60 y.o. Female who presents to skilled outpatient PT with a dx of RR MS and frequent falls. Since last progress note, pt presents with gait dysfunction, dec LE strength (R<L), dec balance, dec righting reactions, dec sensation, dec coordination, and dec cardiovascular endurance. She is below age-related norms and is at an increased risk for falls. She has made improvements in 5xSTS, completed 6mWT, and improved ABC. She is making progress towards all goals. She will benefit from skilled OP PT to improve above impairments, maximize functional mobility independence and dec risk for falls. Will see pt 2x/week. Pt agreeable. Session included STS, marches, and trunk rotations. Pt demo good technique and required CS for exercises however no LOB noted.      Impairments: Abnormal coordination, Abnormal gait, Abnormal muscle tone, Abnormal or restricted ROM, Activity intolerance, Impaired balance, Impaired physical strength, Lacks appropriate HEP, Poor posture, Poor body mechanics, Pain with function, Safety issue, Weight-bearing intolerance, Abnormal movement, Difficulty understanding, Abnormal muscle firing  Understanding of Dx/Px/POC: Good  Prognosis: Good    Patient verbalized understanding of POC. Please contact me if you have any questions or recommendations. Thank you for the referral and the opportunity to share in [address] [place] [address]. Plan  Plan details:  Will see pt 2x/week  Patient would benefit from: PT Eval, Skilled PT and OT Eval  Planned modality interventions: Biofeedback, Cryotherapy, TENS, Thermotherapy  Planned therapy interventions: Abdominal trunk stabilization, ADL training, Balance, Balance/WB training, Breathing training, Body mechanics training, Coordination, Functional ROM exercises, Gait training, HEP, Joint Mobilization, Manual Therapy, Degroot taping, Motor coordination training, Neuromuscular re-education, Patient education, Postural training, Strengthening, Stretching, Therapeutic activities, Therapeutic exercises, Therapeutic training, Transfer training, Activity modification, Work reintegration  Frequency: 2x/wk  Duration in weeks: 12  Plan of Care beginning date: 8/9/23  Plan of Care expiration date: 12 weeks - 11/1/23  Treatment plan discussed with: Patient and Family      Goals  Short Term Goals (4 weeks):    - Patient will improve time on TUG by 2.9 seconds to facilitate improved safety in all ambulation- ongoing  - Patient will be independent in basic HEP 2-3 weeks- MET  - Patient will improve 5xSTS score by 2.3 seconds to promote improved LE functional strength needed for ADLs- MET  - Pt will improve ABC to >/= 67%- ongoing    Long Term Goals (12 weeks):  - Patient will be independent in a comprehensive home exercise program   - Patient will improve gait speed by 0.18 m/s to improve safety with community ambulation  - Patient will improve scoring on FGA by 4 points to progress safety with dynamic tasks  - Patient will be able to demonstrate HT in gait without veering  - Patient will improve 6 Minute Walk Test score by 190 feet to promote improved cardiovascular endurance  - Patient will report 50% reduction in near falls in order to improve safety with functional tasks and reduce his risk for falls  - Patient will report going on walks at least 3 days per week to promote independence and improved cardiovascular endurance  - Patient will be able to ascend/descend stairs reciprocally without UE assist to promote independence and safety with ADLs  - Patient will report 50% reduction in near falls when ambulating on uneven terrain    Subjective  History of Present Illness  Mechanism of injury:   Pt reports that she was making progress after seeing neuro PT. She went to the gym and was working on her strength. Pt reports she uses the RW. She watches her grandchild 2x/week. She has reported fatigue that carries over days due to the MS. She uses her total gym and bike at home and stretches. She has good and bad days but it can change day to day. She recently had a medication change- she has f/u with neurologist. Pt wants to walk and be as functional as possible, get "her life back." She feels that its not necessarily from a relapse. Independent with ADLs, requires assist with  iADLs of cleaning. She has R AFO that she received "3-4 months ago" and has not been wearing because she does not feel that it is helping. Update 7/10/23:   Pt is ambulating with walking sticks and no AFO donned. She reports no falls but believes balance is still her "issue" Her goals are to maximize functional mobility independence with LRAD and to drive.      Update 8/0/23:       Primary AD: RW    Pain  R knee    Social Support  Stairs in house: FF with HR to laundry in basement  Lives with: , dogs, cats, chickens    Objective   - R Hip Flexion: 2+/5  - L Hip Flexion: 4-/5  - R Knee Extension: 4-/5 - L Knee Extension: 4-/5  - R Knee Flexion: 4/5  - L Knee Flexion: 4/5  - R Ankle DF: 3-/5  - L Ankle DF: 4/5  - R Ankle PF: 4-/5  - L Ankle PF: 4/5    Sensation  - Light touch: numbness and tingling in both feet; WFL; RLE differentiation     Coordination  - Dysmetria: WFL   - Dysdiadochokinesia: WFL    Outcome Measures Initial Eval  6/5 7/10 8/9      5xSTS 37.56 sec RW 24.98s RW 22.38s RW no UE      TUG 25.51 sec RW with brace     23.8s   with RW and without brace 30.3s with RW      10 meter 0.86 m/s RW  0.8m/s walking sticks      6MWT NV  345ft with RW and GB with CS      ABC  29.398% 46.88%         Precautions: fall risk  Past Medical History:   Diagnosis Date   • Multiple sclerosis ([address] [facility])

## 2023-08-09 NOTE — PROGRESS NOTES
POC expires Auth Status Total   Visits  Start date  Expiration date PT/OT + Visit Limit? Co-Insurance    BOMN    BOMN Yes; 30 dollars                                            Visit/Unit Tracking  AUTH Status: Dania Beltrán Date         Visits  Authed:  Used 1 1 1 1 1 1 1         Remaining  11 10 9 8 7 6 5          PLAN OF CARE START: 23  PLAN OF CARE END: 2023  PROGRESS NOTE DUE:  (progress note on schedule)  FREQUENCY: 1-2x/week for 8/12 weeks   PRECAUTIONS: fall/safety   DIAGNOSIS: MS, R side weakness   VISITS:  of 12    Daily Note     Today's date: 2023  Patient name: Wagner Jensen  : 1960  MRN: [id number]  Referring provider: John Haddad  Dx:   Encounter Diagnosis   Name Primary? • MS (multiple sclerosis) ([address]) Yes                  Subjective: Pt reports no changes since last visit. Objective: See treatment below. TE:  *Pt completed 4lb medicine ball UB exercises to improve upper body and core strength:    3x15 Shoulder Flexion   3x15 PNF Patterns both sides   3x15 Vietnamese Twists  3x15 bicep curls     NMRE:    *Stance with cross body reaching over table to complete  puzzle with 1lb wrist weights for added prop input and focus on in hand manipulation from palm to finger tip translation with each piece. Min A from therapist to problem solve how pieces fit together. Min seated rest breaks due to fatigue. Assessment: Tolerated treatment well. Pt would benefit from continued OT services to address endurance, core stability, R UE  strength, proximal strength, R UE 3 point pinch, R UE FMC and in hand manipulation skills. Review lora chi exercises. Plan: Continued skilled OT per POC.

## 2023-08-17 ENCOUNTER — TELEPHONE (OUTPATIENT)
Dept: NEUROLOGY | Facility: CLINIC | Age: 63
End: 2023-08-17

## 2023-08-17 DIAGNOSIS — G35 MS (MULTIPLE SCLEROSIS) (HCC): Primary | ICD-10-CM

## 2023-08-17 NOTE — TELEPHONE ENCOUNTER
Patient called in and said she is having some numbness in both legs and feet and fingers. Patient was prescribed at some point a steroid and that gave her some kind of relief.  Please call patient back

## 2023-08-18 NOTE — TELEPHONE ENCOUNTER
Per 10/26/22 office note, pt did previously report numbness in feet and hands. Now overdue for f/u visit. Left detailed message for pt (okay per communication consent) advising additional information is needed regarding her symptoms. MyChart message also sent.

## 2023-08-21 NOTE — TELEPHONE ENCOUNTER
Spoke w/pt. She reports numbness in b/l feet, which is chronic. Also reports a sensation in the middle of her belly. She can't describe it. When she touches that area, it feels "different."  Also reports ambulatory dysfunction. Feels off balance (wobbly). Pt using walker currently. Pt previously used cane. She is currently receiving PT. Patient states symptoms have increased since UTI. No bowel changes. No vision changes. Increased fatigue. Patient recently returned from vacation. Questioning if trip is contributing to fatigue as well. Pt diagnosed w/UTI 8/3/23. Rx'd  Cipro 500mg BID for a 5 day course of therapy. Completed abx on 8/5/23. No follow up testing to ensure resolution of UTI. DMT - none ([place] d/c)  MRI b/c- 8/2022    Pt previously saw Dr. Del Steele at [facility] location; however, she was told an appt cannot be scheduled at this time and she will likely be seen in November. She will see Dr. Ethel Mcmullen once he is available. [place] are too far for patient to travel. Patient would like to see Dr. Del Steele in the interim. She is willing to travel to either [place] for appt. [phone] c/b #    Dr. Del Steele - please advise on symptoms and if you are agreeable to seeing pt in office while she waits for Dr. Ethel Mcmullen to start seeing patients. Thank you.

## 2023-08-21 NOTE — TELEPHONE ENCOUNTER
View All Conversations on this Encounter  Azucena Ayers, RN 3 hours ago (11:25 AM)     NK  Please review      Maria Luisa Fitzpatrick [facility] [place] [facility] (supporting Maria C Calvo, DESHAWN) 3 hours ago (11:05 AM)       I am having numbness in both feet an right calf. Stomach and back have changed . Balance. Have had UTI about 3 weeks ago with breathing issues treated by Dr Tan Cordero and resolved. Fatigue always an issue. I was put on Steroids for breathing and it seemed to be to be helping MS symptoms. Dr Mor Torres took me off [facility] last year.

## 2023-08-21 NOTE — TELEPHONE ENCOUNTER
Patient describing obvious signs of pseudorelapse in the setting of recent urinary tract infection. Preparation to her visit with Dr. Marlyn Matta, patient will be advised to consider imaging of brain and cervical spine as annual follow-up. No emergent follow-up with [address] required at this time; no steroid treatment will be offered because of recent infection in the setting of absence of immunosuppressive regimen.     Patient is to follow-up with provider regardless myself or Dr. Marlyn Matta after MRI completed and available for review

## 2023-08-22 ENCOUNTER — OFFICE VISIT (OUTPATIENT)
Age: 63
End: 2023-08-22
Payer: COMMERCIAL

## 2023-08-22 DIAGNOSIS — J45.20 MILD INTERMITTENT ASTHMA, UNSPECIFIED WHETHER COMPLICATED: ICD-10-CM

## 2023-08-22 DIAGNOSIS — G81.91 HEMIPARESIS OF RIGHT DOMINANT SIDE, UNSPECIFIED HEMIPARESIS ETIOLOGY (HCC): ICD-10-CM

## 2023-08-22 DIAGNOSIS — G35 MS (MULTIPLE SCLEROSIS) (HCC): Primary | ICD-10-CM

## 2023-08-22 PROCEDURE — 97112 NEUROMUSCULAR REEDUCATION: CPT

## 2023-08-22 RX ORDER — LORATADINE 10 MG/1
10 TABLET ORAL DAILY
Qty: 90 TABLET | Refills: 1 | OUTPATIENT
Start: 2023-08-22

## 2023-08-22 NOTE — PROGRESS NOTES
Daily Note     Today's date: 2023  Patient name: Manju Slaughter  : 1960  MRN: [id number]  Referring provider: Helga Olivia,*  Dx:   Encounter Diagnosis     ICD-10-CM    1. MS (multiple sclerosis) ([address])  G35       2. Hemiparesis of right dominant side, unspecified hemiparesis etiology ([address])  G81.91           Subjective: Pt is home from vacation is feeling fatigued. Objective: See treatment diary below    NMR  SOLO 3 minutes on and 2 minutes off  Gait with solo step no AD  High knee marching with 10# TT  Side stepping holding 10# TT  STS with tidal tank 10# and green airex under LLE  Trunk rotation with overhead lift tidal tank 20#   Step taps no UE on 4" aerobic step min Ax1 with RLE    NuStep 12min    Patient education: brace, HEP provided, safety, bathroom transfers (2x) VC for RW use      Assessment: Pt tolerated treatment well. Continued 3 min on 2 min off ratio to improve overall fatigue, and per POC. Initiated step taps with aerobic step without UEs. Noted one LOB into solo step, able to recover independently with B/L UEs. Will continue to progress as able. Patient would benefit from continued PT. Plan: Continue per plan of care. POC expires Auth Status Total   Visits  Start date  Expiration date PT/OT + Visit Limit?  Co-Insurance   23 required [facility] [place] 23 no No                                         Visit/Unit Tracking  AUTH Status: n/a Date 6/5 6/19 6/26 7/6 7/10  7/17 7/19 7/24 7/26  8   Visits  Authed: submitted Used                             Remaining

## 2023-08-22 NOTE — TELEPHONE ENCOUNTER
Left detailed message for pt making her aware. Provided phone # for central scheduling to schedule MRIs.

## 2023-08-22 NOTE — PROGRESS NOTES
POC expires Auth Status Total   Visits  Start date  Expiration date PT/OT + Visit Limit? Co-Insurance    BOMN    BOMN Yes; 30 dollars                                            Visit/Unit Tracking  AUTH Status: Brad Izquierdo Date        Visits  Authed:  Used 1 1 1 1 1 1 1 1        Remaining  11 10 9 8 7 6 5 4         PLAN OF CARE START: 23  PLAN OF CARE END: 2023  PROGRESS NOTE DUE:  (progress note on schedule)  FREQUENCY: 1-2x/week for 8/12 weeks   PRECAUTIONS: fall/safety   DIAGNOSIS: MS, R side weakness   VISITS: 8  12    Daily Note     Today's date: 2023  Patient name: Guera Hanna  : 1960  MRN: [id number]  Referring provider: Tushar Zaman  Dx:   Encounter Diagnosis   Name Primary? • MS (multiple sclerosis) ([address]) Yes                  Subjective: Pt reports no changes since last visit. Objective: See treatment below. NMRE:  *reach,grasp,release of thin metal pegs (functional dexterity board) towel on R side<>vertically placed board using R UE 2 point pinch. 1# weight added to R UE for increased challenge to promote muscle recruitment and to provide proprioceptive input. ~25% dropping noted, performed seated tabletop. *ROM arc-reach,grasp,release of plastic discs R<>L side of arc (with medium extension) using R UE gross grasp. 2.5# weight added to R UE for increased challenge to promote muscle recruitment and to provide proprioceptive input. Performed standing on foam surface to challenge standing balance. Up to CG for steadying    * semi complex pixy cube, x1 round seated tabletop (without grid) focused on R UE in hand manipulation, EF skills, and  skills. Required mod A overall for accuracy. ~10-15% dropping noted. Assessment: Tolerated treatment well.  Pt would benefit from continued OT services to address endurance, core stability, R UE  strength, proximal strength, R UE 3 point pinch, R UE FMC and in hand manipulation skills. Plan: Continued skilled OT per POC.

## 2023-08-25 ENCOUNTER — OFFICE VISIT (OUTPATIENT)
Age: 63
End: 2023-08-25
Payer: COMMERCIAL

## 2023-08-25 DIAGNOSIS — G35 MS (MULTIPLE SCLEROSIS) (HCC): Primary | ICD-10-CM

## 2023-08-25 DIAGNOSIS — G81.91 HEMIPARESIS OF RIGHT DOMINANT SIDE, UNSPECIFIED HEMIPARESIS ETIOLOGY (HCC): ICD-10-CM

## 2023-08-25 PROCEDURE — 97112 NEUROMUSCULAR REEDUCATION: CPT

## 2023-08-25 NOTE — PROGRESS NOTES
Daily Note     Today's date: 2023  Patient name: Dania Faria  : 1960  MRN: [id number]  Referring provider: Wayne Rinne,*  Dx:   Encounter Diagnosis     ICD-10-CM    1. MS (multiple sclerosis) ([address])  G35       2. Hemiparesis of right dominant side, unspecified hemiparesis etiology ([address])  G81.91           Subjective: Pt is home from vacation is feeling fatigued. Objective: See treatment diary below    NMR  SOLO 3 minutes on and 2 minutes off  Gait with solo step no AD  High knee marching with 10# TT  Side stepping holding 10# TT  STS with tidal tank 10# and green airex under LLE  Trunk rotation with overhead lift tidal tank 20#   Step taps no UE on 4" aerobic step min Ax1 with RLE- deferred   Neuro boxin,2,3,4,5,6 x 4 bouts    NuStep 12min    Patient education: brace, HEP provided, safety, bathroom transfers (2x) VC for RW use      Assessment: Pt tolerated treatment well. Patient continues to benefit from circuit training. Patient continues to have difficulty with marching and was unable to perform with tidla tank. Patient did well with sit to stands this session. Deferred step taps this session. Added in boxing this session. Patient does require cues on what hit punches to do. Will continue to progress as able. Patient would benefit from continued PT. Plan: Continue per plan of care. POC expires Auth Status Total   Visits  Start date  Expiration date PT/OT + Visit Limit?  Co-Insurance   23 required [facility] [place] 23 no No                                         Visit/Unit Tracking   AUTH Status: n/a Date 6/5 6/19 6/26 7/6 7/10  7/17 7/19 7/24 7/26  8/2  8/7 8/22    Visits  Authed: submitted Used                              Remaining

## 2023-08-25 NOTE — PROGRESS NOTES
POC expires Auth Status Total   Visits  Start date  Expiration date PT/OT + Visit Limit? Co-Insurance    BOMN    BOMN Yes; 30 dollars                                            Visit/Unit Tracking  AUTH Status: Brad Izquierdo Date       Visits  Authed:  Used 1 1 1 1 1 1 1 1 1       Remaining  11 10 9 8 7 6 5 4 3        PLAN OF CARE START: 23  PLAN OF CARE END: 2023  PROGRESS NOTE DUE:  (full re-eval, submit for auth)  FREQUENCY: 1-2x/week for 8/12 weeks   PRECAUTIONS: fall/safety   DIAGNOSIS: MS, R side weakness   VISITS: 89of 12    Daily Note     Today's date: 2023  Patient name: Guera Hanna  : 1960  MRN: [id number]  Referring provider: Tushar Zaman  Dx:   Encounter Diagnosis   Name Primary? • MS (multiple sclerosis) ([address]) Yes                  Subjective: Pt reports no changes since last visit. Objective: See treatment below. NMRE:  *multi matrix seated tabletop-transporting small white letter blocks>top of large colored figured blocks in ascending letter order while following key of uncompleted shapes (emphasis on visual closure and discrimination skills) using R UE 3 point pinch via red resistive clothespin. 2.5# weight added to R UE for increased challenge to promote muscle recruitment and to provide proprioceptive input. Simultaneously named words based off of color of large block. ~1-% dropping noted. Benefits from [facility] for sequencing and use of external aide to recall what figure she was looking for. Increased difficulty with multi component instructions. Significant increased time to complete with up to mod VC for accuracy overall. Assessment: Tolerated treatment well. Provided with cognitive HEPs. Pt would benefit from continued OT services to address endurance, core stability, R UE  strength, proximal strength, R UE 3 point pinch, R UE FMC and in hand manipulation skills.       Plan: Continued skilled OT per POC.

## 2023-08-28 ENCOUNTER — OFFICE VISIT (OUTPATIENT)
Age: 63
End: 2023-08-28
Payer: COMMERCIAL

## 2023-08-28 DIAGNOSIS — G35 MS (MULTIPLE SCLEROSIS) (HCC): Primary | ICD-10-CM

## 2023-08-28 DIAGNOSIS — G81.91 HEMIPARESIS OF RIGHT DOMINANT SIDE, UNSPECIFIED HEMIPARESIS ETIOLOGY (HCC): ICD-10-CM

## 2023-08-28 PROCEDURE — 97112 NEUROMUSCULAR REEDUCATION: CPT

## 2023-08-28 NOTE — PROGRESS NOTES
Daily Note     Today's date: 2023  Patient name: Stephanie Rivera  : 1960  MRN: [id number]  Referring provider: Melinda Hair,*  Dx:   Encounter Diagnosis     ICD-10-CM    1. MS (multiple sclerosis) ([address])  G35       2. Hemiparesis of right dominant side, unspecified hemiparesis etiology ([address])  G81.91           Subjective: Pt reports nothing new since last session. Objective: See treatment diary below    NMR  SOLO 3 minutes on and 2 minutes off  Gait with solo step no AD  High knee marching with 10# TT  Side stepping with step up to river rock and squat with quarter turns  STS   Step taps no UE onto river rocks  Neuro boxin,2,3,4,5,6 x 4 bouts standing still on river rocks in split stance    Patient education: brace, HEP provided, safety, bathroom transfers (2x) VC for RW use      Assessment: Pt tolerated treatment well. Patient continues to benefit from circuit training to rico global fatigue. Initiated step ups onto river rocks and stationary balance on river rocks. Noted improved trunk control, however difficulty and LOB with turns today. Continue to progress as able. Will continue to progress as able. Patient would benefit from continued PT. Plan: Continue per plan of care. POC expires Auth Status Total   Visits  Start date  Expiration date PT/OT + Visit Limit?  Co-Insurance   23 required [facility] [place] 23 no No                                         Visit/Unit Tracking   AUTH Status: n/a Date 6/5 6/19 6/26 7/6 7/10  7/17 7/19 7/24 7/26  8/2  8/7 8/22    Visits  Authed: submitted Used                              Remaining

## 2023-08-28 NOTE — PROGRESS NOTES
POC expires Auth Status Total   Visits  Start date  Expiration date PT/OT + Visit Limit? Co-Insurance    BOMN    BOMN Yes; 30 dollars                                            Visit/Unit Tracking  AUTH Status: Margo Layer Date      Visits  Authed:  Used 1 1 1 1 1 1 1 1 1 1      Remaining  11 10 9 8 7 6 5 4 3 2       PLAN OF CARE START: 23  PLAN OF CARE END: 2023  PROGRESS NOTE DUE:  (full re-eval, submit for auth)  FREQUENCY: 1-2x/week for 8/12 weeks   PRECAUTIONS: fall/safety   DIAGNOSIS: MS, R side weakness   VISITS: 10 of 12    Daily Note     Today's date: 2023  Patient name: Cecilia Abbott  : 1960  MRN: [id number]  Referring provider: Archana Watson  Dx:   Encounter Diagnosis   Name Primary? • MS (multiple sclerosis) ([address]) Yes                  Subjective: Pt reports no changes since last visit. Objective: See treatment below. NMRE:  *purdue pegboard seated tabletop using vertically placed board using R UE. 2# weight added to R UE for increased challenge to promote muscle recruitment and to provide proprioceptive input.   -reach,grasp,release of thin metal peg towel on R side>board using 2 point pinch, performed 180 degree rotation of peg 3x prior to release; ~30% dropping noted.   -reach,grasp,release of small cylindrical peg towel on R side>board using 2 point pinch, ~75% dropping noted  -reach,grasp,release of washers towel on R side>board using 2 point pinch, ~50% dropping noted     *pinch and pull 6/6 small cylindrical pegs green putty>bin using R UE 2-3 point pinch. Focused on pinch strength, distal UE strength, motor control, and ROM. Performed seated tabletop with 2# weight added to R UE for increased challenge to promote muscle recruitment and to provide proprioceptive input. Assessment: Tolerated treatment well. End of session left on NuStep.  Pt would benefit from continued OT services to address endurance, core stability, R UE  strength, proximal strength, R UE 3 point pinch, R UE FMC and in hand manipulation skills. Plan: Continued skilled OT per POC.

## 2023-08-29 ENCOUNTER — EVALUATION (OUTPATIENT)
Age: 63
End: 2023-08-29
Payer: COMMERCIAL

## 2023-08-29 ENCOUNTER — OFFICE VISIT (OUTPATIENT)
Age: 63
End: 2023-08-29
Payer: COMMERCIAL

## 2023-08-29 DIAGNOSIS — G35 MS (MULTIPLE SCLEROSIS) (HCC): Primary | ICD-10-CM

## 2023-08-29 DIAGNOSIS — G81.91 HEMIPARESIS OF RIGHT DOMINANT SIDE, UNSPECIFIED HEMIPARESIS ETIOLOGY (HCC): ICD-10-CM

## 2023-08-29 PROCEDURE — 97112 NEUROMUSCULAR REEDUCATION: CPT

## 2023-08-29 PROCEDURE — 97168 OT RE-EVAL EST PLAN CARE: CPT

## 2023-08-29 NOTE — PROGRESS NOTES
POC expires Auth Status Total   Visits  Start date  Expiration date PT/OT + Visit Limit? Co-Insurance   10/27 BOMN    BOMN Yes; 30 dollars                                            Visit/Unit Tracking  AUTH Status: Yaakov Botello Date     Visits  Authed:  Used 1 1 1 1 1 1 1 1 1 1 1     Remaining  11 10 9 8 7 6 5 4 3 2 1 (submited for auth today)      PLAN OF CARE START: 23  PLAN OF CARE END: 10/27/23  PROGRESS NOTE DUE: follow up post auth   FREQUENCY: 1-2x/week for 8/12 weeks   PRECAUTIONS: fall/safety   DIAGNOSIS: MS, R side weakness   VISITS:     Daily Note     Today's date: 2023  Patient name: Dylan Hilliard  : 1960  MRN: [id number]  Referring provider: Blas Lund  Dx:   Encounter Diagnosis   Name Primary? • MS (multiple sclerosis) ([address]) Yes                OCCUPATIONAL THERAPY RE-EVALUATION    Today's Date: 2023  Patient Name: Dylan Hilliard  : 1960  MRN: [id number]  Referring Provider: Blas Lund,*  Dx: MS (multiple sclerosis) [facility] Shawn Nurys ANALYSIS:  Pt presents to re-evaluation on 23 status post R side weakness secondary to chronic MS. As per interview, pt reports improvements in pinch strength, writing/tracing, and opening/closing containers since evaluation. Pt reports impairments in  strength-especially when squeezing shampoo bottles during bathing and still has difficulty performing IADLs for longer periods of time without seated rest breaks. Pt continues to have difficulty with pet care due to coordination and balance deficits. Post assessments, pt demonstrating improvements in R UE tip pinch, R UE lateral pinch, R UE proximal and distal MMT. Pt continues to demonstrate impairments in R UE [place], R UE in hand manipulation, R UE  strength. Pt achieved 2 STGs/LTGs.  Progress may be limited by pt performance of HEPs outside of therapy time-reinforced importance of this for neuro recovery and progression. Pt would benefit from continued OT services focusing on impairments for 6-8 more weeks. Pt educated on progress/therapy process and pt in understanding and agreement of recommendations. Recommending to continue HEP-provided pt with multiple coordination HEP handouts and recommended to perform 5x/week for 15 minute bouts to address coordination deficits. Also discussed typical MS progression and plateau periods. Pt verbalized understanding. Subjective  Occupational Profile  Pt resides with  and pets in private home. 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 Pt uses RW for ambulation and transfers inside and outside the home. Pt has shower seat and standard toilet. Has grab bars in bathroom. Pt is independent with ADLs. Pt reports  assists with IADLs due to difficulty. Pt receives assistance with household cleaning, meal prep. Pt is managing all medication independently. Pt has R knee brace, does not wear at all times-only with knee pain. Owns AFO, has not been wearing however will follow up with PT on this. PATIENT GOAL: "to become totally independent"     HISTORY OF PRESENT ILLNESS:   Pt is a 60 y.o. female who was referred to Occupational Therapy s/p  MS (multiple sclerosis) ([address] [facility]) Krystle Liu Pt presents to outpatient OT s/p chronic MS ~30 years ago. Pt has had short bouts of inpatient OT. Pt has not had any OP OT services frequently.      PMH:   Past Medical History:   Diagnosis Date   • Multiple sclerosis ([address] [facility])        Past Surgical Hx:   Past Surgical History:   Procedure Laterality Date   •  SECTION     • FOOT SURGERY Right    • KNEE SURGERY      R meniscus   • TRACHEOSTOMY          Pain:  Location: none, pt reports no pain     Objective  Impairment Observations:    NIKOLAS RIGGS Comments           UPPER EXTREMITY FUNCTION   Impaired Intact Dominant Hand: R UE pre MS, however uses L UE more frequently      /PINCH STRENGTH Dynamometer    - Gross Grasp 45 lbs 40 lbs abnormal - norm R UE: 55lbs     R UE REMAINED    Pinch Meter     - Pincer 7 lbs 2 lbs abnormal - R UE norm: 10lbs      R UE IMPROVED     - Tripod 7 lbs 4 lbs abnormal - R UE norm: 14.8lbs      R UE REMAINED     - Lateral 12 lbs  5 lbs abnormal - R UE norm: 15.5 lbs      R UE IMPROVED      MMT        Shoulder FF  5/5  5/5 B UE IMPROVED   Shoulder Ext  5/5  5/5 B UE IMPROVED   Shoulder Abduction  5/5  5/5 B UE IMPROVED   Shoulder Adduction  5/5  5/5 B UE IMPROVED   Elbow Flex  5/5  5/5 B UE IMPROVED   Elbow Ext  5/5  5/5 B UE IMPROVED   Wrist Flex  5/5  5/5 B UE IMPROVED   Wrist Ext  5/5  5/5 B UE IMPROVED   Gross Grasp  5/5  5/5 B UE IMPROVED     COORDINATION      Opposition intact intact NOT TESTED    Finger to Nose intact intact NOT TESTED    Rapid Alternating Movement Slower, purposeful  intact R UE REMAINED    9 Hole Peg Test-  assesses dexterity/fine motor coordination  43.04  seconds 27.53 seconds abnormal    Pt demonstrates decreased FMC compared to norms for age/sex (18.99 seconds)     R UE DECLINED    Fxnl Dexterity Test-assesses patient's ability to use the hand for daily tasks requiring a 3-jaw haydee prehension between the fingers and the thumb  1 minute 7  seconds 56 seconds abnormal    Pt demonstrates decreased dexterity compared to norms for age/sex (30 seconds for R UE, 31 seconds for L UE)    R UE DECLINED                     SHORT TERM GOALS (4-6 weeks )    GOAL  STATUS ON IE  GOAL STATUS    Pt will demo with G tolerance to standing exercise  x 8 minutes with minimal rest breaks required for increased engagement in life roles and weekly exercise regimen  PROGRESSING, CONTINUE GOAL        Pt will increase R UE  strength by 1lb to complete  IADLs 45lbs  PROGRESSING, CONTINUE GOAL        Pt will increase R UE tripod pinch strength by 1lbs to complete functional ADLs and IADLs with increased independence   7lbs PROGRESSING, CONTINUE GOAL        Pt will demonstrate improved R UE [place] as evidenced by performing 9 hole peg test with RUE in 32 seconds or less in order to increase independence with IADLs   35.87 seconds PROGRESSING, CONTINUE GOAL        Pt will demonstrate improved R UE dexterity as evidenced by performing functional dexterity test with RUE in 57 seconds or less in order to increase independence with IADLs   59.52 seconds PROGRESSING, CONTINUE GOAL      LONG TERM GOALS( 8-12 weeks)    GOAL  STATUS ON IE  GOAL STATUS     Pt will increase R UE proximal shoulder strength to 4+/5  through the use of strengthening exercises and HEP for improving performance during IADLs.   4/5 ACHIEVED         Pt will increase R UE distal forearm strength to 4+/5  through the use of strengthening exercises and HEP for improving performance during IADLs.  4/5 ACHIEVED        Pt will demo with G tolerance to standing exercise  x 10 minutes with minimal rest breaks required for increased engagement in life roles and weekly exercise regimen  PROGRESSING, CONTINUE GOAL        Pt will increase R UE  strength by 2lbs to complete  IADLs 45lbs PROGRESSING, CONTINUE GOAL        Pt will increase R UE tripod pinch strength by 2lbs to complete functional ADLs and IADLs with increased independence   7lbs PROGRESSING, CONTINUE GOAL          Pt will demo with G carryover of Home Exercise Program to improve functional progression towards goals in plan of care and for improved functional use of UE  PROGRESSING, CONTINUE GOAL      Pt will demonstrate improved R UE FMC as evidenced by performing 9 hole peg test with RUE in 30 seconds or less in order to increase independence with IADLs 35.87 seconds PROGRESSING, CONTINUE GOAL      Pt will demonstrate improved R UE dexterity as evidenced by performing functional dexterity test with RUE in 55 seconds or less in order to increase independence with IADLs 59.52 seconds PROGRESSING, CONTINUE GOAL            OTHER PLANNED THERAPY INTERVENTIONS: Supine, seated, and in stance neuro re-ed  Tricep AG  NMES/FES  FMC/prehension  Timed Trials  Manual tx  Hand to target  Sensory re-ed  Seated functional reach: crossing midline  Supine place and hold  WBearing strategies   Closed chain activities  Open chain activities  Internal and external memory aides  Multimatrix for saccades/ visual clutter/attention  Hypersensitivity strategies education  Multi-modal environment  Sustained/alternating/divided attention  Work stations with timed transitions  Temporal Awareness  Memory and mental manipulation  Auditory processing with immediate recall  Memory retention with immediate and delayed recall  Edu on cog/vision apps

## 2023-08-29 NOTE — PROGRESS NOTES
Daily Note     Today's date: 2023  Patient name: Jossy Nichols  : 1960  MRN: [id number]  Referring provider: Humphrey Kumar,*  Dx:   Encounter Diagnosis     ICD-10-CM    1. MS (multiple sclerosis) ([address])  G35       2. Hemiparesis of right dominant side, unspecified hemiparesis etiology ([address])  G81.91           Subjective: Pt reports nothing new since last session. Objective: See treatment diary below    NMR  SOLO 3 minutes on and 2 minutes off  Gait with solo step no AD  High knee marching with 10# TT  Side stepping no UE  STS  Bungee resisted to inc anterior weight shift  Step taps no UE onto river rocks  Neuro boxin,2,3,4,5,6 x 4 bouts standing still on river rocks in split stance    Assessment: Pt tolerated treatment well. Patient continues to benefit from circuit training to rico global fatigue. Focused on weight shifting onto RLE and anterior weight shifts with STS. Pt demo carryover with technique after exercises. Continue to progress as able. Will continue to progress as able. Patient would benefit from continued PT. Plan: Continue per plan of care. POC expires Auth Status Total   Visits  Start date  Expiration date PT/OT + Visit Limit?  Co-Insurance   23 required [facility] [place] 23 no No                                         Visit/Unit Tracking   AUTH Status: n/a Date 6/5 6/19 6/26 7/6 7/10  7/17 7/19 7/24 7/26  8/2  8/7 8/22    Visits  Authed: submitted Used                              Remaining

## 2023-08-31 ENCOUNTER — APPOINTMENT (OUTPATIENT)
Age: 63
End: 2023-08-31
Payer: COMMERCIAL

## 2023-09-05 ENCOUNTER — TELEPHONE (OUTPATIENT)
Dept: NEUROLOGY | Facility: CLINIC | Age: 63
End: 2023-09-05

## 2023-09-05 NOTE — TELEPHONE ENCOUNTER
Patient called back regarding scheduling with a provider in [facility]. Patient informed of previous information from encounters that Dr. Conchita Combs hasn't started yet and offered appointments in [address] [place] [address] or [facility] with Dr. Pino Cordova. Patient declined.

## 2023-09-06 ENCOUNTER — APPOINTMENT (OUTPATIENT)
Age: 63
End: 2023-09-06
Payer: COMMERCIAL

## 2023-09-11 ENCOUNTER — OFFICE VISIT (OUTPATIENT)
Age: 63
End: 2023-09-11
Payer: COMMERCIAL

## 2023-09-11 DIAGNOSIS — G81.91 HEMIPARESIS OF RIGHT DOMINANT SIDE, UNSPECIFIED HEMIPARESIS ETIOLOGY (HCC): ICD-10-CM

## 2023-09-11 DIAGNOSIS — G35 MS (MULTIPLE SCLEROSIS) (HCC): Primary | ICD-10-CM

## 2023-09-11 PROCEDURE — 97112 NEUROMUSCULAR REEDUCATION: CPT

## 2023-09-11 NOTE — PROGRESS NOTES
POC expires Auth Status Total   Visits  Start date  Expiration date PT/OT + Visit Limit? Co-Insurance   10/27 BOMN    BOMN Yes; 30 dollars                                            Visit/Unit Tracking  AUTH Status: Sari Lo Date  8/ 8           Visits  Authed:  Used 1 1 1 1 1 1 1 1 1 1 1 1            Remaining  11 10 9 8 7 6 5 4 3 2 1 (submited for auth today) 0 (submitted for auth)              PLAN OF CARE START: 23  PLAN OF CARE END: 10/27/23  PROGRESS NOTE DUE: follow up post auth   FREQUENCY: 1-2x/week for 8/12 weeks   PRECAUTIONS: fall/safety   DIAGNOSIS: MS, R side weakness   VISITS: 12  12        Daily Note     Today's date: 2023  Patient name: Jossy Nichols  : 1960  MRN: [id number]  Referring provider: Humphrey Kumar  Dx:   Encounter Diagnosis   Name Primary? • MS (multiple sclerosis) ([address]) Yes       Start Time:   Stop Time: 1315  Total time in clinic (min): 40 minutes    Subjective: Pt reports no changes since last visit. Objective: See treatment below. NMRE:  *BITS:   -Visual Motor: focused on visual motor integration, hand eye coordination,  skills. Performed standing on foam surface, Required min VC and multiple trials   Geoboards: flipping image horizontally by 90 degrees.     -Cognitive: to target sequencing, EF skills, recall, standing balance, R UE motor control. Performed standing, x1 trial on wood surface, x1 on foam surface. 2# weight added to R UE for increased challenge to promote muscle recruitment and to provide proprioceptive input. Kris Shea Required CG for balance, only able to tolerate or ~3 minutes, and min-mod VC for accurate sequencing.   -memory: field of 4-5 words total    -Visual scanning: to target visual scanning, saccadic eye movement, ocular motor tracking, standing balance, R UE motor control. Performed standing on foam surface.  Required CG for balance and min VC for direction following   Single target, user paced: with use of central fixation point. Assessment: Tolerated treatment well. Pt would benefit from continued OT services to address endurance, core stability, R UE  strength, proximal strength, R UE 3 point pinch, R UE FMC and in hand manipulation skills. Plan: Continued skilled OT per POC.

## 2023-09-11 NOTE — PROGRESS NOTES
Daily Note     Today's date: 2023  Patient name: Deric Fish  : 1960  MRN: [id number]  Referring provider: Ishan Estevez,*  Dx:   Encounter Diagnosis     ICD-10-CM    1. MS (multiple sclerosis) ([address])  G35       2. Hemiparesis of right dominant side, unspecified hemiparesis etiology ([address])  G81.91           Subjective: Pt reports nothing new since last session. 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 Objective: See treatment diary below    NMR  SOLO 3 minutes on and 2 minutes off  Neuro boxin,2,3,4,5,6 x 4 bouts standing still on river rocks in split stance    Outcome Measures Initial Eval  6/5 7/10 8/9  9/11       5xSTS 37.56 sec RW 24.98s RW 22.38s RW no UE  13.06s no UE RW       TUG 25.51 sec RW with brace       23.8s   with RW and without brace 30.3s with RW  28.85s with RW       10 meter 0.86 m/s RW   0.8m/s walking sticks  0.75m/s RW       6MWT NV   345ft with RW and GB with CS  RW and GB 350ft       ABC   29.398% 46.88% 50.63%            Assessment: Pt tolerated treatment well. Since last progress note, pt has demo improvements in funcitonal mobility independence and confidence, improved 5xSTS and TUG. She has demo continued difficulty with endurance noting fatigue and dec clearance of RLE. She is making progress towards all goals. Will continue to progress. Continued tx workign on standing balance and mobility. Patient continues to benefit from circuit training to rico global fatigue. Patient would benefit from continued PT.      Goals  Short Term Goals (4 weeks):    - Patient will improve time on TUG by 2.9 seconds to facilitate improved safety in all ambulation- MET  - Patient will be independent in basic HEP 2-3 weeks- MET  - Patient will improve 5xSTS score by 2.3 seconds to promote improved LE functional strength needed for ADLs- MET  - Pt will improve ABC to >/= 67%- ongoing     Long Term Goals (12 weeks):  - Patient will be independent in a comprehensive home exercise program - ongoing  - Patient will improve gait speed by 0.18 m/s to improve safety with community ambulation - ongoing  - Patient will improve scoring on FGA by 4 points to progress safety with dynamic tasks - ongoing  - Patient will be able to demonstrate HT in gait without veering - ongoing  - Patient will improve 6 Minute Walk Test score by 190 feet to promote improved cardiovascular endurance - ongoing  - Patient will report 50% reduction in near falls in order to improve safety with functional tasks and reduce his risk for falls - ongoing  - Patient will report going on walks at least 3 days per week to promote independence and improved cardiovascular endurance - ongoing  - Patient will be able to ascend/descend stairs reciprocally without UE assist to promote independence and safety with ADLs - ongoing  - Patient will report 50% reduction in near falls when ambulating on uneven terrain - ongoing       Plan: Continue per plan of care. POC expires Auth Status Total   Visits  Start date  Expiration date PT/OT + Visit Limit?  Co-Insurance   8/28/23 required BOMN 6/5/23 7/28/23 no No   11/1/23 8/9/23 11/1/23                                                            Visit/Unit Tracking   AUTH Status: n/a Date 6/5 6/19 6/26 7/6 7/10  7/17 7/19 7/24 7/26  8/2  8/7 8/22    Visits  Authed: submitted Used                              Remaining

## 2023-09-13 ENCOUNTER — OFFICE VISIT (OUTPATIENT)
Age: 63
End: 2023-09-13
Payer: COMMERCIAL

## 2023-09-13 ENCOUNTER — APPOINTMENT (OUTPATIENT)
Age: 63
End: 2023-09-13
Payer: COMMERCIAL

## 2023-09-13 DIAGNOSIS — G35 MS (MULTIPLE SCLEROSIS) (HCC): Primary | ICD-10-CM

## 2023-09-13 DIAGNOSIS — G81.91 HEMIPARESIS OF RIGHT DOMINANT SIDE, UNSPECIFIED HEMIPARESIS ETIOLOGY (HCC): ICD-10-CM

## 2023-09-13 PROCEDURE — 97112 NEUROMUSCULAR REEDUCATION: CPT

## 2023-09-13 NOTE — PROGRESS NOTES
POC expires Auth Status Total   Visits  Start date  Expiration date PT/OT + Visit Limit? Co-Insurance   10/27 BOMN    BOMN Yes; 30 dollars                                            Visit/Unit Tracking  AUTH Status: Emile Mcdonald Date  8/ 8          Visits  Authed:  Used 1 1 1 1 1 1 1 1 1 1 1 1 1           Remaining  11 10 9 8 7 6 5 4 3 2 1 (submited for auth today) 0 (submitted for auth)  Follow up next visit             PLAN OF CARE START: 23  PLAN OF CARE END: 10/27/23  PROGRESS NOTE DUE: follow up post auth   FREQUENCY: 1-2x/week for 8/12 weeks   PRECAUTIONS: fall/safety   DIAGNOSIS: MS, R side weakness   VISITS: follow up next visit         Daily Note     Today's date: 2023  Patient name: Deb Macias  : 1960  MRN: [id number]  Referring provider: Leslie Alonso  Dx:   Encounter Diagnosis   Name Primary? • MS (multiple sclerosis) ([address]) Yes                  Subjective: Pt reports no changes since last visit. Objective: See treatment below. NMRE:  *multi matrix seated tabletop-reach,grasp, stacking of large shaped blocks>top of small white lettered blocks in ascending letter order starting with A using R UE 2 point pinch. Placed figured blocks based on key provided. All blocks placed in horiziontal line from R<>L to incorporate visual scanning. 2# weight added to R UE for increased challenge to promote muscle recruitment and to provide proprioceptive input. ~50% dropping noted     *reach, grasp,release of large block from top of small block>bin at midline using R UE gross grasp with hand gripper (1 rubber band). 2# weight added to R UE for increased challenge to promote muscle recruitment and to provide proprioceptive input. ~50% dropping noted     *reach,grasp,stacking of small blocks vertically by 3 seated tabletop with R UE 2 point pinch.  2# weight added to R UE for increased challenge to promote muscle recruitment and to provide proprioceptive input. ~25% dropping noted. *reach,grasp,release of small blocks from stack of 3>bucket using R UE 3 point pinch with yellow resistive clothespin. Pt required VC for pacing. ~20% dropping noted. Assessment: Tolerated treatment well. Increase tremors in R UE noted today despite weight added to UE. Pt reports she did not eat breakfast. Pt would benefit from continued OT services to address endurance, core stability, R UE  strength, proximal strength, R UE 3 point pinch, R UE FMC and in hand manipulation skills. Plan: Continued skilled OT per POC.

## 2023-09-13 NOTE — PROGRESS NOTES
Daily Note     Today's date: 2023  Patient name: Dory Peña  : 1960  MRN: [id number]  Referring provider: Vincent Daily,*  Dx:   Encounter Diagnosis     ICD-10-CM    1. MS (multiple sclerosis) ([address])  G35       2. Hemiparesis of right dominant side, unspecified hemiparesis etiology ([address])  G81.91           Subjective: Pt reports nothing new since last session. Objective: See treatment diary below    NMR  SOLO 3 minutes on and 2 minutes off  Gait with solo step no AD  High knee marching with 10# TT  Side stepping no UE  STS  Bungee resisted to inc anterior weight shift  Step taps no UE onto river rocks  Trunk rotations 20# TT 3x10   Neuro boxin,2,3,4,5,6 x 4 bouts standing still on river rocks in split stance    Assessment: Pt tolerated treatment well. Continue to benefit from circuit training, noting increasing fatigue towards end of session. VC for inc anterior weight shifting with STS. Will continue to progress. NV continue boxing. Patient would benefit from continued PT. Plan: Continue per plan of care. POC expires Auth Status Total   Visits  Start date  Expiration date PT/OT + Visit Limit?  Co-Insurance   23 required [place] 23 no No                                         Visit/Unit Tracking   AUTH Status: n/a Date 6/5 6/19 6/26 7/6 7/10  7/17 7/19 7/24 7/26  8/2  8/7 8/22    Visits  Authed: submitted Used                              Remaining

## 2023-09-18 ENCOUNTER — OFFICE VISIT (OUTPATIENT)
Age: 63
End: 2023-09-18
Payer: COMMERCIAL

## 2023-09-18 DIAGNOSIS — G35 MS (MULTIPLE SCLEROSIS) (HCC): Primary | ICD-10-CM

## 2023-09-18 DIAGNOSIS — G35 MS (MULTIPLE SCLEROSIS) (HCC): ICD-10-CM

## 2023-09-18 DIAGNOSIS — G81.91 HEMIPARESIS OF RIGHT DOMINANT SIDE, UNSPECIFIED HEMIPARESIS ETIOLOGY (HCC): Primary | ICD-10-CM

## 2023-09-18 PROCEDURE — 97112 NEUROMUSCULAR REEDUCATION: CPT

## 2023-09-18 NOTE — PROGRESS NOTES
POC expires Auth Status Total   Visits  Start date  Expiration date PT/OT + Visit Limit? Co-Insurance   10/27 BOMN    BOMN Yes; 30 dollars                                            Visit/Unit Tracking  AUTH Status: Mannie Band Date          Visits  Authed:  Used 1 1 1 1 1 1 1 1 1 1 1 1 1 1          Remaining  11 10 9 8 7 6 5 4 3 2 1 (submited for auth today) 0 (submitted for auth)  Follow up next visit  7           PLAN OF CARE START: 23  PLAN OF CARE END: 10/27/23  PROGRESS NOTE DUE: follow up post scheduling  FREQUENCY: 1-2x/week for 8/12 weeks   PRECAUTIONS: fall/safety   DIAGNOSIS: MS, R side weakness   VISITS: 2 of 9         Daily Note     Today's date: 2023  Patient name: Dania Faria  : 1960  MRN: [id number]  Referring provider: Wayne Rinne  Dx:   Encounter Diagnosis   Name Primary? • MS (multiple sclerosis) ([address]) Yes       Start Time: 1230  Stop Time: 1313  Total time in clinic (min): 43 minutes    Subjective: Pt reports no changes since last visit. Objective: See treatment below. NMRE:  *performed the following exercises seated EOM on marilee disc focused on core stability, core strength, sitting balance, and proximal stability. x15-17 reps total   -modified russian twists, using 3kg weighted ball   -BUE shoulder flexion/extension using 3kg  weighted ball   -PNF D2 pattern using 3kg weighted ball     *IQ puzzler seated EOM on marilee disc,  starter level, 2 rounds. Focused on EF skills, R UE grasp/release, sitting balance,coordination, and  skills. Pt required increased time. ~15% dropping noted. Able to reach outside RYAN to L side to grasp piece with no LOB. 2# weight added to R UE for increased challenge to promote muscle recruitment and to provide proprioceptive input.      *reach,grasp,stacking of small wooden blocks vertically by 4 using R UE 2-3 point pinch followed by threading onto shoelace with B UEs. Focus on bimanual coordination, motor control. ~10% dropping noted with undershooting when placing block onto shoelace. 2# weight added to R UE for increased challenge to promote muscle recruitment and to provide proprioceptive input. Assessment: Tolerated treatment well. Pt would benefit from continued OT services to address endurance, core stability, R UE  strength, proximal strength, R UE 3 point pinch, R UE FMC and in hand manipulation skills. Plan: Continued skilled OT per POC.

## 2023-09-18 NOTE — PROGRESS NOTES
Daily Note     Today's date: 2023  Patient name: Guera Hanna  : 1960  MRN: [id number]  Referring provider: Tushar Zaman,*  Dx:   Encounter Diagnosis     ICD-10-CM    1. Hemiparesis of right dominant side, unspecified hemiparesis etiology ([address])  G81.91       2. MS (multiple sclerosis) ([address])  G35           Subjective: Pt reports that she feels tired this morning. She performed exercises at home earlier. Objective: See treatment diary below    NMR  SOLO 3 minutes on and 2 minutes off  Gait with solo step no AD  High knee marching with 10# TT  Side stepping no UE  STS  Bungee resisted to inc anterior weight shift  Step taps no UE onto river rocks  Trunk rotations 20# TT 3x10   Neuro boxin,2,3,4,5,6 x 4 bouts standing    Assessment: Pt tolerated treatment fair. Patient came in with increased fatigue due to performing exercises at home prior to session. Educated patient to not perform exercises the day of therapy. Patient did have increased difficulty performing exercises throughout session. Patient was unable to pick her RLE with marching. Patient did experience LOB twice with sit to stands with required assistance to recover. Will continue to progress. NV continue boxing. Patient would benefit from continued PT. Plan: Continue per plan of care. POC expires Auth Status Total   Visits  Start date  Expiration date PT/OT + Visit Limit?  Co-Insurance   23 required [facility] [place] 23 no No                                         Visit/Unit Tracking   AUTH Status: n/a Date 6/5 6/19 6/26 7/6 7/10  7/17 7/19 7/24 7/26  8/2  8/7 8/22    Visits  Authed: submitted Used                              Remaining

## 2023-09-20 ENCOUNTER — OFFICE VISIT (OUTPATIENT)
Age: 63
End: 2023-09-20
Payer: COMMERCIAL

## 2023-09-20 DIAGNOSIS — G81.91 HEMIPARESIS OF RIGHT DOMINANT SIDE, UNSPECIFIED HEMIPARESIS ETIOLOGY (HCC): Primary | ICD-10-CM

## 2023-09-20 DIAGNOSIS — G35 MS (MULTIPLE SCLEROSIS) (HCC): Primary | ICD-10-CM

## 2023-09-20 DIAGNOSIS — G35 MS (MULTIPLE SCLEROSIS) (HCC): ICD-10-CM

## 2023-09-20 PROCEDURE — 97112 NEUROMUSCULAR REEDUCATION: CPT

## 2023-09-20 NOTE — PROGRESS NOTES
Daily Note     Today's date: 2023  Patient name: Damaris Sanchez  : 1960  MRN: [id number]  Referring provider: Nabil Modi,*  Dx:   Encounter Diagnosis     ICD-10-CM    1. Hemiparesis of right dominant side, unspecified hemiparesis etiology ([address])  G81.91       2. MS (multiple sclerosis) ([address])  G35           Subjective: Pt reports that she feels good. She did not perform exercises at home prior to session. Objective: See treatment diary below    NMR  SOLO 3 minutes on and 2 minutes off  Gait with solo step no AD  High knee marching   Side stepping no UE  STS  Bungee resisted to inc anterior weight shift- deferred   Step taps no UE onto river rocks  Trunk rotations 20# TT 3x10 - deferred   Neuro boxin,2,3,4,5,6 x 4 bouts standing- deferred   Nustep 10 mins    Assessment: Pt tolerated treatment fair. Patient wanted to work on improving hip flexion and knee flexion. Focused on HKM, hurdles, and side stepping. Patient continues to have increased difficulty clearing 6 in hurdles. Patient was able to perform hurdles once flattened, progressed to every other 6in and flattened. Patient did well with step taps. Patient improved with side stepping experiencing no LOB. Will continue to progress. NV continue boxing. Patient would benefit from continued PT. Plan: Continue per plan of care. POC expires Auth Status Total   Visits  Start date  Expiration date PT/OT + Visit Limit?  Co-Insurance   23 required [facility] [place] 23 no No                                         Visit/Unit Tracking   AUTH Status: n/a Date 6/5 6/19 6/26 7/6 7/10  7/17 7/19 7/24 7/26  8/2  8/7 8/22    Visits  Authed: submitted Used                              Remaining

## 2023-09-20 NOTE — PROGRESS NOTES
POC expires Auth Status Total   Visits  Start date  Expiration date PT/OT + Visit Limit? Co-Insurance   10/27 BOMN    BOMN Yes; 30 dollars                                            Visit/Unit Tracking  AUTH Status: Blanka Faustiniff Date  8/ 8/        Visits  Authed:  Used 1 1 1 1 1 1 1 1 1 1 1 1 1 1 1         Remaining  11 10 9 8 7 6 5 4 3 2 1 (submited for auth today) 0 (submitted for auth)  Follow up next visit  7 6          PLAN OF CARE START: 23  PLAN OF CARE END: 10/27/23  PROGRESS NOTE DUE: follow up post scheduling  FREQUENCY: 1-2x/week for 8/12 weeks   PRECAUTIONS: fall/safety   DIAGNOSIS: MS, R side weakness   VISITS: 3 of 9         Daily Note     Today's date: 2023  Patient name: Melony Hsu  : 1960  MRN: [id number]  Referring provider: Ana Gaitan  Dx:   Encounter Diagnosis   Name Primary? • MS (multiple sclerosis) ([address]) Yes                  Subjective: Pt reports no changes since last visit. Objective: See treatment below. NMRE:  BITS: stance on airex, visual pursuits, rotator, 40#'s reverse sequence, central fixation/flash. Focus on RUE eye hand coordination, proximal RUE strength/endurance and proprioception: 95%, 3:59s. BITS: stance on airex, cognitive memory, 5 words, increasing, visual cue. Focus on dynamic reaching with RUE with added wrist weight for proprioceptive and strength purposes. BITS: visual motor, stance on ariex using stylus to trace and replicate simple shapes focusing on RUE with 1 lb wrist weight focusing on precision and accuracy of distal motor movement. Seated at table completing multi matrix alternating lettered blocks to transport on top of numbered blocks with red resistive clothespin. Focus on alternating attention, resistive pinch and proprioception with added 1 lbs wrist weight. Assessment: Tolerated treatment well.  Pt would benefit from continued OT services to address endurance, core stability, R UE  strength, proximal strength, R UE 3 point pinch, R UE FMC and in hand manipulation skills. Plan: Continued skilled OT per POC.

## 2023-09-25 ENCOUNTER — OFFICE VISIT (OUTPATIENT)
Age: 63
End: 2023-09-25
Payer: COMMERCIAL

## 2023-09-25 DIAGNOSIS — G81.91 HEMIPARESIS OF RIGHT DOMINANT SIDE, UNSPECIFIED HEMIPARESIS ETIOLOGY (HCC): Primary | ICD-10-CM

## 2023-09-25 DIAGNOSIS — G35 MS (MULTIPLE SCLEROSIS) (HCC): ICD-10-CM

## 2023-09-25 DIAGNOSIS — G35 MS (MULTIPLE SCLEROSIS) (HCC): Primary | ICD-10-CM

## 2023-09-25 PROCEDURE — 97112 NEUROMUSCULAR REEDUCATION: CPT

## 2023-09-25 NOTE — PROGRESS NOTES
POC expires Auth Status Total   Visits  Start date  Expiration date PT/OT + Visit Limit? Co-Insurance   10/27 BOMN    BOMN Yes; 30 dollars                                            Visit/Unit Tracking  AUTH Status: Warneren Dom Date  8       Visits  Authed:  Used 1 1 1 1 1 1 1 1 1 1 1 1 1 1 1 1        Remaining  11 10 9 8 7 6 5 4 3 2 1 (submited for auth today) 0 (submitted for auth)  Follow up next visit  7 6 5         PLAN OF CARE START: 23  PLAN OF CARE END: 10/27/23  PROGRESS NOTE DUE:10/11/23 (full re-eval for auth)   FREQUENCY: 1-2x/week for 8/12 weeks   PRECAUTIONS: fall/safety   DIAGNOSIS: MS, R side weakness   VISITS: 4 of 9         Daily Note     Today's date: 2023  Patient name: Radha Wyatt  : 1960  MRN: [id number]  Referring provider: Can Lepe  Dx:   Encounter Diagnosis   Name Primary? • MS (multiple sclerosis) ([address]) Yes       Start Time: 1233  Stop Time: 1315  Total time in clinic (min): 42 minutes    Subjective: Pt reports no changes since last visit. Objective: See treatment below. NMRE:  *reach,grasp,relese of "perfection pieces" R side of mat> board on L side outside RYAN using R UE 2-3 point pinch. Performed seated on marilee disc and feet on foam surface to challenge sitting balance. 3# weight added to R UE for increased challenge to promote muscle recruitment and to provide proprioceptive input. Emphasis on R UE motor control, coordination, balance, and  skills. *reach,grasp,release of playing cards R side outside RYAN>piles based on suit (filed of 4) to L side using R UE to transport. Performed seated on marilee disc and feet on foam surface to challenge sitting balance. 3# weight added to R UE for increased challenge to promote muscle recruitment and to provide proprioceptive input. Required min-mod VC for recall of categories. No LOB noted.      Assessment: Tolerated treatment well. Pt would benefit from continued OT services to address endurance, core stability, R UE  strength, proximal strength, R UE 3 point pinch, R UE FMC and in hand manipulation skills. Plan: Continued skilled OT per POC.

## 2023-09-25 NOTE — PROGRESS NOTES
Daily Note     Today's date: 2023  Patient name: Radha Wyatt  : 1960  MRN: [id number]  Referring provider: Can Lepe,*  Dx:   Encounter Diagnosis     ICD-10-CM    1. Hemiparesis of right dominant side, unspecified hemiparesis etiology ([address])  G81.91       2. MS (multiple sclerosis) ([address])  G35           Subjective: Pt reports that she feels good today. Patient feels more confident picking up objects off the ground. Objective: See treatment diary below    NMR  SOLO 3 minutes on and 2 minutes off  Gait with solo step no AD  High knee marching   Side stepping no UE  STS  Bungee resisted to inc anterior weight shift-  Step taps no UE onto river rocks  Trunk rotations 20# TT 3x10   Neuro boxin,2,3,4,5,6 x 4 bouts standing- deferred   Nustep 10 mins- deferred   Hurdles forward     Assessment: Pt tolerated treatment well. Patient arrived without brace this session. Patient had increased foot drag. Patient continues to have difficulty with getting her legs up high enough for marching. Patient improved with hurdles clearing 75% of the time. Will continue to progress. NV continue boxing. Patient would benefit from continued PT. Plan: Continue per plan of care. POC expires Auth Status Total   Visits  Start date  Expiration date PT/OT + Visit Limit?  Co-Insurance   23 required [facility] [place] 23 no No                                         Visit/Unit Tracking   AUTH Status: n/a Date 6/5 6/19 6/26 7/6 7/10  7/17 7/19 7/24 7/26  8    Visits  Authed: submitted Used                              Remaining

## 2023-09-27 ENCOUNTER — OFFICE VISIT (OUTPATIENT)
Age: 63
End: 2023-09-27
Payer: COMMERCIAL

## 2023-09-27 DIAGNOSIS — G35 MS (MULTIPLE SCLEROSIS) (HCC): ICD-10-CM

## 2023-09-27 DIAGNOSIS — G35 MS (MULTIPLE SCLEROSIS) (HCC): Primary | ICD-10-CM

## 2023-09-27 DIAGNOSIS — G81.91 HEMIPARESIS OF RIGHT DOMINANT SIDE, UNSPECIFIED HEMIPARESIS ETIOLOGY (HCC): Primary | ICD-10-CM

## 2023-09-27 PROCEDURE — 97112 NEUROMUSCULAR REEDUCATION: CPT

## 2023-09-27 PROCEDURE — 97110 THERAPEUTIC EXERCISES: CPT

## 2023-09-27 PROCEDURE — 97530 THERAPEUTIC ACTIVITIES: CPT

## 2023-09-27 NOTE — PROGRESS NOTES
POC expires Auth Status Total   Visits  Start date  Expiration date PT/OT + Visit Limit? Co-Insurance   10/27 BOMN    BOMN Yes; 30 dollars                                            Visit/Unit Tracking  AUTH Status: Compa Fore Date       Visits  Authed:  Used 1 1 1 1 1 1 1 1 1 1 1 1 1 1 1 1 1       Remaining  11 10 9 8 7 6 5 4 3 2 1 (submited for auth today) 0 (submitted for auth)  Follow up next visit  7 6 5 4        PLAN OF CARE START: 23  PLAN OF CARE END: 10/27/23  PROGRESS NOTE DUE:10/11/23 (full re-eval for auth)   FREQUENCY: 1-2x/week for 8/12 weeks   PRECAUTIONS: fall/safety   DIAGNOSIS: MS, R side weakness   VISITS: 5 of 9         Daily Note     Today's date: 2023  Patient name: Birdie Cuevas  : 1960  MRN: [id number]  Referring provider: Phil Rasmussen  Dx:   Encounter Diagnosis   Name Primary? • MS (multiple sclerosis) ([address]) Yes                  Subjective: Pt reports no changes since last visit. Objective: See treatment below. TA:  *Seated at table completing medium firm theraputty to pinch, push, pull, adduct and abduct, fingers to find hidden items within putty. Focus on distal strengthening and coordination. NMRE:  * Seated at table completing RUE in hand manipulation to complete palm to finger tip translation with added shoulder press to place in coin slot. 2 lbs wrist weight placed on RUE for added strength and proprioception purposes. * Seated dynamic reaching to right raised side table with red resistive clothespin to complete lettered block transport from shoulder height to table in front of pt in the backwards order. Min increased time to task focusing on shoulder stability, resistive pinch and motor control with cog integration. Assessment: Tolerated treatment well.  Pt would benefit from continued OT services to address endurance, core stability, R UE  strength, proximal strength, R UE 3 point pinch, R UE FMC and in hand manipulation skills. Plan: Continued skilled OT per POC.

## 2023-09-27 NOTE — PROGRESS NOTES
Daily Note     Today's date: 2023  Patient name: Olivier Infante  : 1960  MRN: [id number]  Referring provider: Merly Gillespie,*  Dx:   Encounter Diagnosis     ICD-10-CM    1. Hemiparesis of right dominant side, unspecified hemiparesis etiology ([address])  G81.91       2. MS (multiple sclerosis) ([address])  G35           Subjective: Pt reports that she feels good today. Patient feels more confident picking up objects off the ground. Objective: See treatment diary below    NMR  SOLO 3 minutes on and 2 minutes off  Gait with solo step no AD  High knee marching   Side stepping no UE - deferred   STS  Bungee resisted to inc anterior weight shift  Step taps no UE onto river rocks- deferred   Trunk rotations 20# TT 3x10 - deferred   Neuro boxin,2,3,4,5,6 x 4 bouts standing  Hurdles forward   hurdles lateral     TE:   Nustep 10 minutes monitored RPE throughout     Assessment: Pt tolerated treatment fair. Patient had increase difficulty with hurdles this session due to fatigue. Patient arrived without brace this session. Patient had increased foot drag. Patient does require cues with correct sequencing for boxing. Will continue to progress. Patient would benefit from continued PT. Plan: Continue per plan of care. POC expires Auth Status Total   Visits  Start date  Expiration date PT/OT + Visit Limit?  Co-Insurance   23 required [facility] [place] 23 no No                                         Visit/Unit Tracking   AUTH Status: n/a Date 6/5 6/19 6/26 7/6 7/10  7/17 7/19 7/24 7/26  8    Visits  Authed: submitted Used                              Remaining

## 2023-10-02 ENCOUNTER — OFFICE VISIT (OUTPATIENT)
Dept: FAMILY MEDICINE CLINIC | Facility: CLINIC | Age: 63
End: 2023-10-02
Payer: COMMERCIAL

## 2023-10-02 VITALS
WEIGHT: 207.25 LBS | TEMPERATURE: 97.5 F | BODY MASS INDEX: 36.72 KG/M2 | SYSTOLIC BLOOD PRESSURE: 120 MMHG | DIASTOLIC BLOOD PRESSURE: 66 MMHG | HEIGHT: 63 IN | HEART RATE: 76 BPM | OXYGEN SATURATION: 97 %

## 2023-10-02 DIAGNOSIS — F32.A DEPRESSION, UNSPECIFIED DEPRESSION TYPE: ICD-10-CM

## 2023-10-02 DIAGNOSIS — N39.0 URINARY TRACT INFECTION WITHOUT HEMATURIA, SITE UNSPECIFIED: Primary | ICD-10-CM

## 2023-10-02 LAB
SL AMB  POCT GLUCOSE, UA: ABNORMAL
SL AMB LEUKOCYTE ESTERASE,UA: ABNORMAL
SL AMB POCT BILIRUBIN,UA: ABNORMAL
SL AMB POCT BLOOD,UA: ABNORMAL
SL AMB POCT CLARITY,UA: ABNORMAL
SL AMB POCT COLOR,UA: YELLOW
SL AMB POCT KETONES,UA: ABNORMAL
SL AMB POCT NITRITE,UA: ABNORMAL
SL AMB POCT PH,UA: 5.5
SL AMB POCT SPECIFIC GRAVITY,UA: >=1.03
SL AMB POCT URINE PROTEIN: 100
SL AMB POCT UROBILINOGEN: 0.2

## 2023-10-02 PROCEDURE — 99213 OFFICE O/P EST LOW 20 MIN: CPT | Performed by: STUDENT IN AN ORGANIZED HEALTH CARE EDUCATION/TRAINING PROGRAM

## 2023-10-02 PROCEDURE — 81003 URINALYSIS AUTO W/O SCOPE: CPT | Performed by: STUDENT IN AN ORGANIZED HEALTH CARE EDUCATION/TRAINING PROGRAM

## 2023-10-02 PROCEDURE — 87186 SC STD MICRODIL/AGAR DIL: CPT | Performed by: STUDENT IN AN ORGANIZED HEALTH CARE EDUCATION/TRAINING PROGRAM

## 2023-10-02 PROCEDURE — 87086 URINE CULTURE/COLONY COUNT: CPT | Performed by: STUDENT IN AN ORGANIZED HEALTH CARE EDUCATION/TRAINING PROGRAM

## 2023-10-02 PROCEDURE — 87181 SC STD AGAR DILUTION PER AGT: CPT | Performed by: STUDENT IN AN ORGANIZED HEALTH CARE EDUCATION/TRAINING PROGRAM

## 2023-10-02 PROCEDURE — 87077 CULTURE AEROBIC IDENTIFY: CPT | Performed by: STUDENT IN AN ORGANIZED HEALTH CARE EDUCATION/TRAINING PROGRAM

## 2023-10-02 RX ORDER — CIPROFLOXACIN 500 MG/1
500 TABLET, FILM COATED ORAL EVERY 12 HOURS SCHEDULED
Qty: 20 TABLET | Refills: 0 | Status: SHIPPED | OUTPATIENT
Start: 2023-10-02 | End: 2023-10-12

## 2023-10-02 RX ORDER — FLUOXETINE 10 MG/1
10 CAPSULE ORAL DAILY
Qty: 30 CAPSULE | Refills: 0 | Status: SHIPPED | OUTPATIENT
Start: 2023-10-02

## 2023-10-02 NOTE — PROGRESS NOTES
Name: Jeanne Lewis      : 1960      MRN: [id number]  Encounter Provider: Reuben Perez DO  Encounter Date: 10/2/2023   Encounter department: [address]     1. Urinary tract infection without hematuria, site unspecified  Assessment & Plan:  History of recurrent UTIs last UTI in 2023. We will treat with Cipro twice daily x10 days. I have referred patient to urogynecology for further evaluation and management. POCT UA showing Moderate blood and Small Leuks  Send urine for culture, will follow-up pending results. Orders:  -     POCT urine dip auto non-scope  -     Urine culture; Future; Expected date: 10/02/2023  -     ciprofloxacin (CIPRO) 500 mg tablet; Take 1 tablet (500 mg total) by mouth every 12 (twelve) hours for 10 days  -     Ambulatory Referral to Urogynecology; Future  -     Urine culture    2. Depression, unspecified depression type  Assessment & Plan:  Trial of wellbutrin failed, will discontinue and start Prozac 10mg daily    Orders:  -     FLUoxetine (PROzac) 10 mg capsule; Take 1 capsule (10 mg total) by mouth daily         Subjective      Urinary Tract Infection   Associated symptoms include frequency. Pertinent negatives include no chills, nausea or vomiting. Review of Systems   Constitutional: Positive for fever. Negative for chills. HENT: Positive for ear pain. Negative for congestion. Respiratory: Negative for cough and shortness of breath. Cardiovascular: Negative for chest pain and palpitations. Gastrointestinal: Negative for abdominal pain, constipation, diarrhea, nausea and vomiting. Genitourinary: Positive for dysuria and frequency. Neurological: Negative for dizziness and headaches.        Current Outpatient Medications on File Prior to Visit   Medication Sig   • albuterol (Ventolin HFA) 90 mcg/act inhaler Inhale 2 puffs every 6 (six) hours as needed for wheezing   • IBUPROFEN PO Take by mouth   • meloxicam (MOBIC) 15 mg tablet Take 1 tablet (15 mg total) by mouth daily   • [DISCONTINUED] buPROPion (WELLBUTRIN XL) 150 mg 24 hr tablet TAKE 1 TABLET BY MOUTH EVERY DAY IN THE MORNING (Patient not taking: Reported on 7/31/2023)   • [DISCONTINUED] loratadine (CLARITIN) 10 mg tablet Take 1 tablet (10 mg total) by mouth daily (Patient not taking: Reported on 10/2/2023)   • [DISCONTINUED] meloxicam (Mobic) 15 mg tablet Take 1 tablet (15 mg total) by mouth daily (Patient not taking: Reported on 10/2/2023)       Objective     /66 (BP Location: Left arm, Patient Position: Sitting, Cuff Size: Large)   Pulse 76   Temp 97.5 °F (36.4 °C) (Temporal)   Ht 5' 3" (1.6 m)   Wt 94 kg (207 lb 4 oz)   SpO2 97%   BMI 36.71 kg/m²     Physical Exam  Vitals reviewed. Constitutional:       Appearance: Normal appearance. HENT:      Head: Normocephalic and atraumatic. Right Ear: Tympanic membrane normal. There is no impacted cerumen. Left Ear: Tympanic membrane normal. There is no impacted cerumen. Mouth/Throat:      Mouth: Mucous membranes are moist.      Pharynx: No oropharyngeal exudate or posterior oropharyngeal erythema. Eyes:      Extraocular Movements: Extraocular movements intact. Cardiovascular:      Rate and Rhythm: Normal rate and regular rhythm. Heart sounds: Normal heart sounds. Pulmonary:      Breath sounds: Normal breath sounds. Neurological:      Mental Status: She is alert and oriented to person, place, and time.       Gait: Gait abnormal.      Comments: Ambulating with walker       Chari Coffey DO

## 2023-10-02 NOTE — ASSESSMENT & PLAN NOTE
History of recurrent UTIs last UTI in July 2023. We will treat with Cipro twice daily x10 days. I have referred patient to urogynecology for further evaluation and management. POCT UA showing Moderate blood and Small Leuks  Send urine for culture, will follow-up pending results.

## 2023-10-04 ENCOUNTER — EVALUATION (OUTPATIENT)
Age: 63
End: 2023-10-04
Payer: COMMERCIAL

## 2023-10-04 DIAGNOSIS — G81.91 HEMIPARESIS OF RIGHT DOMINANT SIDE, UNSPECIFIED HEMIPARESIS ETIOLOGY (HCC): ICD-10-CM

## 2023-10-04 DIAGNOSIS — G35 MS (MULTIPLE SCLEROSIS) (HCC): Primary | ICD-10-CM

## 2023-10-04 PROCEDURE — 97164 PT RE-EVAL EST PLAN CARE: CPT

## 2023-10-04 PROCEDURE — 97112 NEUROMUSCULAR REEDUCATION: CPT

## 2023-10-04 PROCEDURE — 97530 THERAPEUTIC ACTIVITIES: CPT

## 2023-10-04 PROCEDURE — 97168 OT RE-EVAL EST PLAN CARE: CPT

## 2023-10-04 NOTE — PROGRESS NOTES
Re-Evaluation     Today's date: 10/4/2023  Patient name: Edwin Cleveland  : 1960  MRN: [id number]  Referring provider: Karly Mccain,*  Dx:   Encounter Diagnosis     ICD-10-CM    1. MS (multiple sclerosis) ([address])  G35       2. Hemiparesis of right dominant side, unspecified hemiparesis etiology ([facility])  G81.91           Subjective: Pt reports she had a bad UTI- is feeling better today a little tired. Arrived with walking sticks    History of Present Illness (from 23): Pt reports that she was making progress after seeing neuro PT. She went to the gym and was working on her strength. Pt reports she uses the RW. She watches her grandchild 2x/week. She has reported fatigue that carries over days due to the MS. She uses her total gym and bike at home and stretches. She has good and bad days but it can change day to day. She recently had a medication change- she has f/u with neurologist. Pt wants to walk and be as functional as possible, get "her life back." She feels that its not necessarily from a relapse. Independent with ADLs, requires assist with  iADLs of cleaning. She has R AFO that she received "3-4 months ago" and has not been wearing because she does not feel that it is helping.     Objective: See treatment diary below  MMT  - R Hip Flexion: 2+/5               - L Hip Flexion: 4-/5  - R Knee Extension: 4-/5        - L Knee Extension: 4-/5  - R Knee Flexion: 4/5              - L Knee Flexion: 4/5  - R Ankle DF: 3-/5                   - L Ankle DF: 4/5  - R Ankle PF: 4-/5                   - L Ankle PF: 4/5    Outcome Measures Initial Eval  6/5 7/10 8/9  9/11  10/4     5xSTS 37.56 sec RW 24.98s RW 22.38s RW no UE  13.06s no UE RW  12.58s no UE     TUG 25.51 sec RW with brace       23.8s   with RW and without brace 30.3s with RW  28.85s with RW  39.8s with walking sticks    26.6s with RW         10 meter 0.86 m/s RW   0.8m/s walking sticks  0.75m/s RW  0.8m/s      6MWT NV   345ft with RW and GB with CS  RW and GB 350ft  364ft RW      ABC   29.398% 46.88% 50.63%  48.75%        NMR  SOLO step walking with walking sticks 100ft x2  Solo step ambualtion with RW 100ft x2    Patient education    Assessment: Patient is a 59 y/o Female who has been making steady gains in skilled outpatient PT since 6/5 with reported >2 falls. However, high risk for falls remains as she has demonstrated results below age matched normative values for the following outcome measures: TUG, gait speed, 10mWT, 6mWT, ABC. She demo dec LE strength, gait dysfunction, dec balance. She has made steady improvements in all areas with continued PT. At this time the patient is appropriate to transition to maintenance skilled PT to maintain current gains and prevent regression. Regression is likely, due to history or regression noted, history of falls with injury, sedentary lifestyle, presence of neurodegenerative/neurocognitive disorder, no access at home to fall prevention system to perform HEP and limited ability for family member/caregiver to assist with HEP. Both the patient and caregiver would be at risk for increased injury if patient had LOB or fall due to inadequate safety equipment, which could lead to hospitalization and increased healthcare costs. New goals have been created below to reflect new focus of maintenance therapy. Pt and  in agreement with POC. Significant amount of time spent completing patient education on POC, HEP, circuit training at home, brace utilization for energy conservation. Liza vsStanford Calderon (2013): a [facility] decision that sought to clarify that Medicare will in fact cover skilled therapy services to maintain a patient’s current condition or prevent/slow further deterioration. Patient verbalized understanding of POC. Please contact me if you have any questions or recommendations. Thank you for the referral and the opportunity to share in [email or web address] care.     Goals  Short Term Goals (4 weeks):    - Patient will improve time on TUG by 2.9 seconds to facilitate improved safety in all ambulation- MET  - Patient will be independent in basic HEP 2-3 weeks- MET  - Patient will improve 5xSTS score by 2.3 seconds to promote improved LE functional strength needed for ADLs- MET  - Pt will improve ABC to >/= 67%- ongoing  Long Term Goals (12 weeks):  - Patient will be independent in a comprehensive home exercise program - ongoing  - Patient will improve gait speed by 0.18 m/s to improve safety with community ambulation - ongoing  - Patient will improve scoring on FGA by 4 points to progress safety with dynamic tasks - ongoing  - Patient will be able to demonstrate HT in gait without veering - ongoing  - Patient will improve 6 Minute Walk Test score by 190 feet to promote improved cardiovascular endurance - ongoing  - Patient will report 50% reduction in near falls in order to improve safety with functional tasks and reduce his risk for falls - ongoing  - Patient will report going on walks at least 3 days per week to promote independence and improved cardiovascular endurance - ongoing  - Patient will be able to ascend/descend stairs reciprocally without UE assist to promote independence and safety with ADLs - ongoing  - Patient will report 50% reduction in near falls when ambulating on uneven terrain - ongoing     Goals (16 weeks): NEW Maintenance goals  mbulate outdoors on uneven surfaces with limited assistance to facilitate safe community ambulation  - Patient will maintain gait speed per 10 meter walk test at 0.8 m/s to facilitate continued safety with community ambulation  - Patient will continue to score at least 12.58s on TUG to maintain fall risk status to reduce risk of injury  - Patient will be able to ambulate at least 364 ft during 6 Minute Walk Test to maintain current cardiovascular capacity  - Patient will attend PT 1x/week with focus on balance and gait training to maintain functional capacity  - Patient will remain at 26.6 seconds on TUG and TUG variations with LRAD to maintain fall risk status    Plan: Continue per plan of care. Transitioning to 1x/week     POC expires Auth Status Total   Visits  Start date  Expiration date PT/OT + Visit Limit?  Co-Insurance   8/28/23 required BOMN 6/5/23 7/28/23 no No   11/1/23 8/9/23 11/1/23 1/4/24 rubmitted BOMN 10/4/23 1/4/24 yes no

## 2023-10-04 NOTE — PROGRESS NOTES
OCCUPATIONAL THERAPY RE-EVALUATION    POC expires Auth Status Total   Visits  Start date  Expiration date PT/OT + Visit Limit? Co-Insurance   10/27 BOMN    BOMN Yes; 30 dollars                                            Visit/Unit Tracking  AUTH Status: Wayne Rich Date  8/ 8/ 9/11 9/13 9/18 9/20 9/25 9/27 10/4     Visits  Authed:  Used 1 1 1 1 1 1 1 1 1 1 1 1 1 1 1 1 1 1 (re-eval)       Remaining  11 10 9 8 7 6 5 4 3 2 1 (submited for auth today) 0 (submitted for auth)  Follow up next visit  7 6 5 4 Follow up post Guy Shefali START: 23  PLAN OF CARE END:24  PROGRESS NOTE DUE: follow up post visit auth   FREQUENCY: 1-2x/week for 8/12 weeks   PRECAUTIONS: fall/safety   DIAGNOSIS: MS, R side weakness, MAINTAINENCE PROGRAM  VISITS: follow up post visit auth    Daily Note     Today's date: 10/4/2023  Patient name: Guerline Brown  : 1960  MRN: [id number]  Referring provider: April Diallo  Dx:   Encounter Diagnosis   Name Primary? • MS (multiple sclerosis) ([address]) Yes                    Today's Date: 10/4/2023  Patient Name: Guerline Brown  : 1960  MRN: [id number]  Referring Provider: April Diallo,*  Dx: MS (multiple sclerosis) [facility]    SKILLED ANALYSIS:  Pt presents to re-evaluation on 10/4/23 status post R side weakness secondary to chronic MS. As per interview, pt reports improvements in pinch strength, writing/tracing, and opening/closing containers since evaluation. Pt reports impairments in  strength-especially when squeezing shampoo bottles during bathing and still has difficulty performing IADLs for longer periods of time without seated rest breaks. Pt continues to have difficulty with pet care due to coordination and balance deficits. Post assessments, pt demonstrating improvements in R UE 3 point pinch strength, coordination, and in hand manipulation from prior testing.   Pt continues to demonstrate impairments in R UE  strength, pinch strength, and coordination overall. Progress may be limited by pt performance of HEPs outside of therapy time due to life roles/circumstances-reinforced importance of this for neuro recovery and progression. Pt would benefit from transitioning to maintenance program due to nature of deficits and progression in current therapy program.  Liza Calderon (2013) is a [facility] decision that clarified that Medicare will in fact cover skilled therapy services to maintain a patient’s current condition or prevent/slow further deterioration. Pt would benefit from maintenance OT services focusing on impairments for the next 8-12 weeks. Pt educated on OT services and maintianence. Pt educated on progress/therapy process and pt in understanding and agreement of recommendations. Recommending to continue HEP. Also continued to  Discuss typical MS progression and plateau periods. Pt verbalized understanding. Subjective  Occupational Profile  Pt resides with  and pets in private home. 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 Pt uses RW for ambulation and transfers inside and outside the home. Pt has shower seat and standard toilet. Has grab bars in bathroom. Pt is independent with ADLs. Pt reports  assists with IADLs due to difficulty. Pt receives assistance with household cleaning, meal prep. Pt is managing all medication independently. Pt has R knee brace, does not wear at all times-only with knee pain. Owns AFO, has not been wearing however will follow up with PT on this. PATIENT GOAL: "to become totally independent"     HISTORY OF PRESENT ILLNESS:   Pt is a 60 y.o. female who was referred to Occupational Therapy s/p  MS (multiple sclerosis) ([address] [facility]) Jodie Lopez Pt presents to outpatient OT s/p chronic MS ~30 years ago. Pt has had short bouts of inpatient OT. Pt has not had any OP OT services frequently.      PMH:   Past Medical History:   Diagnosis Date   • Multiple sclerosis ([address])        Past Surgical Hx:   Past Surgical History:   Procedure Laterality Date   •  SECTION     • FOOT SURGERY Right    • KNEE SURGERY      R meniscus   • TRACHEOSTOMY            Objective  Impairment Observations:    NIKOLAS RIGGS Comments           UPPER EXTREMITY FUNCTION   Impaired Intact Dominant Hand: R UE pre MS, however uses L UE more frequently      /PINCH STRENGTH             Dynamometer    - Gross Grasp 44 lbs 38 lbs abnormal - norm R UE: 55lbs     R UE REMAINED    Pinch Meter     - Pincer 7 lbs 2 lbs abnormal - R UE norm: 10lbs      R UE REMAINED     - Tripod 8 lbs 4 lbs abnormal - R UE norm: 14.8lbs      R UE IMPROVED BY 1     - Lateral 11 lbs  5 lbs abnormal - R UE norm: 15.5 lbs      R UE DECLINED BY 1      MMT        Shoulder FF  5/5  5/5 NOT TESTED   Shoulder Ext  5/5  5/5 NOT TESTED   Shoulder Abduction  5/5  5/5 NOT TESTED   Shoulder Adduction  5/5  5/5 NOT TESTED   Elbow Flex  5/5  5/5 NOT TESTED   Elbow Ext  5/5  5/5 NOT TESTED   Wrist Flex  5/5  5/5 NOT TESTED   Wrist Ext  5/5  5/5 NOT TESTED   Gross Grasp  5/5  5/5 NOT TESTED     COORDINATION      Opposition intact intact NOT TESTED    Finger to Nose intact intact NOT TESTED    Rapid Alternating Movement Slower, purposeful  intact R UE REMAINED    9 Hole Peg Test-  assesses dexterity/fine motor coordination  41 seconds 27.53 seconds abnormal    Pt demonstrates decreased FMC compared to norms for age/sex (18.99 seconds)     R UE IMPROVED BY 3 SECONDS    Fxnl Dexterity Test-assesses patient's ability to use the hand for daily tasks requiring a 3-jaw haydee prehension between the fingers and the thumb  1 minute 3  seconds 56 seconds abnormal    Pt demonstrates decreased dexterity compared to norms for age/sex (30 seconds for R UE, 31 seconds for L UE)    R UE IMPROVED BY 4 SECONDS                    SHORT TERM GOALS (4-6 weeks )    GOAL  STATUS ON IE  GOAL STATUS    Pt will demo with G tolerance to standing exercise  x 8 minutes with minimal rest breaks required for increased engagement in life roles and weekly exercise regimen  DISCONTINUE           Pt will increase R UE  strength by 1lb to complete  IADLs 45lbs  DISCONTINUE       Pt will increase R UE tripod pinch strength by 1lbs to complete functional ADLs and IADLs with increased independence   7lbs DISCONTINUE          Pt will demonstrate improved R UE FMC as evidenced by performing 9 hole peg test with RUE in 38 seconds or less in order to increase independence with IADLs 35.87 seconds DISCONTINUE     Pt will demonstrate improved R UE dexterity as evidenced by performing functional dexterity test with RUE in 57 seconds or less in order to increase independence with IADLs   59.52 seconds DISCONTINUE     LONG TERM GOALS( 8-12 weeks)    GOAL  STATUS ON IE  GOAL STATUS     Pt will increase R UE proximal shoulder strength to 4+/5  through the use of strengthening exercises and HEP for improving performance during IADLs.   4/5 DISCONTINUE      Pt will increase R UE distal forearm strength to 4+/5  through the use of strengthening exercises and HEP for improving performance during IADLs.  4/5 DISCONTINUE        Pt will demo with G tolerance to standing exercise  x 10 minutes with minimal rest breaks required for increased engagement in life roles and weekly exercise regimen  DISCONTINUE       Pt will increase R UE  strength by 2lbs to complete  IADLs 45lbs DISCONTINUE       Pt will increase R UE tripod pinch strength by 2lbs to complete functional ADLs and IADLs with increased independence   7lbs DISCONTINUE         Pt will demo with G carryover of Home Exercise Program to improve functional progression towards goals in plan of care and for improved functional use of UE  DISCONTINUE       Pt will demonstrate improved R UE FMC as evidenced by performing 9 hole peg test with RUE in 35 seconds or less in order to increase independence with IADLs 35.87 seconds DISCONTINUE     Pt will demonstrate improved R UE dexterity as evidenced by performing functional dexterity test with RUE in 55 seconds or less in order to increase independence with IADLs 59.52 seconds DISCONTINUE          NEW GOALS TO REFLECT MAINTAINENCE PROGRAM:   GOALS    GOAL  STATUS ON IE  GOAL STATUS    Pt will maitain R UE  strength at 44 lbs (+/- 3lbs) to complete  IADLs 44lbs  PROGRESSING, CONTINUE GOAL        Pt will maintain R UE tripod pinch strength at 8lbs (+/-3lbs) to complete functional ADLs and IADLs with increased independence   8lbs PROGRESSING, CONTINUE GOAL            Pt will maintain R UE lateral pinch strength at 11lbs (+/-3lbs) to complete functional ADLs and IADLs with increased independence   11lbs PROGRESSING, CONTINUE GOAL      Pt will maintain R UE FMC as evidenced by performing 9 hole peg test with RUE in 41 seconds or less (+/- 5 seconds)  in order to increase independence with IADLs   41 seconds  PROGRESSING, CONTINUE GOAL        Pt will demo with G tolerance to standing exercise  x 10 minutes with minimal rest breaks required for maintained engagement in life roles and weekly exercise regimen  PROGRESSING, CONTINUE GOAL      Pt will maintain improved R UE dexterity as evidenced by performing functional dexterity test with RUE in 1 minute and 3 seconds or less (-/- 5 seconds) in order to increase independence with IADLs   1 minute and 3 seconds PROGRESSING, CONTINUE GOAL        NMRE:   *SCI FIT seated using UE/LEs, x10 minutes on level 3 resistance. Focused on endurance, activity tolerance, ROM, motor control, and functional coordination.      OTHER PLANNED THERAPY INTERVENTIONS:   Supine, seated, and in stance neuro re-ed  Tricep AG  NMES/FES  FMC/prehension  Timed Trials  Manual tx  Hand to target  Sensory re-ed  Seated functional reach: crossing midline  Supine place and hold  WBearing strategies   Closed chain activities  Open chain activities  Internal and external memory aides  Multimatrix for saccades/ visual clutter/attention  Hypersensitivity strategies education  Multi-modal environment  Sustained/alternating/divided attention  Work stations with timed transitions  Temporal Awareness  Memory and mental manipulation  Auditory processing with immediate recall  Memory retention with immediate and delayed recall  Edu on cog/vision apps

## 2023-10-05 LAB — BACTERIA UR CULT: ABNORMAL

## 2023-10-06 ENCOUNTER — TELEPHONE (OUTPATIENT)
Dept: NEUROLOGY | Facility: CLINIC | Age: 63
End: 2023-10-06

## 2023-10-06 NOTE — TELEPHONE ENCOUNTER
Called patient left voicemail to confirm appointment with provider at our [place] location. Left call center number to call back.

## 2023-10-10 ENCOUNTER — OFFICE VISIT (OUTPATIENT)
Dept: NEUROLOGY | Facility: CLINIC | Age: 63
End: 2023-10-10
Payer: COMMERCIAL

## 2023-10-10 VITALS
WEIGHT: 210 LBS | BODY MASS INDEX: 37.21 KG/M2 | HEART RATE: 89 BPM | SYSTOLIC BLOOD PRESSURE: 120 MMHG | HEIGHT: 63 IN | DIASTOLIC BLOOD PRESSURE: 69 MMHG

## 2023-10-10 DIAGNOSIS — R39.9 URINARY SYMPTOM OR SIGN: ICD-10-CM

## 2023-10-10 DIAGNOSIS — R53.1 RIGHT SIDED WEAKNESS: ICD-10-CM

## 2023-10-10 DIAGNOSIS — R26.2 AMBULATORY DYSFUNCTION: ICD-10-CM

## 2023-10-10 DIAGNOSIS — G35 MS (MULTIPLE SCLEROSIS) (HCC): Primary | ICD-10-CM

## 2023-10-10 PROCEDURE — 99215 OFFICE O/P EST HI 40 MIN: CPT | Performed by: PSYCHIATRY & NEUROLOGY

## 2023-10-10 PROCEDURE — 99417 PROLNG OP E/M EACH 15 MIN: CPT | Performed by: PSYCHIATRY & NEUROLOGY

## 2023-10-10 RX ORDER — LORAZEPAM 0.5 MG/1
0.5 TABLET ORAL AS NEEDED
Qty: 4 TABLET | Refills: 0 | Status: SHIPPED | OUTPATIENT
Start: 2023-10-10

## 2023-10-10 RX ORDER — DALFAMPRIDINE 10 MG/1
10 TABLET, FILM COATED, EXTENDED RELEASE ORAL 2 TIMES DAILY
Qty: 180 TABLET | Refills: 3 | Status: SHIPPED | OUTPATIENT
Start: 2023-10-10

## 2023-10-10 RX ORDER — OXYBUTYNIN CHLORIDE 10 MG/1
10 TABLET, EXTENDED RELEASE ORAL
Qty: 90 TABLET | Refills: 3 | Status: SHIPPED | OUTPATIENT
Start: 2023-10-10 | End: 2023-10-17 | Stop reason: SDUPTHER

## 2023-10-10 NOTE — PATIENT INSTRUCTIONS
Mediterranean diet -- There is no single definition of a Mediterranean diet, but such diets are typically high in fruits, vegetables, whole grains, beans, nuts, and seeds; include olive oil as an important source of monounsaturated fat; and allow low to moderate wine consumption. There are typically low to moderate amounts of fish, poultry, and dairy products, with little red meat. The Mediterranean diet is associated with several health benefits; however, it remains uncertain which components of the Mediterranean diet offer the protective benefit or if the benefits result from an aggregation of effects. The Mediterranean diet incorporates the basics of healthy eating -- plus a splash of flavorful olive oil and perhaps a glass of red wine -- among other components characterizing the traditional cooking style of countries bordering the [facility]. Most healthy diets include fruits, vegetables, fish and whole grains, and limit unhealthy fats. While these parts of a healthy diet are tried-and-true, subtle variations or differences in proportions of certain foods may make a difference in your risk of heart disease. Benefits of the [address] [place] [address] has shown that the traditional Mediterranean diet reduces the risk of heart disease. The diet has been associated with a lower level of oxidized low-density lipoprotein (LDL) cholesterol -- the "bad" cholesterol that's more likely to build up deposits in your arteries. In fact, a meta-analysis of more than 1.5 million healthy adults demonstrated that following a Mediterranean diet was associated with a reduced risk of cardiovascular mortality as well as overall mortality. The Mediterranean diet is also associated with a reduced incidence of cancer, and Parkinson's and Alzheimer's diseases. Women who eat a Mediterranean diet supplemented with extra-virgin olive oil and mixed nuts may have a reduced risk of breast cancer.   For these reasons, most if not all major scientific organizations encourage healthy adults to adapt a style of eating like that of the [address] for prevention of major chronic diseases. Key components of the Mediterranean diet  The Mediterranean diet emphasizes:  Eating primarily plant-based foods, such as fruits and vegetables, whole grains, legumes and nuts   Replacing butter with healthy fats such as olive oil and canola oil   Using herbs and spices instead of salt to flavor foods   Limiting red meat to no more than a few times a month   Eating fish and poultry at least twice a week   Enjoying meals with family and friends   Drinking red wine in moderation (optional)   Getting plenty of exercise      If you're looking for a heart-healthy eating plan, the Mediterranean diet might be right for you. The Mediterranean diet incorporates the basics of healthy eating -- plus a splash of flavorful olive oil and perhaps even a glass of red wine -- among other components characterizing the traditional cooking style of countries bordering the [facility]. Most healthy diets include fruits, vegetables, fish and whole grains, and limit unhealthy fats. While these parts of a healthy diet remain tried-and-true, subtle variations or differences in proportions of certain foods may make a difference in your risk of heart disease. Research has shown that the traditional Mediterranean diet reduces the risk of heart disease. In fact, an analysis of more than 1.5 million healthy adults demonstrated that following a Mediterranean diet was associated with a reduced risk of death from heart disease and cancer, as well as a reduced incidence of Parkinson's and Alzheimer's diseases. The Dietary Guidelines for Americans recommends the Mediterranean diet as an eating plan that can help promote health and prevent disease. And the Mediterranean diet is one your whole family can follow for good health.    The Mediterranean diet emphasizes:  Eating primarily plant-based foods, such as fruits and vegetables, whole grains,  legumes and nuts  Replacing butter with healthy fats, such as olive oil  Using herbs and spices instead of salt to flavor foods  Limiting red meat to no more than a few times a month  Eating fish and poultry at least twice a week  Drinking red wine in moderation (optional)     The diet also recognizes the importance of being physically active, and enjoying meals with family and friends. The Mediterranean diet traditionally includes fruits, vegetables and grains. For example, residents of [facility] average six or more servings a day of antioxidant rich fruits and vegetables. Grains in the [address] [place] [address] region are typically whole grain and usually contain very few unhealthy trans fats, and bread is an important part of the diet. However, throughout the [address] [place] [address] region, bread is eaten plain or dipped in olive oil -- not eaten with butter or margarine, which contains saturated or trans fats. Nuts are another part of a healthy Mediterranean diet. Nuts are high in fat, but most of the fat is healthy. Because nuts are high in calories, they should not be eaten in large amounts -- generally no more than a handful a day. For the best nutrition, avoid candied or honey-roasted and heavily salted nuts. The focus of the Mediterranean diet isn't on limiting total fat consumption, but rather on choosing healthier types of fat. The Mediterranean diet discourages saturated fats and hydrogenated oils (trans fats), both of which contribute to heart disease. The Mediterranean diet features olive oil as the primary source of fat. Olive oil is mainly monounsaturated fat -- a type of fat that can help reduce low-density lipoprotein (LDL) cholesterol levels when used in place of saturated or trans fats.  "Extra-virgin" and "virgin" olive oils (the least processed forms) also contain the highest levels of protective plant compounds that provide antioxidant effects. Canola oil and some nuts contain the beneficial linolenic acid (a type of omega-3 fatty acid) in addition to healthy unsaturated fat. Omega-3 fatty acids lower triglycerides, decrease blood clotting, and are associated with decreased incidence of sudden heart attacks, improve the health of your blood vessels, and help moderate blood pressure. Fatty fish -- such as mackerel, lake trout, herring, sardines, albacore tuna and salmon -- are rich sources of omega-3 fatty acids. Fish is eaten on a regular basis in the [address]. The health effects of alcohol have been debated for many years, and some doctors are reluctant to encourage alcohol consumption because of the health consequences of excessive drinking. However, alcohol -- in moderation -- has been associated with a reduced risk of heart disease in some research studies. The Mediterranean diet typically includes a moderate amount of wine, usually red wine. This means no more than 5 ounces (148 milliliters) of wine daily for women of all ages and men older than age 72 and no more than 10 ounces (296 milliliters) of wine daily for younger men. More than this may increase the risk of health problems, including increased risk of certain types of cancer. If you're unable to limit your alcohol intake to the amounts defined above, if you have a personal or family history of alcohol abuse, or if you have heart or liver disease, refrain from drinking wine or any other alcohol. The Mediterranean diet is a delicious and healthy way to eat. Many people who switch to this style of eating say they'll never eat any other way. Here are some specific steps to get you started:   Eat your veggies and fruits -- and switch to whole grains. [place] of plant foods should make up the majority of your meals.  They should be minimally processed -- fresh and whole are best. Include veggies and fruits in every meal and eat them for snacks as well. Switch to whole-grain bread and cereal, and begin to eat more whole-grain rice and pasta products. Keep baby carrots, apples and bananas on hand for quick, satisfying snacks. Fruit salads are a wonderful way to eat a variety of healthy fruit. Go nuts. Nuts and seeds are good sources of fiber, protein and healthy fats. Keep almonds, cashews, pistachios and walnuts on hand for a quick snack. Choose natural peanut butter, rather than the kind with hydrogenated fat added. Try blended sesame seeds (tahini) as a dip or spread for bread. Pass on the butter. Try olive or canola oil as a healthy replacement for butter or margarine. Lightly drizzle it over vegetables. After cooking pasta, add a touch of olive oil, some garlic and green onions for flavoring. Dip bread in flavored olive oil or lightly spread it on whole-grain bread for a tasty alternative to butter. Try tahini as a dip or spread for bread too. Spice it up. Herbs and spices make food tasty and can  for salt and fat in recipes. Go fish. Eat fish at least twice a week. Fresh or water-packed tuna, salmon, trout, mackerel and herring are healthy choices. [place], bake or broil fish for great taste and easy cleanup. Avoid breaded and fried fish. Rein in the red meat. Limit red meat to no more than a few times a month. Substitute fish and poultry for red meat. When choosing red meat, make sure it's lean and keep portions small (about the size of a deck of cards). Also avoid sausage, brizuela and other high-fat, processed meats. Choose low-fat dairy. Limit higher fat dairy products, such as whole or 2 percent milk, cheese and ice cream. Switch to skim milk, fat-free yogurt and low-fat cheese. Many patients ask us about exercise and MS. Some common questions include: “should I exercise?”, “how often, for how long, how strenuous?”, and “what should I do?”.  The answers are different for everybody however for the most part, exercise is good for people with MS.    A 2010 study in [facility] showed that fatigue, mood and quality of life improved after 12 weeks of progressive resistance training. This benefit was maintained for an additional 12 weeks after the end of their training. [1]    Another study at [facility] in 2004 randomized MS patients into 3 groups: one that did [place], one that worked out on a stationary bike, or a control group. The yoga group showed a significant improvement in fatigue compared to controls. [2]    Everybody is different so the type of exercise you do, how often you do it and how intense it is should be tailored to you and based on your abilities. Often times we make referrals to physical therapy where an individualized program can be developed to focus on each person's needs such as walking, managing spasticity, or fatigue. It's important to stick with a program as often the results are not felt until a few weeks into the program.     Many MS patients have worsening of their symptoms during the summer or when it's hot outside. This intolerance to heat is called [place]hoff's Phenomena. For these patients it's recommended to exercise in the morning or in the evening when the sun is not at its peak, or inside in an air conditioned environment. Aquatic exercise such as water aerobics or swimming are beneficial in patients with MS. If exercising in the heat is the only option, there are special clothes designed to keep the body cool including cooling neck wraps and vests. If all of these measures still don't work and symptoms are worse during exercise, it's recommended to discuss this with your neurologist.   Some patients say they are too tired to work out. Fatigue is an extremely common symptom in MS with up to 80% of patients experiencing it. A number of studies have shown that regular exercise, usually with some aerobic component, helps with MS-related fatigue.  [3] If you feel too tired to work out you could try a restorative or yin yoga class. Debria Grim classes tend to rebuild your energy levels and calm the nervous system. These two types of classes involve mostly stretching and relaxing. If you are new to yoga, it might be best to take a few private classes or go to a local yoga studio. However there are also some great resources online including yogaglo. com and yogavibes. com which both offer a 15-day free trial period to watch yoga videos at home. On these sites you can choose a beginner level 1 class or the style and teacher of your choice. If yoga is not your thing, even going for a short walk every day can improve your energy levels and overall health. Next time you visit with your neurologist, be sure to discuss exercise and how you can incorporate it into your life. References:  1. MARLENE Delgado., et al. "Fatigue, mood and quality of life improve in MS patients after progressive resistance training." Multiple sclerosis (2010). 2. Cy Barker et al. "Randomized controlled trial of yoga and exercise in multiple sclerosis." Neurology 62.11 (2004): 6984-3102. 3. [email or web address]. aspx]         Vit D 125 mcg (5000 international units) every day. Vitamin B 12 1000 mcg every day. Vitamin B 1 100 mg every day. Biotin 10,000 mcg every day. Alpha lipoic acid 600 mg every day  Fish oil/Omega 3-one capsule every day. Coq-10 100 mg every day. If you are not able to take all of these Vitamins and supplements daily, you can try to take the first 4 daily. Vit D 125 mcg (5000 international units) every day. Vitamin B 12 1000 mcg every day. Vitamin B 1 100 mg every day. Biotin 10,000 mcg every day. Alpha lipoic acid 600 mg every day  Fish oil/Omega 3-one capsule every day. Coq-10 100 mg every day.          If you are not able to take all of these Vitamins and supplements daily, you can try to take the first 4 daily.
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
## 2023-10-11 ENCOUNTER — OFFICE VISIT (OUTPATIENT)
Age: 63
End: 2023-10-11
Payer: COMMERCIAL

## 2023-10-11 DIAGNOSIS — G35 MS (MULTIPLE SCLEROSIS) (HCC): Primary | ICD-10-CM

## 2023-10-11 DIAGNOSIS — G81.91 HEMIPARESIS OF RIGHT DOMINANT SIDE, UNSPECIFIED HEMIPARESIS ETIOLOGY (HCC): ICD-10-CM

## 2023-10-11 PROCEDURE — 97112 NEUROMUSCULAR REEDUCATION: CPT

## 2023-10-11 NOTE — PROGRESS NOTES
POC expires Auth Status Total   Visits  Start date  Expiration date PT/OT + Visit Limit? Co-Insurance   10/27 BOMN    BOMN Yes; 30 dollars                                            Visit/Unit Tracking  AUTH Status: Boubacar Memos Date  8 8/9 8/22 8/25 8/28 8/29 9/11 9/13 9/18 9/20 9/25 9/27 10/4 10/10    Visits  Authed:  Used 1 1 1 1 1 1 1 1 1 1 1 1 1 1 1 1 1 1 (re-eval)  1     Remaining  11 10 9 8 7 6 5 4 3 2 1 (submited for auth today) 0 (submitted for auth)  Follow up next visit  7 6 5 4 Follow up post auth 2      PLAN OF CARE START: 23  PLAN OF CARE END:24  PROGRESS NOTE DUE: 10/23/23 (full re-eval submit for auth)   FREQUENCY: 1-2x/week for 8/12 weeks   PRECAUTIONS: fall/safety   DIAGNOSIS: MS, R side weakness, MAINTAINENCE PROGRAM  VISITS: 7  9           Daily Note     Today's date: 10/11/2023  Patient name: Mikala Badillo  : 1960  MRN: [id number]  Referring provider: Sussy Turner  Dx:   Encounter Diagnosis   Name Primary? MS (multiple sclerosis) ([address]) Yes                  Subjective: Pt reports no changes since last visit. Objective: See treatment below. NMRE:   Semi complex Qbitz, x2 at 1 time alternating back and forth after completing 1-2 pieces on each puzzle, standing on foam surface unsupported tabletop. 2# weight added to R UE for increased challenge to promote muscle recruitment and to provide proprioceptive input. Focused on EF skills, standing tolerance, balance, R UE coordination, and  skills. Tolerated standing for ~8 minutes prior to self initiating rest break. 2 minute seated rest break followed by completion of task standing. Up to CS for balance. Required min-mod VC for accuracy to recreate 2D image into 3D structure. Reviewed the following lora chi exercises with emphasis on upright posture, sitting balance, breathing, functional endurance, and technique for motor control and coordination.  Provided with visual demo and pt demonstrated all exercise back to therapist with good understanding. Provided with packet on instructed of exercise. Informed to perform seated at this time for safety, 2 minute on/1 minute of rest. Total of 7 exercises. Educated on use of small wrist weights/free weights to upgrade task challenge. Pt request to trial in standing next session with supervision of therapist.   Broadening chest   Dancing with rainbows   Arm circles   Twisting wait and arms  Rowing boat  Holding ball (2kg weighted ball utilized)     Assessment: Tolerated treatment well. Pt would benefit from continued OT services to address standing endurance, core stability, R UE  strength, proximal strength, R UE 3 point pinch, R UE FMC and in hand manipulation skills. Liza vs. Kvng (2013) is a [facility] decision that clarified that Medicare will in fact cover skilled therapy services to maintain a patient’s current condition or prevent/slow further deterioration. Plan: Continued skilled OT per POC.  Trial Hudson chi exercises in standing

## 2023-10-11 NOTE — PROGRESS NOTES
Daily Note     Today's date: 10/11/2023  Patient name: Dani Borrego  : 1960  MRN: [id number]  Referring provider: Herve Lerma,*  Dx:   Encounter Diagnosis     ICD-10-CM    1. MS (multiple sclerosis) ([address])  G35       2. Hemiparesis of right dominant side, unspecified hemiparesis etiology ([address])  G81.91                    Subjective: Pt reports she saw the neurologist who recommended an EMG and further evaluation for neurodegeneration of RLE not related to MS potentially. Wants her to get bioness. Objective: See treatment diary below    NMR  Neuro boxing  Heavy bag 1,2 20x 2 rounds  Heavy bag 3,4 hook with trunk rotaiton 20x ea x2  Heavy bag 1,2 20x 2 rounds    Estim for Dfs 30 intensity   Ambulation with RW and gait belt 200ft x4   Patient education: bioness, MS, skin integrity    Assessment: Tolerated treatment well. Initiated tx with Estim, which pt dmeo good contraction and toe clearance, continued VC for RLE clearance including bending her knee and heel toe gait. Will continue to progress as able. Patient would benefit from continued PT      Plan: Continue per plan of care. POC expires Auth Status Total   Visits  Start date  Expiration date PT/OT + Visit Limit?  Co-Insurance   23 required BOMN 23 no No   23 rubmitted BOMN 10/4/23 1/4/24 yes no

## 2023-10-16 ENCOUNTER — TELEPHONE (OUTPATIENT)
Dept: FAMILY MEDICINE CLINIC | Facility: CLINIC | Age: 63
End: 2023-10-16

## 2023-10-16 DIAGNOSIS — N39.0 URINARY TRACT INFECTION WITHOUT HEMATURIA, SITE UNSPECIFIED: Primary | ICD-10-CM

## 2023-10-16 DIAGNOSIS — F32.A DEPRESSION, UNSPECIFIED DEPRESSION TYPE: ICD-10-CM

## 2023-10-16 RX ORDER — CIPROFLOXACIN 500 MG/1
500 TABLET, FILM COATED ORAL EVERY 12 HOURS SCHEDULED
Qty: 14 TABLET | Refills: 0 | Status: SHIPPED | OUTPATIENT
Start: 2023-10-16 | End: 2023-10-23

## 2023-10-16 RX ORDER — FLUOXETINE 10 MG/1
10 CAPSULE ORAL DAILY
Qty: 30 CAPSULE | Refills: 0 | Status: SHIPPED | OUTPATIENT
Start: 2023-10-16

## 2023-10-16 NOTE — TELEPHONE ENCOUNTER
Refill for Abx has been placed. Please remind patient to call urogynecology for appt. I placed a referral on 10/2. If patient needs new copy please mail to her. This will be her last refill without seeing urogynecology.

## 2023-10-16 NOTE — TELEPHONE ENCOUNTER
Patient stated that her bladder infection is still persisting after completing the ciprofloxacin (CIPRO) 500 mg tablet. Patient would like additional antibiotics.

## 2023-10-17 DIAGNOSIS — G35 MS (MULTIPLE SCLEROSIS) (HCC): ICD-10-CM

## 2023-10-17 RX ORDER — OXYBUTYNIN CHLORIDE 10 MG/1
10 TABLET, EXTENDED RELEASE ORAL
Qty: 90 TABLET | Refills: 3 | Status: SHIPPED | OUTPATIENT
Start: 2023-10-17

## 2023-10-17 NOTE — TELEPHONE ENCOUNTER
10/16 9:50    Pt left a VM re: medications not being sent to [facility].    Transcribed VM:   Hi, this is Pleasant Nurse. YOB: 1960. My medications were not called in to the pharmacy. They did not have them. I'd appreciate them being called in today. Give me a call back at that phone number is [phone]. Thank you very much. Have a great day. Chart review shows Dalfampridine and Ativan were sent to [facility], however Ditropan XL was not (was sent to [facility]). Called [facility] to confirm above and spoke with pharmacist, who confirmed receipt of Ativan script, but states that Dalfampridine must go to a specialty pharmacy. Called pt to acknowledge receipt of VM and spoke with her at length. She is unsure why Dr. Arnol Rodgers ordered Ativan for claustrophobia for her MRI when she states that she is not claustrophobic (please d/c Ativan order). Please sign off Ditropan XL script if agreeable. Thank you. Team 3 - please see what pharmacy Dalfampridine needs to be sent to. Pt also asking for a call back with the specialty pharmacy info so she knows where to expect it from. 02-Sep-2020 10:36

## 2023-10-17 NOTE — TELEPHONE ENCOUNTER
Pt called to say her medications were never called into [facility] after visit on 10/10 with Dr. Arturo Najjar. Please assist and call pt back. Thank you.

## 2023-10-18 ENCOUNTER — APPOINTMENT (OUTPATIENT)
Age: 63
End: 2023-10-18
Payer: COMMERCIAL

## 2023-10-23 ENCOUNTER — OFFICE VISIT (OUTPATIENT)
Age: 63
End: 2023-10-23
Payer: COMMERCIAL

## 2023-10-23 DIAGNOSIS — G35 MS (MULTIPLE SCLEROSIS) (HCC): Primary | ICD-10-CM

## 2023-10-23 DIAGNOSIS — G81.91 HEMIPARESIS OF RIGHT DOMINANT SIDE, UNSPECIFIED HEMIPARESIS ETIOLOGY (HCC): ICD-10-CM

## 2023-10-23 PROCEDURE — 97112 NEUROMUSCULAR REEDUCATION: CPT

## 2023-10-23 PROCEDURE — 97530 THERAPEUTIC ACTIVITIES: CPT

## 2023-10-23 NOTE — PROGRESS NOTES
Daily Note     Today's date: 10/23/2023  Patient name: Oscar Ventura  : 1960  MRN: [id number]  Referring provider: Morro Rothman,*  Dx:   Encounter Diagnosis     ICD-10-CM    1. MS (multiple sclerosis) ([address])  G35       2. Hemiparesis of right dominant side, unspecified hemiparesis etiology ([facility])  G81.91                    Subjective: Pt reports she had a UTI but feeling a little better today. Objective: See treatment diary below    SOLO step  High knee march with knee flexion VC  Ambulation no AD heel toe  Hurdles (2)   Step taps onto blazepods R/L red/blue     Assessment: Tolerated treatment well. Pt demo improved clearance with step taps and VC for marches. Noted improved sarah clearance when leading with RLE. Will cont to progress with dec UE use. Will continue to progress as able. Patient would benefit from continued PT      Plan: Continue per plan of care. POC expires Auth Status Total   Visits  Start date  Expiration date PT/OT + Visit Limit?  Co-Insurance   23 required BOMN 23 no No   23 rubmitted BOMN 10/4/23 1/4/24 yes no

## 2023-10-23 NOTE — PROGRESS NOTES
POC expires Auth Status Total   Visits  Start date  Expiration date PT/OT + Visit Limit? Co-Insurance   10/27 BOMN    BOMN Yes; 30 dollars                                            Visit/Unit Tracking  AUTH Status: Yanet Munoz Date  8 8/9 8/22 8/25 8/28 8/29 9/11 9/13 9/18 9/20 9/25 9/27 10/4 10/10 10/23    Visits  Authed:  Used 1 1 1 1 1 1 1 1 1 1 1 1 1 1 1 1 1 1 (re-eval)  1 1     Remaining  11 10 9 8 7 6 5 4 3 2 1 (submited for auth today) 0 (submitted for auth)  Follow up next visit  7 6 5 4 Follow up post auth 2 1      PLAN OF CARE START: 23  PLAN OF CARE END:24  PROGRESS NOTE DUE:  (full re-eval on schedule)   FREQUENCY: 1-2x/week for 8/12 weeks   PRECAUTIONS: fall/safety   DIAGNOSIS: MS, R side weakness, MAINTAINENCE PROGRAM  VISITS: 8 of 9           Daily Note     Today's date: 10/23/2023  Patient name: Elle Cardenas  : 1960  MRN: [id number]  Referring provider: Jeff Porras  Dx:   Encounter Diagnosis   Name Primary? MS (multiple sclerosis) ([address]) Yes                  Subjective: Pt reports feeling better. Pt has been having symptoms related to UTI. Objective: See treatment below. NMRE:   *reach,grasp,release of 1.5 inch blocks tabletop>elevated bin on R side using R UE gross grasp with moderate resistance hand gripper (yellow spring on second latch). Performed seated with 2# weight added to R UE for increased challenge to promote muscle recruitment and to provide proprioceptive input. Focused on coordination, motor control,distal UE strength. ~35% dropping noted     TE:   *digi flex seated tabletop, x5 bouts 1 minute each. Focused on R hand and finger strength. Required VC for technique and rest breaks in between sets. *red spider web grid gross grasp, x3 sets of 10 reps with wide based . Focused on R hand and finger strength. Required VC for technique and rest breaks in between sets.       Assessment: Tolerated treatment well. Pt would benefit from continued OT services to address standing endurance, core stability, R UE  strength, proximal strength, R UE 3 point pinch, R UE FMC and in hand manipulation skills. Liza Calderon (2013) is a [facility] decision that clarified that Medicare will in fact cover skilled therapy services to maintain a patient’s current condition or prevent/slow further deterioration. Plan: Continued skilled OT per POC.  Trial Hudson chi exercises in standing

## 2023-10-24 DIAGNOSIS — N39.0 URINARY TRACT INFECTION WITHOUT HEMATURIA, SITE UNSPECIFIED: ICD-10-CM

## 2023-10-24 RX ORDER — CIPROFLOXACIN 500 MG/1
500 TABLET, FILM COATED ORAL EVERY 12 HOURS SCHEDULED
Qty: 20 TABLET | Refills: 0 | Status: SHIPPED | OUTPATIENT
Start: 2023-10-24 | End: 2023-11-03

## 2023-10-25 ENCOUNTER — APPOINTMENT (OUTPATIENT)
Age: 63
End: 2023-10-25
Payer: COMMERCIAL

## 2023-10-25 ENCOUNTER — TELEPHONE (OUTPATIENT)
Dept: NEUROLOGY | Facility: CLINIC | Age: 63
End: 2023-10-25

## 2023-10-25 NOTE — TELEPHONE ENCOUNTER
Pt called back her medication from Dr. Marlys Menon was still not at [facility]. Pt said this not about the Cipro medication. Please assist. She did get her script for oxybutynin but its the other medication she is having issues with. Thank you.

## 2023-10-26 NOTE — TELEPHONE ENCOUNTER
Spoke with pharmacist and he confirms pt did  medication, oxybutynin. Pharmacist says Dalfampridine should be sent to [facility]. Dr. Jerardo Faith - Rx entered to be sent to specialty pharmacy. Please review and sign if in agreement.

## 2023-10-26 NOTE — TELEPHONE ENCOUNTER
Script sent to [facility] pharmacy. Called pharmacy and spoke with Mirta Lundborg. Order is currently pending insurance information. Benefits verification team will be notified. [facility] will also inform us if PA is required from plan. Spoke with pt and informed her of same. Advised that she will be receiving a call from the specialty pharmacy and if they say needs PA, advised her to call us to let us know. Pt appreciative of call back and update.      Awaiting outcome from benefits verification team.

## 2023-10-30 ENCOUNTER — OFFICE VISIT (OUTPATIENT)
Age: 63
End: 2023-10-30
Payer: COMMERCIAL

## 2023-10-30 ENCOUNTER — EVALUATION (OUTPATIENT)
Age: 63
End: 2023-10-30
Payer: COMMERCIAL

## 2023-10-30 DIAGNOSIS — G81.91 HEMIPARESIS OF RIGHT DOMINANT SIDE, UNSPECIFIED HEMIPARESIS ETIOLOGY (HCC): ICD-10-CM

## 2023-10-30 DIAGNOSIS — G35 MS (MULTIPLE SCLEROSIS) (HCC): Primary | ICD-10-CM

## 2023-10-30 PROCEDURE — 97112 NEUROMUSCULAR REEDUCATION: CPT

## 2023-10-30 PROCEDURE — 97168 OT RE-EVAL EST PLAN CARE: CPT

## 2023-10-30 NOTE — PROGRESS NOTES
Re-Evaluation/Progress Note    Today's date: 10/30/2023  Patient name: Oscar Ventura  : 1960  MRN: [id number]  Referring provider: Morro Rothman,*  Dx:   Encounter Diagnosis     ICD-10-CM    1. MS (multiple sclerosis) ([address])  G35       2. Hemiparesis of right dominant side, unspecified hemiparesis etiology ([address])  G81.91           POC expires Auth Status Total   Visits  Start date  Expiration date PT/OT + Visit Limit? Co-Insurance   23 required BOMN 23 no No   23  req  BOMN 10/30/23    no no                                   Subjective: Pt reports that she wants to work on her balance. She feels that she really needs that to walk better, She has difficulty bending the R leg. She also wants to work on turning. Arrived with R AFO. Also interested    History of Present Illness (from 23): Pt reports that she was making progress after seeing neuro PT. She went to the gym and was working on her strength. Pt reports she uses the RW. She watches her grandchild 2x/week. She has reported fatigue that carries over days due to the MS. She uses her total gym and bike at home and stretches. She has good and bad days but it can change day to day. She recently had a medication change- she has f/u with neurologist. Pt wants to walk and be as functional as possible, get "her life back." She feels that its not necessarily from a relapse. Independent with ADLs, requires assist with  iADLs of cleaning. She has R AFO that she received "3-4 months ago" and has not been wearing because she does not feel that it is helping.     Objective: See treatment diary below    MMT  - R Hip Flexion: 2+/5               - L Hip Flexion: 4-/5  - R Knee Extension: 4-/5        - L Knee Extension: 4-/5  - R Knee Flexion: 4/5              - L Knee Flexion: 4/5  - R Ankle DF: 3-/5                   - L Ankle DF: 4/5  - R Ankle PF: 4-/5                   - L Ankle PF: 4/5    Outcome Measures Initial Eval  6/5 7/10 8/9  9/11  10/4  10/30   5xSTS 37.56 sec RW 24.98s RW 22.38s RW no UE  13.06s no UE RW  12.58s no UE  12.01s no UE   TUG 25.51 sec RW with brace       23.8s   with RW and without brace 30.3s with RW  28.85s with RW  39.8s with walking sticks    26.6s with RW      55s no AD          23.16s with RW    10 meter 0.86 m/s RW   0.8m/s walking sticks  0.75m/s RW  0.8m/s   0.8m/s    6MWT NV   345ft with RW and GB with CS  RW and GB 350ft  364ft RW   360ft RW    ABC   29.398% 46.88% 50.63%  48.75%  50.63%      NMR  SOLO step walking with walking sticks 100ft x2- NV  Solo step ambualtion with RW 100ft x2  Ambulation without AD SOLO step    Assessment: Patient is a 61 y/o Female who has been making steady gains in skilled outpatient PT since 6/5 with reported >2 falls. However, high risk for falls remains as she has demonstrated results below age matched normative values for the following outcome measures: TUG, gait speed, 10mWT, 6mWT, ABC. She demo dec LE strength, gait dysfunction, dec balance. She has made steady improvements in all areas with continued PT. She has been transitioned to maintenance skilled PT to maintain current gains and prevent regression. Regression is likely, due to history or regression noted, history of falls with injury, sedentary lifestyle, presence of neurodegenerative/neurocognitive disorder, no access at home to fall prevention system to perform HEP and limited ability for family member/caregiver to assist with HEP. Both the patient and caregiver would be at risk for increased injury if patient had LOB or fall due to inadequate safety equipment, which could lead to hospitalization and increased healthcare costs. Significant amount of time spent completing patient education on POC, HEP, circuit training at home, brace utilization for energy conservation. NV continue ambulation with walking sticks, trial TM, and SLS.      Rafa Griffin (2013): a [facility] decision that sought to clarify that Medicare will in fact cover skilled therapy services to maintain a patient’s current condition or prevent/slow further deterioration.       Goals  Short Term Goals (4 weeks):    - Patient will improve time on TUG by 2.9 seconds to facilitate improved safety in all ambulation- MET  - Patient will be independent in basic HEP 2-3 weeks- MET  - Patient will improve 5xSTS score by 2.3 seconds to promote improved LE functional strength needed for ADLs- MET  - Pt will improve ABC to >/= 67%- ongoing  Long Term Goals (12 weeks):  - Patient will be independent in a comprehensive home exercise program - ongoing  - Patient will improve gait speed by 0.18 m/s to improve safety with community ambulation - ongoing  - Patient will improve scoring on FGA by 4 points to progress safety with dynamic tasks - ongoing  - Patient will be able to demonstrate HT in gait without veering - ongoing  - Patient will improve 6 Minute Walk Test score by 190 feet to promote improved cardiovascular endurance - ongoing  - Patient will report 50% reduction in near falls in order to improve safety with functional tasks and reduce his risk for falls - ongoing  - Patient will report going on walks at least 3 days per week to promote independence and improved cardiovascular endurance - ongoing  - Patient will be able to ascend/descend stairs reciprocally without UE assist to promote independence and safety with ADLs - ongoing  - Patient will report 50% reduction in near falls when ambulating on uneven terrain - ongoing     Goals (16 weeks): Maintenance goals  mbulate outdoors on uneven surfaces with limited assistance to facilitate safe community ambulation  - Patient will maintain gait speed per 10 meter walk test at 0.8 m/s to facilitate continued safety with community ambulation  - Patient will continue to score at least 12.58s on TUG to maintain fall risk status to reduce risk of injury  - Patient will be able to ambulate at least 364 ft during 6 Minute Walk Test to maintain current cardiovascular capacity  - Patient will attend PT 1x/week with focus on balance and gait training to maintain functional capacity  - Patient will remain at 26.6 seconds on TUG and TUG variations with LRAD to maintain fall risk status    Plan: Continue per plan of care. Continue maintenance 1x/week  Duration in weeks: 12  Plan of Care beginning date: 10/30/23  Plan of Care expiration date: 1/30/24  Treatment plan discussed with: Patient       POC expires Auth Status Total   Visits  Start date  Expiration date PT/OT + Visit Limit?  Co-Insurance   8/28/23 required BOMN 6/5/23 7/28/23 no No   11/1/23 8/9/23 11/1/23 1/4/24 rubmitted BOMN 10/4/23 1/4/24 yes no

## 2023-10-30 NOTE — PROGRESS NOTES
OCCUPATIONAL THERAPY RE-EVALUATION      POC expires Auth Status Total   Visits  Start date  Expiration date PT/OT + Visit Limit? Co-Insurance   10/27 BOMN    BOMN Yes; 30 dollars                                            Visit/Unit Tracking  AUTH Status: Le Bryant Date 6/26 7/7 7/17 7/26 8/2 8/7 8/9 8/22 8/25 8/28 8/29 9/11 9/13 9/18 9/20 9/25 9/27 10/4 10/10 10/23 10/30   Visits  Authed:  Used 1 1 1 1 1 1 1 1 1 1 1 1 1 1 1 1 1 1 (re-eval)  1 1 1 (re-eval)    Remaining  11 10 9 8 7 6 5 4 3 2 1 (submited for auth today) 0 (submitted for auth)  Follow up next visit  7 6 5 4 Follow up post auth 2 1 0 (submit for auth today)      PLAN OF CARE START: 23  PLAN OF CARE END:24  PROGRESS NOTE DUE: follow up post auth  FREQUENCY: 1-2x/week for 8/12 weeks   PRECAUTIONS: fall/safety   DIAGNOSIS: MS, R side weakness, MAINTAINENCE PROGRAM  VISITS: 9 of 9     Daily Note     Today's date: 10/30/2023  Patient name: Geena Aguilar  : 1960  MRN: [id number]  Referring provider: Felicia Hernandez  Dx:   Encounter Diagnosis   Name Primary? MS (multiple sclerosis) ([address]) Yes                    Today's Date: 10/30/2023  Patient Name: Geena Aguilar  : 1960  MRN: [id number]  Referring Provider: Felicia Hernandez,*  Dx: MS (multiple sclerosis) [facility]) Korey Ritter ANALYSIS:  Pt presents to re-evaluation on 10/30/23 status post R side weakness secondary to chronic MS. As per interview, pt reports improvements in pinch strength, writing/tracing, and opening/closing containers since evaluation. Pt reports impairments in  strength-especially when squeezing shampoo bottles during bathing and still has difficulty performing IADLs for longer periods of time without seated rest breaks. Pt continues to have difficulty with pet care due to coordination and balance deficits. Post assessments, pt demonstrating improvements in  strength, coordination, in hand manipulation.   Pt continues to demonstrate impairments in R UE  strength, pinch strength, and coordination overall. Pt had recent bout of bladder infection and reports this has limited progress with performance of HEP. Pt would benefit from continuing on maintenance program due to nature of deficits and progression in current therapy program.  Liza Calderon (2013) is a [facility] decision that clarified that Medicare will in fact cover skilled therapy services to maintain a patient’s current condition or prevent/slow further deterioration. Pt would benefit from maintenance OT services focusing on impairments for the next 8-12 weeks. Pt educated on OT services and maintianence. Pt educated on progress/therapy process and pt in understanding and agreement of recommendations. Recommending to continue HEP. Also continued to  Discuss typical MS progression and plateau periods. Pt verbalized understanding. Subjective  Occupational Profile  Pt resides with  and pets in private home. 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 Pt uses RW for ambulation and transfers inside and outside the home. Pt has shower seat and standard toilet. Has grab bars in bathroom. Pt is independent with ADLs. Pt reports  assists with IADLs due to difficulty. Pt receives assistance with household cleaning, meal prep. Pt is managing all medication independently. Pt has R knee brace, does not wear at all times-only with knee pain. Owns AFO, has not been wearing however will follow up with PT on this. PATIENT GOAL: "to become totally independent"     HISTORY OF PRESENT ILLNESS:   Pt is a 60 y.o. female who was referred to Occupational Therapy s/p  MS (multiple sclerosis) ([address]) [place] Pt presents to outpatient OT s/p chronic MS ~30 years ago. Pt has had short bouts of inpatient OT. Pt has not had any OP OT services frequently.      PMH:   Past Medical History:   Diagnosis Date    Multiple sclerosis ([address])        Past Surgical Hx:   Past Surgical History:   Procedure Laterality Date     SECTION      FOOT SURGERY Right     KNEE SURGERY      R meniscus    TRACHEOSTOMY            Objective  Impairment Observations:    NIKOLAS RIGGS Comments           UPPER EXTREMITY FUNCTION   Impaired Intact Dominant Hand: R UE pre MS, however uses L UE more frequently      /PINCH STRENGTH             Dynamometer    - Gross Grasp 46 lbs 38 lbs abnormal - norm R UE: 55lbs      R UE IMPROVED BY 2LBS   Pinch Meter     - Pincer 7 lbs 2 lbs abnormal - R UE norm: 10lbs       R UE REMAINED     - Tripod 8 lbs 4 lbs abnormal - R UE norm: 14.8lbs       R UE REMAINED     - Lateral 11 lbs  5 lbs abnormal - R UE norm: 15.5 lbs       R UE REMAINED     MMT        Shoulder FF  5/5  5/5 NOT TESTED   Shoulder Ext    5 NOT TESTED   Shoulder Abduction    5 NOT TESTED   Shoulder Adduction    5 NOT TESTED   Elbow Flex    5 NOT TESTED   Elbow Ext   NOT TESTED   Wrist Flex    5 NOT TESTED   Wrist Ext  /  5 NOT TESTED   Gross Grasp    5 NOT TESTED     COORDINATION      Opposition intact intact NOT TESTED    Finger to Nose intact intact NOT TESTED    Rapid Alternating Movement Slower, purposeful  intact R UE REMAINED    9 Hole Peg Test-  assesses dexterity/fine motor coordination  37 seconds 27.53 seconds abnormal    Pt demonstrates decreased FMC compared to norms for age/sex (18.99 seconds)     R UE IMPROVED BY 4 SECONDS    Fxnl Dexterity Test-assesses patient's ability to use the hand for daily tasks requiring a 3-jaw haydee prehension between the fingers and the thumb  1 minute 2 seconds 56 seconds abnormal    Pt demonstrates decreased dexterity compared to norms for age/sex (30 seconds for R UE, 31 seconds for L UE)    R UE IMPROVED BY 2 SECONDS                  GOALS    GOAL  STATUS ON IE  GOAL STATUS    Pt will maitain R UE  strength at 44 lbs (+/- 3lbs) to complete  IADLs 44lbs  PROGRESSING, CONTINUE GOAL        Pt will maintain R UE tripod pinch strength at 8lbs (+/-3lbs) to complete functional ADLs and IADLs with increased independence   8lbs PROGRESSING, CONTINUE GOAL            Pt will maintain R UE lateral pinch strength at 11lbs (+/-3lbs) to complete functional ADLs and IADLs with increased independence   11lbs PROGRESSING, CONTINUE GOAL      Pt will maintain R UE FMC as evidenced by performing 9 hole peg test with RUE in 41 seconds or less (+/- 5 seconds)  in order to increase independence with IADLs   41 seconds  PROGRESSING, CONTINUE GOAL        Pt will demo with G tolerance to standing exercise  x 10 minutes with minimal rest breaks required for maintained engagement in life roles and weekly exercise regimen  PROGRESSING, CONTINUE GOAL      Pt will maintain improved R UE dexterity as evidenced by performing functional dexterity test with RUE in 1 minute and 3 seconds or less (-/- 5 seconds) in order to increase independence with IADLs   1 minute and 3 seconds PROGRESSING, CONTINUE GOAL        NMRE:   *reviewed and provided pt with written handout of various functional coordination activities to perform in the home with RUE. [facility] and in hand manipulation [facility] handout). Pt verbalized understanding. Recommendation to perform 1 task a day for 15-30 minutes. *reach,grasp,release of heavy weight cans, light weight cans, and plates top cabinet>lower drawers using R UE gross grasp standing at countertop on foam surface. Up to CG for steadying. Focused on balance, functional task performance, and R UE motor control. ~10% dropping of cans noted.        OTHER PLANNED THERAPY INTERVENTIONS:   Supine, seated, and in stance neuro re-ed  Tricep AG  NMES/FES  FMC/prehension  Timed Trials  Manual tx  Hand to target  Sensory re-ed  Seated functional reach: crossing midline  Supine place and hold  WBearing strategies   Closed chain activities  Open chain activities  Internal and external memory aides  Multimatrix for saccades/ visual clutter/attention  Hypersensitivity strategies education  Multi-modal environment  Sustained/alternating/divided attention  Work stations with timed transitions  Temporal Awareness  Memory and mental manipulation  Auditory processing with immediate recall  Memory retention with immediate and delayed recall  Edu on cog/vision apps

## 2023-10-31 DIAGNOSIS — J45.20 MILD INTERMITTENT ASTHMA, UNSPECIFIED WHETHER COMPLICATED: ICD-10-CM

## 2023-10-31 RX ORDER — ALBUTEROL SULFATE 90 UG/1
2 AEROSOL, METERED RESPIRATORY (INHALATION) EVERY 6 HOURS PRN
Qty: 18 G | Refills: 1 | Status: SHIPPED | OUTPATIENT
Start: 2023-10-31

## 2023-11-01 DIAGNOSIS — F32.A DEPRESSION, UNSPECIFIED DEPRESSION TYPE: ICD-10-CM

## 2023-11-02 RX ORDER — FLUOXETINE 10 MG/1
10 CAPSULE ORAL DAILY
Qty: 90 CAPSULE | Refills: 1 | Status: SHIPPED | OUTPATIENT
Start: 2023-11-02

## 2023-11-06 ENCOUNTER — TELEPHONE (OUTPATIENT)
Dept: NEUROLOGY | Facility: CLINIC | Age: 63
End: 2023-11-06

## 2023-11-06 DIAGNOSIS — G35 MULTIPLE SCLEROSIS (HCC): Primary | ICD-10-CM

## 2023-11-06 DIAGNOSIS — R26.2 AMBULATORY DYSFUNCTION: ICD-10-CM

## 2023-11-06 NOTE — TELEPHONE ENCOUNTER
Per 10/10/23 LOV note:  Bioness was tried with her PT.   Durable Medical Equipment bioness L 300. I would message our nurse navigator to see if we can help with the bioness.      SW - can you assist in obtaining a Bioness device? Thank you!    [email or web address]  The Bioness L300® is a compact wireless device worn on a patient's leg to help improve walking abilities. The system features a sensor that detects walking patterns, enabling patients to automatically adjust to different surfaces, walking speeds and uneven ground.    Dr. Cornell - [facility] placed for general DME for Bioness L300 device. Please sign if agreeable. Thank you.

## 2023-11-07 NOTE — TELEPHONE ENCOUNTER
Secure referral to include script, demographics and office note sent to Linda Mathew <[email or web address]>    Good morning Casi.  I have a new referral for patient if you could please assist.  Let me know if you have any questions.  Thank you,       Sindy Sun  Outpatient   [facility]  [address] [place] [address], [address] [address]  Ph# [phone]/Fax# [phone]  [email or web address]    Awaiting response.  SW remains available.

## 2023-11-08 ENCOUNTER — OFFICE VISIT (OUTPATIENT)
Dept: FAMILY MEDICINE CLINIC | Facility: CLINIC | Age: 63
End: 2023-11-08
Payer: COMMERCIAL

## 2023-11-08 VITALS
HEART RATE: 81 BPM | BODY MASS INDEX: 37.39 KG/M2 | HEIGHT: 63 IN | SYSTOLIC BLOOD PRESSURE: 116 MMHG | TEMPERATURE: 97.5 F | DIASTOLIC BLOOD PRESSURE: 80 MMHG | OXYGEN SATURATION: 95 % | WEIGHT: 211 LBS

## 2023-11-08 DIAGNOSIS — J40 BRONCHITIS: Primary | ICD-10-CM

## 2023-11-08 PROCEDURE — 99213 OFFICE O/P EST LOW 20 MIN: CPT | Performed by: STUDENT IN AN ORGANIZED HEALTH CARE EDUCATION/TRAINING PROGRAM

## 2023-11-08 RX ORDER — CIPROFLOXACIN 500 MG/1
500 TABLET, FILM COATED ORAL EVERY 12 HOURS SCHEDULED
Qty: 14 TABLET | Refills: 0 | Status: SHIPPED | OUTPATIENT
Start: 2023-11-08 | End: 2023-11-15

## 2023-11-08 RX ORDER — IPRATROPIUM BROMIDE AND ALBUTEROL SULFATE 2.5; .5 MG/3ML; MG/3ML
3 SOLUTION RESPIRATORY (INHALATION) EVERY 6 HOURS PRN
Qty: 90 ML | Refills: 1 | Status: SHIPPED | OUTPATIENT
Start: 2023-11-08 | End: 2023-11-17 | Stop reason: SDUPTHER

## 2023-11-08 RX ORDER — METHYLPREDNISOLONE 4 MG/1
TABLET ORAL
Qty: 21 EACH | Refills: 0 | Status: SHIPPED | OUTPATIENT
Start: 2023-11-08 | End: 2023-11-17 | Stop reason: ALTCHOICE

## 2023-11-08 NOTE — ASSESSMENT & PLAN NOTE
Start Cipro 500 mg twice daily x7 days, Medrol Dosepak. Return precautions given  Recommend Duo neb treatments q6hrs prn, nebulizer machine to be ordered, solution sent to pharmacy.

## 2023-11-08 NOTE — PROGRESS NOTES
Name: Oscar Ventura      : 1960      MRN: [id number]  Encounter Provider: Morro Rothman DO  Encounter Date: 2023   Encounter department: [address]     1. Bronchitis  Assessment & Plan:  Start Cipro 500 mg twice daily x7 days, Medrol Dosepak. Return precautions given  Recommend Duo neb treatments q6hrs prn, nebulizer machine to be ordered, solution sent to pharmacy. Orders:  -     ciprofloxacin (CIPRO) 500 mg tablet; Take 1 tablet (500 mg total) by mouth every 12 (twelve) hours for 7 days  -     methylPREDNISolone 4 MG tablet therapy pack; Use as directed on package  -     ipratropium-albuterol (DUO-NEB) 0.5-2.5 mg/3 mL nebulizer solution; Take 3 mL by nebulization every 6 (six) hours as needed for wheezing or shortness of breath           Subjective      Cough  This is a new problem. The current episode started 1 to 4 weeks ago. The problem has been gradually worsening. The problem occurs constantly. The cough is Non-productive. Pertinent negatives include no chest pain, chills, fever, headaches, postnasal drip, rhinorrhea, sore throat or shortness of breath. Nothing aggravates the symptoms. She has tried OTC cough suppressant for the symptoms. The treatment provided no relief. Review of Systems   Constitutional:  Negative for chills and fever. HENT:  Negative for congestion, postnasal drip, rhinorrhea and sore throat. Respiratory:  Positive for cough. Negative for shortness of breath. Cardiovascular:  Negative for chest pain and palpitations. Gastrointestinal:  Negative for abdominal pain, constipation and diarrhea. Neurological:  Negative for dizziness and headaches.        Current Outpatient Medications on File Prior to Visit   Medication Sig    albuterol (Ventolin HFA) 90 mcg/act inhaler Inhale 2 puffs every 6 (six) hours as needed for wheezing    Dalfampridine ER 10 MG TB12 Take 1 tablet (10 mg total) by mouth 2 (two) times a day    FLUoxetine (PROzac) 10 mg capsule TAKE 1 CAPSULE BY MOUTH EVERY DAY    IBUPROFEN PO Take by mouth    LORazepam (Ativan) 0.5 mg tablet Take 1 tablet (0.5 mg total) by mouth as needed for anxiety As the patient is claustrophobic I would recommend; one tablet of Xanax 15 minutes before going into the MRI tube and then 1 tablet just before going into the MRI tube. meloxicam (MOBIC) 15 mg tablet Take 1 tablet (15 mg total) by mouth daily    oxybutynin (DITROPAN-XL) 10 MG 24 hr tablet Take 1 tablet (10 mg total) by mouth daily at bedtime       Objective     /80 (BP Location: Left arm, Patient Position: Sitting, Cuff Size: Large)   Pulse 81   Temp 97.5 °F (36.4 °C) (Temporal)   Ht 5' 3" (1.6 m)   Wt 95.7 kg (211 lb)   SpO2 95%   BMI 37.38 kg/m²     Physical Exam  Vitals reviewed. Constitutional:       Appearance: She is obese. HENT:      Head: Normocephalic and atraumatic. Mouth/Throat:      Mouth: Mucous membranes are moist.      Pharynx: No oropharyngeal exudate or posterior oropharyngeal erythema. Eyes:      Extraocular Movements: Extraocular movements intact. Cardiovascular:      Rate and Rhythm: Normal rate and regular rhythm. Heart sounds: Normal heart sounds. Pulmonary:      Effort: Pulmonary effort is normal.      Breath sounds: Rhonchi and rales present. Comments: Coarse lung sound sin Right upper and lower lung lobes  Abdominal:      Palpations: Abdomen is soft. Tenderness: There is no abdominal tenderness. Musculoskeletal:      Cervical back: Neck supple. No tenderness. Neurological:      General: No focal deficit present. Mental Status: She is alert and oriented to person, place, and time.    Psychiatric:         Mood and Affect: Mood normal.         Behavior: Behavior normal.         Simeon Stage, DO

## 2023-11-09 NOTE — TELEPHONE ENCOUNTER
ALEAH rcvd message from [employer] representative-    You too! It turns out she reached out to us last year, but never came for an evaluation. I offered her an appointment next week at [facility] (I have a community clinic day there where I can see outside patients). I will let you know if I hear from her.        Linda Mathew, PT, DPT    Clinical Specialist  Cell: [phone]  Main: [phone]

## 2023-11-15 ENCOUNTER — TELEPHONE (OUTPATIENT)
Dept: FAMILY MEDICINE CLINIC | Facility: CLINIC | Age: 63
End: 2023-11-15

## 2023-11-15 NOTE — TELEPHONE ENCOUNTER
Patient called to cherelle Hoyos know that she has not received her nebulizer machine. Please advise.

## 2023-11-16 NOTE — TELEPHONE ENCOUNTER
After re-faxing orders to [address] [place] [address] for the 3rd time, I was notified by staff that they did not receive it, but they did speak to the patient yesterday and was informed by the patient that she already purchased a nebulizer.

## 2023-11-16 NOTE — TELEPHONE ENCOUNTER
Patient as of today she has not received the nebulizer. She was coughing non stopped, she do not think waiting until Monday to be seen would be good. She stated, she do not want go urgent care. she is currently at [facility], returning late tonight. I offered her urgent care due to the time returning back home. She denied, I then offered her another provider to be seen tomorrow. She agreed. Dr. Yamilex Cheek is aware.

## 2023-11-17 ENCOUNTER — TELEPHONE (OUTPATIENT)
Dept: NEUROLOGY | Facility: CLINIC | Age: 63
End: 2023-11-17

## 2023-11-17 ENCOUNTER — OFFICE VISIT (OUTPATIENT)
Dept: FAMILY MEDICINE CLINIC | Facility: CLINIC | Age: 63
End: 2023-11-17
Payer: COMMERCIAL

## 2023-11-17 VITALS
SYSTOLIC BLOOD PRESSURE: 110 MMHG | HEART RATE: 95 BPM | DIASTOLIC BLOOD PRESSURE: 74 MMHG | OXYGEN SATURATION: 97 % | TEMPERATURE: 97.6 F | BODY MASS INDEX: 37.38 KG/M2 | HEIGHT: 63 IN | RESPIRATION RATE: 18 BRPM

## 2023-11-17 DIAGNOSIS — R06.2 WHEEZES: Primary | ICD-10-CM

## 2023-11-17 DIAGNOSIS — J40 BRONCHITIS: ICD-10-CM

## 2023-11-17 LAB
DME PARACHUTE DELIVERY DATE REQUESTED: NORMAL
DME PARACHUTE ITEM DESCRIPTION: NORMAL
DME PARACHUTE ORDER STATUS: NORMAL
DME PARACHUTE SUPPLIER NAME: NORMAL
DME PARACHUTE SUPPLIER PHONE: NORMAL

## 2023-11-17 PROCEDURE — 99213 OFFICE O/P EST LOW 20 MIN: CPT | Performed by: NURSE PRACTITIONER

## 2023-11-17 RX ORDER — IPRATROPIUM BROMIDE AND ALBUTEROL SULFATE 2.5; .5 MG/3ML; MG/3ML
3 SOLUTION RESPIRATORY (INHALATION) EVERY 6 HOURS PRN
Qty: 90 ML | Refills: 1 | Status: SHIPPED | OUTPATIENT
Start: 2023-11-17

## 2023-11-17 RX ORDER — PREDNISONE 20 MG/1
20 TABLET ORAL DAILY
Qty: 5 TABLET | Refills: 0 | Status: SHIPPED | OUTPATIENT
Start: 2023-11-17

## 2023-11-17 NOTE — TELEPHONE ENCOUNTER
11/16 at 10:24 am:    Hi this is Branden Abad. My birth date is [birth year]. This is with reference to the ambulatory drug that was prescribed for me about a month ago. The nurses are working on it very efficiently, but they keep dropping the ball at the prescription (((inaudible))). So I haven't heard from anybody, and I would really like to start taking. Give me a call back at [phone]. Griseofulvin Counseling:  I discussed with the patient the risks of griseofulvin including but not limited to photosensitivity, cytopenia, liver damage, nausea/vomiting and severe allergy.  The patient understands that this medication is best absorbed when taken with a fatty meal (e.g., ice cream or french fries).

## 2023-11-17 NOTE — ASSESSMENT & PLAN NOTE
Patient is being seen for sick visit. Continues to have wheezes, fatigue, cough. Recently completed Medrol Dosepak and Cipro. Prednisone ordered daily for the next 5 days. Nebulizer ordered. Medication sent for nebulizer. May use steam to help decongest.  Increase fluid hydration. Extensive discussion regarding lung exercises.

## 2023-11-17 NOTE — TELEPHONE ENCOUNTER
Being addressed in refill encounter 10/17/23. Nurse called pt today and provided her with update of Dalfampridine PA. Awaiting determination. Closing this task. Please use other encounter.

## 2023-11-17 NOTE — PROGRESS NOTES
Name: Flavia Mcneil      : 1960      MRN: [id number]  Encounter Provider: TWIN Phillip  Encounter Date: 2023   Encounter department: [address]     1. Wheezes  Assessment & Plan:  Patient is being seen for sick visit. Continues to have wheezes, fatigue, cough. Recently completed Medrol Dosepak and Cipro. Prednisone ordered daily for the next 5 days. Nebulizer ordered. Medication sent for nebulizer. May use steam to help decongest.  Increase fluid hydration. Extensive discussion regarding lung exercises. Orders:  -     predniSONE 20 mg tablet; Take 1 tablet (20 mg total) by mouth daily    2. Bronchitis  -     ipratropium-albuterol (DUO-NEB) 0.5-2.5 mg/3 mL nebulizer solution; Take 3 mL by nebulization every 6 (six) hours as needed for wheezing or shortness of breath         Subjective      Patient is here for sick visit. Reports that she just recently completed Cipro and Medrol Dosepak. Continues to have cough, shortness of breath. Did have a nebulizer ordered somehow it was not delivered. Ordered 1 personally and then returned it. Has not been doing any neb treatments. Review of Systems   Constitutional:  Positive for fatigue. HENT: Negative. Eyes: Negative. Respiratory:  Positive for cough, shortness of breath and wheezing. Cardiovascular: Negative. Gastrointestinal: Negative. Endocrine: Negative. Genitourinary: Negative. Musculoskeletal: Negative. Skin: Negative. Allergic/Immunologic: Negative. Neurological: Negative. Psychiatric/Behavioral: Negative.          Current Outpatient Medications on File Prior to Visit   Medication Sig   • albuterol (Ventolin HFA) 90 mcg/act inhaler Inhale 2 puffs every 6 (six) hours as needed for wheezing   • Dalfampridine ER 10 MG TB12 Take 1 tablet (10 mg total) by mouth 2 (two) times a day   • IBUPROFEN PO Take by mouth   • meloxicam (MOBIC) 15 mg tablet Take 1 tablet (15 mg total) by mouth daily   • oxybutynin (DITROPAN-XL) 10 MG 24 hr tablet Take 1 tablet (10 mg total) by mouth daily at bedtime   • [DISCONTINUED] ipratropium-albuterol (DUO-NEB) 0.5-2.5 mg/3 mL nebulizer solution Take 3 mL by nebulization every 6 (six) hours as needed for wheezing or shortness of breath   • FLUoxetine (PROzac) 10 mg capsule TAKE 1 CAPSULE BY MOUTH EVERY DAY (Patient not taking: Reported on 11/17/2023)   • LORazepam (Ativan) 0.5 mg tablet Take 1 tablet (0.5 mg total) by mouth as needed for anxiety As the patient is claustrophobic I would recommend; one tablet of Xanax 15 minutes before going into the MRI tube and then 1 tablet just before going into the MRI tube. (Patient not taking: Reported on 11/17/2023)   • [DISCONTINUED] methylPREDNISolone 4 MG tablet therapy pack Use as directed on package (Patient not taking: Reported on 11/17/2023)       Objective     /74   Pulse 95   Temp 97.6 °F (36.4 °C) (Temporal)   Resp 18   Ht 5' 3" (1.6 m)   SpO2 97%   BMI 37.38 kg/m²     Physical Exam  Vitals and nursing note reviewed. Constitutional:       Appearance: She is well-developed. She is obese. HENT:      Head: Normocephalic and atraumatic. Right Ear: External ear normal.      Left Ear: External ear normal.      Nose: Congestion present. Cardiovascular:      Rate and Rhythm: Regular rhythm. Tachycardia present. Pulmonary:      Effort: Pulmonary effort is normal.      Breath sounds: Examination of the left-middle field reveals rhonchi. Examination of the left-lower field reveals wheezing and rhonchi. Decreased breath sounds, wheezing and rhonchi present. Musculoskeletal:         General: Normal range of motion. Cervical back: Normal range of motion. Skin:     General: Skin is warm and dry. Neurological:      General: No focal deficit present. Mental Status: She is alert and oriented to person, place, and time.    Psychiatric:         Mood and Affect: Mood normal.         Behavior: Behavior normal.         Thought Content:  Thought content normal.         Judgment: Judgment normal.       Carry Carmen, CRNP

## 2023-11-20 LAB
DME PARACHUTE DELIVERY DATE ACTUAL: NORMAL
DME PARACHUTE DELIVERY DATE REQUESTED: NORMAL
DME PARACHUTE ITEM DESCRIPTION: NORMAL
DME PARACHUTE ORDER STATUS: NORMAL
DME PARACHUTE SUPPLIER NAME: NORMAL
DME PARACHUTE SUPPLIER PHONE: NORMAL

## 2023-11-21 ENCOUNTER — TELEPHONE (OUTPATIENT)
Dept: NEUROLOGY | Facility: CLINIC | Age: 63
End: 2023-11-21

## 2023-11-21 NOTE — TELEPHONE ENCOUNTER
Called spoke with patient r/s appointment to 4/23/2024 at [facility] location. Mailed appointment card.

## 2023-12-01 PROBLEM — N39.0 URINARY TRACT INFECTION WITHOUT HEMATURIA: Status: RESOLVED | Noted: 2023-10-02 | Resolved: 2023-12-01

## 2023-12-06 NOTE — TELEPHONE ENCOUNTER
SW sent unable to reach letter.  Greater than 3 phone calls were attempted and letter provided.  SW has not heard back from patient as to how to proceed with task.  Will close task at this time.  No further questions or needs at this time.  SW will be available for future social needs as requested.

## 2023-12-08 ENCOUNTER — APPOINTMENT (OUTPATIENT)
Age: 63
End: 2023-12-08
Payer: COMMERCIAL

## 2023-12-11 ENCOUNTER — EVALUATION (OUTPATIENT)
Age: 63
End: 2023-12-11
Payer: COMMERCIAL

## 2023-12-11 DIAGNOSIS — G35 MULTIPLE SCLEROSIS (HCC): Primary | ICD-10-CM

## 2023-12-11 PROCEDURE — 97530 THERAPEUTIC ACTIVITIES: CPT

## 2023-12-11 PROCEDURE — 97162 PT EVAL MOD COMPLEX 30 MIN: CPT

## 2023-12-11 NOTE — ADDENDUM NOTE
Addended by: Jose Rafael Young on: 12/11/2023 05:07 PM     Modules accepted: Orders
Detail Level: Zone

## 2023-12-11 NOTE — PROGRESS NOTES
PT Evaluation        POC expires Auth Status Total   Visits  Start date  Expiration date PT/OT + Visit Limit? Co-Insurance   3/11/24 required [facility] 12/11/23 3/11/23 no No                                            Today's date: 2023  Patient name: Ozzy Peres  : 1960  MRN: [id number]  Referring provider: Santo Dela Cruz,*  Dx:   Encounter Diagnosis     ICD-10-CM    1. Multiple sclerosis [facility])  G35             Assessment  Assessment details: Patient is a 60 y.o. Female who presents to skilled outpatient PT with a dx of RR MS and frequent falls. Upon initial evaluation, pt presents with gait dysfunction, dec LE strength (R<L), dec balance, dec righting reactions, dec sensation, dec coordination, and dec cardiovascular endurance. She is below age-related norms, and has shown significant regression since d/c  due to POC complication for bronchial pneumonia. Pt demo improved RLE clearance when ambulating with RW. Per MIFS, pt scored 65/84 which indicates significant impact due to fatigue. Significant amount of time spent completing patient education on POC, HEP, bracing, and prognosis based on diagnosis and presentation. Will initiate circuit style training. She will benefit from skilled OP PT to improve above impairments, maximize functional mobility independence and dec risk for falls. Will see pt 1-3x/week. Pt agreeable. Impairments: Abnormal coordination, Abnormal gait, Abnormal muscle tone, Abnormal or restricted ROM, Activity intolerance, Impaired balance, Impaired physical strength, Lacks appropriate HEP, Poor posture, Poor body mechanics, Pain with function, Safety issue, Weight-bearing intolerance, Abnormal movement, Difficulty understanding, Abnormal muscle firing  Understanding of Dx/Px/POC: Good  Prognosis: Good    Patient verbalized understanding of POC.   Please contact me if you have any questions or recommendations. Thank you for the referral and the opportunity to share in [address] [place] [address]. Plan  Plan details:  Will see pt 2x/week  Patient would benefit from: PT Eval, Skilled PT and OT Eval  Planned modality interventions: Biofeedback, Cryotherapy, TENS, Thermotherapy  Planned therapy interventions: Abdominal trunk stabilization, ADL training, Balance, Balance/WB training, Breathing training, Body mechanics training, Coordination, Functional ROM exercises, Gait training, HEP, Joint Mobilization, Manual Therapy, Degroot taping, Motor coordination training, Neuromuscular re-education, Patient education, Postural training, Strengthening, Stretching, Therapeutic activities, Therapeutic exercises, Therapeutic training, Transfer training, Activity modification, Work reintegration  Frequency: 1-3x/wk  Duration in weeks: 12  Plan of Care beginning date: 12/11/23  Plan of Care expiration date: 12 weeks - 3/4/2024  Treatment plan discussed with: Patient and Family      Goals  Short Term Goals (4 weeks):    - Patient will improve time on TUG by 2.9 seconds to facilitate improved safety in all ambulation  - Patient will be independent in basic HEP 2-3 weeks  - Patient will improve 5xSTS score by 2.3 seconds to promote improved LE functional strength needed for ADLs  - Pt will improve ABC to >/= 67%    Long Term Goals (12 weeks):  - Patient will be independent in a comprehensive home exercise program  - Patient will improve gait speed by 0.18 m/s to improve safety with community ambulation  - Patient will improve MIJARES by 6 points to facilitate return to safe independent ambulation  - Patient will improve scoring on FGA by 4 points to progress safety with dynamic tasks  - Patient will be able to demonstrate HT in gait without veering  - Patient will improve 6 Minute Walk Test score by 190 feet to promote improved cardiovascular endurance  - Patient will report 50% reduction in near falls in order to improve safety with functional tasks and reduce his risk for falls  - Patient will report going on walks at least 3 days per week to promote independence and improved cardiovascular endurance  - Patient will be able to ascend/descend stairs reciprocally without UE assist to promote independence and safety with ADLs  - Patient will report 50% reduction in near falls when ambulating on uneven terrain    Subjective  History of Present Illness  Mechanism of injury:   Pt reports that she was making progress after seeing neuro PT. She went to the gym and was working on her strength. Pt reports she uses the RW. She watches her grandchild 2x/week. She has reported fatigue that carries over days due to the MS. She uses her total gym and bike at home and stretches. She has good and bad days but it can change day to day. She recently had a medication change- she has f/u with neurologist. Pt wants to walk and be as functional as possible, get "her life back." She feels that its not necessarily from a relapse. Independent with ADLs, requires assist with  iADLs of cleaning. She has R AFO that she received "3-4 months ago" and has not been wearing because she does not feel that it is helping. Recently, she had bronchitis and was having inc difficulty breathing. She was able to exercise here and there. 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 Pt wants to be able to walk and get stronger to be able to go to a gym.      Primary AD: RW    Pain  R knee    Social Support  Stairs in house: FF with HR to laundry in basement  Lives with: , dogs, cats, chickens- babysits young grandchildren 2-3x/week    Objective   - R Hip Flexion: 2+/5  - L Hip Flexion: 4-/5  - R Knee Extension: 4-/5 - L Knee Extension: 4-/5  - R Knee Flexion: 4/5  - L Knee Flexion: 4/5  - R Ankle DF: 3-/5  - L Ankle DF: 4/5  - R Ankle PF: 4-/5  - L Ankle PF: 4/5  -  R eversion 1/5    Sensation  - Light touch: numbness and tingling in both feet; WFL; RLE differentiation     Coordination  - Dysmetria: WFL   - Dysdiadochokinesia: WFL    Outcome Measures Initial Eval  12/11/23        5xSTS 41.65s RW no UE        TUG NT        10 meter 0.48m/s RW        2MWT 098ft RW         MFIS 65/84  Physical 29  Cognitive 29  Psychosocial 7              Precautions: fall risk  Past Medical History:   Diagnosis Date    Multiple sclerosis ([facility])

## 2023-12-18 ENCOUNTER — PATIENT MESSAGE (OUTPATIENT)
Dept: NEUROLOGY | Facility: CLINIC | Age: 63
End: 2023-12-18

## 2023-12-18 ENCOUNTER — APPOINTMENT (OUTPATIENT)
Age: 63
End: 2023-12-18
Payer: COMMERCIAL

## 2023-12-22 ENCOUNTER — OFFICE VISIT (OUTPATIENT)
Dept: FAMILY MEDICINE CLINIC | Facility: CLINIC | Age: 63
End: 2023-12-22
Payer: COMMERCIAL

## 2023-12-22 VITALS
BODY MASS INDEX: 37.74 KG/M2 | SYSTOLIC BLOOD PRESSURE: 124 MMHG | DIASTOLIC BLOOD PRESSURE: 70 MMHG | OXYGEN SATURATION: 97 % | WEIGHT: 213 LBS | TEMPERATURE: 97.1 F | HEART RATE: 97 BPM | HEIGHT: 63 IN | RESPIRATION RATE: 14 BRPM

## 2023-12-22 DIAGNOSIS — R09.89 RHONCHI: Primary | ICD-10-CM

## 2023-12-22 DIAGNOSIS — R06.02 SOB (SHORTNESS OF BREATH): ICD-10-CM

## 2023-12-22 PROCEDURE — 99213 OFFICE O/P EST LOW 20 MIN: CPT | Performed by: NURSE PRACTITIONER

## 2023-12-22 RX ORDER — METHYLPREDNISOLONE 4 MG/1
TABLET ORAL
Qty: 21 EACH | Refills: 0 | Status: SHIPPED | OUTPATIENT
Start: 2023-12-22

## 2023-12-22 RX ORDER — FLUTICASONE PROPIONATE AND SALMETEROL 100; 50 UG/1; UG/1
1 POWDER RESPIRATORY (INHALATION) 2 TIMES DAILY
Qty: 60 BLISTER | Refills: 3 | Status: SHIPPED | OUTPATIENT
Start: 2023-12-22

## 2023-12-22 NOTE — ASSESSMENT & PLAN NOTE
Continues to have rhonchi, shortness of breath ongoing for more than a month.  Denies any fevers or chills has had Cipro x 2, Medrol Dosepak x 2.  Does have a history of MS.  Patient reports that Advair has helped in the past.  Medication ordered.  Prednisone ordered.  Discussed with patient possible COPD.  Discussed pulmonary function test with patient.  Patient declined pulmonology referral at this time.  Reports if symptoms do not improve will call office.  Use steam to help decongest.  Continue with good fluid hydration.

## 2023-12-22 NOTE — PROGRESS NOTES
Name: Catina Hilario      : 1960      MRN: [id number]  Encounter Provider: TWIN Nguyen  Encounter Date: 2023   Encounter department: [facility]    Assessment & Plan     1. Rhonchi  Assessment & Plan:  Continues to have rhonchi, shortness of breath ongoing for more than a month.  Denies any fevers or chills has had Cipro x 2, Medrol Dosepak x 2.  Does have a history of MS.  Patient reports that Advair has helped in the past.  Medication ordered.  Prednisone ordered.  Discussed with patient possible COPD.  Discussed pulmonary function test with patient.  Patient declined pulmonology referral at this time.  Reports if symptoms do not improve will call office.  Use steam to help decongest.  Continue with good fluid hydration.    Orders:  -     methylPREDNISolone 4 MG tablet therapy pack; Use as directed on package    2. SOB (shortness of breath)  -     Fluticasone-Salmeterol (Advair Diskus) 100-50 mcg/dose inhaler; Inhale 1 puff 2 (two) times a day Rinse mouth after use.         Subjective      Patient is here with continued complaints of shortness of breath, wheezing.  Ongoing for more than a month.  Patient was recently on Cipro 2 times also on prednisone 2 times.  Been using nebulizer treatments, rescue inhaler.  Feels that symptoms are not improving.      Review of Systems   Constitutional: Negative.    HENT: Negative.     Eyes: Negative.    Respiratory:  Positive for cough, chest tightness and wheezing.    Cardiovascular: Negative.    Gastrointestinal: Negative.    Endocrine: Negative.    Genitourinary: Negative.    Musculoskeletal: Negative.    Skin: Negative.    Allergic/Immunologic: Negative.    Neurological:  Positive for weakness (Has a history of MS).   Psychiatric/Behavioral: Negative.         Current Outpatient Medications on File Prior to Visit   Medication Sig   • albuterol (Ventolin HFA) 90 mcg/act inhaler Inhale 2 puffs every 6 (six) hours as needed  "for wheezing   • Dalfampridine ER 10 MG TB12 Take 1 tablet (10 mg total) by mouth 2 (two) times a day   • IBUPROFEN PO Take by mouth   • ipratropium-albuterol (DUO-NEB) 0.5-2.5 mg/3 mL nebulizer solution Take 3 mL by nebulization every 6 (six) hours as needed for wheezing or shortness of breath   • meloxicam (MOBIC) 15 mg tablet Take 1 tablet (15 mg total) by mouth daily   • oxybutynin (DITROPAN-XL) 10 MG 24 hr tablet Take 1 tablet (10 mg total) by mouth daily at bedtime   • predniSONE 20 mg tablet Take 1 tablet (20 mg total) by mouth daily   • FLUoxetine (PROzac) 10 mg capsule TAKE 1 CAPSULE BY MOUTH EVERY DAY (Patient not taking: Reported on 11/17/2023)   • LORazepam (Ativan) 0.5 mg tablet Take 1 tablet (0.5 mg total) by mouth as needed for anxiety As the patient is claustrophobic I would recommend; one tablet of Xanax 15 minutes before going into the MRI tube and then 1 tablet just before going into the MRI tube. (Patient not taking: Reported on 11/17/2023)       Objective     /70   Pulse 97   Temp (!) 97.1 °F (36.2 °C) (Temporal)   Resp 14   Ht 5' 3\" (1.6 m)   Wt 96.6 kg (213 lb)   SpO2 97%   BMI 37.73 kg/m²     Physical Exam  Vitals and nursing note reviewed.   Constitutional:       Appearance: She is well-developed. She is ill-appearing.   HENT:      Head: Normocephalic and atraumatic.   Cardiovascular:      Rate and Rhythm: Regular rhythm. Tachycardia present.      Pulses: Normal pulses.      Heart sounds: Normal heart sounds.   Pulmonary:      Effort: Pulmonary effort is normal.      Breath sounds: Rhonchi present.   Musculoskeletal:         General: Normal range of motion.      Cervical back: Normal range of motion.   Skin:     General: Skin is warm and dry.   Neurological:      General: No focal deficit present.      Mental Status: She is alert and oriented to person, place, and time.   Psychiatric:         Mood and Affect: Mood normal.         Behavior: Behavior normal.         Thought " Content: Thought content normal.         Judgment: Judgment normal.       TWIN Nguyen

## 2023-12-27 ENCOUNTER — APPOINTMENT (OUTPATIENT)
Age: 63
End: 2023-12-27
Payer: COMMERCIAL

## 2024-01-05 ENCOUNTER — APPOINTMENT (OUTPATIENT)
Age: 64
End: 2024-01-05
Payer: COMMERCIAL

## 2024-01-10 ENCOUNTER — APPOINTMENT (OUTPATIENT)
Age: 64
End: 2024-01-10
Payer: COMMERCIAL

## 2024-01-15 ENCOUNTER — EVALUATION (OUTPATIENT)
Age: 64
End: 2024-01-15
Payer: COMMERCIAL

## 2024-01-15 DIAGNOSIS — R26.9 GAIT DIFFICULTY: ICD-10-CM

## 2024-01-15 DIAGNOSIS — G35 MS (MULTIPLE SCLEROSIS) (HCC): Primary | ICD-10-CM

## 2024-01-15 PROCEDURE — 97530 THERAPEUTIC ACTIVITIES: CPT

## 2024-01-15 PROCEDURE — 97112 NEUROMUSCULAR REEDUCATION: CPT

## 2024-01-15 NOTE — PROGRESS NOTES
Progress Note       POC expires Auth Status Total   Visits  Start date  Expiration date PT/OT + Visit Limit? Co-Insurance   3/11/24 required [facility] 12/11/23 3/11/23 no No                                            Today's date: 1/15/2024  Patient name: Catina Hilario  : 1960  MRN: [id number]  Referring provider: Elise Schmidt,*  Dx:   Encounter Diagnosis     ICD-10-CM    1. MS (multiple sclerosis) (HCC)  G35       2. Gait difficulty  R26.9         Assessment  Assessment details: Patient is a 63 y.o. Female who presents to skilled outpatient PT with a dx of RR MS and frequent falls. Since initial evaluation, pt presents with gait dysfunction, dec LE strength (R<L), dec balance, dec righting reactions, dec sensation, dec coordination, and dec cardiovascular endurance. She is below age-related norms, and has shown significant regression since d/c 10/31 due to POC complication for bronchial pneumonia. Has not returned since IE due to illness and snow. Pt was ambulating inside clinic to waiting room while clinicians were on lunch break. Pt had unwitnessed fall to floor. Pt found on floor. Denies headstrike, denies injury. Pt reports she missed the chair when going to sit down. Pt presenting to PT for re-evaluation. No bruising, redness, and pt denies pain in LE. Pain in L shoulder with lowering arm to side, as well as any resisted motions (all directions). Able to complete re-evaluation. if continues to have pain to follow up with PCP. Pt agreeable. Pt demo improved RLE clearance when ambulating with RW. Per MIFS, pt scored 63/84 which indicates significant impact due to fatigue (form 65/84). Sig improvement in 5xSTS, 2mWT, and TUG. Has not had any other falls. Although pt has not been compliant with 1-2x/week therapy, she has shown sig improvements from IE, due to overall improvement in health. Significant amount of time spent  completing patient education on POC, HEP, bracing, and prognosis based on diagnosis and presentation. Will continue circuit style training. She will benefit from skilled OP PT to improve above impairments, maximize functional mobility independence and dec risk for falls. Will see pt 1-3x/week. Pt agreeable. Pt demo good technique with STS transfers after VC.     Impairments: Abnormal coordination, Abnormal gait, Abnormal muscle tone, Abnormal or restricted ROM, Activity intolerance, Impaired balance, Impaired physical strength, Lacks appropriate HEP, Poor posture, Poor body mechanics, Pain with function, Safety issue, Weight-bearing intolerance, Abnormal movement, Difficulty understanding, Abnormal muscle firing  Understanding of Dx/Px/POC: Good  Prognosis: Good    Patient verbalized understanding of POC.  Please contact me if you have any questions or recommendations. Thank you for the referral and the opportunity to share in Catina EWELINA Hilario's care.    Plan  Plan details: Will see pt 2x/week  Patient would benefit from: PT Eval, Skilled PT and OT Eval  Planned modality interventions: Biofeedback, Cryotherapy, TENS, Thermotherapy  Planned therapy interventions: Abdominal trunk stabilization, ADL training, Balance, Balance/WB training, Breathing training, Body mechanics training, Coordination, Functional ROM exercises, Gait training, HEP, Joint Mobilization, Manual Therapy, Degroot taping, Motor coordination training, Neuromuscular re-education, Patient education, Postural training, Strengthening, Stretching, Therapeutic activities, Therapeutic exercises, Therapeutic training, Transfer training, Activity modification, Work reintegration  Frequency: 1-3x/wk  Duration in weeks: 12  Plan of Care beginning date: 12/11/23  Plan of Care expiration date: 12 weeks - 3/11/24  Treatment plan discussed with: Patient and Family      Goals  Short Term Goals (4 weeks):    - Patient will improve time on TUG by 2.9 seconds to  "facilitate improved safety in all ambulation  - Patient will be independent in basic HEP 2-3 weeks  - Patient will improve 5xSTS score by 2.3 seconds to promote improved LE functional strength needed for ADLs  - Pt will improve ABC to >/= 67%    Long Term Goals (12 weeks):  - Patient will be independent in a comprehensive home exercise program  - Patient will improve gait speed by 0.18 m/s to improve safety with community ambulation  - Patient will improve MIJARES by 6 points to facilitate return to safe independent ambulation  - Patient will improve scoring on FGA by 4 points to progress safety with dynamic tasks  - Patient will be able to demonstrate HT in gait without veering  - Patient will improve 6 Minute Walk Test score by 190 feet to promote improved cardiovascular endurance  - Patient will report 50% reduction in near falls in order to improve safety with functional tasks and reduce his risk for falls  - Patient will report going on walks at least 3 days per week to promote independence and improved cardiovascular endurance  - Patient will be able to ascend/descend stairs reciprocally without UE assist to promote independence and safety with ADLs  - Patient will report 50% reduction in near falls when ambulating on uneven terrain    Subjective  History of Present Illness  Mechanism of injury:   Pt reports that she was making progress after seeing neuro PT. She went to the gym and was working on her strength. Pt reports she uses the RW. She has reported fatigue that carries over days due to the MS. She uses her total gym and bike at home and stretches. She has good and bad days but it can change day to day. She recently had a medication change- she has f/u with neurologist. Pt wants to walk and be as functional as possible, get \"her life back.\" She feels that its not necessarily from a relapse. Independent with ADLs, requires assist with  iADLs of cleaning. She has R AFO that she received \"3-4 months ago\" and " has not been wearing because she does not feel that it is helping.     Recently, she had bronchitis and was having inc difficulty breathing. She was able to exercise here and there. 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 Pt wants to be able to walk and get stronger to be able to go to a gym.     Primary AD: RW    UPDATE 1/15: Pt reports she has been practicing her walking at home with RW, she feels that it is still fluctuating. Pt is not wearing the brace. She has been doing her exercises, despite not being able to come due to ongoing bronchitis. Pt reports she has not had any falls at home.     Pt was ambulating inside clinic to waiting room while clinicians were on lunch break. Pt had unwitnessed fall to floor. Pt found on floor. Denies headstrike, denies injury. Pt reports she missed the chair when going to sit down. Pt presenting to PT for re-evaluation. No bruising, redness, and pt denies pain in LE. Pain in L shoulder with lowering arm to side, as well as any resisted motions (all directions). Able to complete re-evaluation. if continues to have pain to follow up with PCP. Pt agreeable.    Pain  R knee    Social Support  Stairs in house: FF with HR to laundry in basement  Lives with: , dogs, cats, chickens- babysits young grandchildren 2-3x/week    Objective   - R Hip Flexion: 2+/5  - L Hip Flexion: 4-/5  - R Knee Extension: 4-/5 - L Knee Extension: 4-/5  - R Knee Flexion: 4/5  - L Knee Flexion: 4/5  - R Ankle DF: 3-/5  - L Ankle DF: 4/5  - R Ankle PF: 4-/5  - L Ankle PF: 4/5  -  R eversion 1/5    Sensation  - Light touch: numbness and tingling in both feet; WFL; RLE differentiation     Skin Integ: intact, no redness    Coordination  - Dysmetria: WFL   - Dysdiadochokinesia: WFL    Outcome Measures Initial Eval  12/11/23   1/15/24       5xSTS 41.65s RW no UE 20.45s RW no UE GB        TUG NT 27.2s RW       10 meter 0.48m/s RW 0.5m/s RW GB       2MWT 098ft RW   132ft RW GB       MFIS 65/84  Physical 29  Cognitive 29  Psychosocial 7  63/84  34  23  6            Precautions: fall risk  Past Medical History:   Diagnosis Date    Multiple sclerosis (HCC)

## 2024-01-22 ENCOUNTER — OFFICE VISIT (OUTPATIENT)
Age: 64
End: 2024-01-22
Payer: COMMERCIAL

## 2024-01-22 DIAGNOSIS — G35 MS (MULTIPLE SCLEROSIS) (HCC): Primary | ICD-10-CM

## 2024-01-22 DIAGNOSIS — R26.9 GAIT DIFFICULTY: ICD-10-CM

## 2024-01-22 PROCEDURE — 97112 NEUROMUSCULAR REEDUCATION: CPT

## 2024-01-22 NOTE — PATIENT COMMUNICATION
Pt called in needing a refill on her   Dalfampridine ER 10 MG TB12     She said Suzanne was amazing and very helpful and asked if assistance getting this medication filled again. Thank you

## 2024-01-22 NOTE — PROGRESS NOTES
"Daily Note     Today's date: 2024  Patient name: Catina Hilario  : 1960  MRN: [id number]  Referring provider: Elise Schmidt,*  Dx:   Encounter Diagnosis     ICD-10-CM    1. MS (multiple sclerosis) (HCC)  G35       2. Gait difficulty  R26.9                    Subjective: Pt denied falling since previous session. She shares that her shoulder is feeling much better      Objective: See treatment diary below    TA  Shldr examination   Discomfort with shdlr flexion and abduction   Patient education   Communicate with physician if shldr pain persists    NMR  SOLO 3 minutes on and 2 minutes off   High knee march with knee flexion VC  Side stepping with 10# TT  Step taps onto 6\"   Ambulation no AD heel toe  Christen taps 6\"    Sp02 remained >/= 90% t/o session      Assessment: Tolerated treatment well. Pt demonstrated multiple LOB t/o session requiring harness system and PT assist to maintain balance. Pt with improved L shldr pain but remains with discomfort with active sldr flexion and abduction. Educated patient to communicate with physician if pain persists, patient verbalized understanding and with no questions. Patient would benefit from continued PT      Plan: Continue per plan of care.      POC expires Auth Status Total   Visits  Start date  Expiration date PT/OT + Visit Limit? Co-Insurance   3/11/24 required BOMN 12/11/23 3/11/23 no No                                       "

## 2024-01-23 ENCOUNTER — TELEPHONE (OUTPATIENT)
Dept: NEUROLOGY | Facility: CLINIC | Age: 64
End: 2024-01-23

## 2024-01-23 DIAGNOSIS — G35 MULTIPLE SCLEROSIS (HCC): Primary | ICD-10-CM

## 2024-01-23 NOTE — TELEPHONE ENCOUNTER
Patient called had symptoms right leg was numb last night. She wanted to speak with the nurse, gave her the number and to press option 2 to leave a message..

## 2024-01-24 DIAGNOSIS — G35 MS (MULTIPLE SCLEROSIS) (HCC): ICD-10-CM

## 2024-01-24 RX ORDER — DALFAMPRIDINE 10 MG/1
10 TABLET, FILM COATED, EXTENDED RELEASE ORAL 2 TIMES DAILY
Qty: 60 TABLET | Refills: 0 | Status: SHIPPED | OUTPATIENT
Start: 2024-01-24

## 2024-01-24 NOTE — TELEPHONE ENCOUNTER
Patient reports on Monday night b/l legs were extremely hot and numb R > L; walking was very difficult  Admits to chills and nausea; she did not take temp to see if she was febrile  Grandson positive for norovirus; pt was w/him on Saturday before they were aware he was sick. Daughter/LALO also positive for norovirus    Pt states she feels 100% better than she did; only some residual nausea  Denies any visual/bowel/bladder/UTI symptoms    Admits to UTI (Cipro x 2 rounds) and PNA (3 courses of oral steroids/inhaler) over the past appx 1.5 mos; just resolved last week (see 12/22/23 OV note)    DMT - none  MRI c/t-spine - scheduled for 3/13/24    Dr. Cornell - please advise. Thank you.

## 2024-01-24 NOTE — TELEPHONE ENCOUNTER
1/22/24  2:47 PM  Note     Pt called in needing a refill on her   Dalfampridine ER 10 MG TB12      She said Suzanne was amazing and very helpful and asked if assistance getting this medication filled again. Thank you              Patient requesting refill of dalfampridine ER 10mg - 1 tab BID    Last rx - 12/18/23 for a 30 day supply    Script pended below.     Jessica coupon: ID: [id number] BIN: [id number] PCN: [id number] GRP: [id number]  $46.92-$53.42 (60 tablets)    Dr. Cornell - please sign if agreeable. Thank you.

## 2024-01-24 NOTE — TELEPHONE ENCOUNTER
Recd  1/23 9:35 AM    This is Catina turner.  I started with an M S. Exacerbation last night. If Suzanne can call me back or a nurse.  I am on the floor presently. I can manage to get up.  It's not easy. I would just like to hear what I should do.  [phone]. Thank you.

## 2024-01-25 ENCOUNTER — APPOINTMENT (OUTPATIENT)
Dept: LAB | Facility: CLINIC | Age: 64
End: 2024-01-25
Payer: COMMERCIAL

## 2024-01-25 DIAGNOSIS — G35 MULTIPLE SCLEROSIS (HCC): ICD-10-CM

## 2024-01-25 LAB
ALBUMIN SERPL BCP-MCNC: 4.2 G/DL (ref 3.5–5)
ALP SERPL-CCNC: 49 U/L (ref 34–104)
ALT SERPL W P-5'-P-CCNC: 19 U/L (ref 7–52)
ANION GAP SERPL CALCULATED.3IONS-SCNC: 8 MMOL/L
AST SERPL W P-5'-P-CCNC: 21 U/L (ref 13–39)
BACTERIA UR QL AUTO: ABNORMAL /HPF
BASOPHILS # BLD AUTO: 0.04 THOUSANDS/ÂΜL (ref 0–0.1)
BASOPHILS NFR BLD AUTO: 1 % (ref 0–1)
BILIRUB SERPL-MCNC: 0.76 MG/DL (ref 0.2–1)
BILIRUB UR QL STRIP: NEGATIVE
BUN SERPL-MCNC: 15 MG/DL (ref 5–25)
CALCIUM SERPL-MCNC: 9 MG/DL (ref 8.4–10.2)
CAOX CRY URNS QL MICRO: ABNORMAL /HPF
CHLORIDE SERPL-SCNC: 105 MMOL/L (ref 96–108)
CLARITY UR: CLEAR
CO2 SERPL-SCNC: 29 MMOL/L (ref 21–32)
COLOR UR: YELLOW
CREAT SERPL-MCNC: 0.73 MG/DL (ref 0.6–1.3)
EOSINOPHIL # BLD AUTO: 0.08 THOUSAND/ÂΜL (ref 0–0.61)
EOSINOPHIL NFR BLD AUTO: 1 % (ref 0–6)
ERYTHROCYTE [DISTWIDTH] IN BLOOD BY AUTOMATED COUNT: 13.4 % (ref 11.6–15.1)
GFR SERPL CREATININE-BSD FRML MDRD: 87 ML/MIN/1.73SQ M
GLUCOSE P FAST SERPL-MCNC: 96 MG/DL (ref 65–99)
GLUCOSE UR STRIP-MCNC: NEGATIVE MG/DL
HCT VFR BLD AUTO: 43 % (ref 34.8–46.1)
HGB BLD-MCNC: 13.8 G/DL (ref 11.5–15.4)
HGB UR QL STRIP.AUTO: NEGATIVE
IMM GRANULOCYTES # BLD AUTO: 0.01 THOUSAND/UL (ref 0–0.2)
IMM GRANULOCYTES NFR BLD AUTO: 0 % (ref 0–2)
KETONES UR STRIP-MCNC: NEGATIVE MG/DL
LEUKOCYTE ESTERASE UR QL STRIP: NEGATIVE
LYMPHOCYTES # BLD AUTO: 0.99 THOUSANDS/ÂΜL (ref 0.6–4.47)
LYMPHOCYTES NFR BLD AUTO: 18 % (ref 14–44)
MCH RBC QN AUTO: 30.2 PG (ref 26.8–34.3)
MCHC RBC AUTO-ENTMCNC: 32.1 G/DL (ref 31.4–37.4)
MCV RBC AUTO: 94 FL (ref 82–98)
MONOCYTES # BLD AUTO: 0.37 THOUSAND/ÂΜL (ref 0.17–1.22)
MONOCYTES NFR BLD AUTO: 7 % (ref 4–12)
MUCOUS THREADS UR QL AUTO: ABNORMAL
NEUTROPHILS # BLD AUTO: 4.16 THOUSANDS/ÂΜL (ref 1.85–7.62)
NEUTS SEG NFR BLD AUTO: 73 % (ref 43–75)
NITRITE UR QL STRIP: NEGATIVE
NON-SQ EPI CELLS URNS QL MICRO: ABNORMAL /HPF
NRBC BLD AUTO-RTO: 0 /100 WBCS
PH UR STRIP.AUTO: 6 [PH]
PLATELET # BLD AUTO: 256 THOUSANDS/UL (ref 149–390)
PMV BLD AUTO: 11.9 FL (ref 8.9–12.7)
POTASSIUM SERPL-SCNC: 4.3 MMOL/L (ref 3.5–5.3)
PROT SERPL-MCNC: 6.2 G/DL (ref 6.4–8.4)
PROT UR STRIP-MCNC: ABNORMAL MG/DL
RBC # BLD AUTO: 4.57 MILLION/UL (ref 3.81–5.12)
RBC #/AREA URNS AUTO: ABNORMAL /HPF
SODIUM SERPL-SCNC: 142 MMOL/L (ref 135–147)
SP GR UR STRIP.AUTO: 1.02 (ref 1–1.03)
UROBILINOGEN UR STRIP-ACNC: <2 MG/DL
WBC # BLD AUTO: 5.65 THOUSAND/UL (ref 4.31–10.16)
WBC #/AREA URNS AUTO: ABNORMAL /HPF

## 2024-01-25 PROCEDURE — 36415 COLL VENOUS BLD VENIPUNCTURE: CPT

## 2024-01-25 PROCEDURE — 85025 COMPLETE CBC W/AUTO DIFF WBC: CPT

## 2024-01-25 PROCEDURE — 80053 COMPREHEN METABOLIC PANEL: CPT

## 2024-01-25 PROCEDURE — 81001 URINALYSIS AUTO W/SCOPE: CPT | Performed by: PSYCHIATRY & NEUROLOGY

## 2024-01-25 NOTE — TELEPHONE ENCOUNTER
TELEPHONE ENCOUNTER:      I called Ms. Hilario on their phone number listed in EPIC at 8:47 AM.       She mentions that her legs have been getting more numbness for the last few days. She did had a UTI in Oct and most of her family is sick with various viruses. I believe that these symptoms are likely related with an underlying infection. I would check some basic labs and if they are negative we can consider doing a 3 days of IV steroids.     All questions were answered, patient expressed understanding.     1. Multiple sclerosis (HCC)    - CBC and differential; Future  - Comprehensive metabolic panel; Future  - UA w Reflex to Microscopic w Reflex to Culture        Kris Cornell MD.   Staff Neurologist  01/25/24   8:48 AM

## 2024-01-25 NOTE — TELEPHONE ENCOUNTER
1/24 at 11:53 am:    This is Catina Hilario. I had called yesterday for Suzanne who is my savior. I missed the call for some reason. Just have her give me a call back. Thank you. # [phone].

## 2024-01-26 ENCOUNTER — APPOINTMENT (OUTPATIENT)
Age: 64
End: 2024-01-26
Payer: COMMERCIAL

## 2024-01-26 NOTE — TELEPHONE ENCOUNTER
Received VM transcription from 1/25/24, 8:37 AM:    This is Catina Yanelis. I'm feeling numbness in both ankles right now residential up my my calves. So that's the story. Okay, thank you. [phone].   --------------------    Dr. Cornell already called pt back and addressed concerns.

## 2024-01-29 ENCOUNTER — APPOINTMENT (OUTPATIENT)
Age: 64
End: 2024-01-29
Payer: COMMERCIAL

## 2024-01-29 ENCOUNTER — TELEPHONE (OUTPATIENT)
Dept: NEUROLOGY | Facility: CLINIC | Age: 64
End: 2024-01-29

## 2024-01-29 NOTE — TELEPHONE ENCOUNTER
1/26 at 10:45 am:    I know you guys are really busy and backed. This is Catina Hilario, and I'm wondering about my test results from yesterday because I know it's Friday and I don't want to get caught up over the weekend without getting any meds I need. If you could please return my phone call today, I would really appreciate it. # [phone].

## 2024-01-29 NOTE — TELEPHONE ENCOUNTER
----- Message from Vilma George MD sent at 1/26/2024  4:06 PM EST -----  Please call the patient regarding her abnormal result- abnormal UA with Ca oxalate crystals patient is to follow with Urology and Primary care team

## 2024-01-30 ENCOUNTER — PATIENT MESSAGE (OUTPATIENT)
Dept: NEUROLOGY | Facility: CLINIC | Age: 64
End: 2024-01-30

## 2024-01-30 NOTE — TELEPHONE ENCOUNTER
Left detailed msg (per consent in chart) regarding results and recommendations below.     Labs sent to PCP.

## 2024-01-31 ENCOUNTER — PATIENT MESSAGE (OUTPATIENT)
Dept: NEUROLOGY | Facility: CLINIC | Age: 64
End: 2024-01-31

## 2024-01-31 DIAGNOSIS — J45.20 MILD INTERMITTENT ASTHMA, UNSPECIFIED WHETHER COMPLICATED: ICD-10-CM

## 2024-01-31 DIAGNOSIS — R06.2 WHEEZES: Primary | ICD-10-CM

## 2024-01-31 DIAGNOSIS — J40 BRONCHITIS: ICD-10-CM

## 2024-01-31 RX ORDER — IPRATROPIUM BROMIDE AND ALBUTEROL SULFATE 2.5; .5 MG/3ML; MG/3ML
3 SOLUTION RESPIRATORY (INHALATION) EVERY 6 HOURS PRN
Qty: 90 ML | Refills: 0 | Status: SHIPPED | OUTPATIENT
Start: 2024-01-31

## 2024-01-31 RX ORDER — PREDNISONE 20 MG/1
TABLET ORAL DAILY
Qty: 13 TABLET | Refills: 0 | Status: SHIPPED | OUTPATIENT
Start: 2024-01-31 | End: 2024-02-08

## 2024-01-31 NOTE — TELEPHONE ENCOUNTER
1/29 3:16    Pt left a VM re: test results.    Transcribed VM:   I'm sorry, this is Venus Andreablay. I don't mean to be a pest, but I was given abnormal test results. I have MS. I'm having an exacerbation. I would really like to have something to take care of this, or some advice. My phone number is [phone] please give me a call back.    Already addressed.

## 2024-01-31 NOTE — TELEPHONE ENCOUNTER
Dr. Cornell,    A detailed vm was left for patient on 1/30/23 (see below)     CB    1/30/24  9:50 AM  Note     Left detailed msg (per consent in chart) regarding results and recommendations below.      Labs sent to PCP.                     Unfortunately, due to a backlog in receiving patient vms, the messages overlap and cause confusion. Some of these vms were recd prior to the patient being called but were not documented until after the patient was called.

## 2024-01-31 NOTE — TELEPHONE ENCOUNTER
received vm from 1/30 at 12:45pm- A hi, this is Catina turner.  this is my 5th phone call. 4th and 5th, phone call. I don't know. I'm looking for why my test results are abnormal. I don't know what the doctor wants to do with that. If that's what exacerbating the MS.  I really need a phone call back today, it's a little bit crazy. I'd appreciate it. [phone] please.  -------------------------------------------  Please also see [employer] messages from yesterday and today

## 2024-01-31 NOTE — TELEPHONE ENCOUNTER
Can we communicate with the patient that her urine did not showed any signs of infection? It did had some crystals for which the referral to urology has already been placed.     Thank you,     Dr. Kraig MD.   01/31/24   1:00 PM

## 2024-01-31 NOTE — PATIENT COMMUNICATION
Recd  1/30 1:10 PM     this is to apologize for my last phone call this is Catina Hilario. I did get a voice mail from sheila. I am not apologizing for my aggravation.  I do not like the way this is being done medically.  I would like some contact between my doctor and me. The nurse Payal is wonderful. And I can't say enough about her. I'm just getting a little crazy as to how to go about an exacerbation when you can't get a medical appointment with the neurologist for 6 months. Thank you.  call me back at [phone].

## 2024-01-31 NOTE — TELEPHONE ENCOUNTER
1/29/24, 1:15    I had abnormal results on my labs and I would think that something has to be done like an antibiotic or the MS, I have no idea. This is Venus Hilario. Please give me a call back at [phone]. Thank you    Already addressed. See below

## 2024-02-02 ENCOUNTER — TELEPHONE (OUTPATIENT)
Dept: NEUROLOGY | Facility: CLINIC | Age: 64
End: 2024-02-02

## 2024-02-02 ENCOUNTER — APPOINTMENT (OUTPATIENT)
Age: 64
End: 2024-02-02
Payer: COMMERCIAL

## 2024-02-02 NOTE — TELEPHONE ENCOUNTER
Catina Hilario   to  Neurology [facility] (supporting Kris Cornell MD)    2/1/24 11:47 PM    I apologize I am just not used to this type of medicine. I had made appointment with urologist two months ago they could not see me until march how critical is this. I have started drinking lots of water . How much is the medication. I am really sorry! 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

## 2024-02-02 NOTE — TELEPHONE ENCOUNTER
1/31/24 11:19 PM  Buck Dominique,     I apologize for your frustration in not being able to reach us directly by phone. I will call you tomorrow. Is there a certain time that is better than another? In the interim, to address your concerns:     A script for dalfampridine (Qty of 60 pills) was sent to [facility] on 1/24/24. They confirmed receipt of script on 1/24/24 @ 2pm. Are you having problems getting the medication from the pharmacy?     Regarding your UA results - did you get my voicemail on 1/30? Your results revealed Calcium oxalate crystals. These are a common cause of kidney stones. This should be followed up with Urology. If you do not have a urologist, we can place a referral for you. You do not have a UTI. No antibiotics are required.      I will send a message to Dr. Cornell after we speak tomorrow so I can provide him with the most current information regarding the symptoms you are having.      Let me know what time works, and I will call you then!     Take care,     Payal             Last read by Catina Hilario at 11:39 PM on 2/1/2024.

## 2024-02-02 NOTE — TELEPHONE ENCOUNTER
Spoke w/pt. She reports tingling and heaviness in  BLE R > L. She states her legs feel as if she is wearing boots. This has been occurring for a couple of weeks. Pt is recovering from Lisfranc injury; however, she believes her leg issues are a result of MS, not orthopedic issues.    Pt also reports breathing issues. Feels SOB. Called PCP who rx'd nebulizer and Advair. Patient has a hx of being vent-dependent for 40 days. She has an appt w/Pulmonology in April.    Pt has appt w/Urology in March to address Calcium oxalate crystals. Pt questioning if she should have a sooner appt w/Urologist.     Pt scheduled for MRI b/t on 3/13/24.     Pt questioning if IV Solumedrol infusions would be beneficial.     Dr. Cornell - please advise on Urology appt timing and steroid infusion. Thank you!

## 2024-02-02 NOTE — TELEPHONE ENCOUNTER
Shefali Landeros, RN    1/31/24  1:17 PM  Note     Brock cardona 1/30 1:10 PM      this is to apologize for my last phone call this is Catina Hilario. I did get a voice mail from sheila. I am not apologizing for my aggravation.  I do not like the way this is being done medically.  I would like some contact between my doctor and me. The nurse Payal is wonderful. And I can't say enough about her. I'm just getting a little crazy as to how to go about an exacerbation when you can't get a medical appointment with the neurologist for 6 months. Thank you.  call me back at [phone].

## 2024-02-02 NOTE — TELEPHONE ENCOUNTER
TELEPHONE ENCOUNTER:      I called Ms. Hilario on their phone number listed in EPIC at 4:07 PM.     No one picked up the phone, I left voice mail.     With no signs of infections on the most recent labs and due to her persistent symptoms. We can consider 3 days of steroids.     Can we get the steroids scheduled please?       Thank you,       Kris Cornell MD.   Staff Neurologist  02/02/24   4:07 PM

## 2024-02-05 ENCOUNTER — APPOINTMENT (OUTPATIENT)
Age: 64
End: 2024-02-05
Payer: COMMERCIAL

## 2024-02-05 ENCOUNTER — PATIENT MESSAGE (OUTPATIENT)
Dept: NEUROLOGY | Facility: CLINIC | Age: 64
End: 2024-02-05

## 2024-02-05 NOTE — TELEPHONE ENCOUNTER
Spoke w/pt. She is agreeable to IV Solumedrol. Confirmed insurance. Pt would like to go to [facility] infusion ctr.     Primary: Medicare A & B  Secondary: [facility]    Called [facility] [phone]. Spoke w/Aliya THEODORE. Per Aliya, pt's Medicare A & B plan is not listed in their system. They are reflected as pt's only health insurance plan. If they are primary,  will require PA; Admin codes will not. If they are secondary insurance, they will follow primary insurance re: PA.      Solumedrol  99786, [address]4800 Admin    [employer] [phone] (PA for )    Ref# [id number]    Spoke w/pt. She confirmed Medicare A & B as primary. She will upload a copy of card via [employer]. Advised pt she must call [facility]/[employer] and provide them w/primary insurance info to update her account. Pt will do so and then let our office know. Once this is confirmed w/secondary insurance, will be able to proceed with scheduling Solumedrol infusions.

## 2024-02-06 DIAGNOSIS — G35 MS (MULTIPLE SCLEROSIS) (HCC): Primary | ICD-10-CM

## 2024-02-06 RX ORDER — SODIUM CHLORIDE 9 MG/ML
20 INJECTION, SOLUTION INTRAVENOUS ONCE
Status: CANCELLED | OUTPATIENT
Start: 2024-02-07

## 2024-02-06 NOTE — PATIENT COMMUNICATION
TELEPHONE ENCOUNTER:      I called Ms. Hilario on their phone number listed in EPIC at 10:42 AM.     No one picked up the phone, I left voice mail.       Kris Cornell MD.   Staff Neurologist  02/06/24   10:42 AM

## 2024-02-06 NOTE — TELEPHONE ENCOUNTER
Spoke w/pt. She called [facility] and provided them w/Medicare A & B information. [facility] is pt's secondary insurance.     Called [facility] [phone]. Spoke w/Dixie. She confirmed the codes below do not require a PA.      Solumedrol  52903; 95013 Admin codes    Ref# [id number]      Therapy plan entered and sent for signature.     TT sent.

## 2024-02-07 ENCOUNTER — HOSPITAL ENCOUNTER (OUTPATIENT)
Dept: INFUSION CENTER | Facility: CLINIC | Age: 64
Discharge: HOME | End: 2024-02-07
Payer: COMMERCIAL

## 2024-02-07 VITALS
HEART RATE: 87 BPM | DIASTOLIC BLOOD PRESSURE: 95 MMHG | RESPIRATION RATE: 18 BRPM | TEMPERATURE: 96.9 F | SYSTOLIC BLOOD PRESSURE: 132 MMHG

## 2024-02-07 DIAGNOSIS — G35 MS (MULTIPLE SCLEROSIS) (HCC): Primary | ICD-10-CM

## 2024-02-07 PROCEDURE — 96365 THER/PROPH/DIAG IV INF INIT: CPT

## 2024-02-07 RX ORDER — SODIUM CHLORIDE 9 MG/ML
20 INJECTION, SOLUTION INTRAVENOUS ONCE
Status: CANCELLED | OUTPATIENT
Start: 2024-02-08

## 2024-02-07 RX ORDER — SODIUM CHLORIDE 9 MG/ML
20 INJECTION, SOLUTION INTRAVENOUS ONCE
Status: COMPLETED | OUTPATIENT
Start: 2024-02-07 | End: 2024-02-07

## 2024-02-07 RX ADMIN — SODIUM CHLORIDE 20 ML/HR: 0.9 INJECTION, SOLUTION INTRAVENOUS at 16:03

## 2024-02-07 RX ADMIN — SODIUM CHLORIDE 1000 MG: 0.9 INJECTION, SOLUTION INTRAVENOUS at 16:03

## 2024-02-07 NOTE — TELEPHONE ENCOUNTER
Called  [facility]. Spoke w/[place].    Pt is scheduled for Solumedrol infusions on the following dates/times:    Wednesday, 2/7/24 @ 4pm  Thursday, 2/8/24 @ 3pm  Friday, 2/9/24 @ 4pm    Patient is agreeable to the above dates/times.    [employer] msg sent w/appt info.

## 2024-02-07 NOTE — PROGRESS NOTES
Pt to clinic for Solumedrol. Pt offers no complaints. Pt tolerated infusion without complications. PIV removed. Pt aware of next appointment on 2/8/24 at 1500. AVS declined.

## 2024-02-08 ENCOUNTER — HOSPITAL ENCOUNTER (OUTPATIENT)
Dept: INFUSION CENTER | Facility: CLINIC | Age: 64
Discharge: HOME | End: 2024-02-08
Payer: COMMERCIAL

## 2024-02-08 VITALS
DIASTOLIC BLOOD PRESSURE: 61 MMHG | HEART RATE: 101 BPM | RESPIRATION RATE: 18 BRPM | TEMPERATURE: 97.5 F | SYSTOLIC BLOOD PRESSURE: 133 MMHG

## 2024-02-08 DIAGNOSIS — G35 MS (MULTIPLE SCLEROSIS) (HCC): Primary | ICD-10-CM

## 2024-02-08 PROCEDURE — 96365 THER/PROPH/DIAG IV INF INIT: CPT

## 2024-02-08 RX ORDER — SODIUM CHLORIDE 9 MG/ML
20 INJECTION, SOLUTION INTRAVENOUS ONCE
Status: CANCELLED | OUTPATIENT
Start: 2024-02-09

## 2024-02-08 RX ORDER — SODIUM CHLORIDE 9 MG/ML
20 INJECTION, SOLUTION INTRAVENOUS ONCE
Status: COMPLETED | OUTPATIENT
Start: 2024-02-08 | End: 2024-02-08

## 2024-02-08 RX ADMIN — SODIUM CHLORIDE 20 ML/HR: 0.9 INJECTION, SOLUTION INTRAVENOUS at 15:44

## 2024-02-08 RX ADMIN — SODIUM CHLORIDE 1000 MG: 0.9 INJECTION, SOLUTION INTRAVENOUS at 15:47

## 2024-02-08 NOTE — PROGRESS NOTES
Pt here for solumedrol infusion, offering no complaints.  Left arm IV placed with positive blood return noted.  Tolerated infusion without incident.  PIV removed.  AVS declined.  Next appt 2/9 @ 4 pm.  Walked out in stable condition.

## 2024-02-09 ENCOUNTER — APPOINTMENT (OUTPATIENT)
Age: 64
End: 2024-02-09
Payer: COMMERCIAL

## 2024-02-09 ENCOUNTER — HOSPITAL ENCOUNTER (OUTPATIENT)
Dept: INFUSION CENTER | Facility: CLINIC | Age: 64
End: 2024-02-09
Payer: COMMERCIAL

## 2024-02-09 VITALS
HEART RATE: 63 BPM | SYSTOLIC BLOOD PRESSURE: 129 MMHG | DIASTOLIC BLOOD PRESSURE: 64 MMHG | TEMPERATURE: 97.7 F | RESPIRATION RATE: 18 BRPM

## 2024-02-09 DIAGNOSIS — G35 MS (MULTIPLE SCLEROSIS) (HCC): Primary | ICD-10-CM

## 2024-02-09 PROCEDURE — 96365 THER/PROPH/DIAG IV INF INIT: CPT

## 2024-02-09 RX ORDER — SODIUM CHLORIDE 9 MG/ML
20 INJECTION, SOLUTION INTRAVENOUS ONCE
Status: CANCELLED | OUTPATIENT
Start: 2024-02-09

## 2024-02-09 RX ORDER — SODIUM CHLORIDE 9 MG/ML
20 INJECTION, SOLUTION INTRAVENOUS ONCE
Status: COMPLETED | OUTPATIENT
Start: 2024-02-09 | End: 2024-02-09

## 2024-02-09 RX ADMIN — SODIUM CHLORIDE 1000 MG: 0.9 INJECTION, SOLUTION INTRAVENOUS at 16:20

## 2024-02-09 RX ADMIN — SODIUM CHLORIDE 20 ML/HR: 9 INJECTION, SOLUTION INTRAVENOUS at 16:18

## 2024-02-09 NOTE — PROGRESS NOTES
Pt to clinic for solumedrol, offers no complaints today, tolerated infusion without complications, aware of this is her last infusion appointment, PIV removed, avs declined.

## 2024-02-12 ENCOUNTER — APPOINTMENT (OUTPATIENT)
Age: 64
End: 2024-02-12
Payer: COMMERCIAL

## 2024-02-14 ENCOUNTER — TELEPHONE (OUTPATIENT)
Dept: FAMILY MEDICINE CLINIC | Facility: CLINIC | Age: 64
End: 2024-02-14

## 2024-02-14 ENCOUNTER — TELEPHONE (OUTPATIENT)
Dept: NEUROLOGY | Facility: CLINIC | Age: 64
End: 2024-02-14

## 2024-02-14 NOTE — TELEPHONE ENCOUNTER
Patient called to ask if Dr Cornell or anyone from the office had tried to reach her. Advised upon review of chart no notes left of anyone to reach her. Patient understood and thanked me for checking.

## 2024-02-14 NOTE — TELEPHONE ENCOUNTER
Patient would like the provider to know that the Solumedrol infusions have cleared up her breathing issues.

## 2024-02-16 ENCOUNTER — APPOINTMENT (OUTPATIENT)
Age: 64
End: 2024-02-16
Payer: COMMERCIAL

## 2024-02-19 ENCOUNTER — EVALUATION (OUTPATIENT)
Age: 64
End: 2024-02-19
Payer: COMMERCIAL

## 2024-02-19 DIAGNOSIS — R26.9 GAIT DIFFICULTY: ICD-10-CM

## 2024-02-19 DIAGNOSIS — G35 MS (MULTIPLE SCLEROSIS) (HCC): Primary | ICD-10-CM

## 2024-02-19 PROCEDURE — 97112 NEUROMUSCULAR REEDUCATION: CPT

## 2024-02-19 PROCEDURE — 97530 THERAPEUTIC ACTIVITIES: CPT

## 2024-02-19 NOTE — PROGRESS NOTES
Progress Note       POC expires Auth Status Total   Visits  Start date  Expiration date PT/OT + Visit Limit? Co-Insurance   3/11/24 required BOMN 12/11/23 3/11/23 no No                                            Today's date: 2024  Patient name: Catina Hilario  : 1960  MRN: [id number]  Referring provider: Elise Schmidt,*  Dx:   Encounter Diagnosis     ICD-10-CM    1. MS (multiple sclerosis) (HCC)  G35       2. Gait difficulty  R26.9           Assessment  Assessment details: Patient is a 63 y.o. Female who presents to skilled outpatient PT with a dx of RR MS and frequent falls. Since initial evaluation, pt presents with gait dysfunction, dec LE strength (R<L), dec balance, dec righting reactions, dec sensation, dec coordination, and dec cardiovascular endurance. She is below age-related norms, and has shown significant regression since d/c 10/31 due to POC complication for bronchial pneumonia and MS exacerbation which was treated with infusions. Per MIFS, pt scored 62/84 which indicates significant impact due to fatigue (form 65/84). Sig regression in 5xSTS, 2mWT, and TUG. Although pt has not been compliant with 1-2x/week therapy, pt will benefit from skilled OP maintenance program to prevent regression. Regression is likely, due to history or regression noted, history of falls with injury, sedentary lifestyle, presence of neurodegenerative/neurocognitive disorder, no access at home to fall prevention system to perform HEP, and limited ability for family member/caregiver to assist with HEP. Both the patient and caregiver would be at risk for increased injury if patient had LOB or fall due to inadequate safety equipment, which could lead to hospitalization and increased healthcare costs. Significant amount of time spent completing patient education on POC, HEP, bracing, and prognosis based on diagnosis and presentation. Will  continue circuit style training. She will benefit from skilled OP PT to improve above impairments, maintain functional mobility independence and dec risk for falls. Will see pt 1-3x/week. Pt agreeable.     Liza Calderon (2013): a [place] District Court decision that sought to clarify that Medicare will in fact cover skilled therapy services to maintain a patient’s current condition or prevent/slow further deterioration.    Impairments: Abnormal coordination, Abnormal gait, Abnormal muscle tone, Abnormal or restricted ROM, Activity intolerance, Impaired balance, Impaired physical strength, Lacks appropriate HEP, Poor posture, Poor body mechanics, Pain with function, Safety issue, Weight-bearing intolerance, Abnormal movement, Difficulty understanding, Abnormal muscle firing  Understanding of Dx/Px/POC: Good  Prognosis: Good    Patient verbalized understanding of POC.  Please contact me if you have any questions or recommendations. Thank you for the referral and the opportunity to share in Catina THEODORE Yanelis's care.    Plan  Plan details: Will see pt 2x/week  Patient would benefit from: PT Eval, Skilled PT and OT Eval  Planned modality interventions: Biofeedback, Cryotherapy, TENS, Thermotherapy  Planned therapy interventions: Abdominal trunk stabilization, ADL training, Balance, Balance/WB training, Breathing training, Body mechanics training, Coordination, Functional ROM exercises, Gait training, HEP, Joint Mobilization, Manual Therapy, Degroot taping, Motor coordination training, Neuromuscular re-education, Patient education, Postural training, Strengthening, Stretching, Therapeutic activities, Therapeutic exercises, Therapeutic training, Transfer training, Activity modification, Work reintegration  Frequency: 1-3x/wk  Duration in weeks: 12  Plan of Care beginning date: 12/11/23  Plan of Care expiration date: 12 weeks - 3/11/24  Treatment plan discussed with: Patient and Family      Goals  Short Term  "Goals (4 weeks):    - Patient will improve time on TUG by 2.9 seconds to facilitate improved safety in all ambulation  - Patient will be independent in basic HEP 2-3 weeks  - Patient will improve 5xSTS score by 2.3 seconds to promote improved LE functional strength needed for ADLs  - Pt will improve ABC to >/= 67%    Long Term Goals (12 weeks):  - Patient will be independent in a comprehensive home exercise program  - Patient will improve gait speed by 0.18 m/s to improve safety with community ambulation  - Patient will improve MIJARES by 6 points to facilitate return to safe independent ambulation  - Patient will improve scoring on FGA by 4 points to progress safety with dynamic tasks  - Patient will be able to demonstrate HT in gait without veering  - Patient will improve 6 Minute Walk Test score by 190 feet to promote improved cardiovascular endurance  - Patient will report 50% reduction in near falls in order to improve safety with functional tasks and reduce his risk for falls  - Patient will report going on walks at least 3 days per week to promote independence and improved cardiovascular endurance  - Patient will be able to ascend/descend stairs reciprocally without UE assist to promote independence and safety with ADLs  - Patient will report 50% reduction in near falls when ambulating on uneven terrain    Subjective  History of Present Illness  Mechanism of injury:   Pt reports that she was making progress after seeing neuro PT. She went to the gym and was working on her strength. Pt reports she uses the RW. She has reported fatigue that carries over days due to the MS. She uses her total gym and bike at home and stretches. She has good and bad days but it can change day to day. She recently had a medication change- she has f/u with neurologist. Pt wants to walk and be as functional as possible, get \"her life back.\" She feels that its not necessarily from a relapse. Independent with ADLs, requires assist with  " "iADLs of cleaning. She has R AFO that she received \"3-4 months ago\" and has not been wearing because she does not feel that it is helping.     Recently, she had bronchitis and was having inc difficulty breathing. She was able to exercise here and there. 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 Pt wants to be able to walk and get stronger to be able to go to a gym.     Primary AD: RW    UPDATE 1/15: Pt reports she has been practicing her walking at home with RW, she feels that it is still fluctuating. Pt is not wearing the brace. She has been doing her exercises, despite not being able to come due to ongoing bronchitis. Pt reports she has not had any falls at home.     Pt was ambulating inside clinic to waiting room while clinicians were on lunch break. Pt had unwitnessed fall to floor. Pt found on floor. Denies headstrike, denies injury. Pt reports she missed the chair when going to sit down. Pt presenting to PT for re-evaluation. No bruising, redness, and pt denies pain in LE. Pain in L shoulder with lowering arm to side, as well as any resisted motions (all directions). Able to complete re-evaluation. if continues to have pain to follow up with PCP. Pt agreeable.    UPDATE 2/19: Pt has bene away from therapy due to sickness and MS exacerbation. She is now feeling. Pt reports that she was sitting on the scooter and reached forward too far. She had two falls since last session.     Pain  R knee    Social Support  Stairs in house: FF with HR to laundry in basement  Lives with: , dogs, cats, chickens- babysits young grandchildren 2-3x/week    Objective   - R Hip Flexion: 2+/5  - L Hip Flexion: 4-/5  - R Knee Extension: 4-/5 - L Knee Extension: 4-/5  - R Knee Flexion: 4/5  - L Knee Flexion: 4/5  - R Ankle DF: 3-/5  - L Ankle DF: 4/5  - R Ankle PF: 4-/5  - L Ankle PF: 4/5  -  R eversion 1/5    Sensation  - Light touch: numbness and tingling in both feet; WFL; RLE " differentiation     Skin Integ: intact, no redness    Coordination  - Dysmetria: WFL   - Dysdiadochokinesia: WFL    Outcome Measures Initial Eval  12/11/23   1/15/24   2/19/24      5xSTS 41.65s RW no UE 20.45s RW no UE GB  29.75s no UE GB      TUG NT 27.2s RW 34s RW and GB      10 meter 0.48m/s RW 0.5m/s RW GB 0.4m/s      2MWT 098ft RW  132ft RW GB 100ft RW GB      MFIS 65/84  Physical 29  Cognitive 29  Psychosocial 7  63/84  34  23  6 62/84  32  24  6           Precautions: fall risk  Past Medical History:   Diagnosis Date    Multiple sclerosis (HCC)

## 2024-02-21 ENCOUNTER — TELEPHONE (OUTPATIENT)
Dept: NEUROLOGY | Facility: CLINIC | Age: 64
End: 2024-02-21

## 2024-02-21 NOTE — TELEPHONE ENCOUNTER
Called patient left voicemail to schedule f/u appointment with .Left call back number to [place] .

## 2024-02-21 NOTE — TELEPHONE ENCOUNTER
Patient called attempted to transfer to [place]  but no one answered as per note below. She will be available for a call back at [phone].      Current appt is on 4/23 at 9:30 am w/ Dr Cornell

## 2024-02-21 NOTE — TELEPHONE ENCOUNTER
----- Message from Kris Cornell MD sent at 2/21/2024  9:16 AM EST -----  Regarding: VIRGIL: Madina   Contact: [phone]  Buck Ulloa, can we make an appointment to see her? Thank you.

## 2024-02-23 ENCOUNTER — OFFICE VISIT (OUTPATIENT)
Age: 64
End: 2024-02-23
Payer: COMMERCIAL

## 2024-02-23 DIAGNOSIS — G35 MS (MULTIPLE SCLEROSIS) (HCC): Primary | ICD-10-CM

## 2024-02-23 DIAGNOSIS — R26.9 GAIT DIFFICULTY: ICD-10-CM

## 2024-02-23 PROCEDURE — 97112 NEUROMUSCULAR REEDUCATION: CPT

## 2024-02-23 NOTE — PROGRESS NOTES
"Daily Note     Today's date: 2024  Patient name: Catina Hilario  : 1960  MRN: [id number]  Referring provider: Elise Schmidt,*  Dx:   Encounter Diagnosis     ICD-10-CM    1. MS (multiple sclerosis) (HCC)  G35       2. Gait difficulty  R26.9         Start Time: 1100  Stop Time: 1145  Total time in clinic (min): 45 minutes    Subjective: Pt reports she is doing well. She did hear a \"crack\" in her L shoulder when getting up from playing on the floor with her grandchild, but has no pain.      Objective: See treatment diary below    - Nustep level 2, bilateral UE and LE for 10 mins    SOLO 3 minutes on and 2 minutes off   - STS x5 single UE support  - High knee march with knee flexion VC  - Side stepping with 10# TT  - Step taps onto 6\"   - Flattened hurdles 2 laps    Sp02 remained >/= 90% t/o session      Assessment: Tolerated treatment well. She had significant RLE weakness and significant instability when in LLE stance. She did appear to fatigue more quickly with activity today. She loses her balance throughout the session and intermittently need physical assistance to recover. Patient would benefit from continued PT to maintain and maximize function.      Plan: Continue per plan of care.      POC expires Auth Status Total   Visits  Start date  Expiration date PT/OT + Visit Limit? Co-Insurance   3/11/24 required BOMN 12/11/23 3/11/23 no No                                       "

## 2024-02-23 NOTE — TELEPHONE ENCOUNTER
Received VM transcription from 2/21/24, 3:07 PM:    Hi, this message is for Laila or Suzanne. Laila had called me. My name is Catina Hilario. My YOB: 1960. I have tried to call you a couple of times and I get the central scheduling and I have no idea. So if you guys could please give me a call back. Dr. Cornell wants to set up an appointment with me. I will be on my phone. Thank you. Elie.

## 2024-02-26 ENCOUNTER — APPOINTMENT (OUTPATIENT)
Age: 64
End: 2024-02-26
Payer: COMMERCIAL

## 2024-02-26 ENCOUNTER — OFFICE VISIT (OUTPATIENT)
Dept: NEUROLOGY | Facility: CLINIC | Age: 64
End: 2024-02-26
Payer: COMMERCIAL

## 2024-02-26 VITALS
HEIGHT: 63 IN | TEMPERATURE: 98.7 F | WEIGHT: 207 LBS | HEART RATE: 92 BPM | BODY MASS INDEX: 36.68 KG/M2 | SYSTOLIC BLOOD PRESSURE: 126 MMHG | DIASTOLIC BLOOD PRESSURE: 91 MMHG

## 2024-02-26 DIAGNOSIS — R26.2 AMBULATORY DYSFUNCTION: ICD-10-CM

## 2024-02-26 DIAGNOSIS — G35 MS (MULTIPLE SCLEROSIS) (HCC): Primary | ICD-10-CM

## 2024-02-26 DIAGNOSIS — R39.9 URINARY SYMPTOM OR SIGN: ICD-10-CM

## 2024-02-26 DIAGNOSIS — G81.91 HEMIPARESIS OF RIGHT DOMINANT SIDE, UNSPECIFIED HEMIPARESIS ETIOLOGY (HCC): ICD-10-CM

## 2024-02-26 PROCEDURE — 99215 OFFICE O/P EST HI 40 MIN: CPT | Performed by: PSYCHIATRY & NEUROLOGY

## 2024-02-26 RX ORDER — SERTRALINE HYDROCHLORIDE 100 MG/1
100 TABLET, FILM COATED ORAL DAILY
Qty: 90 TABLET | Refills: 3 | Status: SHIPPED | OUTPATIENT
Start: 2024-02-26

## 2024-02-26 RX ORDER — LORAZEPAM 0.5 MG/1
0.5 TABLET ORAL AS NEEDED
Qty: 6 TABLET | Refills: 0 | Status: SHIPPED | OUTPATIENT
Start: 2024-02-26

## 2024-02-26 NOTE — PATIENT INSTRUCTIONS
FES for foot drop devices.     Walk Aide 1: they do have a version 2 but not available in the [place] yet.   Cost:$4,500 to $5,000   [email or web address]    Kaesu L300 Go system  Cost: $6,000 to $7,000   [email or web address]    OdstBIOCUREX dropped foot stimulator  Cost: $1,200 to $1,500   [email or web address]
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
## 2024-02-26 NOTE — PROGRESS NOTES
Review of Systems   Constitutional:  Positive for fatigue. Negative for appetite change and fever.   HENT: Negative.  Negative for hearing loss, tinnitus, trouble swallowing and voice change.    Eyes: Negative.  Negative for photophobia, pain and visual disturbance.   Respiratory: Negative.  Negative for shortness of breath.    Cardiovascular: Negative.  Negative for palpitations.   Gastrointestinal: Negative.  Negative for nausea and vomiting.   Endocrine: Negative.  Negative for cold intolerance.   Genitourinary: Negative.  Negative for dysuria, frequency and urgency.   Musculoskeletal:  Negative for back pain, gait problem, myalgias, neck pain and neck stiffness.   Skin: Negative.  Negative for rash.   Allergic/Immunologic: Negative.    Neurological:  Positive for weakness. Negative for dizziness, tremors, seizures, syncope, facial asymmetry, speech difficulty, light-headedness, numbness and headaches.   Hematological: Negative.  Does not bruise/bleed easily.   Psychiatric/Behavioral: Negative.  Negative for confusion, hallucinations and sleep disturbance.

## 2024-02-26 NOTE — Clinical Note
Good afternoon team, can we do 3 more days of IVMP for home infusions? If the home infusions are not covered than we can in infusion center please. TY

## 2024-02-28 ENCOUNTER — APPOINTMENT (OUTPATIENT)
Dept: RADIOLOGY | Facility: CLINIC | Age: 64
End: 2024-02-28
Payer: COMMERCIAL

## 2024-02-28 ENCOUNTER — OFFICE VISIT (OUTPATIENT)
Dept: OBGYN CLINIC | Facility: CLINIC | Age: 64
End: 2024-02-28
Payer: COMMERCIAL

## 2024-02-28 VITALS
HEART RATE: 89 BPM | TEMPERATURE: 97.6 F | RESPIRATION RATE: 18 BRPM | SYSTOLIC BLOOD PRESSURE: 113 MMHG | DIASTOLIC BLOOD PRESSURE: 76 MMHG | OXYGEN SATURATION: 96 %

## 2024-02-28 DIAGNOSIS — M75.32 CALCIFIC TENDINITIS OF LEFT SHOULDER: ICD-10-CM

## 2024-02-28 DIAGNOSIS — M25.512 LEFT SHOULDER PAIN, UNSPECIFIED CHRONICITY: ICD-10-CM

## 2024-02-28 DIAGNOSIS — M25.512 LEFT SHOULDER PAIN, UNSPECIFIED CHRONICITY: Primary | ICD-10-CM

## 2024-02-28 PROCEDURE — 73030 X-RAY EXAM OF SHOULDER: CPT

## 2024-02-28 PROCEDURE — 99213 OFFICE O/P EST LOW 20 MIN: CPT | Performed by: ORTHOPAEDIC SURGERY

## 2024-02-28 PROCEDURE — 20610 DRAIN/INJ JOINT/BURSA W/O US: CPT | Performed by: ORTHOPAEDIC SURGERY

## 2024-02-28 RX ORDER — CHOLECALCIFEROL (VITAMIN D3) 25 MCG
CAPSULE ORAL
COMMUNITY

## 2024-02-28 RX ADMIN — LIDOCAINE HYDROCHLORIDE 2 ML: 10 INJECTION, SOLUTION EPIDURAL; INFILTRATION; INTRACAUDAL; PERINEURAL at 10:00

## 2024-02-28 RX ADMIN — METHYLPREDNISOLONE ACETATE 2 ML: 40 INJECTION, SUSPENSION INTRA-ARTICULAR; INTRALESIONAL; INTRAMUSCULAR; SOFT TISSUE at 10:00

## 2024-02-28 RX ADMIN — BUPIVACAINE HYDROCHLORIDE 2 ML: 2.5 INJECTION, SOLUTION INFILTRATION; PERINEURAL at 10:00

## 2024-02-28 NOTE — PROGRESS NOTES
HPI:  Patient is a 63 y.o. year old ambidextrous female who presents with chief complaint of left shoulder pain rated 5/10. Pain began on Thursday. She was on the ground with her grandson, and went to stand up. She felt a pop and immediate pain. She self treated with ice and naproxen. She has pain with shoulder abduction. She had transient pain radiating down to her elbow/forearm but mostly the lateral shoulder and arm. She idoes report some pain in the trapezius. She has difficulty doing her hair. Patient has a history of multiple sclerosis. She does not report any new neck symptoms or pain with ROM of neck.      ROS:   General: No fever, no chills, no weight loss, no weight gaielbow/n  HEENT:  No loss of hearing, no nose bleeds, no sore throat  Eyes:  No eye pain, no red eyes, no visual disturbance  Respiratory:  No cough, no shortness of breath, no wheezing  Cardiovascular:  No chest pain, no palpitations, no edema  GI: No abdominal pain, no nausea, no vomiting  Endocrine: No frequent urination, no excessive thirst  Urinary:  No dysuria, no hematuria, no incontinence  Musculoskeletal: see HPI and PE  Skin:  No rash, no wounds  Neurological:  No dizziness, no headache, no numbness  Psychiatric:  No difficulty concentrating, no depression, no suicide thoughts, no anxiety  Review of all other systems is negative    PMH:  Past Medical History:   Diagnosis Date    Multiple sclerosis (HCC)        PSH:  Past Surgical History:   Procedure Laterality Date     SECTION      FOOT SURGERY Right     KNEE SURGERY      R meniscus    TRACHEOSTOMY         Medications:  Current Outpatient Medications   Medication Sig Dispense Refill    albuterol (Ventolin HFA) 90 mcg/act inhaler Inhale 2 puffs every 6 (six) hours as needed for wheezing 18 g 1    Cholecalciferol (Vitamin D-3) 25 MCG (1000 UT) CAPS Take by mouth      Coenzyme Q10 (CoQ-10) 100 MG CAPS Take by mouth      Dalfampridine ER 10 MG TB12 Take 1 tablet (10 mg total)  by mouth 2 (two) times a day 60 tablet 0    Fluticasone-Salmeterol (Advair Diskus) 100-50 mcg/dose inhaler Inhale 1 puff 2 (two) times a day Rinse mouth after use. 60 blister 3    IBUPROFEN PO Take by mouth      ipratropium-albuterol (DUO-NEB) 0.5-2.5 mg/3 mL nebulizer solution Take 3 mL by nebulization every 6 (six) hours as needed for wheezing or shortness of breath 90 mL 0    LORazepam (Ativan) 0.5 mg tablet Take 1 tablet (0.5 mg total) by mouth as needed for anxiety As the patient is claustrophobic I would recommend; one tablet of Xanax 15 minutes before going into the MRI tube and then 1 tablet just before going into the MRI tube. 6 tablet 0    meloxicam (MOBIC) 15 mg tablet Take 1 tablet (15 mg total) by mouth daily 30 tablet 5    oxybutynin (DITROPAN-XL) 10 MG 24 hr tablet Take 1 tablet (10 mg total) by mouth daily at bedtime 90 tablet 3    sertraline (ZOLOFT) 100 mg tablet Take 1 tablet (100 mg total) by mouth daily 90 tablet 3     No current facility-administered medications for this visit.       Allergies:  Allergies   Allergen Reactions    Levofloxacin Nausea Only    Metronidazole Nausea Only    Other Allergic Rhinitis     seasonal       Family History:  Family History   Problem Relation Age of Onset    Cancer Mother     Kidney disease Mother     Hypertension Mother     Heart disease Father        Social History:  Social History     Occupational History    Not on file   Tobacco Use    Smoking status: Former     Passive exposure: Past    Smokeless tobacco: Former   Vaping Use    Vaping status: Former    Substances: THC, CBD   Substance and Sexual Activity    Alcohol use: Not Currently    Drug use: Yes     Types: Marijuana    Sexual activity: Yes     Partners: Male       Physical Exam:  General :  Alert, cooperative, no distress, appears stated age  Blood pressure 113/76, pulse 89, temperature 97.6 °F (36.4 °C), resp. rate 18, SpO2 96%.   Head:  Normocephalic, without obvious abnormality, atraumatic   Eyes:  " Conjunctiva/corneas clear, EOM's intact,   Ears:  Both ears normal appearance, no hearing deficits.    Nose: Nares normal, septum midline, no drainage    Neck: Supple,  trachea midline, no adenopathy, no tenderness, no mass   Back:   Symmetric, no curvature, ROM normal, no tenderness   Lungs:   Respirations unlabored   Chest Wall:  No tenderness or deformity   Extremities: Extremities normal, atraumatic, no cyanosis or edema      Pulses: 2+ and symmetric   Skin: Skin color, texture, turgor normal, no rashes or lesions      Neurologic: Normal           Ortho Exam  Left  Shoulder:   Active range of motion   160 degrees forward flexion  140 degrees abduction  40 degrees external rotation   L5 internal rotation      There is mild tenderness present over the greater tuberosity, upper trapezius   Supraspinatus testing 4/5  Infraspinatus testing 4/5  Subscapularis testing 5/5  Almaraz test is positive   Audrain's test is negative    Speed's test is Negative  The patient is neurovascularly intact distally in the extremity.    Imaging Studies:     The following imaging studies were reviewed in office today. My findings are noted.  X-rays of the left shoulder obtained 2/28/2024 demonstrate calcific density at greater tuberosity. No acute fracture or dislocation.    Assessment  Encounter Diagnoses   Name Primary?    Left shoulder pain, unspecified chronicity Yes    Calcific tendinitis of left shoulder      Large joint arthrocentesis: L subacromial bursa  Universal Protocol:  Consent: Verbal consent obtained.  Risks and benefits: risks, benefits and alternatives were discussed  Consent given by: patient  Time out: Immediately prior to procedure a \"time out\" was called to verify the correct patient, procedure, equipment, support staff and site/side marked as required.  Patient understanding: patient states understanding of the procedure being performed  Site marked: the operative site was marked  Patient identity confirmed: " verbally with patient  Supporting Documentation  Indications: pain   Procedure Details  Location: shoulder - L subacromial bursa  Preparation: Patient was prepped and draped in the usual sterile fashion  Needle size: 22 G  Ultrasound guidance: no  Approach: posterolateral  Medications administered: 2 mL bupivacaine 0.25 %; 2 mL methylPREDNISolone acetate 40 mg/mL; 2 mL lidocaine (PF) 1 %    Patient tolerance: patient tolerated the procedure well with no immediate complications  Dressing:  Sterile dressing applied            Plan:  63 y.o. female with left shoulder calcific tendinitis  X-rays reviewed in the office today  She was referred to outpatient physical therapy  The patient was offered a corticosteroid injection for their left shoulder. They tolerated the procedure well.    The patient was educated they may have some irritation in the next few days and should rest, ice, elevate and perform gentle range of motion exercises. They were advised the medicine should begin to work in a few days time.    She will follow up with Dr Montelongo   Could consider MRI if symptomspersist.    Scribe Attestation      I,:  Laila Short am acting as a scribe while in the presence of the attending physician.:       I,:  Linnea Garcia MD personally performed the services described in this documentation    as scribed in my presence.:

## 2024-02-29 RX ORDER — LIDOCAINE HYDROCHLORIDE 10 MG/ML
2 INJECTION, SOLUTION EPIDURAL; INFILTRATION; INTRACAUDAL; PERINEURAL
Status: COMPLETED | OUTPATIENT
Start: 2024-02-28 | End: 2024-02-28

## 2024-02-29 RX ORDER — BUPIVACAINE HYDROCHLORIDE 2.5 MG/ML
2 INJECTION, SOLUTION INFILTRATION; PERINEURAL
Status: COMPLETED | OUTPATIENT
Start: 2024-02-28 | End: 2024-02-28

## 2024-02-29 RX ORDER — METHYLPREDNISOLONE ACETATE 40 MG/ML
2 INJECTION, SUSPENSION INTRA-ARTICULAR; INTRALESIONAL; INTRAMUSCULAR; SOFT TISSUE
Status: COMPLETED | OUTPATIENT
Start: 2024-02-28 | End: 2024-02-28

## 2024-03-01 ENCOUNTER — TELEPHONE (OUTPATIENT)
Dept: NEUROLOGY | Facility: CLINIC | Age: 64
End: 2024-03-01

## 2024-03-01 ENCOUNTER — OFFICE VISIT (OUTPATIENT)
Age: 64
End: 2024-03-01
Payer: COMMERCIAL

## 2024-03-01 ENCOUNTER — TELEPHONE (OUTPATIENT)
Age: 64
End: 2024-03-01

## 2024-03-01 DIAGNOSIS — G35 MS (MULTIPLE SCLEROSIS) (HCC): Primary | ICD-10-CM

## 2024-03-01 DIAGNOSIS — R26.9 GAIT DIFFICULTY: ICD-10-CM

## 2024-03-01 PROCEDURE — 97112 NEUROMUSCULAR REEDUCATION: CPT

## 2024-03-01 NOTE — PROGRESS NOTES
Daily Note     Today's date: 3/1/2024  Patient name: Catina Hilario  : 1960  MRN: [id number]  Referring provider: Elise Schmidt,*  Dx:   Encounter Diagnosis     ICD-10-CM    1. MS (multiple sclerosis) (HCC)  G35       2. Gait difficulty  R26.9                    Subjective: Pt reports she is doing well. Has rotator cuff tear and limited mobility/strength in L shoudler      Objective: See treatment diary below    - Nustep level 2, bilateral UE and LE for 10 mins    SOLO 3 minutes on and 2 minutes off   - STS x5 single UE support  - High knee march with knee flexion VC  - Side stepping    - Step taps onto med RR  - Flattened hurdles 2 laps    Sp02 remained >/= 90% t/o session      Assessment: Tolerated treatment well. She had significant RLE weakness and significant instability when in LLE stance. She continues to  lose her balance throughout the session and intermittently need physical assistance to recover, however improved from last session. Modified due to shoulder pain today Patient would benefit from continued PT to maintain and maximize function.      Plan: Continue per plan of care.      POC expires Auth Status Total   Visits  Start date  Expiration date PT/OT + Visit Limit? Co-Insurance   3/11/24 required BOMN 12/11/23 3/11/23 no No

## 2024-03-02 NOTE — TELEPHONE ENCOUNTER
03-1-2024, 9:02:12 am EST    Patient left voicemail requesting callback from [facility] regarding infusion.    [phone]

## 2024-03-04 ENCOUNTER — OFFICE VISIT (OUTPATIENT)
Age: 64
End: 2024-03-04
Payer: COMMERCIAL

## 2024-03-04 ENCOUNTER — TELEPHONE (OUTPATIENT)
Dept: NEUROLOGY | Facility: CLINIC | Age: 64
End: 2024-03-04

## 2024-03-04 DIAGNOSIS — R26.9 GAIT DIFFICULTY: ICD-10-CM

## 2024-03-04 DIAGNOSIS — G35 MS (MULTIPLE SCLEROSIS) (HCC): Primary | ICD-10-CM

## 2024-03-04 PROCEDURE — 97112 NEUROMUSCULAR REEDUCATION: CPT

## 2024-03-04 NOTE — PROGRESS NOTES
Daily Note     Today's date: 3/4/2024  Patient name: Catina Hilario  : 1960  MRN: [id number]  Referring provider: Elise Schmidt,*  Dx:   Encounter Diagnosis     ICD-10-CM    1. MS (multiple sclerosis) (HCC)  G35       2. Gait difficulty  R26.9                      Subjective: Pt reports she is doing well.       Objective: See treatment diary below    - Nustep level 2, bilateral UE and LE for 10 mins    SOLO 3 minutes on and 2 minutes off   - STS x5 single UE support  - High knee march with knee flexion VC  - Side stepping    - Step taps onto med RR  - Flattened hurdles 2 laps  - trunk rotation P MB     Sp02 remained >/= 90% t/o session      Assessment: Tolerated treatment well. She continues to  lose her balance throughout the session and intermittently need physical assistance to recover, however continues to improve from last session. No c/o shoulder pain trhoughoutPatient would benefit from continued PT to maintain and maximize function. NV re-evaluation.      Plan: Continue per plan of care.      POC expires Auth Status Total   Visits  Start date  Expiration date PT/OT + Visit Limit? Co-Insurance   3/11/24 required BOMN 12/11/23 3/11/23 no No

## 2024-03-04 NOTE — TELEPHONE ENCOUNTER
Called spoke to patient re: upcoming appointment with  will be on 4/23/24 at new location in [place]. Pt agreed.     Pt asked that she has left several messages regarding her infusion and where/when will they begin and has not heard any response. I stated I will route message to nurse team and .

## 2024-03-05 ENCOUNTER — TELEPHONE (OUTPATIENT)
Dept: NEUROLOGY | Facility: CLINIC | Age: 64
End: 2024-03-05

## 2024-03-05 DIAGNOSIS — G35 MS (MULTIPLE SCLEROSIS) (HCC): Primary | ICD-10-CM

## 2024-03-05 RX ORDER — SODIUM CHLORIDE 9 MG/ML
20 INJECTION, SOLUTION INTRAVENOUS ONCE
Status: CANCELLED | OUTPATIENT
Start: 2024-03-06

## 2024-03-05 NOTE — TELEPHONE ENCOUNTER
March 4, 2024           3/4/24  9:00 AM  Sindy Seo, DESHAWN routed this conversation to Me  Sindy Seo RN         3/4/24  9:00 AM  Note     Kris Cornell MD   to [facility] [place] Clinical Team 3        2/29/24  1:37 PM  [facility] team,    Can we communicate with her about the steroids treatment please?    Thank you,    Dr. Kraig MD.  02/29/24  1:37 PM     --------------------------------     Per LOV chart notes 2/26/24:      Disease Modifying Therapy:   She was previously on Gilenya which was stopped after she was stable for at least 6 to 7 years she feels that her symptoms started to get worse with stopping of the Gilenya and she would like to go back on that medication.  discussed the various risk factors and the risks versus benefits.  Although there is increased risk of infection but it is quite possible that her progression might be stopped for another few years of being on immunosuppressant--the ultimate decision would likely be coming from the patient as have discussed risk versus benefit.  In my opinion Zeposia  might be a better option than being on Gilenya.      I would also recommend 3 more days of IV Solu-Medrol.  If he can do the IV Solu-Medrol at home that would be more beneficial as she babysits her grandson.            February 29, 2024  Kris Cornell MD   to [facility] [place] Clinical Team 3       2/29/24  1:37 PM  [facility] team,    Can we communicate with her about the steroids treatment please?    Thank you,    Dr. Kraig MD.  02/29/24  1:37 PM             2/29/24  7:55 AM  Rebekah Adames, DESHAWN routed this conversation to Kris Cornell MD    February 28, 2024  Catina Hilario   to [facility] Clinical Team 5 (supporting Kris Cornell MD)         2/28/24  5:55 PM  Sorry the infusion is going to be    Catina Hilario   to [facility] Clinical Team 5 (supporting Kris Cornell, MD)          2/28/24  5:55 PM  I was wondering when and where. Can you let me know where to send the letter we discussed.

## 2024-03-05 NOTE — TELEPHONE ENCOUNTER
"Spoke with pt. Discussed home infusion vs infusions at [facility] infusion center. Pt agreeable to [facility] [place]. Would prefer Wednesday, Thursday, Friday this week if possible.     Pt verbalized concern with wait time regarding office response. Staff message sent to management. Pt made aware.     Medicare does not require PA.     Per prior encounter, \"Called [facility] [phone]. Spoke w/Dixie. She confirmed the codes below do not require a PA.    Solumedrol  31207; 97328 Admin codes  Ref# [id number]\"    Therapy plan entered and sent for signature. Message sent to provider. Referral updated.   "

## 2024-03-05 NOTE — TELEPHONE ENCOUNTER
Plan is signed.     Called [facility] and spoke with Puja. She scheduled pt for the following:    Wednesday 3/6/24 @ 10:30am   Thursday 3/7/24 @ 4pm  Friday 3/8/24 @ 3pm     Pt made aware, she is agreeable.     Pt notes she has MRI scheduled, possible DMT start once results are received.

## 2024-03-06 ENCOUNTER — HOSPITAL ENCOUNTER (OUTPATIENT)
Dept: INFUSION CENTER | Facility: CLINIC | Age: 64
Discharge: HOME | End: 2024-03-06
Payer: COMMERCIAL

## 2024-03-06 VITALS
DIASTOLIC BLOOD PRESSURE: 61 MMHG | HEART RATE: 70 BPM | RESPIRATION RATE: 18 BRPM | TEMPERATURE: 96.9 F | SYSTOLIC BLOOD PRESSURE: 133 MMHG

## 2024-03-06 DIAGNOSIS — G35 MS (MULTIPLE SCLEROSIS) (HCC): Primary | ICD-10-CM

## 2024-03-06 RX ORDER — SODIUM CHLORIDE 9 MG/ML
20 INJECTION, SOLUTION INTRAVENOUS ONCE
Status: CANCELLED | OUTPATIENT
Start: 2024-03-07

## 2024-03-06 RX ORDER — SODIUM CHLORIDE 9 MG/ML
20 INJECTION, SOLUTION INTRAVENOUS ONCE
Status: COMPLETED | OUTPATIENT
Start: 2024-03-06 | End: 2024-03-06

## 2024-03-06 RX ADMIN — SODIUM CHLORIDE 1000 MG: 0.9 INJECTION, SOLUTION INTRAVENOUS at 11:02

## 2024-03-06 RX ADMIN — SODIUM CHLORIDE 20 ML/HR: 9 INJECTION, SOLUTION INTRAVENOUS at 11:00

## 2024-03-06 NOTE — PROGRESS NOTES
Patient presents for solu-medrol infusion offering no complaints, patient tolerated treatment without incident. AVS declined. Next appointment 3/7/24 at 4pm reviewed.

## 2024-03-07 ENCOUNTER — HOSPITAL ENCOUNTER (OUTPATIENT)
Dept: INFUSION CENTER | Facility: CLINIC | Age: 64
Discharge: HOME | End: 2024-03-07
Payer: COMMERCIAL

## 2024-03-07 VITALS
RESPIRATION RATE: 18 BRPM | TEMPERATURE: 96.9 F | DIASTOLIC BLOOD PRESSURE: 61 MMHG | HEART RATE: 72 BPM | SYSTOLIC BLOOD PRESSURE: 122 MMHG

## 2024-03-07 DIAGNOSIS — G35 MS (MULTIPLE SCLEROSIS) (HCC): Primary | ICD-10-CM

## 2024-03-07 PROCEDURE — 96365 THER/PROPH/DIAG IV INF INIT: CPT

## 2024-03-07 RX ORDER — SODIUM CHLORIDE 9 MG/ML
20 INJECTION, SOLUTION INTRAVENOUS ONCE
Status: COMPLETED | OUTPATIENT
Start: 2024-03-07 | End: 2024-03-07

## 2024-03-07 RX ORDER — SODIUM CHLORIDE 9 MG/ML
20 INJECTION, SOLUTION INTRAVENOUS ONCE
Status: CANCELLED | OUTPATIENT
Start: 2024-03-08

## 2024-03-07 RX ADMIN — SODIUM CHLORIDE 20 ML/HR: 9 INJECTION, SOLUTION INTRAVENOUS at 16:13

## 2024-03-07 RX ADMIN — SODIUM CHLORIDE 1000 MG: 0.9 INJECTION, SOLUTION INTRAVENOUS at 16:15

## 2024-03-07 NOTE — PROGRESS NOTES
Pt presents for solumedrol. Offers no complaints, tolerating infusion, report provided to DESHAWN Hooks

## 2024-03-07 NOTE — PROGRESS NOTES
Patient tolerated remainder of infusion without incident.  PIV removed.  AVS declined.  Aware of appt 3/8 @ 3 pm

## 2024-03-08 ENCOUNTER — APPOINTMENT (OUTPATIENT)
Age: 64
End: 2024-03-08
Payer: COMMERCIAL

## 2024-03-08 ENCOUNTER — HOSPITAL ENCOUNTER (OUTPATIENT)
Dept: INFUSION CENTER | Facility: CLINIC | Age: 64
End: 2024-03-08
Payer: COMMERCIAL

## 2024-03-08 VITALS
HEART RATE: 73 BPM | RESPIRATION RATE: 18 BRPM | TEMPERATURE: 97.2 F | SYSTOLIC BLOOD PRESSURE: 127 MMHG | DIASTOLIC BLOOD PRESSURE: 66 MMHG

## 2024-03-08 DIAGNOSIS — G35 MS (MULTIPLE SCLEROSIS) (HCC): Primary | ICD-10-CM

## 2024-03-08 PROCEDURE — 96365 THER/PROPH/DIAG IV INF INIT: CPT

## 2024-03-08 RX ORDER — SODIUM CHLORIDE 9 MG/ML
20 INJECTION, SOLUTION INTRAVENOUS ONCE
Status: CANCELLED | OUTPATIENT
Start: 2024-03-08

## 2024-03-08 RX ORDER — SODIUM CHLORIDE 9 MG/ML
20 INJECTION, SOLUTION INTRAVENOUS ONCE
Status: COMPLETED | OUTPATIENT
Start: 2024-03-08 | End: 2024-03-08

## 2024-03-08 RX ADMIN — SODIUM CHLORIDE 1000 MG: 0.9 INJECTION, SOLUTION INTRAVENOUS at 15:09

## 2024-03-08 RX ADMIN — SODIUM CHLORIDE 20 ML/HR: 9 INJECTION, SOLUTION INTRAVENOUS at 15:09

## 2024-03-08 NOTE — PROGRESS NOTES
Pt to clinic for Solumedrol. Pt offers no complaints. Pt tolerated infusion without complications. PIV removed. Pt aware this was her last appointment with infusion. AVS declined.

## 2024-03-11 ENCOUNTER — APPOINTMENT (OUTPATIENT)
Age: 64
End: 2024-03-11
Payer: COMMERCIAL

## 2024-03-12 ENCOUNTER — TELEPHONE (OUTPATIENT)
Dept: NEUROLOGY | Facility: CLINIC | Age: 64
End: 2024-03-12

## 2024-03-12 DIAGNOSIS — G35 MS (MULTIPLE SCLEROSIS) (HCC): Primary | ICD-10-CM

## 2024-03-12 NOTE — TELEPHONE ENCOUNTER
MULTIPLE SCLEROSIS PLAN:  Disease Modifying Therapy:   She was previously on Gilenya which was stopped after she was stable for at least 6 to 7 years she feels that her symptoms started to get worse with stopping of the Gilenya and she would like to go back on that medication.  discussed the various risk factors and the risks versus benefits.  Although there is increased risk of infection but it is quite possible that her progression might be stopped for another few years of being on immunosuppressant--the ultimate decision would likely be coming from the patient as have discussed risk versus benefit.  In my opinion Zeposia  might be a better option than being on Gilenya.

## 2024-03-12 NOTE — TELEPHONE ENCOUNTER
----- Message -----        From:Catina Hilario        Sent:3/11/2024  3:22 PM EDT          To:Patient Medical Advice Request Message List     Subject:Infusion     Dr. Cornell,   This infusion continued to handle MS symptoms  , but unfortunately I came down with bad cold and maybe that is why things are out of wack, I feel is affecting my walking I’ll and am not sure about symptoms if it is due to cold.. Therefore I may have to reschedule MRI    I will not be able to not move. with coughing and sneezing. Please advise!     I spoke to zeposia they need intake form filled out and signed by you then me and then I can send it.   Thank you,     Catina Hilario

## 2024-03-12 NOTE — TELEPHONE ENCOUNTER
From  Kris Cornell MD To  Catina Hilario Sent and Delivered  3/12/2024 11:37 AM       Hello Ms. Hilario,      I am sorry to hear about your symptoms.      I hope that you are feeling better from the cold now.  Yes definitely you can reschedule the MRI and when you feel better you can reschedule the MRI.  In regards to the Zeposia we can take care of it I will send a message to our team to start the process.   .         Hope you feel better soon.         Sincerely,   Dr. Cornell  03/12/24

## 2024-03-12 NOTE — TELEPHONE ENCOUNTER
1. MS (multiple sclerosis) (HCC)    - CBC and differential; Future  - Comprehensive metabolic panel; Future  - ECG 12 lead; Future        Kris Cornell MD.   Staff Neurologist  03/12/24   3:48 PM

## 2024-03-12 NOTE — TELEPHONE ENCOUNTER
Per 2/26/24 LOV note:  Safety labs:   Once she makes a decision we can order some baseline safety labs as well.     Dr. Cornell - can you please place orders for labs needed prior to patient starting Zeposia? Thank you.

## 2024-03-15 ENCOUNTER — OFFICE VISIT (OUTPATIENT)
Age: 64
End: 2024-03-15
Payer: COMMERCIAL

## 2024-03-15 DIAGNOSIS — G35 MS (MULTIPLE SCLEROSIS) (HCC): Primary | ICD-10-CM

## 2024-03-15 DIAGNOSIS — R26.9 GAIT DIFFICULTY: ICD-10-CM

## 2024-03-15 PROCEDURE — 97112 NEUROMUSCULAR REEDUCATION: CPT | Performed by: PHYSICAL THERAPIST

## 2024-03-15 NOTE — PROGRESS NOTES
Re-evaluation     Today's date: 3/15/2024  Patient name: Catina Hilario  : 1960  MRN: [id number]  Referring provider: Elise Schmidt,*  Dx:   Encounter Diagnosis     ICD-10-CM    1. MS (multiple sclerosis) ([facility])  G35       2. Gait difficulty  R26.9                    POC expires Unit limit Auth Expiration date PT/OT + Visit Limit?   6/15/24 BOMN pend BOMN                           Visit/Unit Tracking  AUTH Status:  Date                Used                Remaining                           POC expires Auth Status Total   Visits  Start date  Expiration date PT/OT + Visit Limit? Co-Insurance   3/11/24 required BOMN 12/11/23 3/11/23 no No   6/15/24                                         Today's date: 2024  Patient name: Catina Hilario  : 1960  MRN: [id number]  Referring provider: Elise Schmidt,*  Dx:   Encounter Diagnosis     ICD-10-CM    1. MS (multiple sclerosis) ([facility])  G35       2. Gait difficulty  R26.9           Assessment  Assessment details: Patient is a 63 y.o. Female who presents to skilled outpatient PT with a dx of RR MS and frequent falls. Since initial evaluation, pt presents with gait dysfunction, dec LE strength (R<L), dec balance, dec righting reactions, dec sensation, dec coordination, and dec cardiovascular endurance. She is below age-related norms, and has shown significant regression since d/c 10/31 due to POC complication for bronchial pneumonia and MS exacerbation which was treated with infusions. Per MIFS, pt scored 62/84 which indicates significant impact due to fatigue (form 65/84). Sig regression in 5xSTS, 2mWT, and TUG. Although pt has not been compliant with 1-2x/week therapy, pt will benefit from skilled OP maintenance program to prevent regression. Regression is likely, due to history or regression noted, history of falls with injury, sedentary lifestyle, presence of neurodegenerative/neurocognitive disorder, no access at home to fall  prevention system to perform HEP, and limited ability for family member/caregiver to assist with HEP. Both the patient and caregiver would be at risk for increased injury if patient had LOB or fall due to inadequate safety equipment, which could lead to hospitalization and increased healthcare costs. Significant amount of time spent completing patient education on POC, HEP, bracing, and prognosis based on diagnosis and presentation. Will continue circuit style training. She will benefit from skilled OP PT to improve above impairments, maintain functional mobility independence and dec risk for falls. Will see pt 1-3x/week. Pt agreeable.     [place] vs[place] (2013): a [place] decision that sought to clarify that Medicare will in fact cover skilled therapy services to maintain a patient’s current condition or prevent/slow further deterioration.    Impairments: Abnormal coordination, Abnormal gait, Abnormal muscle tone, Abnormal or restricted ROM, Activity intolerance, Impaired balance, Impaired physical strength, Lacks appropriate HEP, Poor posture, Poor body mechanics, Pain with function, Safety issue, Weight-bearing intolerance, Abnormal movement, Difficulty understanding, Abnormal muscle firing  Understanding of Dx/Px/POC: Good  Prognosis: Good    Patient verbalized understanding of POC.  Please contact me if you have any questions or recommendations. Thank you for the referral and the opportunity to share in Catina Hilario's care.    Plan  Plan details: Will see pt 2x/week  Patient would benefit from: PT Eval, Skilled PT and OT Eval  Planned modality interventions: Biofeedback, Cryotherapy, TENS, Thermotherapy  Planned therapy interventions: Abdominal trunk stabilization, ADL training, Balance, Balance/WB training, Breathing training, Body mechanics training, Coordination, Functional ROM exercises, Gait training, HEP, Joint Mobilization, Manual Therapy, Degroot taping, Motor coordination  training, Neuromuscular re-education, Patient education, Postural training, Strengthening, Stretching, Therapeutic activities, Therapeutic exercises, Therapeutic training, Transfer training, Activity modification, Work reintegration  Frequency: 1-3x/wk  Duration in weeks: 12  Plan of Care beginning date: 12/11/23  Plan of Care expiration date: 12 weeks - 3/11/24  Treatment plan discussed with: Patient and Family      Goals  Short Term Goals (4 weeks):    - Patient will improve time on TUG by 2.9 seconds to facilitate improved safety in all ambulation  - Patient will be independent in basic HEP 2-3 weeks  - Patient will improve 5xSTS score by 2.3 seconds to promote improved LE functional strength needed for ADLs  - Pt will improve ABC to >/= 67%    Long Term Goals (12 weeks):  - Patient will be independent in a comprehensive home exercise program  - Patient will improve gait speed by 0.18 m/s to improve safety with community ambulation  - Patient will improve MIJARES by 6 points to facilitate return to safe independent ambulation  - Patient will improve scoring on FGA by 4 points to progress safety with dynamic tasks  - Patient will be able to demonstrate HT in gait without veering  - Patient will improve 6 Minute Walk Test score by 190 feet to promote improved cardiovascular endurance  - Patient will report 50% reduction in near falls in order to improve safety with functional tasks and reduce his risk for falls  - Patient will report going on walks at least 3 days per week to promote independence and improved cardiovascular endurance  - Patient will be able to ascend/descend stairs reciprocally without UE assist to promote independence and safety with ADLs  - Patient will report 50% reduction in near falls when ambulating on uneven terrain    Subjective  History of Present Illness  Mechanism of injury:   Pt reports that she was making progress after seeing neuro PT. She went to the gym and was working on her strength.  "Pt reports she uses the RW. She has reported fatigue that carries over days due to the MS. She uses her total gym and bike at home and stretches. She has good and bad days but it can change day to day. She recently had a medication change- she has f/u with neurologist. Pt wants to walk and be as functional as possible, get \"her life back.\" She feels that its not necessarily from a relapse. Independent with ADLs, requires assist with  iADLs of cleaning. She has R AFO that she received \"3-4 months ago\" and has not been wearing because she does not feel that it is helping.     Recently, she had bronchitis and was having inc difficulty breathing. She was able to exercise here and there. 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 Pt wants to be able to walk and get stronger to be able to go to a gym.     Primary AD: RW    UPDATE 1/15: Pt reports she has been practicing her walking at home with RW, she feels that it is still fluctuating. Pt is not wearing the brace. She has been doing her exercises, despite not being able to come due to ongoing bronchitis. Pt reports she has not had any falls at home.     Pt was ambulating inside clinic to waiting room while clinicians were on lunch break. Pt had unwitnessed fall to floor. Pt found on floor. Denies headstrike, denies injury. Pt reports she missed the chair when going to sit down. Pt presenting to PT for re-evaluation. No bruising, redness, and pt denies pain in LE. Pain in L shoulder with lowering arm to side, as well as any resisted motions (all directions). Able to complete re-evaluation. if continues to have pain to follow up with PCP. Pt agreeable.    UPDATE 2/19: Pt has bene away from therapy due to sickness and MS exacerbation. She is now feeling. Pt reports that she was sitting on the scooter and reached forward too far. She had two falls since last session.     UPDATE 3/15: Pt reports having issues with most recent " infusion, seeing neuro Monday for emergency follow up. Continues to feel off since most recent illness and MS flare up. Has been using the scooter more. Most recent medication infusion has been causing her to sleep most of the day away. . Unsure how she will do today.     Pain  R knee    Social Support  Stairs in house: FF with HR to laundry in basement  Lives with: , dogs, cats, chickens- babysits young grandchildren 2-3x/week    Objective Updated 3/15  - R Hip Flexion: 2+/5  - L Hip Flexion: 3+/5  - R Knee Extension: 4-/5 - L Knee Extension: 3+5  - R Knee Flexion: 4/5  - L Knee Flexion: 4-/5  - R Ankle DF: 3-/5  - L Ankle DF: 4/5  - R Ankle PF: 4-/5  - L Ankle PF: 4/5  -  R eversion 1/5    Sensation  - Light touch: numbness and tingling in both feet; WFL; RLE differentiation     Skin Integ: intact, no redness    Coordination  - Dysmetria: WFL   - Dysdiadochokinesia: WFL    Outcome Measures Initial Eval  12/11/23   1/15/24   2/19/24   3/15/2024     5xSTS 41.65s RW no UE 20.45s RW no UE GB  29.75s no UE GB 27.22 sec no UE GB     TUG NT 27.2s RW 34s RW and GB 39.21 sec w RW and GB     10 meter 0.48m/s RW 0.5m/s RW GB 0.4m/s      2MWT 098ft RW  132ft RW GB 100ft RW GB      MFIS 65/84  Physical 29  Cognitive 29  Psychosocial 7  63/84  34  23  6 62/84  32  24  6 Not this date          Daily treatment below:  - Nustep level 2, bilateral UE and LE for 10 mins    SOLO 3 minutes on and 2 minutes off   - STS x5 single UE support  - High knee march with knee flexion VC  - Side stepping    - Step taps onto med RR  - Flattened hurdles 2 laps  - trunk rotation P MB     Sp02 remained >/= 90% t/o session

## 2024-03-18 ENCOUNTER — OFFICE VISIT (OUTPATIENT)
Age: 64
End: 2024-03-18
Payer: COMMERCIAL

## 2024-03-18 ENCOUNTER — APPOINTMENT (OUTPATIENT)
Age: 64
End: 2024-03-18
Payer: COMMERCIAL

## 2024-03-18 VITALS
HEIGHT: 63 IN | WEIGHT: 198 LBS | OXYGEN SATURATION: 93 % | DIASTOLIC BLOOD PRESSURE: 97 MMHG | HEART RATE: 95 BPM | BODY MASS INDEX: 35.08 KG/M2 | SYSTOLIC BLOOD PRESSURE: 140 MMHG

## 2024-03-18 DIAGNOSIS — G47.26 SLEEP DISORDER, SHIFT WORK: ICD-10-CM

## 2024-03-18 DIAGNOSIS — R20.0 NUMBNESS AND TINGLING OF BOTH FEET: ICD-10-CM

## 2024-03-18 DIAGNOSIS — R53.1 RIGHT SIDED WEAKNESS: ICD-10-CM

## 2024-03-18 DIAGNOSIS — G35 MS (MULTIPLE SCLEROSIS) (HCC): Primary | ICD-10-CM

## 2024-03-18 DIAGNOSIS — R06.2 WHEEZES: ICD-10-CM

## 2024-03-18 DIAGNOSIS — J06.9 URI (UPPER RESPIRATORY INFECTION): ICD-10-CM

## 2024-03-18 DIAGNOSIS — R26.2 AMBULATORY DYSFUNCTION: ICD-10-CM

## 2024-03-18 DIAGNOSIS — R20.2 NUMBNESS AND TINGLING OF BOTH FEET: ICD-10-CM

## 2024-03-18 PROCEDURE — G2211 COMPLEX E/M VISIT ADD ON: HCPCS | Performed by: PSYCHIATRY & NEUROLOGY

## 2024-03-18 PROCEDURE — 99215 OFFICE O/P EST HI 40 MIN: CPT | Performed by: PSYCHIATRY & NEUROLOGY

## 2024-03-18 RX ORDER — DALFAMPRIDINE 10 MG/1
10 TABLET, FILM COATED, EXTENDED RELEASE ORAL 2 TIMES DAILY
Qty: 180 TABLET | Refills: 3 | Status: SHIPPED | OUTPATIENT
Start: 2024-03-18

## 2024-03-18 RX ORDER — CIPROFLOXACIN 500 MG/1
500 TABLET, FILM COATED ORAL EVERY 12 HOURS SCHEDULED
Qty: 20 TABLET | Refills: 0 | Status: SHIPPED | OUTPATIENT
Start: 2024-03-18 | End: 2024-03-25 | Stop reason: ALTCHOICE

## 2024-03-18 RX ORDER — MODAFINIL 100 MG/1
100 TABLET ORAL DAILY
Qty: 30 TABLET | Refills: 0 | Status: SHIPPED | OUTPATIENT
Start: 2024-03-18

## 2024-03-18 NOTE — PROGRESS NOTES
Patient ID: Catina Hilario is a 63 y.o. female.        Physical Exam    Neurological Exam    Gait    T25FW: 15.32 SECONDS WITH WALKER.        ROS:    Review of Systems
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
## 2024-03-18 NOTE — Clinical Note
Good afternoon team.  Is it possible to check about the status of Zeposia?Thank you,   Dr. Kraig MD.  03/18/24  3:06 PM

## 2024-03-18 NOTE — PATIENT INSTRUCTIONS
"Mediterranean diet -- There is no single definition of a Mediterranean diet, but such diets are typically high in fruits, vegetables, whole grains, beans, nuts, and seeds; include olive oil as an important source of monounsaturated fat; and allow low to moderate wine consumption. There are typically low to moderate amounts of fish, poultry, and dairy products, with little red meat. The Mediterranean diet is associated with several health benefits; however, it remains uncertain which components of the Mediterranean diet offer the protective benefit or if the benefits result from an aggregation of effects.    The Mediterranean diet incorporates the basics of healthy eating -- plus a splash of flavorful olive oil and perhaps a glass of red wine -- among other components characterizing the traditional cooking style of countries bordering the [place].  Most healthy diets include fruits, vegetables, fish and whole grains, and limit unhealthy fats. While these parts of a healthy diet are tried-and-true, subtle variations or differences in proportions of certain foods may make a difference in your risk of heart disease.     Benefits of the Mediterranean diet  Research has shown that the traditional Mediterranean diet reduces the risk of heart disease. The diet has been associated with a lower level of oxidized low-density lipoprotein (LDL) cholesterol -- the \"bad\" cholesterol that's more likely to build up deposits in your arteries.  In fact, a meta-analysis of more than 1.5 million healthy adults demonstrated that following a Mediterranean diet was associated with a reduced risk of cardiovascular mortality as well as overall mortality.  The Mediterranean diet is also associated with a reduced incidence of cancer, and Parkinson's and Alzheimer's diseases. Women who eat a Mediterranean diet supplemented with extra-virgin olive oil and mixed nuts may have a reduced risk of breast cancer.  For these reasons, most if " not all major scientific organizations encourage healthy adults to adapt a style of eating like that of the Mediterranean diet for prevention of major chronic diseases.     Key components of the Mediterranean diet  The Mediterranean diet emphasizes:  Eating primarily plant-based foods, such as fruits and vegetables, whole grains, legumes and nuts   Replacing butter with healthy fats such as olive oil and canola oil   Using herbs and spices instead of salt to flavor foods   Limiting red meat to no more than a few times a month   Eating fish and poultry at least twice a week   Enjoying meals with family and friends   Drinking red wine in moderation (optional)   Getting plenty of exercise      If you're looking for a heart-healthy eating plan, the Mediterranean diet might be right for you. The Mediterranean diet incorporates the basics of healthy eating -- plus a splash of flavorful olive oil and perhaps even a glass of red wine -- among other components characterizing the traditional cooking style of countries bordering the [place].  Most healthy diets include fruits, vegetables, fish and whole grains, and limit unhealthy fats. While these parts of a healthy diet remain tried-and-true, subtle variations or differences in proportions of certain foods may make a difference in your risk of heart disease.      Research has shown that the traditional Mediterranean diet reduces the risk of heart disease. In fact, an analysis of more than 1.5 million healthy adults demonstrated that following a Mediterranean diet was associated with a reduced risk of death from heart disease and cancer, as well as a reduced incidence of Parkinson's and Alzheimer's diseases.      The Dietary Guidelines for Americans recommends the Mediterranean diet as an eating plan that can help promote health and prevent disease. And the Mediterranean diet is one your whole family can follow for good health.   The Mediterranean diet  "emphasizes:  Eating primarily plant-based foods, such as fruits and vegetables, whole grains,  legumes and nuts  Replacing butter with healthy fats, such as olive oil  Using herbs and spices instead of salt to flavor foods  Limiting red meat to no more than a few times a month  Eating fish and poultry at least twice a week  Drinking red wine in moderation (optional)     The diet also recognizes the importance of being physically active, and enjoying meals with family and friends.     The Mediterranean diet traditionally includes fruits, vegetables and grains. For example, residents of [facility] average six or more servings a day of antioxidant rich fruits and vegetables.     Grains in the Mediterranean region are typically whole grain and usually contain very few unhealthy trans fats, and bread is an important part of the diet. However, throughout the Mediterranean region, bread is eaten plain or dipped in olive oil -- not eaten with butter or margarine, which contains saturated or trans fats.      Nuts are another part of a healthy Mediterranean diet. Nuts are high in fat, but most of the fat is healthy. Because nuts are high in calories, they should not be eaten in large amounts -- generally no more than a handful a day. For the best nutrition, avoid candied or honey-roasted and heavily salted nuts.  The focus of the Mediterranean diet isn't on limiting total fat consumption, but rather on choosing healthier types of fat. The Mediterranean diet discourages saturated fats and hydrogenated oils (trans fats), both of which contribute to heart disease.  The Mediterranean diet features olive oil as the primary source of fat. Olive oil is mainly monounsaturated fat -- a type of fat that can help reduce low-density lipoprotein (LDL) cholesterol levels when used in place of saturated or trans fats. \"Extra-virgin\" and \"virgin\" olive oils (the least processed forms) also contain the highest levels of protective plant compounds " that provide antioxidant effects.  Canola oil and some nuts contain the beneficial linolenic acid (a type of omega-3 fatty acid) in addition to healthy unsaturated fat. Omega-3 fatty acids lower triglycerides, decrease blood clotting, and are associated with decreased incidence of sudden heart attacks, improve the health of your blood vessels, and help moderate blood pressure.      Fatty fish -- such as mackerel, lake trout, herring, sardines, albacore tuna and salmon -- are rich sources of omega-3 fatty acids. Fish is eaten on a regular basis in the Mediterranean diet.     The health effects of alcohol have been debated for many years, and some doctors are reluctant to encourage alcohol consumption because of the health consequences of excessive drinking. However, alcohol -- in moderation -- has been associated with a reduced risk of heart disease in some research studies.  The Mediterranean diet typically includes a moderate amount of wine, usually red wine. This means no more than 5 ounces (148 milliliters) of wine daily for women of all ages and men older than age 65 and no more than 10 ounces (296 milliliters) of wine daily for younger men. More than this may increase the risk of health problems, including increased risk of certain types of cancer.   If you're unable to limit your alcohol intake to the amounts defined above, if you have a personal or family history of alcohol abuse, or if you have heart or liver disease, refrain from drinking wine or any other alcohol.     The Mediterranean diet is a delicious and healthy way to eat. Many people who switch to this style of eating say they'll never eat any other way. Here are some specific steps to get you started:   Eat your veggies and fruits -- and switch to whole grains. [place] of plant foods should make up the majority of your meals. They should be minimally processed -- fresh and whole are best. Include veggies and fruits in every meal and eat them for  snacks as well. Switch to whole-grain bread and cereal, and begin to eat more whole-grain rice and pasta products. Keep baby carrots, apples and bananas on hand for quick, satisfying snacks. Fruit salads are a wonderful way to eat a variety of healthy fruit.  Go nuts. Nuts and seeds are good sources of fiber, protein and healthy fats. Keep almonds, cashews, pistachios and walnuts on hand for a quick snack. Choose natural peanut butter, rather than the kind with hydrogenated fat added. Try blended sesame seeds (tahini) as a dip or spread for bread.  Pass on the butter. Try olive or canola oil as a healthy replacement for butter or margarine. Lightly drizzle it over vegetables. After cooking pasta, add a touch of olive oil, some garlic and green onions for flavoring. Dip bread in flavored olive oil or lightly spread it on whole-grain bread for a tasty alternative to butter. Try tahini as a dip or spread for bread too.  Spice it up. Herbs and spices make food tasty and can  for salt and fat in recipes.  Go fish. Eat fish at least twice a week. Fresh or water-packed tuna, salmon, trout, mackerel and herring are healthy choices. [place], bake or broil fish for great taste and easy cleanup. Avoid breaded and fried fish.  Rein in the red meat. Limit red meat to no more than a few times a month. Substitute fish and poultry for red meat. When choosing red meat, make sure it's lean and keep portions small (about the size of a deck of cards). Also avoid sausage, brizuela and other high-fat, processed meats.  Choose low-fat dairy. Limit higher fat dairy products, such as whole or 2 percent milk, cheese and ice cream. Switch to skim milk, fat-free yogurt and low-fat cheese.       Many patients ask us about exercise and MS. Some common questions include: “should I exercise?”, “how often, for how long, how strenuous?”, and “what should I do?”. The answers are different for everybody however for the most part, exercise is good  for people with MS.    A 2010 study in [place] showed that fatigue, mood and quality of life improved after 12 weeks of progressive resistance training. This benefit was maintained for an additional 12 weeks after the end of their training. [1]    Another study at [facility] in 2004 randomized MS patients into 3 groups: one that did Robin yoga, one that worked out on a stationary bike, or a control group. The yoga group showed a significant improvement in fatigue compared to controls. [2]    Everybody is different so the type of exercise you do, how often you do it and how intense it is should be tailored to you and based on your abilities. Often times we make referrals to physical therapy where an individualized program can be developed to focus on each person's needs such as walking, managing spasticity, or fatigue. It's important to stick with a program as often the results are not felt until a few weeks into the program.     Many MS patients have worsening of their symptoms during the summer or when it's hot outside. This intolerance to heat is called Uhthoff's Phenomena. For these patients it's recommended to exercise in the morning or in the evening when the sun is not at its peak, or inside in an air conditioned environment. Aquatic exercise such as water aerobics or swimming are beneficial in patients with MS. If exercising in the heat is the only option, there are special clothes designed to keep the body cool including cooling neck wraps and vests. If all of these measures still don't work and symptoms are worse during exercise, it's recommended to discuss this with your neurologist.   Some patients say they are too tired to work out. Fatigue is an extremely common symptom in MS with up to 80% of patients experiencing it. A number of studies have shown that regular exercise, usually with some aerobic component, helps with MS-related fatigue. [3] If you feel too tired to work out  "you could try a restorative or yin yoga class. Yin classes tend to rebuild your energy levels and calm the nervous system. These two types of classes involve mostly stretching and relaxing. If you are new to yoga, it might be best to take a few private classes or go to a local yoga studio. However there are also some great resources online including yogaglo.com and yogavibes.com which both offer a 15-day free trial period to watch yoga videos at home. On these sites you can choose a beginner level 1 class or the style and teacher of your choice. If yoga is not your thing, even going for a short walk every day can improve your energy levels and overall health.     Next time you visit with your neurologist, be sure to discuss exercise and how you can incorporate it into your life.     References:  1. KRISS Delgado, et al. \"Fatigue, mood and quality of life improve in MS patients after progressive resistance training.\" Multiple sclerosis (2010).  2. JUANITA Orta, et al. \"Randomized controlled trial of yoga and exercise in multiple sclerosis.\" Neurology 62.11 (2004): 3407-3497.  3. http://[email or web address]_[email or web address]-multiple-sclerosis/patient-education/hic-fatigue-in-multiple-sclerosis.aspx]         Vit D 125 mcg (5000 international units) every day.   Vitamin B 12 1000 mcg every day.   Vitamin B 1 100 mg every day.   Biotin 10,000 mcg every day.   Alpha lipoic acid 600 mg every day  Fish oil/Omega 3-one capsule every day.   Coq-10 100 mg every day.         If you are not able to take all of these Vitamins and supplements daily, you can try to take the first 4 daily.          These symptoms are NOT suggestive for relapses (exaecerbations or flare-ups): headache, stomach pain, nausea, vomiting, sore throat, joint pain, muscle pain, rash, cough, back pain, flu like symptoms.      These symptoms can be MS relapses. Please note they are all neurological symptoms:    - Numbness and tingling " affecting a part of the body and last continuously for days or weeks (on and off symptoms are not suggestive for MS relapses).       - Muscle weakness in arms (more so in legs) that lasts continuously for days or weeks. Short-lasting symptoms and on and off symptoms are not suggestive for an MS relapse.     - Face numbness and weakness that last continuously for days or weeks. Short-lasting, intermittent symptoms or face or eye twitching are not MS relapses.       - Spinning sensation (vertigo), that lasts continuously for days or weeks. Lightheadedness, positional dizziness and short-lasting dizziness are not MS symptoms.     - Imbalance while walking or incoordination in hands and arms that lasts continuously for days or weeks.    - Major, new onset loss of vision (or major blurry vision) in ONE eye (not both eyes), that happen relatively suddenly and worsen over few days and is usually associated with eye pain (with eye movement) can be an MS relapse. Short-lasting change in vision or very long change in vision that can be corrected with glasses ARE NOT MS symptoms.     - Double vision while both eyes are open, which goes away with closing one of the eyes and last days or weeks; can be a symptoms of MS relapse. Momentary double vision, or double vision which still is present with closing either eye; IS NOT an MS symptom.

## 2024-03-20 ENCOUNTER — APPOINTMENT (OUTPATIENT)
Dept: LAB | Facility: CLINIC | Age: 64
End: 2024-03-20
Payer: COMMERCIAL

## 2024-03-20 DIAGNOSIS — G35 MS (MULTIPLE SCLEROSIS) (HCC): ICD-10-CM

## 2024-03-20 LAB
ALBUMIN SERPL BCP-MCNC: 4 G/DL (ref 3.5–5)
ALP SERPL-CCNC: 56 U/L (ref 34–104)
ALT SERPL W P-5'-P-CCNC: 23 U/L (ref 7–52)
ANION GAP SERPL CALCULATED.3IONS-SCNC: 6 MMOL/L (ref 4–13)
AST SERPL W P-5'-P-CCNC: 15 U/L (ref 13–39)
BASOPHILS # BLD AUTO: 0.1 THOUSANDS/ÂΜL (ref 0–0.1)
BASOPHILS NFR BLD AUTO: 1 % (ref 0–1)
BILIRUB SERPL-MCNC: 0.49 MG/DL (ref 0.2–1)
BUN SERPL-MCNC: 21 MG/DL (ref 5–25)
CALCIUM SERPL-MCNC: 9.1 MG/DL (ref 8.4–10.2)
CHLORIDE SERPL-SCNC: 105 MMOL/L (ref 96–108)
CO2 SERPL-SCNC: 30 MMOL/L (ref 21–32)
CREAT SERPL-MCNC: 0.63 MG/DL (ref 0.6–1.3)
EOSINOPHIL # BLD AUTO: 0.15 THOUSAND/ÂΜL (ref 0–0.61)
EOSINOPHIL NFR BLD AUTO: 1 % (ref 0–6)
ERYTHROCYTE [DISTWIDTH] IN BLOOD BY AUTOMATED COUNT: 13.5 % (ref 11.6–15.1)
GFR SERPL CREATININE-BSD FRML MDRD: 95 ML/MIN/1.73SQ M
GLUCOSE SERPL-MCNC: 115 MG/DL (ref 65–140)
HCT VFR BLD AUTO: 43.9 % (ref 34.8–46.1)
HGB BLD-MCNC: 13.9 G/DL (ref 11.5–15.4)
IMM GRANULOCYTES # BLD AUTO: 0.06 THOUSAND/UL (ref 0–0.2)
IMM GRANULOCYTES NFR BLD AUTO: 0 % (ref 0–2)
LYMPHOCYTES # BLD AUTO: 1.63 THOUSANDS/ÂΜL (ref 0.6–4.47)
LYMPHOCYTES NFR BLD AUTO: 12 % (ref 14–44)
MCH RBC QN AUTO: 30.3 PG (ref 26.8–34.3)
MCHC RBC AUTO-ENTMCNC: 31.7 G/DL (ref 31.4–37.4)
MCV RBC AUTO: 96 FL (ref 82–98)
MONOCYTES # BLD AUTO: 0.52 THOUSAND/ÂΜL (ref 0.17–1.22)
MONOCYTES NFR BLD AUTO: 4 % (ref 4–12)
NEUTROPHILS # BLD AUTO: 11.29 THOUSANDS/ÂΜL (ref 1.85–7.62)
NEUTS SEG NFR BLD AUTO: 82 % (ref 43–75)
NRBC BLD AUTO-RTO: 0 /100 WBCS
PLATELET # BLD AUTO: 308 THOUSANDS/UL (ref 149–390)
PMV BLD AUTO: 11.5 FL (ref 8.9–12.7)
POTASSIUM SERPL-SCNC: 4.3 MMOL/L (ref 3.5–5.3)
PROT SERPL-MCNC: 6.4 G/DL (ref 6.4–8.4)
RBC # BLD AUTO: 4.59 MILLION/UL (ref 3.81–5.12)
SODIUM SERPL-SCNC: 141 MMOL/L (ref 135–147)
WBC # BLD AUTO: 13.75 THOUSAND/UL (ref 4.31–10.16)

## 2024-03-20 PROCEDURE — 80053 COMPREHEN METABOLIC PANEL: CPT

## 2024-03-20 PROCEDURE — 85025 COMPLETE CBC W/AUTO DIFF WBC: CPT

## 2024-03-20 PROCEDURE — 36415 COLL VENOUS BLD VENIPUNCTURE: CPT

## 2024-03-22 ENCOUNTER — OFFICE VISIT (OUTPATIENT)
Age: 64
End: 2024-03-22
Payer: COMMERCIAL

## 2024-03-22 DIAGNOSIS — G35 MS (MULTIPLE SCLEROSIS) (HCC): Primary | ICD-10-CM

## 2024-03-22 DIAGNOSIS — R26.9 GAIT DIFFICULTY: ICD-10-CM

## 2024-03-22 PROCEDURE — 97530 THERAPEUTIC ACTIVITIES: CPT

## 2024-03-22 PROCEDURE — 97112 NEUROMUSCULAR REEDUCATION: CPT

## 2024-03-22 NOTE — PROGRESS NOTES
Daily Note     Today's date: 3/22/2024  Patient name: Catina Hilario  : 1960  MRN: [id number]  Referring provider: Elise Schmidt,*  Dx:   No diagnosis found.                 Subjective: Pt reports saw neurologist who said she was stronger from the steroid and then was placed on two new medications. She is feeling very fatigued and uncoordinated. She has MRI on  to see if she has progressed.       Objective: See treatment diary below    - Nustep level 2, bilateral UE and LE for 10 mins    SOLO 3 minutes on and 2 minutes off   - STS x5 single UE support- VC for pt to bring RLE fully back to touch the chair before descending  - High knee march with knee flexion VC  - HHA B/L   - Side stepping  HHA  - Step taps onto med RR  - posture training: correct posture in chair noted significant R trunk lean with exercises.  - trunk rotation P MB     Assessment: Tolerated treatment well. She continues to  lose her balance throughout the session and intermittently need physical assistance to recover, however continues to improve from last session. Significant amount of time spent completing postural education in standing, dynamic and static noting R trunk lean. Pt responded to visual feedback. VC continued throughout for appropriate posture. Educated to practice at home Pt agreeable. Patient would benefit from continued PT to maintain and maximize function.      Plan: Continue per plan of care.      POC expires Auth Status Total   Visits  Start date  Expiration date PT/OT + Visit Limit? Co-Insurance   3/11/24 required BOMN 12/11/23 3/11/23 no No

## 2024-03-25 ENCOUNTER — OFFICE VISIT (OUTPATIENT)
Dept: FAMILY MEDICINE CLINIC | Facility: CLINIC | Age: 64
End: 2024-03-25
Payer: COMMERCIAL

## 2024-03-25 ENCOUNTER — APPOINTMENT (OUTPATIENT)
Dept: LAB | Facility: CLINIC | Age: 64
End: 2024-03-25
Payer: COMMERCIAL

## 2024-03-25 VITALS
SYSTOLIC BLOOD PRESSURE: 134 MMHG | RESPIRATION RATE: 16 BRPM | HEIGHT: 63 IN | WEIGHT: 200.4 LBS | BODY MASS INDEX: 35.51 KG/M2 | OXYGEN SATURATION: 97 % | DIASTOLIC BLOOD PRESSURE: 80 MMHG | TEMPERATURE: 98.1 F | HEART RATE: 83 BPM

## 2024-03-25 DIAGNOSIS — Z13.29 THYROID DISORDER SCREEN: ICD-10-CM

## 2024-03-25 DIAGNOSIS — Z13.1 DIABETES MELLITUS SCREENING: ICD-10-CM

## 2024-03-25 DIAGNOSIS — Z12.11 COLON CANCER SCREENING: ICD-10-CM

## 2024-03-25 DIAGNOSIS — Z13.220 LIPID SCREENING: ICD-10-CM

## 2024-03-25 DIAGNOSIS — Z00.00 MEDICARE ANNUAL WELLNESS VISIT, SUBSEQUENT: ICD-10-CM

## 2024-03-25 DIAGNOSIS — K21.9 GASTROESOPHAGEAL REFLUX DISEASE WITHOUT ESOPHAGITIS: ICD-10-CM

## 2024-03-25 DIAGNOSIS — Z78.0 POST-MENOPAUSAL: ICD-10-CM

## 2024-03-25 DIAGNOSIS — Z12.31 ENCOUNTER FOR SCREENING MAMMOGRAM FOR MALIGNANT NEOPLASM OF BREAST: ICD-10-CM

## 2024-03-25 DIAGNOSIS — Z00.00 MEDICARE ANNUAL WELLNESS VISIT, SUBSEQUENT: Primary | ICD-10-CM

## 2024-03-25 LAB
ALBUMIN SERPL BCP-MCNC: 4.1 G/DL (ref 3.5–5)
ALP SERPL-CCNC: 58 U/L (ref 34–104)
ALT SERPL W P-5'-P-CCNC: 20 U/L (ref 7–52)
ANION GAP SERPL CALCULATED.3IONS-SCNC: 8 MMOL/L (ref 4–13)
AST SERPL W P-5'-P-CCNC: 14 U/L (ref 13–39)
BILIRUB SERPL-MCNC: 0.45 MG/DL (ref 0.2–1)
BUN SERPL-MCNC: 18 MG/DL (ref 5–25)
CALCIUM SERPL-MCNC: 9.1 MG/DL (ref 8.4–10.2)
CHLORIDE SERPL-SCNC: 104 MMOL/L (ref 96–108)
CHOLEST SERPL-MCNC: 227 MG/DL
CO2 SERPL-SCNC: 29 MMOL/L (ref 21–32)
CREAT SERPL-MCNC: 0.63 MG/DL (ref 0.6–1.3)
ERYTHROCYTE [DISTWIDTH] IN BLOOD BY AUTOMATED COUNT: 13.2 % (ref 11.6–15.1)
GFR SERPL CREATININE-BSD FRML MDRD: 95 ML/MIN/1.73SQ M
GLUCOSE P FAST SERPL-MCNC: 84 MG/DL (ref 65–99)
HCT VFR BLD AUTO: 42 % (ref 34.8–46.1)
HDLC SERPL-MCNC: 42 MG/DL
HGB BLD-MCNC: 13.7 G/DL (ref 11.5–15.4)
LDLC SERPL CALC-MCNC: 162 MG/DL (ref 0–100)
MCH RBC QN AUTO: 30.9 PG (ref 26.8–34.3)
MCHC RBC AUTO-ENTMCNC: 32.6 G/DL (ref 31.4–37.4)
MCV RBC AUTO: 95 FL (ref 82–98)
NONHDLC SERPL-MCNC: 185 MG/DL
PLATELET # BLD AUTO: 327 THOUSANDS/UL (ref 149–390)
PMV BLD AUTO: 11.7 FL (ref 8.9–12.7)
POTASSIUM SERPL-SCNC: 4.5 MMOL/L (ref 3.5–5.3)
PROT SERPL-MCNC: 6.3 G/DL (ref 6.4–8.4)
RBC # BLD AUTO: 4.43 MILLION/UL (ref 3.81–5.12)
SODIUM SERPL-SCNC: 141 MMOL/L (ref 135–147)
TRIGL SERPL-MCNC: 117 MG/DL
TSH SERPL DL<=0.05 MIU/L-ACNC: 1.78 UIU/ML (ref 0.45–4.5)
WBC # BLD AUTO: 9.03 THOUSAND/UL (ref 4.31–10.16)

## 2024-03-25 PROCEDURE — 85027 COMPLETE CBC AUTOMATED: CPT

## 2024-03-25 PROCEDURE — 80061 LIPID PANEL: CPT

## 2024-03-25 PROCEDURE — 99214 OFFICE O/P EST MOD 30 MIN: CPT | Performed by: STUDENT IN AN ORGANIZED HEALTH CARE EDUCATION/TRAINING PROGRAM

## 2024-03-25 PROCEDURE — 84443 ASSAY THYROID STIM HORMONE: CPT

## 2024-03-25 PROCEDURE — 80053 COMPREHEN METABOLIC PANEL: CPT

## 2024-03-25 PROCEDURE — G0439 PPPS, SUBSEQ VISIT: HCPCS | Performed by: STUDENT IN AN ORGANIZED HEALTH CARE EDUCATION/TRAINING PROGRAM

## 2024-03-25 PROCEDURE — 36415 COLL VENOUS BLD VENIPUNCTURE: CPT

## 2024-03-25 RX ORDER — PANTOPRAZOLE SODIUM 40 MG/1
40 TABLET, DELAYED RELEASE ORAL DAILY
Qty: 90 TABLET | Refills: 0 | Status: SHIPPED | OUTPATIENT
Start: 2024-03-25

## 2024-03-25 NOTE — TELEPHONE ENCOUNTER
VM 3/21 at 8:12 am:    This is Venus Hilario. My YOB: 1960. It's in reference to a drug I need and I was dealing with Suzanne for it. If you could please have someone call me back. It's about getting it. # [phone]  _______________________________________________________________________________    Called pt. Left her a message making her aware that her message was received. Routed to clinical team to follow up with pt.    medications/cold/heat

## 2024-03-25 NOTE — ASSESSMENT & PLAN NOTE
Taking Zantac with minimal relief  Will start Protonix 40mg daily  Discussed dietary modifications to aid in reducing symptoms.

## 2024-03-25 NOTE — PROGRESS NOTES
Assessment and Plan:     Problem List Items Addressed This Visit     Medicare annual wellness visit, subsequent - Primary     Medicare Annual Wellness visit completed  Patient due for colon cancer screening, breast cancer screening and Bone density screening. All orders have been placed. Will f/u pending results         Relevant Orders    CBC and Platelet    Gastroesophageal reflux disease without esophagitis     Taking Zantac with minimal relief  Will start Protonix 40mg daily  Discussed dietary modifications to aid in reducing symptoms.          Relevant Medications    pantoprazole (PROTONIX) 40 mg tablet   Other Visit Diagnoses     Lipid screening        Relevant Orders    Lipid panel    Diabetes mellitus screening        Relevant Orders    Comprehensive metabolic panel    Thyroid disorder screen        Relevant Orders    TSH, 3rd generation with Free T4 reflex    Encounter for screening mammogram for malignant neoplasm of breast        Relevant Orders    Mammo screening bilateral w 3d & cad    Post-menopausal        Relevant Orders    DXA bone density spine hip and pelvis    Colon cancer screening        Relevant Orders    Occult Blood, Fecal Immunochemical          Depression Screening and Follow-up Plan: Patient's depression screening was positive with a PHQ-9 score of 16. Patient with underlying depression and was advised to continue current medications as prescribed. Depression likely due to other medical condition. Will treat underlying condition.       Preventive health issues were discussed with patient, and age appropriate screening tests were ordered as noted in patient's After Visit Summary.  Personalized health advice and appropriate referrals for health education or preventive services given if needed, as noted in patient's After Visit Summary.     History of Present Illness:     Patient presents for a Medicare Wellness Visit    Heartburn  She complains of heartburn. She reports no abdominal pain,  no chest pain, no coughing or no nausea. This is a new problem. The current episode started 1 to 4 weeks ago. The problem occurs constantly. The problem has been gradually worsening. The heartburn duration is less than a minute. The heartburn is located in the substernum. The heartburn is of mild intensity. The heartburn does not wake her from sleep. The heartburn does not limit her activity. The symptoms are aggravated by certain foods. Pertinent negatives include no fatigue or melena. Risk factors include caffeine use. She has tried an antacid for the symptoms. The treatment provided mild relief.      Patient Care Team:  Elise Schmidt DO as PCP - General (Family Medicine)  Elise Schmidt DO as PCP - PCP-[facility] ([facility])     Review of Systems:     Review of Systems   Constitutional:  Negative for chills, fatigue and fever.   HENT:  Negative for congestion and rhinorrhea.    Respiratory:  Negative for cough and shortness of breath.    Cardiovascular:  Negative for chest pain and palpitations.   Gastrointestinal:  Positive for heartburn. Negative for abdominal pain, constipation, diarrhea, melena, nausea and vomiting.   Neurological:  Negative for dizziness and headaches.        Problem List:     Patient Active Problem List   Diagnosis   • Multiple sclerosis (HCC)   • Ambulatory dysfunction   • Hemiparesis of right dominant side ([facility])   • Anxiety   • Depression   • Generalized anxiety disorder   • Hyperlipidemia   • Hypothyroid   • Vitamin D deficiency   • Recurrent major depressive disorder, remission status unspecified ([facility])   • Medicare annual wellness visit, subsequent   • Bronchitis   • Dysuria   • Urinary urgency   • Mild intermittent asthma   • MS (multiple sclerosis) (HCC)   • Wheezes   • Rhonchi   • Gastroesophageal reflux disease without esophagitis      Past Medical and Surgical History:     Past Medical History:   Diagnosis Date   • Multiple sclerosis (HCC)      Past Surgical  History:   Procedure Laterality Date   •  SECTION     • FOOT SURGERY Right    • KNEE SURGERY      R meniscus   • TRACHEOSTOMY        Family History:     Family History   Problem Relation Age of Onset   • Cancer Mother    • Kidney disease Mother    • Hypertension Mother    • Heart disease Father       Social History:     Social History     Socioeconomic History   • Marital status: /Civil Union     Spouse name: None   • Number of children: None   • Years of education: None   • Highest education level: None   Occupational History   • None   Tobacco Use   • Smoking status: Former     Passive exposure: Past   • Smokeless tobacco: Former   Vaping Use   • Vaping status: Former   • Substances: THC, CBD   Substance and Sexual Activity   • Alcohol use: Not Currently   • Drug use: Yes     Types: Marijuana   • Sexual activity: Yes     Partners: Male   Other Topics Concern   • None   Social History Narrative   • None     Social Determinants of Health     Financial Resource Strain: Low Risk  (3/21/2023)    Overall Financial Resource Strain (CARDIA)    • Difficulty of Paying Living Expenses: Not hard at all   Food Insecurity: No Food Insecurity (3/25/2024)    Hunger Vital Sign    • Worried About Running Out of Food in the Last Year: Never true    • Ran Out of Food in the Last Year: Never true   Transportation Needs: No Transportation Needs (3/25/2024)    PRAPARE - Transportation    • Lack of Transportation (Medical): No    • Lack of Transportation (Non-Medical): No   Physical Activity: Not on file   Stress: Not on file   Social Connections: Not on file   Intimate Partner Violence: Not on file   Housing Stability: Low Risk  (3/25/2024)    Housing Stability Vital Sign    • Unable to Pay for Housing in the Last Year: No    • Number of Places Lived in the Last Year: 1    • Unstable Housing in the Last Year: No      Medications and Allergies:     Current Outpatient Medications   Medication Sig Dispense Refill   •  albuterol (Ventolin HFA) 90 mcg/act inhaler Inhale 2 puffs every 6 (six) hours as needed for wheezing 18 g 1   • Cholecalciferol (Vitamin D-3) 25 MCG (1000 UT) CAPS Take by mouth     • Coenzyme Q10 (CoQ-10) 100 MG CAPS Take by mouth     • Dalfampridine ER 10 MG TB12 Take 1 tablet (10 mg total) by mouth 2 (two) times a day 180 tablet 3   • Fluticasone-Salmeterol (Advair Diskus) 100-50 mcg/dose inhaler Inhale 1 puff 2 (two) times a day Rinse mouth after use. 60 blister 3   • IBUPROFEN PO Take by mouth     • ipratropium-albuterol (DUO-NEB) 0.5-2.5 mg/3 mL nebulizer solution Take 3 mL by nebulization every 6 (six) hours as needed for wheezing or shortness of breath 90 mL 0   • meloxicam (MOBIC) 15 mg tablet Take 1 tablet (15 mg total) by mouth daily 30 tablet 5   • modafinil (PROVIGIL) 100 mg tablet Take 1 tablet (100 mg total) by mouth daily 30 tablet 0   • oxybutynin (DITROPAN-XL) 10 MG 24 hr tablet Take 1 tablet (10 mg total) by mouth daily at bedtime 90 tablet 3   • pantoprazole (PROTONIX) 40 mg tablet Take 1 tablet (40 mg total) by mouth daily 90 tablet 0   • sertraline (ZOLOFT) 100 mg tablet Take 1 tablet (100 mg total) by mouth daily 90 tablet 3     No current facility-administered medications for this visit.     Allergies   Allergen Reactions   • Levofloxacin Nausea Only   • Metronidazole Nausea Only   • Other Allergic Rhinitis     seasonal      Immunizations:     Immunization History   Administered Date(s) Administered   • COVID-19 MODERNA VACC 0.5 ML IM 03/17/2021, 04/14/2021, 10/27/2021, 04/27/2022   • COVID-19 Moderna mRNA Vaccine 12 Yr+ 50 mcg/0.5 mL (Spikevax) 02/04/2024   • INFLUENZA 09/08/2016, 09/28/2017, 10/02/2018, 10/01/2019, 09/17/2020   • Pneumococcal Polysaccharide PPV23 03/08/2017   • Tdap 03/26/2018      Health Maintenance:         Topic Date Due   • Breast Cancer Screening: Mammogram  09/01/2023   • Colorectal Cancer Screening  03/15/2024   • Cervical Cancer Screening  04/04/2028   • HIV  Screening  Completed   • Hepatitis C Screening  Completed         Topic Date Due   • Pneumococcal Vaccine: Pediatrics (0 to 5 Years) and At-Risk Patients (6 to 64 Years) (2 of 2 - PCV) 03/08/2018      Medicare Screening Tests and Risk Assessments:     Catina is here for her Subsequent Wellness visit.     Health Risk Assessment:   Patient rates overall health as very good. Patient feels that their physical health rating is slightly better. Patient is satisfied with their life. Eyesight was rated as slightly worse. Hearing was rated as same. Patient feels that their emotional and mental health rating is same. Patients states they are never, rarely angry. Patient states they are always unusually tired/fatigued. Pain experienced in the last 7 days has been some. Patient's pain rating has been 3/10. Patient states that she has experienced weight loss or gain in last 6 months.     Depression Screening:   PHQ-9 Score: 16      Fall Risk Screening:   In the past year, patient has experienced: history of falling in past year    Number of falls: 2 or more  Injured during fall?: No    Feels unsteady when standing or walking?: Yes    Worried about falling?: No      Urinary Incontinence Screening:   Patient has leaked urine accidently in the last six months.     Home Safety:  Patient has trouble with stairs inside or outside of their home. Patient has working smoke alarms and has working carbon monoxide detector. Home safety hazards include: none.     Nutrition:   Current diet is Regular.     Medications:   Patient is currently taking over-the-counter supplements. OTC medications include: see medication list. Patient is able to manage medications.     Activities of Daily Living (ADLs)/Instrumental Activities of Daily Living (IADLs):   Walk and transfer into and out of bed and chair?: Yes  Dress and groom yourself?: Yes    Bathe or shower yourself?: Yes    Feed yourself? Yes  Do your laundry/housekeeping?: No  Manage your money,  "pay your bills and track your expenses?: Yes  Make your own meals?: Yes    Do your own shopping?: Yes    Previous Hospitalizations:   Any hospitalizations or ED visits within the last 12 months?: No      Advance Care Planning:   Living will: No    Advanced directive: No      PREVENTIVE SCREENINGS      Cardiovascular Screening:    General: History Lipid Disorder and Risks and Benefits Discussed    Due for: Lipid Panel      Diabetes Screening:     General: Screening Current      Colorectal Cancer Screening:     General: Risks and Benefits Discussed    Due for: FOBT/FIT      Breast Cancer Screening:     General: Risks and Benefits Discussed    Due for: Mammogram        Cervical Cancer Screening:    General: Screening Current      Osteoporosis Screening:    General: Risks and Benefits Discussed    Due for: DXA Axial      Abdominal Aortic Aneurysm (AAA) Screening:        General: Screening Not Indicated      Lung Cancer Screening:     General: Screening Not Indicated      Hepatitis C Screening:    General: Screening Current    Screening, Brief Intervention, and Referral to Treatment (SBIRT)    Screening  Typical number of drinks in a day: 0  Typical number of drinks in a week: 0  Interpretation: Low risk drinking behavior.    Single Item Drug Screening:  How often have you used an illegal drug (including marijuana) or a prescription medication for non-medical reasons in the past year? never    Single Item Drug Screen Score: 0  Interpretation: Negative screen for possible drug use disorder    No results found.     Physical Exam:     /80 (BP Location: Left arm, Patient Position: Sitting)   Pulse 83   Temp 98.1 °F (36.7 °C) (Temporal)   Resp 16   Ht 5' 3\" (1.6 m)   Wt 90.9 kg (200 lb 6.4 oz)   SpO2 97%   BMI 35.50 kg/m²     Physical Exam  Vitals reviewed.   Constitutional:       Appearance: She is obese.   HENT:      Head: Normocephalic and atraumatic.      Mouth/Throat:      Mouth: Mucous membranes are moist. "      Pharynx: No oropharyngeal exudate or posterior oropharyngeal erythema.   Eyes:      Extraocular Movements: Extraocular movements intact.   Cardiovascular:      Rate and Rhythm: Normal rate and regular rhythm.      Heart sounds: Normal heart sounds.   Pulmonary:      Effort: Pulmonary effort is normal.      Breath sounds: Normal breath sounds.   Neurological:      Mental Status: She is alert and oriented to person, place, and time.      Gait: Gait abnormal.      Comments: Ambulating with walker, due to bilateral LE weakness   Psychiatric:         Mood and Affect: Mood normal.         Behavior: Behavior normal.          Elise Schmidt, DO

## 2024-03-25 NOTE — ASSESSMENT & PLAN NOTE
Medicare Annual Wellness visit completed  Patient due for colon cancer screening, breast cancer screening and Bone density screening. All orders have been placed. Will f/u pending results

## 2024-03-27 ENCOUNTER — OFFICE VISIT (OUTPATIENT)
Age: 64
End: 2024-03-27
Payer: COMMERCIAL

## 2024-03-27 DIAGNOSIS — R26.9 GAIT DIFFICULTY: ICD-10-CM

## 2024-03-27 DIAGNOSIS — G35 MS (MULTIPLE SCLEROSIS) (HCC): Primary | ICD-10-CM

## 2024-03-27 PROCEDURE — 97112 NEUROMUSCULAR REEDUCATION: CPT

## 2024-03-27 NOTE — PROGRESS NOTES
Daily Note     Today's date: 3/27/2024  Patient name: Catina Hilario  : 1960  MRN: [id number]  Referring provider: Elise Schmidt,*  Dx:   Encounter Diagnosis     ICD-10-CM    1. MS (multiple sclerosis) (HCC)  G35       2. Gait difficulty  R26.9                        Subjective: Pt reports saw neurologist who said she was stronger from the steroid and then was placed on two new medications. She is feeling very fatigued and uncoordinated. She has MRI on  to see if she has progressed.       Objective: See treatment diary below    - Nustep level 2, bilateral UE and LE for 10 mins    SOLO 3 minutes on and 2 minutes off   - STS x5 single UE support- VC for pt to bring RLE fully back to touch the chair before descending  - High knee march with knee flexion VC  - HHA B/L   - Side stepping  HHA  - Step taps onto med RR  - posture training: correct posture in chair noted significant R trunk lean with exercises.  - trunk rotation P MB     Assessment: Tolerated treatment well. Patient continues to experience LOB throughout session. Patient does require assistance to recover when experiencing LOB. Patient does require UE assist with step taps. Patient was unable to perform trunk rotation for the full 3 minutes and required a rest break. Patient would benefit from continued PT to maintain and maximize function.      Plan: Continue per plan of care.      POC expires Auth Status Total   Visits  Start date  Expiration date PT/OT + Visit Limit? Co-Insurance   3/11/24 required BOMN 12/11/23 3/11/23 no No   6/15/24

## 2024-03-28 ENCOUNTER — TELEPHONE (OUTPATIENT)
Dept: NEUROLOGY | Facility: CLINIC | Age: 64
End: 2024-03-28

## 2024-03-28 NOTE — TELEPHONE ENCOUNTER
Received VM transcription from 3/25/24, 5:15 PM:      Hi, this is Catina Hilario returning your call to me. I guess you're closed by now, so I will be around tomorrow on my cell. Thank you.  ------------------------    Called pt, no answer. Left detailed message (ok per consent on file) advising pt of outstanding EKG still needing to be done. Encouraged her to get this done so that we can proceed with Zeposia.    Oralia - is there anything else needed from pt for Zeposia start? TIA!

## 2024-03-28 NOTE — TELEPHONE ENCOUNTER
Pt is looking to talk to a nurse about her new medication. [place] leave out of [place] and would like to be able to  her medication prior to leaving.

## 2024-03-28 NOTE — TELEPHONE ENCOUNTER
Pt has a question on testing that she needs to get done for Dr Cornell and would like a call back.    Please assist and thank you.

## 2024-03-29 ENCOUNTER — OFFICE VISIT (OUTPATIENT)
Age: 64
End: 2024-03-29
Payer: COMMERCIAL

## 2024-03-29 DIAGNOSIS — G35 MS (MULTIPLE SCLEROSIS) (HCC): Primary | ICD-10-CM

## 2024-03-29 DIAGNOSIS — R26.9 GAIT DIFFICULTY: ICD-10-CM

## 2024-03-29 PROCEDURE — 97112 NEUROMUSCULAR REEDUCATION: CPT

## 2024-03-29 NOTE — TELEPHONE ENCOUNTER
Zeposia start form completed, emailed to MA for provider signature.     Dr. Cornell- Please complete prescription section.    Please fax with copy of pt's insurance card and scan into chart once signed. Thanks!    Pt does still need EKG.     Called and left detailed message for pt providing update. Advised her to have EKG completed.

## 2024-03-29 NOTE — PROGRESS NOTES
Daily Note     Today's date: 3/29/2024  Patient name: Catina Hilario  : 1960  MRN: [id number]  Referring provider: Elise Schmidt,*  Dx:   Encounter Diagnosis     ICD-10-CM    1. MS (multiple sclerosis) (HCC)  G35       2. Gait difficulty  R26.9                        Subjective: Pt reports nothing new since last session.       Objective: See treatment diary below    - Nustep level 2, bilateral UE and LE for 10 mins    SOLO 3 minutes on and 2 minutes off   - STS x5 single UE support- VC for pt to bring RLE fully back to touch the chair before descending  - High knee march with knee flexion VC  - HHA B/L   - Side stepping  HHA  - Step taps onto med RR  - posture training: correct posture in chair noted significant R trunk lean with exercises.  - posture training with gait with RW for dec R trunk flexion with RLE swing- improved with step to pattern.   - trunk rotation P MB     Assessment: Tolerated treatment well. Session focused on posture with ambulation and seated. Continued lat trunk lean toward the R.  Utilized RW for step taps.  NV attempt walking sticks again. Patient would benefit from continued PT to maintain and maximize function.      Plan: Continue per plan of care.      POC expires Auth Status Total   Visits  Start date  Expiration date PT/OT + Visit Limit? Co-Insurance   3/11/24 required BOMN 12/11/23 3/11/23 no No   6/15/24

## 2024-03-29 NOTE — TELEPHONE ENCOUNTER
received vm 3/26 at 1:05pm-A hi, this is Catina turner. I spoke,  i called yesterday and I had a phone call from you and I would like to speak with you. My number is,  you have it. [phone]. I am on it now. Thank you.  -------------------------------------  Ratna left messThe [employer] for pt on 3/28.  After this message ws left by kenny Barton-please see ratna's message

## 2024-04-03 ENCOUNTER — APPOINTMENT (OUTPATIENT)
Age: 64
End: 2024-04-03
Payer: COMMERCIAL

## 2024-04-05 NOTE — TELEPHONE ENCOUNTER
Zina PATRICK    3/28/24  4:36 PM  Note     Pt has a question on testing that she needs to get done for Dr Cornell and would like a call back.     Please assist and thank you.

## 2024-04-05 NOTE — TELEPHONE ENCOUNTER
Vilma George MD  You3 days ago       I would wait for Dr. Cornell return - I will be happy to offer Solumedrol 500 mg if patient believes she has worsening symptoms or MS relapse.     You  Vilma George MD3 days ago     LUZ ELENA Barraza, would you be agreeable to signing Zeposia start form in Dr. Cornell's absence? Rx section would need to be completed.     Laila Zelaya  You; Kris Cornell MD4 days ago     JM  Good Morning ,   is out of the office from 4/1-4/19 maybe this form can be emailed to the  That is covering him    Laila Zelaya M.A.

## 2024-04-05 NOTE — TELEPHONE ENCOUNTER
Spoke with pt. She is asking if ECG can be completed with her PCP. Advised pt to contact PCP office first to determine if they can assist. Pt notes she is out of the country through Sunday. She will contact PCP upon her return.    Pt made aware Dr. Cornell is currently out of office. She will contact our office should she experience any new symptoms.     Pt reports she spoke with Zeposia team and was told she should be able to receive free drug. Advised I will complete PA. If approved, can send to pharmacy to determine copay. Will send start form once Dr. Cornell is back in office.      Pt agreeable to plan.     Cigital message sent with my direct phone #.     PA submitted on [facility] for starter kit. Key: [id number]. Will await determination before proceeding with maintenance dose PA.

## 2024-04-08 ENCOUNTER — OFFICE VISIT (OUTPATIENT)
Age: 64
End: 2024-04-08
Payer: COMMERCIAL

## 2024-04-08 DIAGNOSIS — G35 MS (MULTIPLE SCLEROSIS) (HCC): Primary | ICD-10-CM

## 2024-04-08 DIAGNOSIS — R26.9 GAIT DIFFICULTY: ICD-10-CM

## 2024-04-08 PROCEDURE — 97112 NEUROMUSCULAR REEDUCATION: CPT | Performed by: PHYSICAL THERAPIST

## 2024-04-08 NOTE — PROGRESS NOTES
Daily Note     Today's date: 2024  Patient name: Catina Hilario  : 1960  MRN: [id number]  Referring provider: Elise Schmidt,*  Dx:   Encounter Diagnosis     ICD-10-CM    1. MS (multiple sclerosis) (HCC)  G35       2. Gait difficulty  R26.9                      Subjective: Pt reports doing ok today      Objective: See treatment diary below    SOLO 3 minutes on and 2 minutes off  - STS x10 no UE assist  - High knee march with knee flexion VC  - Ambulation no HHA cues to increase heel strike  - Side stepping no HHA   - Step taps onto med RR    Not performed d/t time:  - posture training: correct posture in chair noted significant R trunk lean with exercises.  - posture training with gait with RW for dec R trunk flexion with RLE swing- improved with step to pattern.   - trunk rotation P MB     Assessment: Tolerated treatment well. Pt required less UE assist today during weightbearing activity, multiple LOB noted throughout session however most notably during step taps, struggled to initiate steps d/t fear avoidance and trouble shifting weight and had difficulty recovering RYAN after LOB.  Cued pt to increase heel strike on B/L LE's during ambulation, able to implement successful with repeated cueing.  Educated on safety of SOLO step and its benefits with increasing confidence.  Patient demonstrated fatigue post treatment, exhibited good technique with therapeutic exercises, and would benefit from continued PT      Plan: Continue per plan of care.      POC expires Auth Status Total   Visits  Start date  Expiration date PT/OT + Visit Limit? Co-Insurance   3/11/24 required BOMN 12/11/23 3/11/23 no No   6/15/24

## 2024-04-09 NOTE — TELEPHONE ENCOUNTER
Zeposia is approved from 4/5/24 to 12/31/24.     Attempted to submit PA for maintenance dose- received message that this is already approved.    Awaiting ECG results.

## 2024-04-09 NOTE — TELEPHONE ENCOUNTER
Recd  4/5 9:19 AM   Catina turner. My YOB: 1960. I got a phone call, I think from a pallavi with reference to my zeposia and I need an EKG. We are out of the country right now. Will be back on Sunday, and I was hoping I could go to my pcp the office and just go whenever she could take me, which would probably be this week. So in the week we will be home and we could give me a call and let me know if that's okay.    [phone]  ____________  Already addressed.

## 2024-04-10 ENCOUNTER — APPOINTMENT (OUTPATIENT)
Age: 64
End: 2024-04-10
Payer: COMMERCIAL

## 2024-04-12 ENCOUNTER — OFFICE VISIT (OUTPATIENT)
Age: 64
End: 2024-04-12
Payer: COMMERCIAL

## 2024-04-12 DIAGNOSIS — R26.9 GAIT DIFFICULTY: ICD-10-CM

## 2024-04-12 DIAGNOSIS — G35 MS (MULTIPLE SCLEROSIS) (HCC): Primary | ICD-10-CM

## 2024-04-12 PROCEDURE — 97530 THERAPEUTIC ACTIVITIES: CPT

## 2024-04-12 NOTE — PROGRESS NOTES
Daily Note     Today's date: 2024  Patient name: Catina Hilario  : 1960  MRN: [id number]  Referring provider: Elise Schmidt,*  Dx:   Encounter Diagnosis     ICD-10-CM    1. MS (multiple sclerosis) (HCC)  G35       2. Gait difficulty  R26.9                      Subjective: Pt reports she has some swelling in her RLE- knee pain that feels like nerve on R knee. Pain started Wednesday and then progressed Thursday which effected her walking.       Objective: See treatment diary below    TA  - posture training: correct posture in chair noted significant R trunk lean with exercises.  - posture training with gait with RW for dec R trunk flexion with RLE swing- improved with step to pattern.   - medication consistency- Ampyra  - compression socks and measurements: calf RLE 48cm LLE 42cm   - Pt knee pain at joint line lateral R leg- f/u with ortho    Assessment: Tolerated treatment well. Patient arrived with RLE swelling, noting 6cm difference from R to L. Educated patient on consistency of medications, side effects, and consistency of PT/rest days. Patient agreeable. NV re-evaluation.Patient demonstrated fatigue post treatment, exhibited good technique with therapeutic exercises, and would benefit from continued PT      Plan: Continue per plan of care.      POC expires Auth Status Total   Visits  Start date  Expiration date PT/OT + Visit Limit? Co-Insurance   3/11/24 required BOMN 12/11/23 3/11/23 no No   6/15/24

## 2024-04-15 DIAGNOSIS — G35 MS (MULTIPLE SCLEROSIS) (HCC): ICD-10-CM

## 2024-04-15 RX ORDER — SERTRALINE HYDROCHLORIDE 100 MG/1
100 TABLET, FILM COATED ORAL DAILY
Qty: 90 TABLET | Refills: 0 | Status: CANCELLED | OUTPATIENT
Start: 2024-04-15

## 2024-04-16 DIAGNOSIS — K21.9 GASTROESOPHAGEAL REFLUX DISEASE WITHOUT ESOPHAGITIS: ICD-10-CM

## 2024-04-16 RX ORDER — PANTOPRAZOLE SODIUM 40 MG/1
40 TABLET, DELAYED RELEASE ORAL DAILY
Qty: 90 TABLET | Refills: 0 | Status: SHIPPED | OUTPATIENT
Start: 2024-04-16

## 2024-04-17 ENCOUNTER — APPOINTMENT (OUTPATIENT)
Age: 64
End: 2024-04-17
Payer: COMMERCIAL

## 2024-04-19 ENCOUNTER — APPOINTMENT (OUTPATIENT)
Age: 64
End: 2024-04-19
Payer: COMMERCIAL

## 2024-04-20 ENCOUNTER — OFFICE VISIT (OUTPATIENT)
Dept: OBGYN CLINIC | Facility: CLINIC | Age: 64
End: 2024-04-20
Payer: COMMERCIAL

## 2024-04-20 VITALS
DIASTOLIC BLOOD PRESSURE: 86 MMHG | HEART RATE: 75 BPM | BODY MASS INDEX: 35.5 KG/M2 | HEIGHT: 63 IN | SYSTOLIC BLOOD PRESSURE: 134 MMHG

## 2024-04-20 DIAGNOSIS — M22.2X1 PATELLOFEMORAL SYNDROME OF RIGHT KNEE: ICD-10-CM

## 2024-04-20 DIAGNOSIS — M17.11 PRIMARY OSTEOARTHRITIS OF RIGHT KNEE: Primary | ICD-10-CM

## 2024-04-20 DIAGNOSIS — M48.02 FORAMINAL STENOSIS OF CERVICAL REGION: ICD-10-CM

## 2024-04-20 DIAGNOSIS — M54.12 RADICULOPATHY, CERVICAL REGION: ICD-10-CM

## 2024-04-20 PROCEDURE — 99214 OFFICE O/P EST MOD 30 MIN: CPT | Performed by: FAMILY MEDICINE

## 2024-04-20 PROCEDURE — 20610 DRAIN/INJ JOINT/BURSA W/O US: CPT | Performed by: FAMILY MEDICINE

## 2024-04-20 RX ORDER — PREDNISONE 20 MG/1
20 TABLET ORAL 2 TIMES DAILY WITH MEALS
Qty: 10 TABLET | Refills: 0 | Status: SHIPPED | OUTPATIENT
Start: 2024-04-20 | End: 2024-04-25

## 2024-04-20 RX ADMIN — BUPIVACAINE HYDROCHLORIDE 1 ML: 2.5 INJECTION, SOLUTION INFILTRATION; PERINEURAL at 11:00

## 2024-04-20 RX ADMIN — METHYLPREDNISOLONE ACETATE 2 ML: 40 INJECTION, SUSPENSION INTRA-ARTICULAR; INTRALESIONAL; INTRAMUSCULAR; SOFT TISSUE at 11:00

## 2024-04-20 RX ADMIN — BUPIVACAINE HYDROCHLORIDE 4 ML: 2.5 INJECTION, SOLUTION INFILTRATION; PERINEURAL at 11:00

## 2024-04-20 NOTE — PROGRESS NOTES
Assessment/Plan:  Assessment/Plan   Diagnoses and all orders for this visit:    Primary osteoarthritis of right knee  -     Large joint arthrocentesis: R knee    Patellofemoral syndrome of right knee  -     Brace    Radiculopathy, cervical region  -     Ambulatory referral to Spine & Pain Management; Future  -     predniSONE 20 mg tablet; Take 1 tablet (20 mg total) by mouth 2 (two) times a day with meals for 5 days    Foraminal stenosis of cervical region  -     Ambulatory referral to Spine & Pain Management; Future      63-year-old ambidextrous female with left upper extremity pain more than 2 months duration and with worsening right knee pain.  Discussed with patient physical exam, imaging studies, impression, and plan.  MRI cervical spine is noted for C4-C5 moderate left foraminal narrowing.  Physical exam right knee noted for generalized swelling.  She has full extension of the knee.  Clinical impression is that left lower extremity symptoms likely due to cervical radiculopathy, as she did not have relief with shoulder steroid injection.  Regards to upper extremity symptoms I will give her a course of oral steroid and recommend she follow-up with pain management.  She is to take prednisone 20 mg twice a day with food for 5 days.  Regards to her right knee she is likely experiencing aggravation of arthritis.  Her insurance does not cover visco injection so offered patient repeat steroid injection to which she agreed.  I administered mixture 3 cc 0.25% bupivacaine and 2 cc Depo-Medrol to the right knee without complication.  She will follow-up with me as needed.        Subjective:   Patient ID: Catina Hilario is a 63 y.o. female.  Chief Complaint   Patient presents with    Right Knee - Follow-up    Left Shoulder - Pain      63-year-old female with osteoarthritis the right knee following for worsening right knee pain and for left upper extremity pain.  She was last seen by me 8 months ago at which point we  "requested for visco injection.  Insurance did cover the cost of visco injection.  She has been experiencing worsening pain in the knee.  She has pain described as generalized to the knee, worse with movement and ambulating, associated with swelling, and improved with resting.  She reports worsening of the right knee pain 2 weeks ago after buckling.  She also has left upper extremity pain.  Pain described generalized to the shoulder, radiating proximally to the side of the neck, worse with moving the arm, and improved with resting.  She was given steroid injection by Dr. Garcia nearly 2 months ago.  She states injection did not provide much improvement in symptoms.      Knee Pain  This is a chronic problem. The current episode started more than 1 year ago. The problem occurs daily. The problem has been gradually worsening. Associated symptoms include arthralgias, joint swelling, numbness and weakness. The symptoms are aggravated by standing and walking. She has tried rest, position changes and NSAIDs for the symptoms. The treatment provided mild relief.               Review of Systems   Musculoskeletal:  Positive for arthralgias and joint swelling.   Neurological:  Positive for weakness and numbness.       Objective:  Vitals:    04/20/24 1101   BP: 134/86   Pulse: 75   Height: 5' 3\" (1.6 m)      Right Knee Exam     Other   Swelling: mild            Physical Exam  Vitals and nursing note reviewed.   Constitutional:       Appearance: Normal appearance. She is well-developed. She is not ill-appearing or diaphoretic.   HENT:      Head: Normocephalic and atraumatic.      Right Ear: External ear normal.      Left Ear: External ear normal.   Eyes:      Conjunctiva/sclera: Conjunctivae normal.   Neck:      Trachea: No tracheal deviation.   Cardiovascular:      Rate and Rhythm: Normal rate.   Pulmonary:      Effort: Pulmonary effort is normal. No respiratory distress.   Abdominal:      General: There is no distension. " "  Musculoskeletal:         General: Swelling present.   Skin:     General: Skin is warm and dry.      Coloration: Skin is not jaundiced or pale.   Neurological:      Mental Status: She is alert and oriented to person, place, and time.   Psychiatric:         Mood and Affect: Mood normal.         Behavior: Behavior normal.         Thought Content: Thought content normal.         Judgment: Judgment normal.         I have personally reviewed pertinent films in PACS and my interpretation is  .  Multilevel degenerative changes with foraminal narrowing 4-5 on the left    Large joint arthrocentesis: R knee  Universal Protocol:  Consent: Verbal consent obtained.  Risks and benefits: risks, benefits and alternatives were discussed  Consent given by: patient  Time out: Immediately prior to procedure a \"time out\" was called to verify the correct patient, procedure, equipment, support staff and site/side marked as required.  Patient understanding: patient states understanding of the procedure being performed  Patient consent: the patient's understanding of the procedure matches consent given  Procedure consent: procedure consent matches procedure scheduled  Relevant documents: relevant documents present and verified  Test results: test results available and properly labeled  Site marked: the operative site was marked  Radiology Images displayed and confirmed. If images not available, report reviewed: imaging studies available  Required items: required blood products, implants, devices, and special equipment available  Patient identity confirmed: verbally with patient  Supporting Documentation  Indications: pain   Procedure Details  Location: knee - R knee  Preparation: Patient was prepped and draped in the usual sterile fashion  Needle gauge: 21G 2\"  Approach: anteromedial  Medications administered: 4 mL bupivacaine 0.25 %; 1 mL bupivacaine 0.25 %; 2 mL methylPREDNISolone acetate 40 mg/mL    Patient tolerance: patient tolerated " the procedure well with no immediate complications  Dressing:  Sterile dressing applied

## 2024-04-22 ENCOUNTER — EVALUATION (OUTPATIENT)
Age: 64
End: 2024-04-22
Payer: COMMERCIAL

## 2024-04-22 ENCOUNTER — APPOINTMENT (OUTPATIENT)
Age: 64
End: 2024-04-22
Payer: COMMERCIAL

## 2024-04-22 DIAGNOSIS — G35 MS (MULTIPLE SCLEROSIS) (HCC): Primary | ICD-10-CM

## 2024-04-22 DIAGNOSIS — R26.2 AMBULATORY DYSFUNCTION: ICD-10-CM

## 2024-04-22 PROCEDURE — 97166 OT EVAL MOD COMPLEX 45 MIN: CPT

## 2024-04-22 PROCEDURE — 97530 THERAPEUTIC ACTIVITIES: CPT

## 2024-04-22 RX ORDER — BUPIVACAINE HYDROCHLORIDE 2.5 MG/ML
1 INJECTION, SOLUTION INFILTRATION; PERINEURAL
Status: COMPLETED | OUTPATIENT
Start: 2024-04-20 | End: 2024-04-20

## 2024-04-22 RX ORDER — METHYLPREDNISOLONE ACETATE 40 MG/ML
2 INJECTION, SUSPENSION INTRA-ARTICULAR; INTRALESIONAL; INTRAMUSCULAR; SOFT TISSUE
Status: COMPLETED | OUTPATIENT
Start: 2024-04-20 | End: 2024-04-20

## 2024-04-22 RX ORDER — BUPIVACAINE HYDROCHLORIDE 2.5 MG/ML
4 INJECTION, SOLUTION INFILTRATION; PERINEURAL
Status: COMPLETED | OUTPATIENT
Start: 2024-04-20 | End: 2024-04-20

## 2024-04-22 NOTE — PROGRESS NOTES
OCCUPATIONAL THERAPY INITIAL EVALUATION      POC expires Auth Status Total   Visits  Start date  Expiration date PT/OT + Visit Limit? Co-Insurance   24   $20 Co-pay per service                                           Visit/Unit Tracking  AUTH Status:  Date               Visits  Authed:  Used 1 (IE)               Remaining                  PLAN OF CARE START:24  PLAN OF CARE END: 2024  PROGRESS NOTE DUE: follow up post scheduling  FREQUENCY: 1-2x/week for 8-12 weeks  PRECAUTIONS: fall risk, MAINTENANCE PROGRAM  DIAGNOSIS: MS, R side weakness and cognitive   VISITS: 1 (IE)      Today's date: 2024  Patient name: Catina Hilario  : 1960  MRN: [id number]  Referring provider: Kris Cornell *  Dx:   Encounter Diagnosis     ICD-10-CM    1. MS (multiple sclerosis) (HCC)  G35 Ambulatory Referral to Occupational Therapy      2. Ambulatory dysfunction  R26.2 Ambulatory Referral to Occupational Therapy            SKILLED ANALYSIS:  Pt is a right hand dominant 63 year old female presenting to OP OT s/p MS with R side weakness (MS diagnosed ~30 years ago). Last bout of OP OT in 2023.  Pt reports she is modified independent with ADLs and receives supervision with IADLs. Pt reports deficits in balance and core strength. Pt currently reports difficulty with fatigue levels.  Pt is demonstrating deficits based on clinical observation and the following assessments: DANIELA  strength, pinch strength, 9 hole peg test, functional dexterity test, and MoCA 8.1. Post assessments pt demonstrating the following: impaired R UE  strength, pinch strength, coordination, and in hand manipulation skills compared to age related forms. Pt scored within similar range on all assessments from prior re-evaluation in October. Presents with fair insight into deficits in relation to safety awareness during daily tasks and community mobility.  Pt has been seen for OT services and would  "benefit from transitioning to maintenance program. Pt presents with decreased engagement in life roles due to effects of  MS and demonstrates difficulty in daily tasks, including remembering recent events and completing health management. Pt would benefit from maintenance OT services focusing on impairments for the next 8-12 weeks, 1-2x/week. Pt educated on OT services and maintianence.  Skilled Maintenance occupational therapy is required to slow the patients functional decline due to MS. Given the pathophysiology of MS a maintenance program will help reduce the risk of further deterioration, loss of functional mobility or independence as well as decrease risk of secondary impairments form disease progress. Educated pt on charges of insurance, assessments performed with standardized norms, POC, goal creation, and OP OT services.       Subjective  Occupational Profile  *type of home: ranch   *lives with:    *vocation: retired  *driving: no  *DME: none  *Assistive device for inside home: RW  *Assistive device for outside home: RW  *AE for ADLs: none  *ADL status: modified independent  *IADL status (cooking, cleaning, community mobility, medication management, finances):  assists     PATIENT GOAL: \"to get stronger\"    HISTORY OF PRESENT ILLNESS:   Pt is a 63 y.o. female who was referred to Occupational Therapy s/p  MS (multiple sclerosis) ([facility]) [G35]. Pt presents to outpatient OT s/p chronic MS ~30 years ago for follow up IE. Last bout of OP OT was in 2023. Recent follow up with neurology who administered new medication to address MS.     PMH:   Past Medical History:   Diagnosis Date    Multiple sclerosis (HCC)        Past Surgical Hx:   Past Surgical History:   Procedure Laterality Date     SECTION      FOOT SURGERY Right     KNEE SURGERY      R meniscus    TRACHEOSTOMY          Pain:  Location: none     Objective  Impairment Observations:    RUE  Status on IE: 24 LUE  Status on " IE: 24 Comments           UPPER EXTREMITY FUNCTION   impaired Intact Dominant Hand: R UE     /PINCH STRENGTH             Dynamometer    - Gross Grasp 55 lbs 65 lbs within normal limits   Pinch Meter     - Pincer 6 lbs 9 lbs abnormal - R UE norm: 10lbs    - Tripod 9 lbs 9 lbs abnormal - R UE norm: 14lbs    - Lateral 11 lbs  10 lbs abnormal - R UE norm: 15lbs     COORDINATION      9 Hole Peg Test-  assesses dexterity/fine motor coordination  37 seconds, 2 drops noted 31 seconds abnormal    Pt demonstrates decreased FMC compared to norms for age/sex (R UE 19 seconds)    Fxnl Dexterity Test-assesses patient's ability to use the hand for daily tasks requiring a 3-jaw haydee prehension between the fingers and the thumb  1 minute and 10 seconds, multiple drops  36 seconds abnormal    Pt demonstrates decreased dexterity compared to norms for age/sex (R UE: 31 seconds)         Rojas Cognitive Assessment (MoCA)       Status on IE: 24 COMMENTS                                         COGNITIVE FXN                   MOCA (8.1)      Education Level 12= 12 years    Visuospatial/executive functioning 1/5    Naming 3/3    Memory: 1st trial: 5/5    Memory: 2nd trial: 5/5    Attention/concentration 2/2    List of letters:  0/1    Serial Seven Subtraction: 2/3    Language/sentence repetition: 2/2    Language Fluency:  0/1 8 words    Abstract/Correlational Thinkin/2    Delayed Recall: 0/5    Orientation: 5/6 Unable to recall date   Memory Index Score 415                                       Total Score     MoCA Scoring        Normal: 26+         Mild Cognitive Impairment: 18-25          Moderate Cognitive Impairment: 10-17         Severe Cognitive Impairment: <10   moderate         GOALS (8-12 WEEKS)    GOAL  STATUS ON IE  GOAL STATUS    Pt will demo ability to score 30 (+/-3 points) in order to maintain cognitive functionals for life roles  Established      Pt will maintain score of 1/5 or greater on  /EF portion of MoCA in order to maintain cognitive functions for life roles  1/5 Established      Pt will maintain improved in hand manipulation skills as evidenced by performing functional dexterity test with R UE in 1 minute and 10 seconds (+/-5 seconds) in order to maintain independence with life roles 1 minute and 10 seconds Established      Pt will maintain FMC as evidenced by performing 9 hole peg test with R UE in 37 seconds (+/-5 seconds) in order to maintain independence with life roles 37 seconds Established      Pt will maintain good tolerance to in stance exercise x 10 minutes with CG for steadying and with minimal rest breaks required for maintained engagement in life roles and weekly exercise regimen   Established      Pt will demo with G carryover of Home Exercise Program to improve functional progression towards goals in plan of care and for improved functional use of UE  Established      Pt will maintain  R UE 2 point pinch strength at 6lbs or greater to complete functional ADLs and IADLs  6lbs Established      Pt will maintain  R UE 3 point pinch strength at 9lbs or greater to complete functional ADLs and IADLs  9lbs Established      Pt will maintain  R UE lateral pinch strength at 11lbs or greater to complete functional ADLs and IADLs  11lbs Established      Pt will demo increased insight into deficits x 50% for overall increased safety and self awareness with ADL/IADL tasks and to encourage use of compensatory strategies  Established        OTHER PLANNED THERAPY INTERVENTIONS:   Supine, seated, and in stance neuro re-ed  Tricep AG  NMES/FES  FMC/prehension  Timed Trials  Manual tx  Hand to target  Sensory re-ed  Seated functional reach: crossing midline  Supine place and hold  WBearing strategies   Closed chain activities  Open chain activities  Internal and external memory aides  Multimatrix for saccades/ visual clutter/attention  Hypersensitivity strategies education  Multi-modal  environment  Sustained/alternating/divided attention  Tracking tube  Oculomotor control:  saccades, con/divergence  Conv./div. Dynamic tasks  Work stations with timed transitions  Temporal Awareness  Memory and mental manipulation  Auditory processing with immediate recall  Memory retention with immediate and delayed recall  Edu on cog/vision apps

## 2024-04-24 ENCOUNTER — APPOINTMENT (OUTPATIENT)
Age: 64
End: 2024-04-24
Payer: COMMERCIAL

## 2024-04-26 ENCOUNTER — APPOINTMENT (OUTPATIENT)
Age: 64
End: 2024-04-26
Payer: COMMERCIAL

## 2024-04-29 ENCOUNTER — EVALUATION (OUTPATIENT)
Age: 64
End: 2024-04-29
Payer: COMMERCIAL

## 2024-04-29 ENCOUNTER — OFFICE VISIT (OUTPATIENT)
Dept: LAB | Facility: HOSPITAL | Age: 64
End: 2024-04-29
Payer: COMMERCIAL

## 2024-04-29 ENCOUNTER — OFFICE VISIT (OUTPATIENT)
Age: 64
End: 2024-04-29
Payer: COMMERCIAL

## 2024-04-29 DIAGNOSIS — G35 MS (MULTIPLE SCLEROSIS) (HCC): Primary | ICD-10-CM

## 2024-04-29 DIAGNOSIS — G35 MS (MULTIPLE SCLEROSIS) (HCC): ICD-10-CM

## 2024-04-29 DIAGNOSIS — R26.9 GAIT DIFFICULTY: ICD-10-CM

## 2024-04-29 PROCEDURE — 93005 ELECTROCARDIOGRAM TRACING: CPT

## 2024-04-29 PROCEDURE — 97164 PT RE-EVAL EST PLAN CARE: CPT

## 2024-04-29 PROCEDURE — 97112 NEUROMUSCULAR REEDUCATION: CPT

## 2024-04-29 PROCEDURE — 97116 GAIT TRAINING THERAPY: CPT

## 2024-04-29 PROCEDURE — 97530 THERAPEUTIC ACTIVITIES: CPT

## 2024-04-29 NOTE — PROGRESS NOTES
POC expires Auth Status Total   Visits  Start date  Expiration date PT/OT + Visit Limit? Co-Insurance   24   $20 Co-pay per service                                           Visit/Unit Tracking  AUTH Status:  Date              Visits  Authed:  Used 1 (IE) 1              Remaining                  PLAN OF CARE START:24  PLAN OF CARE END: 2024  PROGRESS NOTE DUE: 24 (PN on schedule)   FREQUENCY: 1-2x/week for 8-12 weeks  PRECAUTIONS: fall risk, MAINTENANCE PROGRAM  DIAGNOSIS: MS, R side weakness and cognitive   VISITS: 2    Daily Note     Today's date: 2024  Patient name: Catina Hilario  : 1960  MRN: [id number]  Referring provider: Kris Cornell *  Dx:   Encounter Diagnosis     ICD-10-CM    1. MS (multiple sclerosis) ([facility])  G35                          Subjective: Pt reports no changes since last visit       Objective: See treatment below.  NMRE:   *performed the following exercises seated/supine on mat focused on core stability, core strength, sitting balance, and proximal stability. Performed majority of exercises seated on marilee disc to challenge sitting balance.  VC for form and body kinematics.   -modified russian twists, using 1kg weighted ball, 2 sets of 15 reps  -BUE shoulder flexion/extension using 1kg  weighted ball, 1 set of 15 reps   -modified crunches using 1kg weighted ball, 2 sets of 15 reps   -bilateral leg lifts supine on mat, 3 sets of 10 reps    *IQ puzzler seated EOM on marilee disc,  starter  level, 1 round using R UE to place pieces primarily. Focused on EF skills, R UE coordination, sitting balance, and  skills. Pt required min VC for accuracy. ~15% dropping of pieces noted. No LOB.     Assessment: Tolerated treatment well. Focus of session on core stability, coordination, endurance, and balance. Pt would benefit from continued OT services to address R UE pinch strength, R UE FMC/in hand manipulation, core strength, standing  endurance/balance, EF skills,  skills, problem solving, and delayed recall.  Skilled Maintenance occupational therapy is required to slow the patients functional decline due to MS. Given the pathophysiology of MS a maintenance program will help reduce the risk of further deterioration, loss of functional mobility or independence as well as decrease risk of secondary impairments form disease progress.       Plan: Continued skilled OT per POC.

## 2024-04-29 NOTE — PROGRESS NOTES
Re-evaluation     Today's date: 2024  Patient name: Catina Hilario  : 1960  MRN: [id number]  Referring provider: Elise Schmidt,*  Dx:   Encounter Diagnosis     ICD-10-CM    1. MS (multiple sclerosis) (HCC)  G35       2. Gait difficulty  R26.9                    POC expires Unit limit Auth Expiration date PT/OT + Visit Limit?   6/15/24 BOMN pend BOMN                           Visit/Unit Tracking  AUTH Status:  Date                Used                Remaining                           POC expires Auth Status Total   Visits  Start date  Expiration date PT/OT + Visit Limit? Co-Insurance   3/11/24 required BOMN 12/11/23 3/11/23 no No   6/15/24                                         Today's date: 2024  Patient name: Catina Hilario  : 1960  MRN: [id number]  Referring provider: Elise Schmidt,*  Dx:   Encounter Diagnosis     ICD-10-CM    1. MS (multiple sclerosis) ([facility])  G35       2. Gait difficulty  R26.9           Assessment  Assessment details: Patient is a 63 y.o. Female who presents to skilled outpatient PT with a dx of RR MS and frequent falls. Since initial evaluation, pt presents with gait dysfunction, dec LE strength (R<L), dec balance, dec righting reactions, dec sensation, dec coordination, and dec cardiovascular endurance. She is below age-related norms, and has shown significant improvement since last re-evaluation per TUG, 5xSTS, 2mWT, and initiation of 25 foot walk test. Pt will benefit from skilled OP maintenance program to prevent regression. Regression is likely, due to history or regression noted, history of falls with injury, sedentary lifestyle, presence of neurodegenerative/neurocognitive disorder, no access at home to fall prevention system to perform HEP, and limited ability for family member/caregiver to assist with HEP. Both the patient and caregiver would be at risk for increased injury if patient had LOB or fall due to inadequate safety  equipment, which could lead to hospitalization and increased healthcare costs. Significant amount of time spent completing patient education on POC, HEP, bracing, and prognosis based on diagnosis and presentation. Will continue circuit style training. She will benefit from skilled OP PT to improve above impairments, maintain functional mobility independence and dec risk for falls. Will see pt 1-2x/week. Pt agreeable. New goals created to align with maintenance program.     Liza Calderon (2013): a [place] District Court decision that sought to clarify that Medicare will in fact cover skilled therapy services to maintain a patient’s current condition or prevent/slow further deterioration.    Impairments: Abnormal coordination, Abnormal gait, Abnormal muscle tone, Abnormal or restricted ROM, Activity intolerance, Impaired balance, Impaired physical strength, Lacks appropriate HEP, Poor posture, Poor body mechanics, Pain with function, Safety issue, Weight-bearing intolerance, Abnormal movement, Difficulty understanding, Abnormal muscle firing  Understanding of Dx/Px/POC: Good  Prognosis: Good    Patient verbalized understanding of POC.  Please contact me if you have any questions or recommendations. Thank you for the referral and the opportunity to share in Catina THEODORE Yanelis's care.    Plan  Plan details: Will see pt 2x/week  Patient would benefit from: PT Eval, Skilled PT and OT Eval  Planned modality interventions: Biofeedback, Cryotherapy, TENS, Thermotherapy  Planned therapy interventions: Abdominal trunk stabilization, ADL training, Balance, Balance/WB training, Breathing training, Body mechanics training, Coordination, Functional ROM exercises, Gait training, HEP, Joint Mobilization, Manual Therapy, Degroot taping, Motor coordination training, Neuromuscular re-education, Patient education, Postural training, Strengthening, Stretching, Therapeutic activities, Therapeutic exercises, Therapeutic  training, Transfer training, Activity modification, Work reintegration  Frequency: 1-3x/wk  Duration in weeks: 12  Plan of Care beginning date: 4/29/24  Plan of Care expiration date: 12 weeks -7/29/24  Treatment plan discussed with: Patient and Family      Goals  Short Term Goals (4 weeks):    - Patient will improve time on TUG by 2.9 seconds to facilitate improved safety in all ambulation  - Patient will be independent in basic HEP 2-3 weeks  - Patient will improve 5xSTS score by 2.3 seconds to promote improved LE functional strength needed for ADLs  - Pt will improve ABC to >/= 67%    Long Term Goals (12 weeks):  - Patient will be independent in a comprehensive home exercise program  - Patient will improve gait speed by 0.18 m/s to improve safety with community ambulation  - Patient will improve MIJARES by 6 points to facilitate return to safe independent ambulation  - Patient will improve scoring on FGA by 4 points to progress safety with dynamic tasks  - Patient will be able to demonstrate HT in gait without veering  - Patient will improve 6 Minute Walk Test score by 190 feet to promote improved cardiovascular endurance  - Patient will report 50% reduction in near falls in order to improve safety with functional tasks and reduce his risk for falls  - Patient will report going on walks at least 3 days per week to promote independence and improved cardiovascular endurance  - Patient will be able to ascend/descend stairs reciprocally without UE assist to promote independence and safety with ADLs  - Patient will report 50% reduction in near falls when ambulating on uneven terrain    NEW GOALS 4/29/2024:  - Patient will ambulate outdoors on uneven surfaces with limited assistance to facilitate safe community ambulation with LRAD  - Patient will maintain gait speed per 25 foot walk test at 11 m/s to facilitate continued safety with community ambulation  - Patient will continue to score at least 28s on 5xSTS to maintain  "fall risk status to reduce risk of injury  - Patient will be able to ambulate at least 100 ft during 2 Minute Walk Test to maintain current cardiovascular capacity  - Patient will attend PT 1x/week with focus on balance and gait training to maintain functional capacity  - Patient will remain at 26 seconds on TUG with AD to maintain fall risk status  - Patient will maintain LE strength at current level to maintain level of independence with transfers    Subjective  History of Present Illness  Mechanism of injury:   Pt reports that she was making progress after seeing neuro PT. She went to the gym and was working on her strength. Pt reports she uses the RW. She has reported fatigue that carries over days due to the MS. She uses her total gym and bike at home and stretches. She has good and bad days but it can change day to day. She recently had a medication change- she has f/u with neurologist. Pt wants to walk and be as functional as possible, get \"her life back.\" She feels that its not necessarily from a relapse. Independent with ADLs, requires assist with  iADLs of cleaning. She has R AFO that she received \"3-4 months ago\" and has not been wearing because she does not feel that it is helping.     Recently, she had bronchitis and was having inc difficulty breathing. She was able to exercise here and there. 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 Pt wants to be able to walk and get stronger to be able to go to a gym.     Primary AD: RW    UPDATE 1/15: Pt reports she has been practicing her walking at home with RW, she feels that it is still fluctuating. Pt is not wearing the brace. She has been doing her exercises, despite not being able to come due to ongoing bronchitis. Pt reports she has not had any falls at home.     Pt was ambulating inside clinic to waiting room while clinicians were on lunch break. Pt had unwitnessed fall to floor. Pt found on floor. Denies " headstrike, denies injury. Pt reports she missed the chair when going to sit down. Pt presenting to PT for re-evaluation. No bruising, redness, and pt denies pain in LE. Pain in L shoulder with lowering arm to side, as well as any resisted motions (all directions). Able to complete re-evaluation. if continues to have pain to follow up with PCP. Pt agreeable.    UPDATE 2/19: Pt has bene away from therapy due to sickness and MS exacerbation. She is now feeling. Pt reports that she was sitting on the scooter and reached forward too far. She had two falls since last session.     UPDATE 3/15: Pt reports having issues with most recent infusion, seeing neuro Monday for emergency follow up. Continues to feel off since most recent illness and MS flare up. Has been using the scooter more. Most recent medication infusion has been causing her to sleep most of the day away. . Unsure how she will do today.     UPDATE 4/29/24: Pt is feeling good today. Has made adaptations to medication consistently taking them daily. As well as being able to dec frequency of watching grandson which has improved overall fatigue and management. Will benefit form daily HHA walks with . pt would like to walk with the poles and no AD, as well as core strength. Interested in 1x/week therapy- switching to maintenance program.    Pain  R knee    Social Support  Stairs in house: FF with HR to laundry in basement  Lives with: , dogs, cats, chickens- babysits young grandchildren 2-3x/week    Objective Updated 3/15  - R Hip Flexion: 2+/5  - L Hip Flexion: 3/5  - R Knee Extension: 4-/5 - L Knee Extension: 3+5  - R Knee Flexion: 4/5  - L Knee Flexion: 4-/5  - R Ankle DF: 2+/5  - L Ankle DF: 4-/5  - R Ankle PF: 3+/5  - L Ankle PF: 4/-5  -  R eversion 1/5    Sensation  - Light touch: numbness and tingling in both feet; WFL; RLE differentiation     Skin Integ: intact, no redness    Coordination  - Dysmetria: WFL   - Dysdiadochokinesia:  WFL    Outcome Measures Initial Eval  12/11/23   1/15/24   2/19/24   3/15/2024   04/29/2024    5xSTS 41.65s RW no UE 20.45s RW no UE GB  29.75s no UE GB 27.22 sec no UE GB 28.16s     TUG NT 27.2s RW 34s RW and GB 39.21 sec w RW and GB 25.76s RW and GB    10 meter 0.48m/s RW 0.5m/s RW GB 0.4m/s  0.6m/s    2MWT 098ft RW  132ft RW GB 100ft RW GB  150ft RW    MFIS 65/84  Physical 29  Cognitive 29  Psychosocial 7  63/84  34  23  6 62/84  32  24  6 Not this date  64/84  31  26  7    25 foot walk test     11.2s RW and GB        NMR  - RW management with STS and gait- VC for dec UE use on walker  - posture training in standing  - R knee flexion with knee brace on  - STS

## 2024-04-30 LAB
ATRIAL RATE: 83 BPM
P AXIS: 53 DEGREES
PR INTERVAL: 136 MS
QRS AXIS: 38 DEGREES
QRSD INTERVAL: 80 MS
QT INTERVAL: 386 MS
QTC INTERVAL: 453 MS
T WAVE AXIS: 48 DEGREES
VENTRICULAR RATE: 83 BPM

## 2024-04-30 PROCEDURE — 93010 ELECTROCARDIOGRAM REPORT: CPT | Performed by: INTERNAL MEDICINE

## 2024-05-01 PROBLEM — Z00.00 MEDICARE ANNUAL WELLNESS VISIT, SUBSEQUENT: Status: RESOLVED | Noted: 2023-03-06 | Resolved: 2024-05-01

## 2024-05-06 ENCOUNTER — OFFICE VISIT (OUTPATIENT)
Age: 64
End: 2024-05-06
Payer: COMMERCIAL

## 2024-05-06 DIAGNOSIS — R26.9 GAIT DIFFICULTY: ICD-10-CM

## 2024-05-06 DIAGNOSIS — G35 MS (MULTIPLE SCLEROSIS) (HCC): Primary | ICD-10-CM

## 2024-05-06 PROCEDURE — 97112 NEUROMUSCULAR REEDUCATION: CPT

## 2024-05-06 NOTE — PROGRESS NOTES
POC expires Auth Status Total   Visits  Start date  Expiration date PT/OT + Visit Limit? Co-Insurance   24   $20 Co-pay per service                                           Visit/Unit Tracking  AUTH Status:  Date  5/6            Visits  Authed:  Used 1 (IE) 1 1             Remaining                  PLAN OF CARE START:24  PLAN OF CARE END: 2024  PROGRESS NOTE DUE: 24 (PN on schedule)   FREQUENCY: 1-2x/week for 8-12 weeks  PRECAUTIONS: fall risk, MAINTENANCE PROGRAM  DIAGNOSIS: MS, R side weakness and cognitive   VISITS: 3    Daily Note     Today's date: 2024  Patient name: Catina Hilario  : 1960  MRN: [id number]  Referring provider: Kris Cornell *  Dx:   Encounter Diagnosis     ICD-10-CM    1. MS (multiple sclerosis) ([facility])  G35                          Subjective: Pt reports no changes since last visit       Objective: See treatment below.  NMRE:   *Vacation View pegboard seated tabletop. Multiple drops noted throughout (~50%). VC for accurate 2 point pinch.   -reach,grasp,release of thin metal pegs towel on R side>board using 2 point pinch.   -reach,grasp,release of cylindrical pegs towel on R side>board using 2 point pinch.    -reach, grasp,release of washers towel on R side>board using 2 point pinch    *Fnbox-board seated tabletop using B UE. Recreated 2D image into 3D structure using appropriate scaling, colors, and size rubber bands. Focus on EF/ skills, hand strength, and coordination. Good ability to utilize B UE throughout.     *[employer] board seated tabletop: flipping discs red<>black using R UE only. ~15% dropping noted.     *CG for steadying to ambulate to car end of session using RW, multiple incidences of LOB and R knee buckling.     Assessment: Tolerated treatment well. Focus of session on coordination, hand strength, and cognitive integration. Pt would benefit from continued OT services to address R UE pinch strength,  R UE FMC/in hand manipulation, core strength, standing endurance/balance, EF skills,  skills, problem solving, and delayed recall.  Skilled Maintenance occupational therapy is required to slow the patients functional decline due to MS. Given the pathophysiology of MS a maintenance program will help reduce the risk of further deterioration, loss of functional mobility or independence as well as decrease risk of secondary impairments form disease progress.     Plan: Continued skilled OT per POC.

## 2024-05-06 NOTE — PROGRESS NOTES
Daily Note     Today's date: 2024  Patient name: Catina Hilario  : 1960  MRN: [id number]  Referring provider: Elise Schmidt,*  Dx:   Encounter Diagnosis     ICD-10-CM    1. MS (multiple sclerosis) (HCC)  G35       2. Gait difficulty  R26.9           Subjective: Pt reports she is doing well.       Objective: See treatment diary below    NMR  SOLO 2 minutes on and 3 minutes off   - STS   - High knee march with knee flexion VC with walking sticks  - fwd wlaking with sticks  - Side stepping    - Step taps onto med RR with walking sticks  - trunk rotation Y MB in standing   - fwd walking with sticks    Assessment: Tolerated treatment well. Improved balance with walking sticks today, and improved with focus. Balance and clearance suffers with dual task and distractions. Improved quality and R LE clearance with LUE support and consistent VC. Patient would benefit from continued PT to maintain and maximize function.       Plan: Continue per plan of care.

## 2024-05-10 ENCOUNTER — TELEPHONE (OUTPATIENT)
Dept: NEUROLOGY | Facility: CLINIC | Age: 64
End: 2024-05-10

## 2024-05-10 NOTE — TELEPHONE ENCOUNTER
5/7/24, 1:25    Hi, this is Catina Hilario, birth date 1960. I know that [employer] was working on my [employer] and I got a notice from them, but I don't know what the out of pocket will be on that, I have medicare and I'd like to know if there is one. If you could call me back and also I need the MRI rescheduled and I don't know who to call for that. So if you could just leave me the message on that too. Thank you. Take care, carin.

## 2024-05-10 NOTE — TELEPHONE ENCOUNTER
Called pt re: below. Pt states that her zap[employer]ia application is in process. She did receive the starter kit. Since she is not fully approved yet. Pt wants to know if she should start zaposia starter kit or wait until she's fully approved?     Cb [phone], ok to leave detailed message

## 2024-05-10 NOTE — TELEPHONE ENCOUNTER
I would recommend that she should wait to get the full approval before starting the starter pack of Zeposia.    Thank you,     Dr. Kraig MD.   05/10/24   11:53 AM

## 2024-05-13 ENCOUNTER — APPOINTMENT (OUTPATIENT)
Age: 64
End: 2024-05-13
Payer: COMMERCIAL

## 2024-05-17 ENCOUNTER — TELEPHONE (OUTPATIENT)
Dept: PAIN MEDICINE | Facility: CLINIC | Age: 64
End: 2024-05-17

## 2024-05-17 ENCOUNTER — OFFICE VISIT (OUTPATIENT)
Age: 64
End: 2024-05-17
Payer: COMMERCIAL

## 2024-05-17 DIAGNOSIS — R26.9 GAIT DIFFICULTY: ICD-10-CM

## 2024-05-17 DIAGNOSIS — G35 MS (MULTIPLE SCLEROSIS) (HCC): Primary | ICD-10-CM

## 2024-05-17 PROCEDURE — 97112 NEUROMUSCULAR REEDUCATION: CPT

## 2024-05-17 PROCEDURE — 97530 THERAPEUTIC ACTIVITIES: CPT

## 2024-05-17 NOTE — PROGRESS NOTES
Daily Note     Today's date: 2024  Patient name: Catina Hilario  : 1960  MRN: [id number]  Referring provider: Elise Schmidt,*  Dx:   Encounter Diagnosis     ICD-10-CM    1. MS (multiple sclerosis) (HCC)  G35       2. Gait difficulty  R26.9             Subjective: Pt reports she is doing well. Has donjoy brace donned upon arrival.      Objective: See treatment diary below    NMR  SOLO 2 minutes on and 3 minutes off   - STS   - High knee march with knee flexion VC with walking sticks  - hurdles 6 inches 3 laps   - Side stepping    - Step taps onto med RR with walking sticks  - trunk rotation Y MB in standing   - fwd walking with sticks    Assessment: Tolerated treatment well. Patient continues to experience LOB with SLS exercises. Patient had knee buckling with hurdles. Patient did well with walking sticks and added stability.  Continue to progress as able.  Patient would benefit from continued PT to maintain and maximize function.       Plan: Continue per plan of care.

## 2024-05-20 ENCOUNTER — APPOINTMENT (OUTPATIENT)
Age: 64
End: 2024-05-20
Payer: COMMERCIAL

## 2024-05-20 ENCOUNTER — TELEPHONE (OUTPATIENT)
Dept: NEUROLOGY | Facility: CLINIC | Age: 64
End: 2024-05-20

## 2024-05-24 ENCOUNTER — OFFICE VISIT (OUTPATIENT)
Age: 64
End: 2024-05-24
Payer: COMMERCIAL

## 2024-05-24 ENCOUNTER — EVALUATION (OUTPATIENT)
Age: 64
End: 2024-05-24
Payer: COMMERCIAL

## 2024-05-24 DIAGNOSIS — R26.9 GAIT DIFFICULTY: ICD-10-CM

## 2024-05-24 DIAGNOSIS — G35 MS (MULTIPLE SCLEROSIS) (HCC): Primary | ICD-10-CM

## 2024-05-24 PROCEDURE — 97112 NEUROMUSCULAR REEDUCATION: CPT

## 2024-05-24 NOTE — PROGRESS NOTES
POC expires Auth Status Total   Visits  Start date  Expiration date PT/OT + Visit Limit? Co-Insurance   24   $20 Co-pay per service                                           Visit/Unit Tracking  AUTH Status:  Date            Visits  Authed:  Used 1 (IE) 1 1 1 (PN)            Remaining                  PLAN OF CARE START:24  PLAN OF CARE END: 2024  PROGRESS NOTE DUE: 24 (PN on schedule)   FREQUENCY: 1-2x/week for 8-12 weeks  PRECAUTIONS: fall risk, MAINTENANCE PROGRAM  DIAGNOSIS: MS, R side weakness and cognitive   VISITS: 4    Daily Note     Today's date: 2024  Patient name: Catina Hilario  : 1960  MRN: [id number]  Referring provider: Kris Cornell *  Dx:   Encounter Diagnosis     ICD-10-CM    1. MS (multiple sclerosis) ([facility])  G35                          Subjective: Pt reports no changes since last visit       Objective: See treatment below.  NMRE:   Assessed the following this session to assess progress in therapy: 9 hole peg test, functional dexterity test. All other observations/assessments carried forward from previous evaluation. See below for goal updates.     Impairment Observations:    RUE status on PN  RUE  Status on IE: 24 LUE  Status on IE: 24 Comments           UPPER EXTREMITY FUNCTION   Impaired  impaired Intact Dominant Hand: R UE     /PINCH STRENGTH              Dynamometer     - Gross Grasp  55 lbs 65 lbs within normal limits   Pinch Meter      - Pincer  6 lbs 9 lbs abnormal - R UE norm: 10lbs    - Tripod  9 lbs 9 lbs abnormal - R UE norm: 14lbs    - Lateral  11 lbs  10 lbs abnormal - R UE norm: 15lbs     COORDINATION       9 Hole Peg Test-  assesses dexterity/fine motor coordination  42 seconds, no drops 37 seconds, 2 drops noted 31 seconds abnormal    Pt demonstrates decreased FMC compared to norms for age/sex (R UE 19 seconds)    Fxnl Dexterity Test-assesses patient's ability to use the hand for daily  tasks requiring a 3-jaw haydee prehension between the fingers and the thumb  1 minute and 18 seconds 1 minute and 10 seconds, multiple drops  36 seconds abnormal    Pt demonstrates decreased dexterity compared to norms for age/sex (R UE: 31 seconds)         [place] Cognitive Assessment (MoCA)       Status on IE: 24 COMMENTS                                         COGNITIVE FXN       Version 8.1            MOCA      Education Level 12= 12 years    Visuospatial/executive functioning 1/5    Naming 3/3    Memory: 1st trial: 5/5    Memory: 2nd trial:     Attention/concentration 2/2    List of letters:  0/1    Serial Seven Subtraction: 2/3    Language/sentence repetition: 2/2    Language Fluency:  0/1 8 words    Abstract/Correlational Thinkin/2    Delayed Recall: 0/5    Orientation: 5/6 Unable to recall date   Memory Index Score 4/15                                       Total Score     MoCA Scoring        Normal: 26+         Mild Cognitive Impairment: 18-25          Moderate Cognitive Impairment: 10-17         Severe Cognitive Impairment: <10   moderate         GOALS (8-12 WEEKS)    GOAL  STATUS ON IE  GOAL STATUS    Pt will demo ability to score  (+/-3 points) in order to maintain cognitive functionals for life roles  Established      Pt will maintain score of 1/5 or greater on /EF portion of MoCA in order to maintain cognitive functions for life roles   Established      Pt will maintain improved in hand manipulation skills as evidenced by performing functional dexterity test with R UE in 1 minute and 10 seconds (+/-5 seconds) in order to maintain independence with life roles 1 minute and 10 seconds PROGRESSING, CONTINUE GOAL        Pt will maintain FMC as evidenced by performing 9 hole peg test with R UE in 37 seconds (+/-5 seconds) in order to maintain independence with life roles 37 seconds PROGRESSING, CONTINUE GOAL        Pt will maintain good tolerance to in stance exercise x 10  minutes with CG for steadying and with minimal rest breaks required for maintained engagement in life roles and weekly exercise regimen   Established      Pt will demo with G carryover of Home Exercise Program to improve functional progression towards goals in plan of care and for improved functional use of UE  Established      Pt will maintain  R UE 2 point pinch strength at 6lbs or greater to complete functional ADLs and IADLs  6lbs Established      Pt will maintain  R UE 3 point pinch strength at 9lbs or greater to complete functional ADLs and IADLs  9lbs Established      Pt will maintain  R UE lateral pinch strength at 11lbs or greater to complete functional ADLs and IADLs  11lbs Established      Pt will demo increased insight into deficits x 50% for overall increased safety and self awareness with ADL/IADL tasks and to encourage use of compensatory strategies  Established        *[employer] board seated tabletop-reach,grasp,release of pegs top board<>bottom of board using R UE. Performed simple rotation of pegs prior to release. 25% dropping noted.     *CG-min A (due to R knee buckling and LOB) to perform ambulatory transfer therapy gym>car during therapy session using RW    Assessment: Tolerated treatment well. 9 hole peg test and functional dexterity assessed this session to assess progress in therapy services. Continues to maintain all goals. Status fluctuates pending status for day. Focus of session on coordination, hand strength, and cognitive integration. Pt would benefit from continued OT services to address R UE pinch strength, R UE FMC/in hand manipulation, core strength, standing endurance/balance, EF skills,  skills, problem solving, and delayed recall.  Skilled Maintenance occupational therapy is required to slow the patients functional decline due to MS. Given the pathophysiology of MS a maintenance program will help reduce the risk of further deterioration, loss of functional  mobility or independence as well as decrease risk of secondary impairments form disease progress.     Plan: Continued skilled OT per POC.

## 2024-05-24 NOTE — PROGRESS NOTES
Daily Note     Today's date: 2024  Patient name: Catina Hilario  : 1960  MRN: [id number]  Referring provider: Elise Schmidt,*  Dx:   Encounter Diagnosis     ICD-10-CM    1. MS (multiple sclerosis) (HCC)  G35       2. Gait difficulty  R26.9             Subjective: Pt reports having shoulder pain and knee pain this session. Patient reports having a control fall this past week while walking in the grass.       Objective: See treatment diary below    NMR   2 minutes on and 3 minutes off   - STS   - High knee march with knee flexion VC with walking sticks  - hurdles 6 inches 3 laps   - Side stepping    - Step taps onto med RR with walking sticks  - trunk rotation Y MB in standing   - fwd walking with sticks    Assessment: Tolerated treatment fair. Patient deferred solo due to increased pain. Patient perform exercises in parallel bars. Patient occasionally used one UE assist throughout session. Continue to progress as able.  Patient would benefit from continued PT to maintain and maximize function.       Plan: Continue per plan of care.

## 2024-05-31 ENCOUNTER — OFFICE VISIT (OUTPATIENT)
Age: 64
End: 2024-05-31
Payer: COMMERCIAL

## 2024-05-31 DIAGNOSIS — R26.9 GAIT DIFFICULTY: ICD-10-CM

## 2024-05-31 DIAGNOSIS — G35 MS (MULTIPLE SCLEROSIS) (HCC): Primary | ICD-10-CM

## 2024-05-31 PROCEDURE — 97112 NEUROMUSCULAR REEDUCATION: CPT

## 2024-05-31 NOTE — PROGRESS NOTES
Daily Note     Today's date: 2024  Patient name: Catina Hilario  : 1960  MRN: [id number]  Referring provider: Elise Schmidt,*  Dx:   Encounter Diagnosis     ICD-10-CM    1. MS (multiple sclerosis) (HCC)  G35       2. Gait difficulty  R26.9             Subjective: Pt reports nothing new      Objective: See treatment diary below    NMR   2 minutes on and 3 minutes off   - STS   - High knee march with knee flexion VC with walking sticks  - hurdles 6 inches 3 laps   - Side stepping    - Step taps onto med RR with walking sticks  - trunk rotation Y MB in standing   - fwd walking with sticks    Assessment: Tolerated treatment fair. Performed exercised and circuit training in SOLO step.Modified for R knee pain and L shoudler pain.. Patient occasionally used one UE assist throughout session. Continue to progress as able.  Patient would benefit from continued PT to maintain and maximize function. NV re-eval      Plan: Continue per plan of care.

## 2024-06-04 ENCOUNTER — TELEMEDICINE (OUTPATIENT)
Age: 64
End: 2024-06-04
Payer: COMMERCIAL

## 2024-06-04 ENCOUNTER — TELEPHONE (OUTPATIENT)
Age: 64
End: 2024-06-04

## 2024-06-04 VITALS — BODY MASS INDEX: 33.31 KG/M2 | HEIGHT: 63 IN | WEIGHT: 188 LBS

## 2024-06-04 DIAGNOSIS — R26.2 AMBULATORY DYSFUNCTION: ICD-10-CM

## 2024-06-04 DIAGNOSIS — G35 MS (MULTIPLE SCLEROSIS) (HCC): Primary | ICD-10-CM

## 2024-06-04 DIAGNOSIS — M54.9 BACK PAIN: ICD-10-CM

## 2024-06-04 DIAGNOSIS — R53.1 RIGHT SIDED WEAKNESS: ICD-10-CM

## 2024-06-04 PROCEDURE — G2211 COMPLEX E/M VISIT ADD ON: HCPCS | Performed by: PSYCHIATRY & NEUROLOGY

## 2024-06-04 PROCEDURE — 99215 OFFICE O/P EST HI 40 MIN: CPT | Performed by: PSYCHIATRY & NEUROLOGY
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
## 2024-06-04 NOTE — PROGRESS NOTES
Virtual Regular Visit    Verification of patient location:[place]    Patient is located at Home in the following state in which I hold an active license [place]      Assessment/Plan:    Problem List Items Addressed This Visit    None           Reason for visit is   Chief Complaint   Patient presents with    Multiple Sclerosis    Virtual Regular Visit          Encounter provider Kris Bunch MD      Recent Visits  No visits were found meeting these conditions.  Showing recent visits within past 7 days and meeting all other requirements  Today's Visits  Date Type Provider Dept   24 Telemedicine Kris Cornell MD [facility]   Showing today's visits and meeting all other requirements  Future Appointments  No visits were found meeting these conditions.  Showing future appointments within next 150 days and meeting all other requirements       The patient was identified by name and date of birth. Catina Hilario was informed that this is a telemedicine visit and that the visit is being conducted through {[employer] VIRTUAL VISIT MEDIUM:66084}.  {Telemedicine confidentiality :12798} {Telemedicine participants:76607}  She acknowledged consent and understanding of privacy and security of the video platform. The patient has agreed to participate and understands they can discontinue the visit at any time.    Patient is aware this is a billable service.     Subjective  Catina Hilario is a 63 y.o. female *** .      HPI     Past Medical History:   Diagnosis Date    Multiple sclerosis (HCC)        Past Surgical History:   Procedure Laterality Date     SECTION      FOOT SURGERY Right     KNEE SURGERY      R meniscus    TRACHEOSTOMY         Current Outpatient Medications   Medication Sig Dispense Refill    albuterol (Ventolin HFA) 90 mcg/act inhaler Inhale 2 puffs every 6 (six) hours as needed for wheezing 18 g 1    Cholecalciferol (Vitamin D-3) 25 MCG (1000 UT) CAPS Take by mouth      Coenzyme Q10  "(CoQ-10) 100 MG CAPS Take by mouth (Patient not taking: Reported on 4/20/2024)      Dalfampridine ER 10 MG TB12 Take 1 tablet (10 mg total) by mouth 2 (two) times a day 180 tablet 3    Fluticasone-Salmeterol (Advair Diskus) 100-50 mcg/dose inhaler Inhale 1 puff 2 (two) times a day Rinse mouth after use. 60 blister 3    IBUPROFEN PO Take by mouth      ipratropium-albuterol (DUO-NEB) 0.5-2.5 mg/3 mL nebulizer solution Take 3 mL by nebulization every 6 (six) hours as needed for wheezing or shortness of breath 90 mL 0    meloxicam (MOBIC) 15 mg tablet Take 1 tablet (15 mg total) by mouth daily 30 tablet 5    modafinil (PROVIGIL) 100 mg tablet Take 1 tablet (100 mg total) by mouth daily 30 tablet 0    oxybutynin (DITROPAN-XL) 10 MG 24 hr tablet Take 1 tablet (10 mg total) by mouth daily at bedtime 90 tablet 3    pantoprazole (PROTONIX) 40 mg tablet Take 1 tablet by mouth once daily (Patient not taking: Reported on 4/20/2024) 90 tablet 0    sertraline (ZOLOFT) 100 mg tablet Take 1 tablet (100 mg total) by mouth daily (Patient not taking: Reported on 4/20/2024) 90 tablet 3     No current facility-administered medications for this visit.        Allergies   Allergen Reactions    Levofloxacin Nausea Only    Metronidazole Nausea Only    Other Allergic Rhinitis     seasonal       Review of Systems    Video Exam    Vitals:    06/04/24 1154   Weight: 85.3 kg (188 lb)   Height: 5' 3\" (1.6 m)       Physical Exam     Visit Time  Total Visit Duration: ***        "

## 2024-06-14 ENCOUNTER — TELEPHONE (OUTPATIENT)
Age: 64
End: 2024-06-14

## 2024-06-17 ENCOUNTER — APPOINTMENT (OUTPATIENT)
Dept: RADIOLOGY | Facility: CLINIC | Age: 64
End: 2024-06-17
Payer: COMMERCIAL

## 2024-06-17 ENCOUNTER — OFFICE VISIT (OUTPATIENT)
Dept: OBGYN CLINIC | Facility: CLINIC | Age: 64
End: 2024-06-17
Payer: COMMERCIAL

## 2024-06-17 VITALS
WEIGHT: 188 LBS | DIASTOLIC BLOOD PRESSURE: 67 MMHG | SYSTOLIC BLOOD PRESSURE: 106 MMHG | HEIGHT: 63 IN | HEART RATE: 69 BPM | BODY MASS INDEX: 33.31 KG/M2

## 2024-06-17 DIAGNOSIS — M23.91 INTERNAL DERANGEMENT OF RIGHT KNEE: ICD-10-CM

## 2024-06-17 DIAGNOSIS — M23.91 INTERNAL DERANGEMENT OF RIGHT KNEE: Primary | ICD-10-CM

## 2024-06-17 PROCEDURE — 73562 X-RAY EXAM OF KNEE 3: CPT

## 2024-06-17 PROCEDURE — 99214 OFFICE O/P EST MOD 30 MIN: CPT | Performed by: FAMILY MEDICINE

## 2024-06-17 NOTE — PATIENT INSTRUCTIONS
Meloxicam 15 mg  - once daily  - eat first  - everyday  - no ibuprofen  - no aleve/no naproxen  - tylenol is ok

## 2024-06-21 DIAGNOSIS — G47.26 SLEEP DISORDER, SHIFT WORK: ICD-10-CM

## 2024-06-21 DIAGNOSIS — G35 MS (MULTIPLE SCLEROSIS) (HCC): ICD-10-CM

## 2024-06-24 RX ORDER — MODAFINIL 100 MG/1
100 TABLET ORAL DAILY
Qty: 30 TABLET | Refills: 0 | Status: SHIPPED | OUTPATIENT
Start: 2024-06-24

## 2024-06-27 ENCOUNTER — HOSPITAL ENCOUNTER (OUTPATIENT)
Dept: MRI IMAGING | Facility: CLINIC | Age: 64
Discharge: HOME | End: 2024-06-27
Payer: COMMERCIAL

## 2024-06-27 DIAGNOSIS — M23.91 INTERNAL DERANGEMENT OF RIGHT KNEE: ICD-10-CM

## 2024-06-27 PROCEDURE — 73721 MRI JNT OF LWR EXTRE W/O DYE: CPT

## 2024-07-03 ENCOUNTER — TELEPHONE (OUTPATIENT)
Dept: OBGYN CLINIC | Facility: CLINIC | Age: 64
End: 2024-07-03

## 2024-07-08 NOTE — TELEPHONE ENCOUNTER
I L/M for her to please return my call.  She still has an appt. Scheduled w/  for tomorrow, 7/9 and I want to make sure she isn't coming in for it since she is scheduled w/  for Friday.  Waiting for a call back.

## 2024-07-12 ENCOUNTER — OFFICE VISIT (OUTPATIENT)
Dept: OBGYN CLINIC | Facility: CLINIC | Age: 64
End: 2024-07-12
Payer: COMMERCIAL

## 2024-07-12 VITALS
BODY MASS INDEX: 33.31 KG/M2 | WEIGHT: 188 LBS | SYSTOLIC BLOOD PRESSURE: 109 MMHG | HEART RATE: 77 BPM | HEIGHT: 63 IN | DIASTOLIC BLOOD PRESSURE: 74 MMHG

## 2024-07-12 DIAGNOSIS — G89.29 CHRONIC PAIN OF RIGHT KNEE: ICD-10-CM

## 2024-07-12 DIAGNOSIS — M17.11 PRIMARY OSTEOARTHRITIS OF RIGHT KNEE: Primary | ICD-10-CM

## 2024-07-12 DIAGNOSIS — M25.561 CHRONIC PAIN OF RIGHT KNEE: ICD-10-CM

## 2024-07-12 PROCEDURE — 99213 OFFICE O/P EST LOW 20 MIN: CPT | Performed by: ORTHOPAEDIC SURGERY

## 2024-07-12 NOTE — PROGRESS NOTES
Patient Name:  Catina iHlario  MRN:  [id number]    Assessment & Plan     1. Primary osteoarthritis of right knee  2. Chronic pain of right knee      Right knee primary osteoarthritis   X-rays and MRI reviewed in office today with patient  Diagnosis, treatment options and associated risks were discussed with the patient including no treatment, nonsurgical treatment and potential for surgical intervention in the form of total knee arthroplasty.  The patient was given the opportunity to ask questions regarding each.   Patient has bilateral lower extremity weakness and instability. Referral to physical therapy provided. Recommend strengthening before discussing surgical intervention further.   Patient tried Visco supplementation 2 years ago, and is interested in repeating at this time. She will follow up with Dr Montelongo for further discussion of repeat injections.   We will see her back in clinic if she would like to pursue total knee arthroplasty    Chief Complaint     Right knee pain    History of the Present Illness     Catina Hilario is a 63 y.o. female with Right knee pain, patient is referred to my office by Dr Montelongo. Patient has had chronic right knee pain and instability for 3.5 years. History of right knee arthroscopic meniscus surgery at [facility] 2017. Patient also notes a history of lis franc injury to the right foot and ankle fracture also treated at [facility]. Due to pain in multiple joints of the right lower extremity and MS patient is in a wheel chair. She does ambulate with a walker at home. Patient has previously under gone repeat corticosteroid injections her last injection was 4/20/2024 with only 2 weeks of relief, visco supplementation (12/6/2022). She takes meloxicam 15mg with some benefit. She notes constant aching pain diffuse to the knee, difficulty with flexion and ambulation.       Review of Systems     Review of Systems   Constitutional:  Negative for chills and fever.   HENT:  Negative for  "ear pain and sore throat.    Eyes:  Negative for pain and visual disturbance.   Respiratory:  Negative for cough and shortness of breath.    Cardiovascular:  Negative for chest pain and palpitations.   Gastrointestinal:  Negative for abdominal pain and vomiting.   Genitourinary:  Negative for dysuria and hematuria.   Musculoskeletal:  Negative for arthralgias and back pain.   Skin:  Negative for color change and rash.   Neurological:  Negative for seizures and syncope.   All other systems reviewed and are negative.      Physical Exam     /74   Pulse 77   Ht 5' 3\" (1.6 m)   Wt 85.3 kg (188 lb)   BMI 33.30 kg/m²     RightKnee  Range of motion from 0 to 100.    TTP lateral joint line  There is  crepitus with range of motion.   There is no effusion.    There is 4/5 quadriceps strength   The patient is able to perform a straight leg raise.    negative patellar grind test.  Anterior drawer tests is negative  negative Lachman Test.   Posterior drawer test is   negative   Varus stress testing reveals stability at 0 and 30 degrees   Valgus stress testing reveals stability at 0 and 30 degrees  Daniela's testing is negative   The patient is neurovascular intact distally.      Eyes:  Anicteric sclerae.  Neck:  Supple.  Lungs:  Normal respiratory effort.  Cardiovascular:  Capillary refill is less than 2 seconds.  Skin:  Intact without erythema.  Neurologic:  Sensation grossly intact to light touch.  Psychiatric:  Mood and affect are appropriate.    Data Review     I have personally reviewed pertinent films in PACS, and my interpretation follows:    X-rays taken 6/17/2024 of Right knee demonstrate tricompartmental osteoarthritis, most significant in the lateral compartment with joint space loss, subchondral sclerosis, and osteophytosis.    MRI of the right knee obtained 6/27/24 demonstrates complex tear lateral meniscus. Tricompartmental osteoarthritis.     Past Medical History:   Diagnosis Date    Multiple sclerosis " ([facility])        Past Surgical History:   Procedure Laterality Date     SECTION      FOOT SURGERY Right     KNEE SURGERY      R meniscus    TRACHEOSTOMY         Allergies   Allergen Reactions    Other Allergic Rhinitis     seasonal       Current Outpatient Medications on File Prior to Visit   Medication Sig Dispense Refill    albuterol (Ventolin HFA) 90 mcg/act inhaler Inhale 2 puffs every 6 (six) hours as needed for wheezing 18 g 1    Dalfampridine ER 10 MG TB12 Take 1 tablet (10 mg total) by mouth 2 (two) times a day 180 tablet 3    Fluticasone-Salmeterol (Advair Diskus) 100-50 mcg/dose inhaler Inhale 1 puff 2 (two) times a day Rinse mouth after use. 60 blister 3    IBUPROFEN PO Take by mouth      ipratropium-albuterol (DUO-NEB) 0.5-2.5 mg/3 mL nebulizer solution Take 3 mL by nebulization every 6 (six) hours as needed for wheezing or shortness of breath 90 mL 0    meloxicam (MOBIC) 15 mg tablet Take 1 tablet (15 mg total) by mouth daily 30 tablet 5    oxybutynin (DITROPAN-XL) 10 MG 24 hr tablet Take 1 tablet (10 mg total) by mouth daily at bedtime 90 tablet 3    sertraline (ZOLOFT) 100 mg tablet Take 1 tablet (100 mg total) by mouth daily 90 tablet 3    modafinil (PROVIGIL) 100 mg tablet Take 1 tablet by mouth once daily (Patient not taking: Reported on 2024) 30 tablet 0     No current facility-administered medications on file prior to visit.       Social History     Tobacco Use    Smoking status: Former     Passive exposure: Past    Smokeless tobacco: Former   Vaping Use    Vaping status: Former    Substances: THC, CBD   Substance Use Topics    Alcohol use: Not Currently    Drug use: Yes     Types: Marijuana       Family History   Problem Relation Age of Onset    Cancer Mother     Kidney disease Mother     Hypertension Mother     Heart disease Father          Procedures Performed     Scribe Attestation      I,:  Laila Azar am acting as a scribe while in the presence of the attending physician.:        I,:  Kartik Curiel, DO personally performed the services described in this documentation    as scribed in my presence.:

## 2024-07-16 ENCOUNTER — TELEPHONE (OUTPATIENT)
Age: 64
End: 2024-07-16

## 2024-07-16 NOTE — TELEPHONE ENCOUNTER
Caller: Patient  Doctor/office: Reginald/Rajni  #: [phone]       called to start auth/delivery for VISCO(Gel) injections.    Body Part: right knee-patient declined to have sx w/Dr Curiel   Pain Level (scale 1-10): 9/10   Date of last Gel Injection: na    Educated patient on Visco procedure. Auth team will review insurance and process the request appropriately. Patient will be contacted if the have any questions and when ready to schedule.

## 2024-07-17 DIAGNOSIS — M17.11 PRIMARY OSTEOARTHRITIS OF RIGHT KNEE: Primary | ICD-10-CM

## 2024-07-29 ENCOUNTER — TELEPHONE (OUTPATIENT)
Dept: OBGYN CLINIC | Facility: MEDICAL CENTER | Age: 64
End: 2024-07-29

## 2024-07-29 NOTE — TELEPHONE ENCOUNTER
Caller: Catina     Doctor: Dr Montelongo    Reason for call: The patient has a Visco Scheduled on 8/9. She would like to be sure that she will not have to pay anything.   Please call to advise. Thank you.    Pt will keep 8/9.       Call back#: [phone]

## 2024-07-30 ENCOUNTER — EVALUATION (OUTPATIENT)
Dept: PHYSICAL THERAPY | Facility: CLINIC | Age: 64
End: 2024-07-30
Payer: COMMERCIAL

## 2024-07-30 DIAGNOSIS — M17.11 PRIMARY OSTEOARTHRITIS OF RIGHT KNEE: ICD-10-CM

## 2024-07-30 DIAGNOSIS — M25.561 CHRONIC PAIN OF RIGHT KNEE: ICD-10-CM

## 2024-07-30 DIAGNOSIS — G89.29 CHRONIC PAIN OF RIGHT KNEE: ICD-10-CM

## 2024-07-30 PROCEDURE — 97161 PT EVAL LOW COMPLEX 20 MIN: CPT

## 2024-07-30 PROCEDURE — 97112 NEUROMUSCULAR REEDUCATION: CPT

## 2024-07-30 PROCEDURE — 97110 THERAPEUTIC EXERCISES: CPT

## 2024-07-30 NOTE — PROGRESS NOTES
PT Evaluation     Today's date: 2024  Patient name: Catina Hilario  : 1960  MRN: [id number]  Referring provider: Kartik Curiel DO  Dx:   Encounter Diagnosis     ICD-10-CM    1. Primary osteoarthritis of right knee  M17.11 Ambulatory Referral to Physical Therapy      2. Chronic pain of right knee  M25.561 Ambulatory Referral to Physical Therapy    G89.29           Start Time: 1110  Stop Time: 1200  Total time in clinic (min): 50 minutes    Assessment  Impairments: abnormal gait, abnormal or restricted ROM, activity intolerance, impaired balance, impaired physical strength, lacks appropriate home exercise program, pain with function, safety issue, participation limitations, activity limitations and endurance    Assessment details: Pt is a 63 y.o. female presenting to PT services with c/o  R knee pain with referring diagnosis of meniscus tear, OA, and popliteal cyst. PMH also includes multiple sclerosis which contributes to pt's frequent falls. Pt arrives ambulating with RW without AFO. PT educated pt on the importance of compliance with AFO in order to decrease fall risk. Pt has RLE weakness. Pt demonstrates R patellar hypomobility, and lacks R knee flexion. PT added quad sets, heel slides, and LAQ to pt's HEP, pt verbalized and demonstrated understanding of proper form. Pt is a good candidate for skilled physical therapy in order to improve RLE strength, decrease pain, improve quality of gait, reduce fall risk and increase functional ability.       Goals  STG (3 weeks):  1. Pt will improve R knee flexion AROM to be at least 115*  2. Pt will report pain at worst as 6/10 or less   3. Pt will improve R patella mobility to be WNL     LTG (6 weeks):  1. Pt will be independent in HEP  2. Pt will improve RLE strength to be at least 5/5   3. Pt will report pain at worst as 4/10 or less     Plan  Patient would benefit from: skilled physical therapy and home program  Planned modality interventions:  "biofeedback, cryotherapy, TENS, thermotherapy: hydrocollator packs, unattended electrical stimulation and neuromuscular electric stimulation    Planned therapy interventions: IASTM, joint mobilization, kinesiology taping, manual therapy, massage, abdominal trunk stabilization, balance, balance/weight bearing training, nerve gliding, neuromuscular re-education, patient/caregiver education, postural training, strengthening, stretching, home exercise program, gait training, functional ROM exercises and flexibility    Frequency: 1-2x week  Duration in weeks: 6  Plan of Care beginning date: 7/30/2024  Plan of Care expiration date: 9/13/2024  Treatment plan discussed with: patient  Plan details: High co-pay         Subjective Evaluation    History of Present Illness  Mechanism of injury: Pt reports that her R knee started hurting about 3 weeks ago or so. She states that she is using her scooter a lot because of the pain. She states that standing and walking gets very painful. She states that she also has a lot of balance issues, she was being seen in neuro PT but stopped because she didn't feel like she was able to give it her all. She states she falls roughly 2x/week. She states that her MS does impact her balance. She states that 2 weeks ago she was able to walk perfectly, get off of the couch easily, and even jumped with two feet off the ground but then the next day she couldn't walk or anything.   Patient Goals  Patient goals for therapy: decreased edema, decreased pain, improved balance, increased motion, increased strength and return to sport/leisure activities  Patient goal: \"to be able to play with my grandson, push him on the swing, run around with him as much as I'm able, and be able to walk. To be able to ride horses again. To go to [employer], and to be able to do normal things.\"  Pain  Pain scale: 2.5.  Pain scale at lowest: 2.5.  At worst pain rating: 10  Location: R peripatella, R lateral joint line of " "knee  Quality: tearing, constant, ripping.  Alleviating factors: laying down, after exercises.  Aggravating factors: walking and standing (exercises while doing it)    Social Support  Steps to enter house: yes (3 steps, 1 hand rail)  Stairs in house: yes (1 flight to basement, does not use)   Lives in: multiple-level home  Lives with: spouse (3 dog, 1 cat)    Employment status: not working  Hand dominance: ambidextrous      Diagnostic Tests    FCE comments: Complex tear of the body of the lateral meniscus with radial component and extrusion of the body. Tricompartmental chondrosis, worst in the lateral compartment. Large multiloculated popliteal cyst. Intact medial meniscus. Intact cruciate and collateral ligaments.        Objective     Active Range of Motion   Left Knee   Flexion: 130 degrees   Extension: 0 degrees     Right Knee   Flexion: 104 degrees with pain  Extension: 0 degrees     Strength/Myotome Testing     Left Hip   Planes of Motion   Flexion: 4    Right Hip   Planes of Motion   Flexion: 3+    Left Knee   Flexion: 4  Extension: 5    Right Knee   Flexion: 4-  Extension: 4+             Precautions: MS, GERD, asthma, R hemiparesis with R foot drop and AFO, a/d   Access Code: [email or web address]    POC expires Unit limit Auth Expiration date PT/OT/ST + Visit Limit?   9/13/24 3 12/31/24 BOMN                           Visit/Unit Tracking  AUTH Status:  Date 7/30              Not required  Used 1               Remaining                      Date 7/30            Re-Eval             FOTO             Manuals             Patella mobs SY                                                    Neuro Re-Ed             Quad set 5\"x20            Hamstring set             Glute set                                                                  Ther Ex             Bike             Heel slides 10\"x10            LAQ 2x10            SAQ             S/l hip abduction             Hamstring curl                                                "      Ther Activity                                       Gait Training                                       Modalities

## 2024-08-02 ENCOUNTER — TELEPHONE (OUTPATIENT)
Age: 64
End: 2024-08-02

## 2024-08-02 DIAGNOSIS — R26.2 AMBULATORY DYSFUNCTION: ICD-10-CM

## 2024-08-02 DIAGNOSIS — G35 MS (MULTIPLE SCLEROSIS) (HCC): Primary | ICD-10-CM

## 2024-08-02 NOTE — TELEPHONE ENCOUNTER
Reason for call:   [] Refill   [] Prior Auth  [x] Other: Pt called she is requesting a 4 waite scooter rx . Please call pt at [phone]    Office:   [] PCP/Provider -   [x] Specialty/Provider - Kris Cornell MD  Neurology

## 2024-08-02 NOTE — TELEPHONE ENCOUNTER
1. MS (multiple sclerosis) (HCC)    - Durable Medical Equipment    2. Ambulatory dysfunction    - Durable Medical Equipment        Kris Cornell MD.   Staff Neurologist  08/02/24   11:22 AM

## 2024-08-06 ENCOUNTER — OFFICE VISIT (OUTPATIENT)
Dept: PHYSICAL THERAPY | Facility: CLINIC | Age: 64
End: 2024-08-06
Payer: COMMERCIAL

## 2024-08-06 DIAGNOSIS — M17.11 PRIMARY OSTEOARTHRITIS OF RIGHT KNEE: Primary | ICD-10-CM

## 2024-08-06 DIAGNOSIS — M25.561 CHRONIC PAIN OF RIGHT KNEE: ICD-10-CM

## 2024-08-06 DIAGNOSIS — G89.29 CHRONIC PAIN OF RIGHT KNEE: ICD-10-CM

## 2024-08-06 PROCEDURE — 97112 NEUROMUSCULAR REEDUCATION: CPT

## 2024-08-06 PROCEDURE — 97110 THERAPEUTIC EXERCISES: CPT

## 2024-08-06 PROCEDURE — 97140 MANUAL THERAPY 1/> REGIONS: CPT

## 2024-08-06 NOTE — PROGRESS NOTES
"Daily Note     Today's date: 2024  Patient name: Catina Hilario  : 1960  MRN: [id number]  Referring provider: Kartik Curiel DO  Dx:   Encounter Diagnosis     ICD-10-CM    1. Primary osteoarthritis of right knee  M17.11       2. Chronic pain of right knee  M25.561     G89.29                      Subjective: Pt reports her knee is still painful.  Did not bring her AFO, she is waiting for new shoes.      Objective: See treatment diary below      Assessment: Decreased patellar mobility present.  Patient with improvement in AROM following patella PROM (99*-110*).  She has difficulty maintaining appropriate hip position to perform heel slides.  Requires therapist assist to perform sidelying hip abd.  Improvement in knee flexion with swing phase of gait post session.      Plan: Continue per plan of care.      Precautions: MS, GERD, asthma, R hemiparesis with R foot drop and AFO, a/d   Access Code: [email or web address]    POC expires Unit limit Auth Expiration date PT/OT/ST + Visit Limit?   24 3 24 BOMN                           Visit/Unit Tracking  AUTH Status:  Date              Not required  Used 1 2              Remaining                      Date            Re-Eval             FOTO             Manuals             Patella mobs SY  AF           Tibial distraction  AF                                     Neuro Re-Ed             Quad set 5\"x20            Hamstring set             Glute set              Bridge c hip add  Bball 2x10           Hip abd iso  10\"x10 sit                                     Ther Ex             Bike             Heel slides 10\"x10 x20           LAQ 2x10 3x10           SAQ  3x10           SLR  2x8           S/l hip abduction  AA 2x8           Hamstring curl                                                     Ther Activity                                       Gait Training                                       Modalities                                            "

## 2024-08-09 ENCOUNTER — PROCEDURE VISIT (OUTPATIENT)
Dept: OBGYN CLINIC | Facility: CLINIC | Age: 64
End: 2024-08-09
Payer: COMMERCIAL

## 2024-08-09 VITALS
SYSTOLIC BLOOD PRESSURE: 91 MMHG | HEART RATE: 77 BPM | DIASTOLIC BLOOD PRESSURE: 56 MMHG | HEIGHT: 63 IN | BODY MASS INDEX: 33.3 KG/M2

## 2024-08-09 DIAGNOSIS — M17.11 PRIMARY OSTEOARTHRITIS OF RIGHT KNEE: Primary | ICD-10-CM

## 2024-08-09 PROCEDURE — 20610 DRAIN/INJ JOINT/BURSA W/O US: CPT | Performed by: FAMILY MEDICINE

## 2024-08-09 RX ORDER — BUPIVACAINE HYDROCHLORIDE 2.5 MG/ML
1 INJECTION, SOLUTION INFILTRATION; PERINEURAL
Status: COMPLETED | OUTPATIENT
Start: 2024-08-09 | End: 2024-08-09

## 2024-08-09 RX ORDER — BUPIVACAINE HYDROCHLORIDE 2.5 MG/ML
2 INJECTION, SOLUTION INFILTRATION; PERINEURAL
Status: COMPLETED | OUTPATIENT
Start: 2024-08-09 | End: 2024-08-09

## 2024-08-09 RX ADMIN — BUPIVACAINE HYDROCHLORIDE 2 ML: 2.5 INJECTION, SOLUTION INFILTRATION; PERINEURAL at 13:00

## 2024-08-09 RX ADMIN — BUPIVACAINE HYDROCHLORIDE 1 ML: 2.5 INJECTION, SOLUTION INFILTRATION; PERINEURAL at 13:00

## 2024-08-09 NOTE — PROGRESS NOTES
Assessment/Plan:  Assessment & Plan   Diagnoses and all orders for this visit:    Primary osteoarthritis of right knee  -     Large joint arthrocentesis: R knee        63-year-old female with osteoarthritis of the right knee with right knee pain 4 years duration.  Clinical impression is that she has symptoms from degenerative changes.  She agreed to proceed with Visco injection.  I administered Synvisc 1 to the right knee without complication.  She was advised to allow 3 to 4 weeks before she may notice full effects of visco injection.  If visco injection is to be repeated we must wait at least 6 months.  She will follow-up needed.        Subjective:   Patient ID: Catina Hilario is a 63 y.o. female.  Chief Complaint   Patient presents with    Right Knee - Follow-up     Visco Injection        63-year-old female with osteoarthritis of the right knee following for right knee pain more than 4 years duration.  She was last seen by me nearly 7 weeks ago at which point she was referred for MRI and has been referred to orthopedic surgeon.  She was seen by Dr. Curiel who advised to continue strengthening lower extremities prior to exploring surgery.  We requested for visco injection.  She presents today for Visco injection of right knee.  She has pain described as generalized to the knee but worse to the lateral aspect, nonradiating, worse with bearing weight and ambulating, rated as 8 out of 10, associated with swelling, and improved with resting.    Knee Pain  This is a chronic problem. The current episode started more than 1 year ago. The problem occurs daily. The problem has been gradually worsening. Associated symptoms include arthralgias, joint swelling and weakness. Pertinent negatives include no numbness. The symptoms are aggravated by standing and walking. She has tried rest and position changes for the symptoms. The treatment provided mild relief.               Review of Systems   Musculoskeletal:  Positive  "for arthralgias and joint swelling.   Neurological:  Positive for weakness. Negative for numbness.       Objective:  Vitals:    08/09/24 1302   BP: 91/56   Pulse: 77   Height: 5' 3\" (1.6 m)      Ortho Exam    Physical Exam  Vitals and nursing note reviewed.   Constitutional:       Appearance: Normal appearance. She is well-developed. She is not ill-appearing or diaphoretic.   HENT:      Head: Normocephalic and atraumatic.      Right Ear: External ear normal.      Left Ear: External ear normal.   Eyes:      Conjunctiva/sclera: Conjunctivae normal.   Neck:      Trachea: No tracheal deviation.   Cardiovascular:      Rate and Rhythm: Normal rate.   Pulmonary:      Effort: Pulmonary effort is normal. No respiratory distress.   Abdominal:      General: There is no distension.   Skin:     General: Skin is warm and dry.      Coloration: Skin is not jaundiced or pale.   Neurological:      Mental Status: She is alert and oriented to person, place, and time.   Psychiatric:         Mood and Affect: Mood normal.         Behavior: Behavior normal.         Thought Content: Thought content normal.         Judgment: Judgment normal.             Large joint arthrocentesis: R knee  Universal Protocol:  Consent: Verbal consent obtained.  Risks and benefits: risks, benefits and alternatives were discussed  Consent given by: patient  Time out: Immediately prior to procedure a \"time out\" was called to verify the correct patient, procedure, equipment, support staff and site/side marked as required.  Patient understanding: patient states understanding of the procedure being performed  Patient consent: the patient's understanding of the procedure matches consent given  Procedure consent: procedure consent matches procedure scheduled  Relevant documents: relevant documents present and verified  Test results: test results available and properly labeled  Site marked: the operative site was marked  Radiology Images displayed and confirmed. If " "images not available, report reviewed: imaging studies available  Patient identity confirmed: verbally with patient  Supporting Documentation  Indications: pain   Procedure Details  Location: knee - R knee  Preparation: Patient was prepped and draped in the usual sterile fashion  Needle gauge: 21G 2\"  Ultrasound guidance: no  Approach: anterolateral  Medications administered: 2 mL bupivacaine 0.25 %; 1 mL bupivacaine 0.25 %; 48 mg hylan 48 MG/6ML    Patient tolerance: patient tolerated the procedure well with no immediate complications  Dressing:  Sterile dressing applied              "

## 2024-08-12 ENCOUNTER — OFFICE VISIT (OUTPATIENT)
Dept: PHYSICAL THERAPY | Facility: CLINIC | Age: 64
End: 2024-08-12
Payer: COMMERCIAL

## 2024-08-12 DIAGNOSIS — G89.29 CHRONIC PAIN OF RIGHT KNEE: ICD-10-CM

## 2024-08-12 DIAGNOSIS — M17.11 PRIMARY OSTEOARTHRITIS OF RIGHT KNEE: Primary | ICD-10-CM

## 2024-08-12 DIAGNOSIS — M25.561 CHRONIC PAIN OF RIGHT KNEE: ICD-10-CM

## 2024-08-12 PROCEDURE — 97110 THERAPEUTIC EXERCISES: CPT

## 2024-08-12 PROCEDURE — 97140 MANUAL THERAPY 1/> REGIONS: CPT

## 2024-08-12 PROCEDURE — 97112 NEUROMUSCULAR REEDUCATION: CPT

## 2024-08-12 NOTE — PROGRESS NOTES
"Daily Note     Today's date: 2024  Patient name: Catina Hilario  : 1960  MRN: [id number]  Referring provider: Kartik Curiel DO  Dx:   Encounter Diagnosis     ICD-10-CM    1. Primary osteoarthritis of right knee  M17.11       2. Chronic pain of right knee  M25.561     G89.29                      Subjective: Patient reports having her injection on Friday, she is having a little less pain. Presents with Ruben keane on R LE.       Objective: See treatment diary below      Assessment: Cont with outlined POC with good tolerance. She does exhibit some toe drag even in AFO and will occasionally self correct.  She exhibits improved form with heel slides and required less correction to avoid ER/IR. She was able to progress in reps with SLR although does utilize small ROM. PTA assist with SL abd to avoid hip flexor compensation. Did very well with the addition of STS for functional strengthening, was able to ride using no hands with 2 foam. Patient demonstrated fatigue post treatment and would benefit from continued PT      Plan: Progress treatment as tolerated.       Precautions: MS, GERD, asthma, R hemiparesis with R foot drop and AFO, a/d   Access Code: [email or web address]    POC expires Unit limit Auth Expiration date PT/OT/ST + Visit Limit?   24 3 24 BOMN                           Visit/Unit Tracking  AUTH Status:  Date              Not required  Used 1 2              Remaining                      Date           Re-Eval             FOTO             Manuals             Patella mobs SY  AF CARLY          Tibial distraction  AF CARLY                                    Neuro Re-Ed             Quad set 5\"x20  HEP          Hamstring set             Glute set    HEP          Bridge c hip add  Bball 2x10 Bolster 2x10          Hip abd iso  10\"x10 sit 10\"x10 sit                                    Ther Ex             Bike             Heel slides 10\"x10 x20 X20           LAQ 2x10 3x10 3x10      "      SAQ  3x10 3x10          SLR  2x8 2x15          S/l hip abduction  AA 2x8 AA 2x10          Hamstring curl                                                     Ther Activity             STS   2 foam   1x10  CGA                       Gait Training                                       Modalities

## 2024-08-15 DIAGNOSIS — G35 MS (MULTIPLE SCLEROSIS) (HCC): ICD-10-CM

## 2024-08-16 ENCOUNTER — OFFICE VISIT (OUTPATIENT)
Dept: PHYSICAL THERAPY | Facility: CLINIC | Age: 64
End: 2024-08-16
Payer: COMMERCIAL

## 2024-08-16 DIAGNOSIS — G89.29 CHRONIC PAIN OF RIGHT KNEE: ICD-10-CM

## 2024-08-16 DIAGNOSIS — M17.11 PRIMARY OSTEOARTHRITIS OF RIGHT KNEE: Primary | ICD-10-CM

## 2024-08-16 DIAGNOSIS — M25.561 CHRONIC PAIN OF RIGHT KNEE: ICD-10-CM

## 2024-08-16 PROCEDURE — 97140 MANUAL THERAPY 1/> REGIONS: CPT

## 2024-08-16 PROCEDURE — 97110 THERAPEUTIC EXERCISES: CPT

## 2024-08-16 PROCEDURE — 97112 NEUROMUSCULAR REEDUCATION: CPT

## 2024-08-16 RX ORDER — OXYBUTYNIN CHLORIDE 10 MG/1
10 TABLET, EXTENDED RELEASE ORAL
Qty: 90 TABLET | Refills: 1 | Status: SHIPPED | OUTPATIENT
Start: 2024-08-16

## 2024-08-16 NOTE — TELEPHONE ENCOUNTER
Patient called requesting refill for oxybutynin. Patient made aware medication was refilled on 8/16 for 90 with 1 refills to [facility] pharmacy. Patient instructed to contact the pharmacy to obtain refills of medication. Patient verbalized understanding.

## 2024-08-16 NOTE — PROGRESS NOTES
"Daily Note     Today's date: 2024  Patient name: Catina Hilario  : 1960  MRN: [id number]  Referring provider: Kartik Curiel DO  Dx:   Encounter Diagnosis     ICD-10-CM    1. Primary osteoarthritis of right knee  M17.11       2. Chronic pain of right knee  M25.561     G89.29                      Subjective: Catina states her knee is sore today.       Objective: See treatment diary below    AROM flexion 130*      Assessment: Patient did well with outlined program. She does require cuing with ambulation to lift R LE in order to prevent toe drag even with AFO. Her knee flexion has improved to functional limit and within range of opp LE. She does have pain at end range. She is able to perform heel slides actively but lacks control after a few reps and her LE/foot externally rotates as she fatigues. Added supine clamshells today with TB to progress hip strengthening, the band itself gave her some discomfort but one removed that was gone. She has difficulty with SL abd and exhibits hip flexor compensation with assistance. She did well with an ecc focus and cues to \"do not let your leg drop\" as there is PTA assistance for hold and lower. Her muscle activation also got stronger as she progressed in reps. Did well with lower height with STS today, she did utlize momentum at times and CGA for safety and to lower controlled.  Patient demonstrated fatigue post treatment and would benefit from continued PT to improve functional mobility.       Plan: Progress note during next visit.      Precautions: MS, GERD, asthma, R hemiparesis with R foot drop and AFO, a/d   Access Code: [email or web address]    POC expires Unit limit Auth Expiration date PT/OT/ST + Visit Limit?   24 3 24 BOMN                           Visit/Unit Tracking  AUTH Status:  Date            Not required  Used 1 2 3 4            Remaining      FOTO                Date          Re-Eval             FOTO         " "    Manuals             Patella mobs SY  AF CARLY CARLY         Tibial distraction  AF CARLY                                    Neuro Re-Ed             Quad set 5\"x20  HEP          Hamstring set             Glute set    HEP          Bridge c hip add  Bball 2x10 Bolster 2x10 Bolster 2x10 3\"          Hip abd iso  10\"x10 sit 10\"x10 sit Supine unilat  YTB                                   Ther Ex             Bike             Heel slides 10\"x10 x20 X20  5\"x20 x/ strap  X10  AROM         LAQ 2x10 3x10 3x10           SAQ  3x10 3x10 3x10         SLR  2x8 2x15 2x10         S/l hip abduction  AA 2x8 AA 2x10 AA  4x5         Hamstring curl                                                     Ther Activity             STS   2 foam   1x10  CGA 1 foam 2x5  No foam 2x                      Gait Training                                       Modalities                                                "

## 2024-08-20 ENCOUNTER — OFFICE VISIT (OUTPATIENT)
Dept: FAMILY MEDICINE CLINIC | Facility: CLINIC | Age: 64
End: 2024-08-20
Payer: COMMERCIAL

## 2024-08-20 VITALS
HEIGHT: 63 IN | WEIGHT: 185 LBS | OXYGEN SATURATION: 93 % | HEART RATE: 95 BPM | BODY MASS INDEX: 32.78 KG/M2 | TEMPERATURE: 98.9 F | SYSTOLIC BLOOD PRESSURE: 122 MMHG | DIASTOLIC BLOOD PRESSURE: 70 MMHG

## 2024-08-20 DIAGNOSIS — J41.1 MUCOPURULENT CHRONIC BRONCHITIS (HCC): Primary | ICD-10-CM

## 2024-08-20 DIAGNOSIS — F33.9 RECURRENT MAJOR DEPRESSIVE DISORDER, REMISSION STATUS UNSPECIFIED (HCC): ICD-10-CM

## 2024-08-20 DIAGNOSIS — R06.02 SOB (SHORTNESS OF BREATH): ICD-10-CM

## 2024-08-20 DIAGNOSIS — J45.20 MILD INTERMITTENT ASTHMA, UNSPECIFIED WHETHER COMPLICATED: ICD-10-CM

## 2024-08-20 PROBLEM — F41.9 ANXIETY: Status: RESOLVED | Noted: 2021-05-05 | Resolved: 2024-08-20

## 2024-08-20 PROBLEM — F32.A DEPRESSION: Status: RESOLVED | Noted: 2021-05-05 | Resolved: 2024-08-20

## 2024-08-20 PROBLEM — M85.80 OSTEOPENIA: Status: ACTIVE | Noted: 2018-03-26

## 2024-08-20 PROCEDURE — 99214 OFFICE O/P EST MOD 30 MIN: CPT | Performed by: NURSE PRACTITIONER

## 2024-08-20 PROCEDURE — G2211 COMPLEX E/M VISIT ADD ON: HCPCS | Performed by: NURSE PRACTITIONER

## 2024-08-20 RX ORDER — CIPROFLOXACIN 500 MG/1
500 TABLET, FILM COATED ORAL EVERY 12 HOURS SCHEDULED
Qty: 20 TABLET | Refills: 0 | Status: SHIPPED | OUTPATIENT
Start: 2024-08-20 | End: 2024-08-30

## 2024-08-20 RX ORDER — LEVALBUTEROL INHALATION SOLUTION 1.25 MG/3ML
1.25 SOLUTION RESPIRATORY (INHALATION) 3 TIMES DAILY
Qty: 90 ML | Refills: 0 | Status: SHIPPED | OUTPATIENT
Start: 2024-08-20 | End: 2024-08-30

## 2024-08-20 RX ORDER — FLUTICASONE PROPIONATE AND SALMETEROL 100; 50 UG/1; UG/1
1 POWDER RESPIRATORY (INHALATION) 2 TIMES DAILY
Qty: 60 BLISTER | Refills: 3 | Status: SHIPPED | OUTPATIENT
Start: 2024-08-20

## 2024-08-20 RX ORDER — METHYLPREDNISOLONE 4 MG
TABLET, DOSE PACK ORAL
Qty: 21 EACH | Refills: 0 | Status: SHIPPED | OUTPATIENT
Start: 2024-08-20

## 2024-08-20 RX ORDER — ALBUTEROL SULFATE 90 UG/1
2 AEROSOL, METERED RESPIRATORY (INHALATION) EVERY 6 HOURS PRN
Qty: 18 G | Refills: 1 | Status: SHIPPED | OUTPATIENT
Start: 2024-08-20

## 2024-08-20 RX ORDER — LEVALBUTEROL INHALATION SOLUTION 1.25 MG/3ML
1.25 SOLUTION RESPIRATORY (INHALATION) 3 TIMES DAILY
Qty: 90 ML | Refills: 0 | Status: SHIPPED | OUTPATIENT
Start: 2024-08-20 | End: 2024-08-20 | Stop reason: SDUPTHER

## 2024-08-20 NOTE — PROGRESS NOTES
Ambulatory Visit- pt of Dr Schmidt   Name: Catina Andreablay      : 1960      MRN: [id number]  Encounter Provider: TWIN Lopez  Encounter Date: 2024   Encounter department: [facility]    Pt is a 63 yr old female   Presents in office for cough congestion and sinus pressure with underlying history of bronchitis   Discussed treatment options as listed below   Assessment & Plan   1. Mucopurulent chronic bronchitis (HCC)  -     methylPREDNISolone 4 MG tablet therapy pack; Use as directed on package  -     ciprofloxacin (CIPRO) 500 mg tablet; Take 1 tablet (500 mg total) by mouth every 12 (twelve) hours for 10 days  -     levalbuterol (Xopenex) 1.25 mg/3 mL nebulizer solution; Take 3 mL (1.25 mg total) by nebulization 3 (three) times a day for 10 days  2. SOB (shortness of breath)  -     Fluticasone-Salmeterol (Advair Diskus) 100-50 mcg/dose inhaler; Inhale 1 puff 2 (two) times a day Rinse mouth after use.  3. Mild intermittent asthma, unspecified whether complicated  -     albuterol (Ventolin HFA) 90 mcg/act inhaler; Inhale 2 puffs every 6 (six) hours as needed for wheezing  -     levalbuterol (Xopenex) 1.25 mg/3 mL nebulizer solution; Take 3 mL (1.25 mg total) by nebulization 3 (three) times a day for 10 days  4. Recurrent major depressive disorder, remission status unspecified ([facility])       History of Present Illness       Pt is a 63 yr old female   Presents in office for cough congestion and sinus pressure with underlying history of bronchitis         Review of Systems   Constitutional:  Positive for chills and fatigue. Negative for fever and unexpected weight change.   HENT:  Positive for congestion, postnasal drip and sinus pressure.    Respiratory:  Positive for cough.    Cardiovascular:  Negative for chest pain and palpitations.   Gastrointestinal:  Negative for abdominal distention and vomiting.   Endocrine: Negative.    Genitourinary:  Negative for difficulty  urinating and flank pain.   Musculoskeletal:  Positive for arthralgias and myalgias.   Skin:  Negative for rash.   Allergic/Immunologic: Positive for environmental allergies.   Neurological:  Positive for headaches.     Pertinent Medical History   Reviewed       Medical History Reviewed by provider this encounter:  Tobacco  Allergies  Meds  Problems  Med Hx  Surg Hx  Fam Hx       Past Medical History   Past Medical History:   Diagnosis Date    Multiple sclerosis (HCC)      Past Surgical History:   Procedure Laterality Date     SECTION      FOOT SURGERY Right     KNEE SURGERY      R meniscus    TRACHEOSTOMY       Family History   Problem Relation Age of Onset    Cancer Mother     Kidney disease Mother     Hypertension Mother     Heart disease Father      Current Outpatient Medications on File Prior to Visit   Medication Sig Dispense Refill    Dalfampridine ER 10 MG TB12 Take 1 tablet (10 mg total) by mouth 2 (two) times a day 180 tablet 3    IBUPROFEN PO Take by mouth      ipratropium-albuterol (DUO-NEB) 0.5-2.5 mg/3 mL nebulizer solution Take 3 mL by nebulization every 6 (six) hours as needed for wheezing or shortness of breath 90 mL 0    meloxicam (MOBIC) 15 mg tablet Take 1 tablet (15 mg total) by mouth daily 30 tablet 5    modafinil (PROVIGIL) 100 mg tablet Take 1 tablet by mouth once daily 30 tablet 0    oxybutynin (DITROPAN-XL) 10 MG 24 hr tablet Take 1 tablet (10 mg total) by mouth daily at bedtime 90 tablet 1    sertraline (ZOLOFT) 100 mg tablet Take 1 tablet (100 mg total) by mouth daily 90 tablet 3     No current facility-administered medications on file prior to visit.     Allergies   Allergen Reactions    Other Allergic Rhinitis     seasonal      Current Outpatient Medications on File Prior to Visit   Medication Sig Dispense Refill    Dalfampridine ER 10 MG TB12 Take 1 tablet (10 mg total) by mouth 2 (two) times a day 180 tablet 3    IBUPROFEN PO Take by mouth      ipratropium-albuterol  "(DUO-NEB) 0.5-2.5 mg/3 mL nebulizer solution Take 3 mL by nebulization every 6 (six) hours as needed for wheezing or shortness of breath 90 mL 0    meloxicam (MOBIC) 15 mg tablet Take 1 tablet (15 mg total) by mouth daily 30 tablet 5    modafinil (PROVIGIL) 100 mg tablet Take 1 tablet by mouth once daily 30 tablet 0    oxybutynin (DITROPAN-XL) 10 MG 24 hr tablet Take 1 tablet (10 mg total) by mouth daily at bedtime 90 tablet 1    sertraline (ZOLOFT) 100 mg tablet Take 1 tablet (100 mg total) by mouth daily 90 tablet 3     No current facility-administered medications on file prior to visit.      Social History     Tobacco Use    Smoking status: Former     Passive exposure: Past    Smokeless tobacco: Former   Vaping Use    Vaping status: Former    Substances: THC, CBD   Substance and Sexual Activity    Alcohol use: Not Currently    Drug use: Yes     Types: Marijuana    Sexual activity: Yes     Partners: Male     Objective     /70 (BP Location: Left arm, Patient Position: Sitting, Cuff Size: Large)   Pulse 95   Temp 98.9 °F (37.2 °C)   Ht 5' 3\" (1.6 m)   Wt 83.9 kg (185 lb)   SpO2 93%   BMI 32.77 kg/m²     Physical Exam  Vitals and nursing note reviewed.   Constitutional:       Appearance: Normal appearance.   HENT:      Head: Atraumatic.      Nose: Congestion and rhinorrhea present.   Cardiovascular:      Rate and Rhythm: Normal rate and regular rhythm.      Pulses: Normal pulses.      Heart sounds: Normal heart sounds.   Pulmonary:      Breath sounds: Wheezing and rhonchi present.   Abdominal:      Palpations: Abdomen is soft.   Musculoskeletal:      Right lower leg: No edema.      Left lower leg: No edema.   Skin:     General: Skin is warm.   Neurological:      Mental Status: She is alert and oriented to person, place, and time.   Psychiatric:         Mood and Affect: Mood normal.         Behavior: Behavior normal.       Administrative Statements   I have spent a total time of 35  minutes in caring for " this patient on the day of the visit/encounter including Risks and benefits of tx options, Instructions for management, Patient and family education, Importance of tx compliance, Risk factor reductions, Impressions, Counseling / Coordination of care, Documenting in the medical record, Reviewing / ordering tests, medicine, procedures  , and Obtaining or reviewing history  .

## 2024-08-26 ENCOUNTER — OFFICE VISIT (OUTPATIENT)
Dept: PHYSICAL THERAPY | Facility: CLINIC | Age: 64
End: 2024-08-26
Payer: COMMERCIAL

## 2024-08-26 DIAGNOSIS — M17.11 PRIMARY OSTEOARTHRITIS OF RIGHT KNEE: Primary | ICD-10-CM

## 2024-08-26 DIAGNOSIS — G89.29 CHRONIC PAIN OF RIGHT KNEE: ICD-10-CM

## 2024-08-26 DIAGNOSIS — M25.561 CHRONIC PAIN OF RIGHT KNEE: ICD-10-CM

## 2024-08-26 PROCEDURE — 97110 THERAPEUTIC EXERCISES: CPT

## 2024-08-26 PROCEDURE — 97112 NEUROMUSCULAR REEDUCATION: CPT

## 2024-08-26 PROCEDURE — 97530 THERAPEUTIC ACTIVITIES: CPT

## 2024-08-26 NOTE — PROGRESS NOTES
"Daily Note     Today's date: 2024  Patient name: Catina Hilario  : 1960  MRN: [id number]  Referring provider: Kartik Curiel DO  Dx:   Encounter Diagnosis     ICD-10-CM    1. Primary osteoarthritis of right knee  M17.11       2. Chronic pain of right knee  M25.561     G89.29           Start Time: 1145  Stop Time: 1230  Total time in clinic (min): 45 minutes    Subjective: Pt reports that she is feeling stronger and has been doing her HEP daily. Pt states that she is wearing a soft wrap ankle brace and finds that it works better than her AFO because it can fit with more shoes.       Objective: See treatment diary below    AROM flexion 125*      Assessment: Pt demonstrates improved form with STS, but does lack eccentric control and requires therapist close supervision and occasional CGA to maintain balance. Pt is able to tolerate progressed resistance without increase in pain or significant fatigue, will monitor prolonged response NV. Pt requires cueing to perform hip flexion while ambulating to decrease toe drag. PT educated pt on the benefit of AFO to decrease foot drop while walking vs soft brace. Will continue to benefit from skilled physical therapy.      Plan: Progress note during next visit.      Precautions: MS, GERD, asthma, R hemiparesis with R foot drop and AFO, a/d   Access Code: [email or web address]    POC expires Unit limit Auth Expiration date PT/OT/ST + Visit Limit?   24 3 24 BOMN                           Visit/Unit Tracking  AUTH Status:  Date 7/30 8/6 8/12 8/16 8/26          Not required  Used 1 2 3 4 5           Remaining      FOTO NV               Date         Re-Eval             FOTO             Manuals             Patella mobs SY  AF CARLY CARLY SY        Tibial distraction  AF CARLY                                    Neuro Re-Ed             Quad set 5\"x20  HEP          Hamstring set             Glute set    HEP          Bridge c hip add  Bball 2x10 Bolster " "2x10 Bolster 2x10 3\"  Bolster 2x8 3\"        Hip abd iso  10\"x10 sit 10\"x10 sit Supine unilat  YTB                                   Ther Ex             Bike             Heel slides 10\"x10 x20 X20  5\"x20 x/ strap  X10  AROM X10 AROM        LAQ 2x10 3x10 3x10   1.5# 2x15        SAQ  3x10 3x10 3x10 3x10        SLR  2x8 2x15 2x10         S/l hip abduction  AA 2x8 AA 2x10 AA  4x5 Sit RTB 2x10        Hamstring curl      Sit RTB 2x10        Hip flexion     Sit 1.5# 2x10                                  Ther Activity             STS   2 foam   1x10  CGA 1 foam 2x5  No foam 2x 1 foam x10   No foam x10                     Gait Training                                       Modalities                                                "

## 2024-09-03 ENCOUNTER — APPOINTMENT (OUTPATIENT)
Dept: PHYSICAL THERAPY | Facility: CLINIC | Age: 64
End: 2024-09-03
Payer: COMMERCIAL

## 2024-09-04 ENCOUNTER — OFFICE VISIT (OUTPATIENT)
Dept: PHYSICAL THERAPY | Facility: CLINIC | Age: 64
End: 2024-09-04
Payer: COMMERCIAL

## 2024-09-04 DIAGNOSIS — G89.29 CHRONIC PAIN OF RIGHT KNEE: ICD-10-CM

## 2024-09-04 DIAGNOSIS — M25.561 CHRONIC PAIN OF RIGHT KNEE: ICD-10-CM

## 2024-09-04 DIAGNOSIS — M17.11 PRIMARY OSTEOARTHRITIS OF RIGHT KNEE: Primary | ICD-10-CM

## 2024-09-04 PROCEDURE — 97530 THERAPEUTIC ACTIVITIES: CPT

## 2024-09-04 PROCEDURE — 97110 THERAPEUTIC EXERCISES: CPT

## 2024-09-04 PROCEDURE — 97112 NEUROMUSCULAR REEDUCATION: CPT

## 2024-09-04 NOTE — PROGRESS NOTES
"Daily Note     Today's date: 2024  Patient name: Catina Hilario  : 1960  MRN: [id number]  Referring provider: Kartik Curiel DO  Dx:   Encounter Diagnosis     ICD-10-CM    1. Primary osteoarthritis of right knee  M17.11       2. Chronic pain of right knee  M25.561     G89.29           Start Time: 1100  Stop Time: 1145  Total time in clinic (min): 45 minutes    Subjective: Pt reports that she is feeling stronger, but her knee pain remains and she feels it is very limiting.       Objective: See treatment diary below          Assessment: Pt continues to demonstrates strength improvements. Continues to require CGA for STS and continues with lack of eccentric control with sitting. Will continue to benefit from skilled physical therapy.      Plan: Progress note during next visit.      Precautions: MS, GERD, asthma, R hemiparesis with R foot drop and AFO, a/d   Access Code: [email or web address]    POC expires Unit limit Auth Expiration date PT/OT/ST + Visit Limit?   24 3 24 BOMN                           Visit/Unit Tracking  AUTH Status:  Date          Not required  Used 1 2 3 4 5 6          Remaining      FOTO NV               Date        Re-Eval             FOTO      NV       Manuals             Patella mobs SY  AF CARLY CARLY SY        Tibial distraction  AF CARLY                                    Neuro Re-Ed             Quad set 5\"x20  HEP          Hamstring set             Glute set    HEP          Bridge c hip add  Bball 2x10 Bolster 2x10 Bolster 2x10 3\"  Bolster 2x8 3\" Bolster 2x10 3\"       Hip abd iso  10\"x10 sit 10\"x10 sit Supine unilat  YTB                                   Ther Ex             Bike             Heel slides 10\"x10 x20 X20  5\"x20 x/ strap  X10  AROM X10 AROM        LAQ 2x10 3x10 3x10   1.5# 2x15 2# 2x15       SAQ  3x10 3x10 3x10 3x10        SLR  2x8 2x15 2x10         S/l hip abduction  AA 2x8 AA 2x10 AA  4x5 Sit RTB 2x10 Sit RTB 3x8   "     Hamstring curl      Sit RTB 2x10 Sit RTB 3x10       Hip flexion     Sit 1.5# 2x10 Sit RTB 2x10                                 Ther Activity             STS   2 foam   1x10  CGA 1 foam 2x5  No foam 2x 1 foam x10   No foam x10 2x5 no foam                    Gait Training                                       Modalities

## 2024-09-10 ENCOUNTER — APPOINTMENT (OUTPATIENT)
Dept: PHYSICAL THERAPY | Facility: CLINIC | Age: 64
End: 2024-09-10
Payer: COMMERCIAL

## 2024-09-11 ENCOUNTER — APPOINTMENT (OUTPATIENT)
Dept: PHYSICAL THERAPY | Facility: CLINIC | Age: 64
End: 2024-09-11
Payer: COMMERCIAL

## 2024-09-16 ENCOUNTER — OFFICE VISIT (OUTPATIENT)
Dept: OBGYN CLINIC | Facility: CLINIC | Age: 64
End: 2024-09-16
Payer: COMMERCIAL

## 2024-09-16 VITALS
BODY MASS INDEX: 32.77 KG/M2 | HEIGHT: 63 IN | HEART RATE: 86 BPM | SYSTOLIC BLOOD PRESSURE: 98 MMHG | DIASTOLIC BLOOD PRESSURE: 65 MMHG

## 2024-09-16 DIAGNOSIS — M25.561 CHRONIC PAIN OF RIGHT KNEE: ICD-10-CM

## 2024-09-16 DIAGNOSIS — G89.29 CHRONIC PAIN OF RIGHT KNEE: ICD-10-CM

## 2024-09-16 DIAGNOSIS — M17.11 PRIMARY OSTEOARTHRITIS OF RIGHT KNEE: Primary | ICD-10-CM

## 2024-09-16 PROCEDURE — 99214 OFFICE O/P EST MOD 30 MIN: CPT | Performed by: ORTHOPAEDIC SURGERY

## 2024-09-16 NOTE — PROGRESS NOTES
Patient Name:  Catina Hilario  MRN:  [id number]    Assessment & Plan     1. Primary osteoarthritis of right knee  -     Durable Medical Equipment  2. Chronic pain of right knee  -     Durable Medical Equipment      Right knee osteoarthritis with persistent pain.   Treatment options were discussed including oral and topical medications, bracing, injections, physical therapy  Operative management was discussed in the form of total knee arthroplasty. Risks of surgery, including but not limited to, infection, nerve and blood vessel injury, periprosthetic fracture, postoperative stiffness, persistent pain, retear, DVT/PE, anesthesia complications, and need for subsequent surgery were discussed in detail.  Discussed increased risks of periprosthetic fracture and potential wound healing complications in the early postoperative period if patient has recurrent falls.   Strongly advised the patient to use walker all times for balance.  The patient does not want to move forward with surgical intervention at this time.  She was provided hinge knee brace today.  Earliest we would do surgery is beginning of November due to recent visco  Can see Dr. Montelongo for repeat injections if she elects to proceed forward with repeat injections.  Follow up with me as needed if considering total knee arthroplasty if symptoms persist despite recent injection therapy and trial of brace.      History of the Present Illness   Catina Hilario is a 63 y.o. female with Right knee osteoarthritis s/p Synvisc one injection by Dr Montelongo on 8/9/2024. She did not have any relief from her last injection. The patient admits she is falling up to 2-3 times per day on occasion.  Falls do take place when she is not using her walker. This began after the injection and she feels it is due to knee pain. She denies back pain. The patient is using an electric scooter because she has pain while walking.  The patient has MS. She sometimes uses a walker.  "        Review of Systems     Review of Systems   Constitutional:  Negative for chills and fever.   HENT:  Negative for ear pain and sore throat.    Eyes:  Negative for pain and visual disturbance.   Respiratory:  Negative for cough and shortness of breath.    Cardiovascular:  Negative for chest pain and palpitations.   Gastrointestinal:  Negative for abdominal pain and vomiting.   Genitourinary:  Negative for dysuria and hematuria.   Musculoskeletal:  Negative for arthralgias and back pain.   Skin:  Negative for color change and rash.   Neurological:  Negative for seizures and syncope.   All other systems reviewed and are negative.      Physical Exam     BP 98/65   Pulse 86   Ht 5' 3\" (1.6 m)   BMI 32.77 kg/m²     Right Knee  Range of motion from 0 to 110 with pain.    There is no effusion.    There is mild tenderness over the medial and lateral joint lines.    The patient is able to perform a straight leg raise.    Varus stress testing reveals no instability at 0 and 30 degrees   Valgus stress testing reveals no instability at 0 and 30 degrees  The patient is neurovascular intact distally.      Data Review     I have personally reviewed pertinent films in PACS, and my interpretation follows.    X-rays taken 6/17/2024 of Right knee demonstrate tricompartmental osteoarthritis, most significant in the lateral compartment with joint space loss, subchondral sclerosis, and osteophytosis.     MRI of the right knee obtained 6/27/24 demonstrates complex tear lateral meniscus. Tricompartmental osteoarthritis.     Social History     Tobacco Use    Smoking status: Former     Passive exposure: Past    Smokeless tobacco: Former   Vaping Use    Vaping status: Former    Substances: THC, CBD   Substance Use Topics    Alcohol use: Not Currently    Drug use: Yes     Types: Marijuana           Procedures  None.    Kartik Curiel DO   Scribe Attestation      I,:  Laila Short am acting as a scribe while in the presence of the " attending physician.:       I,:  Kartik Curiel, DO personally performed the services described in this documentation    as scribed in my presence.:

## 2024-09-18 ENCOUNTER — OFFICE VISIT (OUTPATIENT)
Dept: FAMILY MEDICINE CLINIC | Facility: CLINIC | Age: 64
End: 2024-09-18
Payer: COMMERCIAL

## 2024-09-18 VITALS
HEIGHT: 63 IN | HEART RATE: 84 BPM | DIASTOLIC BLOOD PRESSURE: 80 MMHG | BODY MASS INDEX: 32.6 KG/M2 | SYSTOLIC BLOOD PRESSURE: 130 MMHG | OXYGEN SATURATION: 95 % | TEMPERATURE: 97.6 F | WEIGHT: 184 LBS

## 2024-09-18 DIAGNOSIS — J41.1 MUCOPURULENT CHRONIC BRONCHITIS (HCC): ICD-10-CM

## 2024-09-18 DIAGNOSIS — Z13.29 THYROID DISORDER SCREEN: Primary | ICD-10-CM

## 2024-09-18 DIAGNOSIS — E78.5 HYPERLIPIDEMIA, UNSPECIFIED HYPERLIPIDEMIA TYPE: ICD-10-CM

## 2024-09-18 DIAGNOSIS — Z13.1 DIABETES MELLITUS SCREENING: ICD-10-CM

## 2024-09-18 PROCEDURE — 99213 OFFICE O/P EST LOW 20 MIN: CPT | Performed by: STUDENT IN AN ORGANIZED HEALTH CARE EDUCATION/TRAINING PROGRAM

## 2024-09-18 PROCEDURE — G2211 COMPLEX E/M VISIT ADD ON: HCPCS | Performed by: STUDENT IN AN ORGANIZED HEALTH CARE EDUCATION/TRAINING PROGRAM

## 2024-09-18 RX ORDER — METHYLPREDNISOLONE 4 MG
TABLET, DOSE PACK ORAL
Qty: 21 EACH | Refills: 0 | Status: SHIPPED | OUTPATIENT
Start: 2024-09-18

## 2024-09-18 RX ORDER — CIPROFLOXACIN 500 MG/1
500 TABLET, FILM COATED ORAL EVERY 12 HOURS SCHEDULED
Qty: 20 TABLET | Refills: 0 | Status: SHIPPED | OUTPATIENT
Start: 2024-09-18 | End: 2024-09-28

## 2024-09-18 NOTE — PROGRESS NOTES
Ambulatory Visit  Name: Catina Hilario      : 1960      MRN: [id number]  Encounter Provider: Elise Schmidt DO  Encounter Date: 2024   Encounter department: [facility]    Assessment & Plan  Mucopurulent chronic bronchitis (HCC)  Patient reports that she didn't complete most recent abx course, recurrence of symptoms currently  Will restart cipro and medrol dose pack course  Return precautions given   Given patient hx and immunocompromised state recommend patient wear a mask when leaving the house.   Orders:    ciprofloxacin (CIPRO) 500 mg tablet; Take 1 tablet (500 mg total) by mouth every 12 (twelve) hours for 10 days    methylPREDNISolone 4 MG tablet therapy pack; Use as directed on package    Thyroid disorder screen    Orders:    TSH, 3rd generation with Free T4 reflex; Future    Diabetes mellitus screening    Orders:    Comprehensive metabolic panel; Future    Hemoglobin A1C; Future    Hyperlipidemia, unspecified hyperlipidemia type    Orders:    Lipid panel; Future    CBC and Platelet; Future       History of Present Illness       Cough  This is a new problem. The current episode started in the past 7 days. The problem has been gradually worsening. The problem occurs constantly. The cough is Productive of purulent sputum. Pertinent negatives include no chest pain, chills, fever, headaches, rhinorrhea, shortness of breath or sweats. She has tried steroid inhaler and oral steroids for the symptoms. The treatment provided mild relief. There is no history of bronchitis or COPD.           Review of Systems   Constitutional:  Negative for chills and fever.   HENT:  Negative for congestion and rhinorrhea.    Respiratory:  Positive for cough. Negative for shortness of breath.    Cardiovascular:  Negative for chest pain and palpitations.   Gastrointestinal:  Negative for abdominal pain.   Neurological:  Negative for dizziness and headaches.             Objective  "    /80 (BP Location: Left arm, Patient Position: Sitting, Cuff Size: Large)   Pulse 84   Temp 97.6 °F (36.4 °C) (Temporal)   Ht 5' 3\" (1.6 m)   Wt 83.5 kg (184 lb)   SpO2 95%   BMI 32.59 kg/m²     Physical Exam  Vitals reviewed.   Constitutional:       Appearance: Normal appearance.   HENT:      Head: Normocephalic and atraumatic.      Mouth/Throat:      Mouth: Mucous membranes are moist.      Pharynx: No oropharyngeal exudate or posterior oropharyngeal erythema.   Eyes:      Extraocular Movements: Extraocular movements intact.   Cardiovascular:      Rate and Rhythm: Normal rate and regular rhythm.      Heart sounds: Normal heart sounds.   Pulmonary:      Effort: Pulmonary effort is normal.      Breath sounds: Normal breath sounds.   Musculoskeletal:      Cervical back: Neck supple. No tenderness.   Neurological:      General: No focal deficit present.      Mental Status: She is alert and oriented to person, place, and time.   Psychiatric:         Mood and Affect: Mood normal.         Behavior: Behavior normal.         "

## 2024-09-18 NOTE — ASSESSMENT & PLAN NOTE
Patient reports that she didn't complete most recent abx course, recurrence of symptoms currently  Will restart cipro and medrol dose pack course  Return precautions given   Given patient hx and immunocompromised state recommend patient wear a mask when leaving the house.   Orders:    ciprofloxacin (CIPRO) 500 mg tablet; Take 1 tablet (500 mg total) by mouth every 12 (twelve) hours for 10 days    methylPREDNISolone 4 MG tablet therapy pack; Use as directed on package

## 2024-09-25 ENCOUNTER — EVALUATION (OUTPATIENT)
Dept: PHYSICAL THERAPY | Facility: CLINIC | Age: 64
End: 2024-09-25
Payer: COMMERCIAL

## 2024-09-25 DIAGNOSIS — M25.561 CHRONIC PAIN OF RIGHT KNEE: ICD-10-CM

## 2024-09-25 DIAGNOSIS — G89.29 CHRONIC PAIN OF RIGHT KNEE: ICD-10-CM

## 2024-09-25 DIAGNOSIS — M17.11 PRIMARY OSTEOARTHRITIS OF RIGHT KNEE: Primary | ICD-10-CM

## 2024-09-25 PROCEDURE — 97110 THERAPEUTIC EXERCISES: CPT

## 2024-09-25 NOTE — PROGRESS NOTES
PT Discharge    Today's date: 2024  Patient name: Catina Hilario  : 1960  MRN: [id number]  Referring provider: Kartik Curiel DO  Dx:   Encounter Diagnosis     ICD-10-CM    1. Primary osteoarthritis of right knee  M17.11       2. Chronic pain of right knee  M25.561     G89.29             Start Time: 1333  Stop Time: 1412  Total time in clinic (min): 39 minutes    Assessment  Impairments: abnormal gait, abnormal or restricted ROM, activity intolerance, impaired balance, impaired physical strength, pain with function, safety issue, participation limitations, activity limitations and endurance    Assessment details: Pt is a 63 y.o. female presenting to PT services with c/o  R knee pain with referring diagnosis of meniscus tear, OA, and popliteal cyst. PMH also includes multiple sclerosis which contributes to pt's frequent falls. Pt has been participating in PT for 6 weeks and has made significant improvement in regards to RLE strength, improved functional tolerance, improved R knee AROM,  and improved functional ability. Although improved, pt remains limited by pain with function and balance deficits. Pt will follow up with orthopedics in November for further assessment for possible TKA. PT and pt have discussed and agreed that in the meantime, pt will resume neurologic PT to address balance deficits.  Pt is discharged from skilled orthopedic physical therapy at this time and will continue to benefit from independent performance of HEP.     Goals  STG (3 weeks):  1. Pt will improve R knee flexion AROM to be at least 115* GOAL MET  2. Pt will report pain at worst as 6/10 or less NOT MET  3. Pt will improve R patella mobility to be WNL GOAL MET    LTG (6 weeks):  1. Pt will be independent in HEP GOAL MET  2. Pt will improve RLE strength to be at least 5/5 GOAL MET   3. Pt will report pain at worst as 4/10 or less NOT MET    Plan  Patient would benefit from: home program (neurologic PT, orthopedic  "follow up)  Planned modality interventions: biofeedback, cryotherapy, TENS, thermotherapy: hydrocollator packs, unattended electrical stimulation and neuromuscular electric stimulation    Planned therapy interventions: IASTM, joint mobilization, kinesiology taping, manual therapy, massage, abdominal trunk stabilization, balance, balance/weight bearing training, nerve gliding, neuromuscular re-education, patient/caregiver education, postural training, strengthening, stretching, home exercise program, gait training, functional ROM exercises and flexibility    Treatment plan discussed with: patient  Plan details: High co-pay         Subjective Evaluation    History of Present Illness  Mechanism of injury: Pt states that since starting PT her energy seems to be improved, she is walking a lot more, and she feels she can stand longer because of strength but it's still limited because of pain. Pt states that she feels her balance is her biggest issue is now. She states she had a recent fall last week when she went to go turn around in the kitchen. Pt reports that her knee has been very painful, she states that she had her ortho appointment and they decided that if she gets TKA it won't be until November because of recent visco injections.   Patient Goals  Patient goals for therapy: decreased edema, decreased pain, improved balance, increased motion, increased strength and return to sport/leisure activities  Patient goal: \"to be able to play with my grandson, push him on the swing, run around with him as much as I'm able, and be able to walk. To be able to ride horses again. To go to [employer], and to be able to do normal things.\"  Pain  Current pain rating: 3  At best pain rating: 3  At worst pain rating: 10  Location: R peripatella, R lateral joint line of knee  Quality: tearing, constant, ripping.  Alleviating factors: laying down, after exercises.  Aggravating factors: walking and standing (exercises while doing " "it)  Progression: improved    Social Support  Steps to enter house: yes (3 steps, 1 hand rail)  Stairs in house: yes (1 flight to basement, does not use)   Lives in: multiple-level home  Lives with: spouse (3 dog, 1 cat)    Employment status: not working  Hand dominance: ambidextrous      Diagnostic Tests    FCE comments: Complex tear of the body of the lateral meniscus with radial component and extrusion of the body. Tricompartmental chondrosis, worst in the lateral compartment. Large multiloculated popliteal cyst. Intact medial meniscus. Intact cruciate and collateral ligaments.        Objective     Active Range of Motion   Left Knee   Flexion: 130 degrees   Extension: 0 degrees     Right Knee   Flexion: 118 degrees with pain  Extension: 0 degrees     Strength/Myotome Testing     Left Hip   Planes of Motion   Flexion: 5    Right Hip   Planes of Motion   Flexion: 4+    Left Knee   Flexion: 5  Extension: 5    Right Knee   Flexion: 5  Extension: 5             Precautions: MS, GERD, asthma, R hemiparesis with R foot drop and AFO, a/d   Access Code: [email or web address]    POC expires Unit limit Auth Expiration date PT/OT/ST + Visit Limit?   9/13/24 3 12/31/24 BOMN                           Visit/Unit Tracking  AUTH Status:  Date 7/30 8/6 8/12 8/16 8/26 9/4 9/25        Not required  Used 1 2 3 4 5 6 7         Remaining      FOTO NV               Date 7/30 8/6 8/12 8/16 8/26 9/4 9/25      Re-Eval       SY      FOTO      NV DONE      Manuals             Patella mobs SY  AF CARLY CARLY SY        Tibial distraction  AF CARLY                                    Neuro Re-Ed             Quad set 5\"x20  HEP          Hamstring set             Glute set    HEP          Bridge c hip add  Bball 2x10 Bolster 2x10 Bolster 2x10 3\"  Bolster 2x8 3\" Bolster 2x10 3\"       Hip abd iso  10\"x10 sit 10\"x10 sit Supine unilat  YTB                                   Ther Ex             Pt edu       SY - HEP review, re-eval findings      Bike             Heel slides " "10\"x10 x20 X20  5\"x20 x/ strap  X10  AROM X10 AROM        LAQ 2x10 3x10 3x10   1.5# 2x15 2# 2x15       SAQ  3x10 3x10 3x10 3x10        SLR  2x8 2x15 2x10         S/l hip abduction  AA 2x8 AA 2x10 AA  4x5 Sit RTB 2x10 Sit RTB 3x8       Hamstring curl      Sit RTB 2x10 Sit RTB 3x10       Hip flexion     Sit 1.5# 2x10 Sit RTB 2x10                                 Ther Activity             STS   2 foam   1x10  CGA 1 foam 2x5  No foam 2x 1 foam x10   No foam x10 2x5 no foam                    Gait Training                                       Modalities                                             "

## 2024-10-15 DIAGNOSIS — G35 MS (MULTIPLE SCLEROSIS) (HCC): ICD-10-CM

## 2024-10-15 RX ORDER — DALFAMPRIDINE 10 MG/1
10 TABLET, FILM COATED, EXTENDED RELEASE ORAL 2 TIMES DAILY
Qty: 180 TABLET | Refills: 3 | Status: SHIPPED | OUTPATIENT
Start: 2024-10-15

## 2024-10-15 NOTE — TELEPHONE ENCOUNTER
Pt wants pharmacy changed for Dalfampridine to [facility], added to pharmacy list.      medication is pending and Pharmacy selected that pt requested.

## 2024-10-15 NOTE — TELEPHONE ENCOUNTER
Patient called office stating that she will like for Dr. Cornell to send medication Dalfampridine ER 10 mg to her online pharmacy. Patient stated that the name is [facility] and she will be sending over a form that would need to be filled out. I gave patient Fax number [phone] and Ida will be scanning to media once received.  Laila please be on the look out thanks.

## 2024-10-15 NOTE — TELEPHONE ENCOUNTER
Patient called and stated that she needs a order for a scooter. Patient ask if we can send order to [facility] at [phone].

## 2024-10-15 NOTE — TELEPHONE ENCOUNTER
1. MS (multiple sclerosis) (HCC)    - Dalfampridine ER 10 MG TB12; Take 1 tablet (10 mg total) by mouth 2 (two) times a day  Dispense: 180 tablet; Refill: 3        Kris Cornell MD.   Staff Neurologist  10/15/24   3:10 PM

## 2024-10-16 ENCOUNTER — PATIENT MESSAGE (OUTPATIENT)
Age: 64
End: 2024-10-16

## 2024-10-16 DIAGNOSIS — G35 MS (MULTIPLE SCLEROSIS) (HCC): ICD-10-CM

## 2024-10-16 DIAGNOSIS — R26.2 AMBULATORY DYSFUNCTION: Primary | ICD-10-CM

## 2024-10-26 ENCOUNTER — PATIENT MESSAGE (OUTPATIENT)
Age: 64
End: 2024-10-26

## 2024-10-26 DIAGNOSIS — G35 MS (MULTIPLE SCLEROSIS) (HCC): Primary | ICD-10-CM

## 2024-10-31 DIAGNOSIS — J40 BRONCHITIS: ICD-10-CM

## 2024-10-31 RX ORDER — IPRATROPIUM BROMIDE AND ALBUTEROL SULFATE 2.5; .5 MG/3ML; MG/3ML
3 SOLUTION RESPIRATORY (INHALATION) EVERY 6 HOURS PRN
Qty: 90 ML | Refills: 0 | Status: SHIPPED | OUTPATIENT
Start: 2024-10-31

## 2024-10-31 NOTE — TELEPHONE ENCOUNTER
Medication: DUO-NEB    Dose/Frequency: 0.5-2.5mg/3ml    Quantity: 90ml    Pharmacy: [facility]    Office:   [x] PCP/Provider -   [] Speciality/Provider -     Does the patient have enough for 3 days?   [x] Yes   [] No - Send as HP to POD

## 2024-11-01 ENCOUNTER — TELEPHONE (OUTPATIENT)
Age: 64
End: 2024-11-01

## 2024-11-01 NOTE — TELEPHONE ENCOUNTER
PA for ipratropium-albuterol (DUO-NEB) 0.5-2.5 mg/3 mL nebulizer solution SUBMITTED     via    []CMM-KEY:   [x]Surescripts-Case ID # [id number]   []Availity-Auth ID # NDC #   []Faxed to plan   []Other website   []Phone call Case ID #     Office notes sent, clinical questions answered. Awaiting determination    Turnaround time for your insurance to make a decision on your Prior Authorization can take 7-21 business days.

## 2024-11-07 ENCOUNTER — TELEPHONE (OUTPATIENT)
Age: 64
End: 2024-11-07

## 2024-11-11 DIAGNOSIS — G35 MS (MULTIPLE SCLEROSIS) (HCC): ICD-10-CM

## 2024-11-11 DIAGNOSIS — J41.1 MUCOPURULENT CHRONIC BRONCHITIS (HCC): ICD-10-CM

## 2024-11-11 DIAGNOSIS — M17.11 PRIMARY OSTEOARTHRITIS OF RIGHT KNEE: ICD-10-CM

## 2024-11-11 DIAGNOSIS — G47.26 SLEEP DISORDER, SHIFT WORK: ICD-10-CM

## 2024-11-11 RX ORDER — METHYLPREDNISOLONE 4 MG/1
TABLET ORAL
Qty: 21 EACH | Refills: 0 | Status: CANCELLED | OUTPATIENT
Start: 2024-11-11

## 2024-11-12 RX ORDER — MODAFINIL 100 MG/1
100 TABLET ORAL DAILY
Qty: 30 TABLET | Refills: 0 | Status: SHIPPED | OUTPATIENT
Start: 2024-11-12

## 2024-11-13 ENCOUNTER — OFFICE VISIT (OUTPATIENT)
Dept: FAMILY MEDICINE CLINIC | Facility: CLINIC | Age: 64
End: 2024-11-13
Payer: COMMERCIAL

## 2024-11-13 VITALS
OXYGEN SATURATION: 95 % | DIASTOLIC BLOOD PRESSURE: 80 MMHG | WEIGHT: 181 LBS | SYSTOLIC BLOOD PRESSURE: 122 MMHG | TEMPERATURE: 97.8 F | BODY MASS INDEX: 32.07 KG/M2 | HEIGHT: 63 IN | HEART RATE: 100 BPM

## 2024-11-13 DIAGNOSIS — Z12.11 COLON CANCER SCREENING: Primary | ICD-10-CM

## 2024-11-13 DIAGNOSIS — J40 BRONCHITIS: ICD-10-CM

## 2024-11-13 DIAGNOSIS — Z12.31 ENCOUNTER FOR SCREENING MAMMOGRAM FOR MALIGNANT NEOPLASM OF BREAST: ICD-10-CM

## 2024-11-13 PROCEDURE — 99213 OFFICE O/P EST LOW 20 MIN: CPT | Performed by: STUDENT IN AN ORGANIZED HEALTH CARE EDUCATION/TRAINING PROGRAM

## 2024-11-13 PROCEDURE — G2211 COMPLEX E/M VISIT ADD ON: HCPCS | Performed by: STUDENT IN AN ORGANIZED HEALTH CARE EDUCATION/TRAINING PROGRAM

## 2024-11-13 RX ORDER — LEVALBUTEROL INHALATION SOLUTION 1.25 MG/3ML
1.25 SOLUTION RESPIRATORY (INHALATION) 3 TIMES DAILY
Qty: 270 ML | Refills: 1 | Status: SHIPPED | OUTPATIENT
Start: 2024-11-13

## 2024-11-13 RX ORDER — PREDNISONE 20 MG/1
TABLET ORAL
Qty: 13 TABLET | Refills: 0 | Status: SHIPPED | OUTPATIENT
Start: 2024-11-13 | End: 2024-11-21

## 2024-11-13 RX ORDER — LEVALBUTEROL TARTRATE 45 UG/1
1-2 AEROSOL, METERED ORAL EVERY 4 HOURS PRN
Qty: 15 G | Refills: 1 | Status: CANCELLED | OUTPATIENT
Start: 2024-11-13

## 2024-11-13 RX ORDER — CIPROFLOXACIN 500 MG/1
500 TABLET, FILM COATED ORAL EVERY 12 HOURS SCHEDULED
Qty: 14 TABLET | Refills: 0 | Status: SHIPPED | OUTPATIENT
Start: 2024-11-13 | End: 2024-11-20

## 2024-11-13 RX ORDER — MELOXICAM 15 MG/1
15 TABLET ORAL DAILY
Qty: 30 TABLET | Refills: 5 | Status: SHIPPED | OUTPATIENT
Start: 2024-11-13

## 2024-11-13 NOTE — PROGRESS NOTES
Name: Catina Hilario      : 1960      MRN: [id number]  Encounter Provider: Elise Schmidt DO  Encounter Date: 2024   Encounter department: [facility] PRIMARY CARE  :  Assessment & Plan        History of Present Illness   Wheezing  The current episode started in the past 7 days. The problem occurs constantly. The problem has been gradually worsening since onset. Associated symptoms include chest pain, coughing and wheezing. Pertinent negatives include no dizziness, palpitations or rhinorrhea. Nothing aggravates the symptoms. There was no intake of a foreign body. She has had intermittent steroid use. Past treatments include beta-agonist inhalers (Nebulizer). The treatment provided mild relief.     Catina Hilario is a 63 y.o. female who presents with recurrent bronchial symptoms    Review of Systems   Constitutional:  Positive for fever. Negative for chills.   HENT:  Negative for congestion and rhinorrhea.    Respiratory:  Positive for cough and wheezing. Negative for shortness of breath.    Cardiovascular:  Positive for chest pain. Negative for palpitations.   Neurological:  Negative for dizziness and headaches.          Objective   There were no vitals taken for this visit.     Physical Exam  Vitals reviewed.   Constitutional:       Appearance: Normal appearance.   HENT:      Head: Normocephalic and atraumatic.      Mouth/Throat:      Mouth: Mucous membranes are moist.      Pharynx: No oropharyngeal exudate or posterior oropharyngeal erythema.   Eyes:      Extraocular Movements: Extraocular movements intact.   Cardiovascular:      Rate and Rhythm: Normal rate and regular rhythm.      Heart sounds: Normal heart sounds.   Pulmonary:      Effort: Pulmonary effort is normal.      Breath sounds: Wheezing present.   Neurological:      Mental Status: She is alert.      Comments: Ambulating with walker   Psychiatric:         Mood and Affect: Mood normal.

## 2024-11-13 NOTE — ASSESSMENT & PLAN NOTE
With her current upper respiratory/bronchial symptoms.  At this time we will start Cipro 500 mg twice daily, prednisone taper and Xopenex nebulizer solution    Patient's history of MS and recurrent respiratory issues I will refer patient to pulmonology.  Referral order has been placed and patient has been instructed to make appointment.

## 2024-11-26 ENCOUNTER — TELEPHONE (OUTPATIENT)
Dept: PAIN MEDICINE | Facility: CLINIC | Age: 64
End: 2024-11-26

## 2024-11-27 ENCOUNTER — OFFICE VISIT (OUTPATIENT)
Dept: FAMILY MEDICINE CLINIC | Facility: CLINIC | Age: 64
End: 2024-11-27
Payer: COMMERCIAL

## 2024-11-27 ENCOUNTER — TELEPHONE (OUTPATIENT)
Dept: FAMILY MEDICINE CLINIC | Facility: CLINIC | Age: 64
End: 2024-11-27

## 2024-11-27 VITALS
BODY MASS INDEX: 31.54 KG/M2 | RESPIRATION RATE: 18 BRPM | SYSTOLIC BLOOD PRESSURE: 116 MMHG | DIASTOLIC BLOOD PRESSURE: 72 MMHG | WEIGHT: 178 LBS | HEART RATE: 72 BPM | HEIGHT: 63 IN | OXYGEN SATURATION: 97 %

## 2024-11-27 DIAGNOSIS — R10.9 FLANK PAIN: Primary | ICD-10-CM

## 2024-11-27 LAB
SL AMB  POCT GLUCOSE, UA: NORMAL
SL AMB LEUKOCYTE ESTERASE,UA: NORMAL
SL AMB POCT BILIRUBIN,UA: NORMAL
SL AMB POCT BLOOD,UA: 5.5
SL AMB POCT CLARITY,UA: CLEAR
SL AMB POCT COLOR,UA: YELLOW
SL AMB POCT KETONES,UA: NORMAL
SL AMB POCT NITRITE,UA: NORMAL
SL AMB POCT PH,UA: NORMAL
SL AMB POCT SPECIFIC GRAVITY,UA: 1.02
SL AMB POCT URINE PROTEIN: 0.2
SL AMB POCT UROBILINOGEN: NORMAL

## 2024-11-27 PROCEDURE — 99213 OFFICE O/P EST LOW 20 MIN: CPT | Performed by: STUDENT IN AN ORGANIZED HEALTH CARE EDUCATION/TRAINING PROGRAM

## 2024-11-27 PROCEDURE — 81002 URINALYSIS NONAUTO W/O SCOPE: CPT | Performed by: STUDENT IN AN ORGANIZED HEALTH CARE EDUCATION/TRAINING PROGRAM

## 2024-11-27 PROCEDURE — G2211 COMPLEX E/M VISIT ADD ON: HCPCS | Performed by: STUDENT IN AN ORGANIZED HEALTH CARE EDUCATION/TRAINING PROGRAM

## 2024-11-27 NOTE — PROGRESS NOTES
Name: Catina Hilario      : 1960      MRN: [id number]  Encounter Provider: Elise Schmidt DO  Encounter Date: 2024   Encounter department: [facility] PRIMARY CARE  :  Assessment & Plan  Flank pain  Point-of-care UA showing trace leukocytes, will check CT abdomen pelvis, to evaluate for kidney stone.    Orders:    POCT urine dip    CT abdomen pelvis wo contrast; Future           History of Present Illness     Flank Pain  Pertinent negatives include no chest pain, fever or headaches.     Review of Systems   Constitutional:  Negative for chills and fever.   HENT:  Negative for congestion and rhinorrhea.    Respiratory:  Negative for cough and shortness of breath.    Cardiovascular:  Negative for chest pain and palpitations.   Genitourinary:  Positive for flank pain.   Neurological:  Negative for dizziness and headaches.          Objective   There were no vitals taken for this visit.     Physical Exam  Vitals reviewed.   Constitutional:       General: She is not in acute distress.     Appearance: She is well-developed. She is obese.   HENT:      Head: Normocephalic and atraumatic.   Eyes:      Extraocular Movements: Extraocular movements intact.      Conjunctiva/sclera: Conjunctivae normal.   Cardiovascular:      Rate and Rhythm: Normal rate and regular rhythm.      Heart sounds: No murmur heard.  Pulmonary:      Effort: Pulmonary effort is normal. No respiratory distress.      Breath sounds: Normal breath sounds.   Abdominal:      Tenderness: There is no abdominal tenderness. There is no right CVA tenderness or left CVA tenderness.   Skin:     General: Skin is warm and dry.      Capillary Refill: Capillary refill takes less than 2 seconds.   Neurological:      General: No focal deficit present.      Mental Status: She is alert and oriented to person, place, and time.   Psychiatric:         Mood and Affect: Mood normal.         Behavior: Behavior normal.

## 2024-11-27 NOTE — ASSESSMENT & PLAN NOTE
Point-of-care UA showing trace leukocytes, will check CT abdomen pelvis, to evaluate for kidney stone.    Orders:    POCT urine dip    CT abdomen pelvis wo contrast; Future

## 2024-12-05 ENCOUNTER — OFFICE VISIT (OUTPATIENT)
Dept: OBGYN CLINIC | Facility: CLINIC | Age: 64
End: 2024-12-05
Payer: COMMERCIAL

## 2024-12-05 ENCOUNTER — APPOINTMENT (OUTPATIENT)
Dept: RADIOLOGY | Facility: CLINIC | Age: 64
End: 2024-12-05
Payer: COMMERCIAL

## 2024-12-05 VITALS
DIASTOLIC BLOOD PRESSURE: 71 MMHG | HEIGHT: 63 IN | BODY MASS INDEX: 31.53 KG/M2 | SYSTOLIC BLOOD PRESSURE: 111 MMHG | RESPIRATION RATE: 18 BRPM | OXYGEN SATURATION: 94 % | HEART RATE: 94 BPM

## 2024-12-05 DIAGNOSIS — S80.01XA CONTUSION OF RIGHT KNEE, INITIAL ENCOUNTER: ICD-10-CM

## 2024-12-05 DIAGNOSIS — M17.11 PRIMARY OSTEOARTHRITIS OF RIGHT KNEE: Primary | ICD-10-CM

## 2024-12-05 DIAGNOSIS — S89.91XA INJURY OF RIGHT KNEE, INITIAL ENCOUNTER: ICD-10-CM

## 2024-12-05 PROCEDURE — 73564 X-RAY EXAM KNEE 4 OR MORE: CPT

## 2024-12-05 PROCEDURE — 99214 OFFICE O/P EST MOD 30 MIN: CPT | Performed by: FAMILY MEDICINE

## 2024-12-05 RX ORDER — DICLOFENAC SODIUM 75 MG/1
75 TABLET, DELAYED RELEASE ORAL 2 TIMES DAILY
Qty: 60 TABLET | Refills: 1 | Status: SHIPPED | OUTPATIENT
Start: 2024-12-05

## 2024-12-05 NOTE — PROGRESS NOTES
Assessment/Plan:  Assessment & Plan   Diagnoses and all orders for this visit:    Primary osteoarthritis of right knee  -     diclofenac (VOLTAREN) 75 mg EC tablet; Take 1 tablet (75 mg total) by mouth 2 (two) times a day    Contusion of right knee, initial encounter  -     XR knee 4+ vw right injury; Future        63-year-old female with osteoarthritis of the right knee with acute worsening of chronic right knee pain from fall injury 12/1/2024.  Discussed with patient physical exam, imaging studies, impression, and plan.  X-rays of the right knee are unremarkable for acute osseous abnormality, but noted for degenerative changes.  Imaging studies, clinical exam, and history suggest that she has symptoms with contusion of the knee and aggravation of arthritis.  I reviewed patient that as per Dr. Curiel's instructions she was to continue formal therapy and soonest for arthroplasty would have been November if she still continued with symptoms.  I recommend she follow-up with Dr. Curiel to discuss moving forward with arthroplasty.  Will defer injection due to possibility of surgery.  In interim we will do trial different NSAID.  She is to discontinue meloxicam and start diclofenac 75 mg twice daily with food.  She will follow-up with me as needed.        Subjective:   Patient ID: Catina Hilario is a 63 y.o. female.  Chief Complaint   Patient presents with    Right Knee - Follow-up, Pain, Swelling        63-year female with osteoarthritis of the right knee following up for acute worsening of chronic right knee pain from fall injury on 12/1/2024.  She is lost balance and her knee gave out causing her to fall landing directly onto her right knee and face.  She has pain described as sudden in onset, generalized to knee but worse to the anterior aspect, achy and throbbing, nonradiating, worse with attempts of moving the knee and bearing weight and ambulating, associated with swelling, and improved with resting.  She  "has continued with taking NSAIDs and also applying ice.  She was last seen by Dr. Curiel 2.5 months ago at which point she was advised that knee replacement would have to wait at least 3 months following visco injection.  She has continued with formal therapy to help with balance but states her knee locks and gives out.    Knee Pain  This is a chronic problem. The current episode started more than 1 year ago. The problem occurs daily. The problem has been gradually worsening. Associated symptoms include arthralgias, joint swelling and weakness. Pertinent negatives include no numbness. The symptoms are aggravated by standing and walking. She has tried rest, position changes, NSAIDs and ice for the symptoms. The treatment provided mild relief.               Review of Systems   Musculoskeletal:  Positive for arthralgias and joint swelling.   Neurological:  Positive for weakness. Negative for numbness.       Objective:  Vitals:    12/05/24 1541   BP: 111/71   Pulse: 94   Resp: 18   SpO2: 94%   Height: 5' 3\" (1.6 m)      Right Knee Exam     Muscle Strength   The patient has normal right knee strength.    Tenderness   The patient is experiencing tenderness in the medial joint line, lateral joint line and patella (Medial tibial plateau, lateral tibial plateau).    Range of Motion   Extension:  normal   Flexion:  110     Other   Swelling: moderate            Physical Exam  Vitals and nursing note reviewed.   Constitutional:       Appearance: Normal appearance. She is well-developed. She is not ill-appearing or diaphoretic.   HENT:      Head: Normocephalic and atraumatic.      Right Ear: External ear normal.      Left Ear: External ear normal.   Eyes:      Conjunctiva/sclera: Conjunctivae normal.   Neck:      Trachea: No tracheal deviation.   Cardiovascular:      Rate and Rhythm: Normal rate.   Pulmonary:      Effort: Pulmonary effort is normal. No respiratory distress.   Abdominal:      General: There is no distension. "   Musculoskeletal:         General: Swelling and tenderness present. No deformity.   Skin:     General: Skin is warm and dry.      Coloration: Skin is not jaundiced or pale.   Neurological:      Mental Status: She is alert and oriented to person, place, and time.   Psychiatric:         Mood and Affect: Mood normal.         Behavior: Behavior normal.         Thought Content: Thought content normal.         Judgment: Judgment normal.         I have personally reviewed pertinent films in PACS and my interpretation is  .  X-rays of the right knee are unremarkable for acute osseous abnormality, but noted for degenerative changes.    More than 31 minutes spent reviewing patient chart, reviewing and interpreting imaging studies, obtaining history from patient, examining patient, discussing and implementing treatment plan.

## 2024-12-05 NOTE — PATIENT INSTRUCTIONS
Stop Meloxicam 15 mg      Rx: Diclofenac 75 mg  - twice daily  - eat first  - everyday  - 30 days  - no ibuprofen  - no aleve  - tylenol is ok

## 2024-12-06 ENCOUNTER — HOSPITAL ENCOUNTER (OUTPATIENT)
Dept: CT IMAGING | Facility: CLINIC | Age: 64
Discharge: HOME | End: 2024-12-06
Payer: COMMERCIAL

## 2024-12-06 DIAGNOSIS — R10.9 FLANK PAIN: ICD-10-CM

## 2024-12-06 PROCEDURE — 74176 CT ABD & PELVIS W/O CONTRAST: CPT

## 2024-12-16 ENCOUNTER — RESULTS FOLLOW-UP (OUTPATIENT)
Dept: FAMILY MEDICINE CLINIC | Facility: CLINIC | Age: 64
End: 2024-12-16

## 2024-12-16 DIAGNOSIS — N28.1 RENAL CYST: Primary | ICD-10-CM

## 2024-12-27 ENCOUNTER — OFFICE VISIT (OUTPATIENT)
Dept: OBGYN CLINIC | Facility: CLINIC | Age: 64
End: 2024-12-27
Payer: COMMERCIAL

## 2024-12-27 ENCOUNTER — HOSPITAL ENCOUNTER (OUTPATIENT)
Facility: HOSPITAL | Age: 64
Setting detail: SURGERY ADMIT
End: 2024-12-27
Attending: ORTHOPAEDIC SURGERY | Admitting: ORTHOPAEDIC SURGERY
Payer: COMMERCIAL

## 2024-12-27 ENCOUNTER — TELEPHONE (OUTPATIENT)
Dept: OBGYN CLINIC | Facility: CLINIC | Age: 64
End: 2024-12-27

## 2024-12-27 VITALS — HEIGHT: 63 IN | WEIGHT: 178 LBS | BODY MASS INDEX: 31.54 KG/M2

## 2024-12-27 DIAGNOSIS — Z01.818 PREOP TESTING: Primary | ICD-10-CM

## 2024-12-27 DIAGNOSIS — M25.59 PAIN IN OTHER JOINT: ICD-10-CM

## 2024-12-27 DIAGNOSIS — M17.11 PRIMARY OSTEOARTHRITIS OF RIGHT KNEE: Primary | ICD-10-CM

## 2024-12-27 PROCEDURE — 99214 OFFICE O/P EST MOD 30 MIN: CPT | Performed by: ORTHOPAEDIC SURGERY

## 2024-12-27 RX ORDER — MULTIVIT WITH MINERALS/LUTEIN
500 TABLET ORAL DAILY
Qty: 60 TABLET | Refills: 1 | Status: SHIPPED | OUTPATIENT
Start: 2024-12-27 | End: 2025-02-25

## 2024-12-27 RX ORDER — MULTIVITAMIN
1 TABLET ORAL DAILY
Qty: 30 TABLET | Refills: 1 | Status: SHIPPED | OUTPATIENT
Start: 2024-12-27

## 2024-12-27 RX ORDER — CHLORHEXIDINE GLUCONATE 40 MG/ML
SOLUTION TOPICAL DAILY PRN
OUTPATIENT
Start: 2024-12-27

## 2024-12-27 RX ORDER — CHLORHEXIDINE GLUCONATE ORAL RINSE 1.2 MG/ML
15 SOLUTION DENTAL ONCE
OUTPATIENT
Start: 2024-12-27 | End: 2024-12-27

## 2024-12-27 RX ORDER — MUPIROCIN 20 MG/G
OINTMENT TOPICAL
Qty: 10 G | Refills: 0 | Status: SHIPPED | OUTPATIENT
Start: 2024-12-27

## 2024-12-27 RX ORDER — FERROUS SULFATE 324(65)MG
324 TABLET, DELAYED RELEASE (ENTERIC COATED) ORAL
Qty: 60 TABLET | Refills: 0 | Status: CANCELLED | OUTPATIENT
Start: 2024-12-27

## 2024-12-27 RX ORDER — ACETAMINOPHEN 325 MG/1
975 TABLET ORAL ONCE
OUTPATIENT
Start: 2024-12-27 | End: 2024-12-27

## 2024-12-27 RX ORDER — GABAPENTIN 100 MG/1
300 CAPSULE ORAL ONCE
OUTPATIENT
Start: 2024-12-27 | End: 2024-12-27

## 2024-12-27 RX ORDER — FOLIC ACID 1 MG/1
1 TABLET ORAL DAILY
Qty: 30 TABLET | Refills: 0 | Status: CANCELLED | OUTPATIENT
Start: 2024-12-27

## 2024-12-27 NOTE — TELEPHONE ENCOUNTER
Patient is scheduled for right TKA on 3/11/25. The surgery will take place at [facility] with Dr. Curiel.    Patient is aware that she will need to complete Labs, CXR and EKG prior to seeing her PCP. She was informed that these tests are walk-in services and no appointment will be necessary. She was also informed that the labs cannot be done more than 28 days prior to surgery. Timeframe for lab work has been provided to the patient. All testing must be done at a [facility] facility.     My direct phone number was given and patient was informed to call me if she has any questions related to her surgery.   Patient is aware that the hospital will call the afternoon prior, after two pm, with her surgery check in time.   The patient should not eat or drink anything after midnight the night before surgery.  Patient will need a ride home once discharged from the hospital.     Preoperative Vitamins and Anticoagulant therapy was reviewed with the patient.   Soap instructions were reviewed with the patient.     Patient is aware that she will need to complete a Physical Therapy Evaluation prior to surgery and will also be scheduled for Post Op Physical Therapy. She will be contacted to schedule by the [facility] Physical Therapy ..     Patient is also aware that a Nurse Navigator will call them to schedule a Joint Education meeting prior to surgery.     Post Op : 3/21/25 at 1:15 in [place] office.    MC : 2/24/25 at 11:20 with Dr. Schmidt.

## 2024-12-27 NOTE — PROGRESS NOTES
Patient Name:  Catina Hilario  MRN:  [id number]    Assessment & Plan     1. Primary osteoarthritis of right knee  -     Comprehensive metabolic panel; Future  -     Hemoglobin A1C W/EAG Estimation; Future  -     CBC and differential; Future  -     MRSA culture; Future  -     Anemia Panel w/Reflex; Future  -     Protime-INR; Future  -     APTT; Future  -     [facility] Referral to [facility]; Future  -     Ambulatory referral to Physical Therapy; Future  -     Case request operating room: ARTHROPLASTY KNEE TOTAL; Standing  -     EKG 12 lead; Future  -     XR chest pa and lateral; Future; Expected date: 12/27/2024  -     Case request operating room: ARTHROPLASTY KNEE TOTAL  -     Multiple Vitamin (multivitamin) tablet; Take 1 tablet by mouth daily  -     ascorbic acid (VITAMIN C) 250 mg tablet; Take 2 tablets (500 mg total) by mouth daily Start 30 days prior to surgery  -     mupirocin (BACTROBAN) 2 % ointment; Apply 0.5 g (half a tube) to each nostril twice per day for 5 days prior to procedure  -     MRSA culture; Future  2. Pain in other joint  -     CBC and differential; Future  -     Protime-INR; Future  -     APTT; Future      Right knee osteoarthritis with worsening pain  X-rays of right knee reviewed and discussed   In depth conversation had with patient regarding nonoperative and surgical management  Continued nonoperative treatment by means of OTC topical and oral analgesics, activity modification, outpatient physical therapy, injection therapy, and bracing.   Surgical management by means of right total knee arthroplasty.  Risks of surgery, including but not limited to, infection, iatrogenic fracture, periprosthetic fracture, aseptic loosening, persistent pain, wound healing complications, gait dysfunction, nerve injury, blood vessel injury, DVT/PE, loss of life or limb, postoperative stiffness, need for subsequent surgery including revision surgery, numbness/tingling in lower  extremity and/or over surgical incision, bleeding complications requiring blood transfusion, and anesthesia complications   Discussed increased risks of periprosthetic fracture and potential wound healing complications in the early postoperative period if patient has recurrent falls.   Discussed procedure, anesthesia, overnight hospital stay and working with PT/OT while inpatient, pain management, outpatient PT, return to unrestricted activity  Patient understands and has elected to proceed forward with right total knee arthroplasty tentatively scheduled for 3/11/2025.   Patient will require labs, CXR, EKG, PCP clearance pre operatively  Strongly advised patient to continue ambulating with walker.   Follow up at the end of February for reevaluation and consent      History of the Present Illness   Catina Hilario is a 63 y.o. female with Right knee osteoarthritis. I last saw the patient on 9/24/2024 when we discussed total knee arthroplasty, and the patient wished to continue nonoperative management with Dr. Montelongo. The patient was referred back to me for another surgical discussion. The patient was seen by Dr Montelongo on 12/5/2024 after a fall for increased right knee pain. She stopped physical therapy for her balance, but admits she has not been falling as much. She does her physical therapy at home. She presents in a wheelchair today, but usually walks with a walker. She admits pain after standing for approximately 15 minutes. She is taking oral Voltaren for symptom management. History of right knee arthroscopy in 2019. She would like to discuss TKA today and would like to go to inpatient rehab following the surgery. She has tried physical therapy, CSI, and viscosupplementation.        Review of Systems     Review of Systems   Constitutional:  Negative for chills and fever.   HENT:  Negative for ear pain and sore throat.    Eyes:  Negative for pain and visual disturbance.   Respiratory:  Negative for cough  "and shortness of breath.    Cardiovascular:  Negative for chest pain and palpitations.   Gastrointestinal:  Negative for abdominal pain and vomiting.   Genitourinary:  Negative for dysuria and hematuria.   Musculoskeletal:  Negative for arthralgias and back pain.   Skin:  Negative for color change and rash.   Neurological:  Negative for seizures and syncope.   All other systems reviewed and are negative.      Physical Exam     Ht 5' 3\" (1.6 m)   Wt 80.7 kg (178 lb)   BMI 31.53 kg/m²     Right Knee  Range of motion from 0 to 105.    There is no effusion.    There is mild tenderness over the medial and lateral joint lines.    The patient is able to perform a straight leg raise.    Varus stress testing reveals no instability at 0 and 30 degrees   Valgus stress testing reveals no instability at 0 and 30 degrees  The patient is neurovascular intact distally.      Data Review     I have personally reviewed pertinent films in PACS, and my interpretation follows.    X-rays taken 6/17/2024 of Right knee demonstrate tricompartmental osteoarthritis, most significant in the lateral compartment with joint space loss, subchondral sclerosis, and osteophytosis.     MRI of the right knee obtained 6/27/24 demonstrates complex tear lateral meniscus. Tricompartmental osteoarthritis.    Social History     Tobacco Use    Smoking status: Former     Current packs/day: 0.50     Average packs/day: 0.5 packs/day for 5.0 years (2.5 ttl pk-yrs)     Types: Cigarettes     Passive exposure: Past    Smokeless tobacco: Former   Vaping Use    Vaping status: Former    Substances: THC, CBD   Substance Use Topics    Alcohol use: Never    Drug use: Not Currently     Types: Marijuana, Other           Procedures  None performed.    Kartik Curiel DO   Scribe Attestation      I,:  Laila Short am acting as a scribe while in the presence of the attending physician.:       I,:  Kartik Curiel DO personally performed the services described in this " documentation    as scribed in my presence.:

## 2025-01-08 ENCOUNTER — HOSPITAL ENCOUNTER (OUTPATIENT)
Age: 65
Discharge: HOME | End: 2025-01-08
Payer: COMMERCIAL

## 2025-01-08 VITALS — BODY MASS INDEX: 30.48 KG/M2 | WEIGHT: 172 LBS | HEIGHT: 63 IN

## 2025-01-08 DIAGNOSIS — Z12.31 ENCOUNTER FOR SCREENING MAMMOGRAM FOR MALIGNANT NEOPLASM OF BREAST: ICD-10-CM

## 2025-01-08 PROCEDURE — 77063 BREAST TOMOSYNTHESIS BI: CPT

## 2025-01-08 PROCEDURE — 77067 SCR MAMMO BI INCL CAD: CPT

## 2025-01-09 ENCOUNTER — RESULTS FOLLOW-UP (OUTPATIENT)
Dept: FAMILY MEDICINE CLINIC | Facility: CLINIC | Age: 65
End: 2025-01-09

## 2025-01-13 ENCOUNTER — RA CDI HCC (OUTPATIENT)
Dept: OTHER | Facility: HOSPITAL | Age: 65
End: 2025-01-13

## 2025-01-20 ENCOUNTER — OFFICE VISIT (OUTPATIENT)
Dept: FAMILY MEDICINE CLINIC | Facility: CLINIC | Age: 65
End: 2025-01-20
Payer: COMMERCIAL

## 2025-01-20 VITALS
WEIGHT: 179 LBS | DIASTOLIC BLOOD PRESSURE: 70 MMHG | HEIGHT: 63 IN | TEMPERATURE: 97.9 F | BODY MASS INDEX: 31.71 KG/M2 | SYSTOLIC BLOOD PRESSURE: 108 MMHG | OXYGEN SATURATION: 95 % | HEART RATE: 84 BPM

## 2025-01-20 DIAGNOSIS — Z78.0 POST-MENOPAUSAL: ICD-10-CM

## 2025-01-20 DIAGNOSIS — F33.9 RECURRENT MAJOR DEPRESSIVE DISORDER, REMISSION STATUS UNSPECIFIED (HCC): ICD-10-CM

## 2025-01-20 DIAGNOSIS — G35 MS (MULTIPLE SCLEROSIS) (HCC): ICD-10-CM

## 2025-01-20 DIAGNOSIS — Z12.11 COLON CANCER SCREENING: Primary | ICD-10-CM

## 2025-01-20 DIAGNOSIS — G81.91 HEMIPARESIS OF RIGHT DOMINANT SIDE, UNSPECIFIED HEMIPARESIS ETIOLOGY (HCC): ICD-10-CM

## 2025-01-20 DIAGNOSIS — J41.1 MUCOPURULENT CHRONIC BRONCHITIS (HCC): ICD-10-CM

## 2025-01-20 PROCEDURE — 99214 OFFICE O/P EST MOD 30 MIN: CPT | Performed by: STUDENT IN AN ORGANIZED HEALTH CARE EDUCATION/TRAINING PROGRAM

## 2025-01-20 PROCEDURE — G2211 COMPLEX E/M VISIT ADD ON: HCPCS | Performed by: STUDENT IN AN ORGANIZED HEALTH CARE EDUCATION/TRAINING PROGRAM

## 2025-01-20 RX ORDER — METHYLPREDNISOLONE 4 MG/1
TABLET ORAL
Qty: 21 EACH | Refills: 0 | Status: CANCELLED | OUTPATIENT
Start: 2025-01-20

## 2025-01-20 RX ORDER — PREDNISONE 20 MG/1
TABLET ORAL
Qty: 20 TABLET | Refills: 0 | Status: SHIPPED | OUTPATIENT
Start: 2025-01-20 | End: 2025-02-01

## 2025-01-20 RX ORDER — CIPROFLOXACIN 500 MG/1
500 TABLET, FILM COATED ORAL EVERY 12 HOURS SCHEDULED
Qty: 20 TABLET | Refills: 0 | Status: SHIPPED | OUTPATIENT
Start: 2025-01-20 | End: 2025-01-30

## 2025-01-20 NOTE — ASSESSMENT & PLAN NOTE
Recurrent, patient does have history of asthma as well as unknown diagnosis of COPD.  Recommend continue current inhalers, will start Cipro and Medrol Dosepak.  I have advised patient in the past to see pulmonology, she has been referred she has not able to complete appointment and canceled last scheduled visit.    Orders:    ciprofloxacin (CIPRO) 500 mg tablet; Take 1 tablet (500 mg total) by mouth every 12 (twelve) hours for 10 days    predniSONE 20 mg tablet; Take 3 tablets (60 mg total) by mouth daily for 3 days, THEN 2 tablets (40 mg total) daily for 3 days, THEN 1 tablet (20 mg total) daily for 3 days, THEN 0.5 tablets (10 mg total) daily for 3 days.

## 2025-01-20 NOTE — PROGRESS NOTES
Name: Catina Hilario      : 1960      MRN: [id number]  Encounter Provider: Elise Schmidt DO  Encounter Date: 2025   Encounter department: [facility]  :  Assessment & Plan  Colon cancer screening    Orders:    iFOBT/FIT; Future    MS (multiple sclerosis) (HCC)  Following with neurology, patient at this time would like second opinion or to see MS specialist, referral had previously been given.       Hemiparesis of right dominant side, unspecified hemiparesis etiology (HCC)  Secondary to MS, patient currently following with neurology       Mucopurulent chronic bronchitis (HCC)  Recurrent, patient does have history of asthma as well as unknown diagnosis of COPD.  Recommend continue current inhalers, will start Cipro and Medrol Dosepak.  I have advised patient in the past to see pulmonology, she has been referred she has not able to complete appointment and canceled last scheduled visit.    Orders:    ciprofloxacin (CIPRO) 500 mg tablet; Take 1 tablet (500 mg total) by mouth every 12 (twelve) hours for 10 days    predniSONE 20 mg tablet; Take 3 tablets (60 mg total) by mouth daily for 3 days, THEN 2 tablets (40 mg total) daily for 3 days, THEN 1 tablet (20 mg total) daily for 3 days, THEN 0.5 tablets (10 mg total) daily for 3 days.    Recurrent major depressive disorder, remission status unspecified (HCC)  Patient to continue Zoloft 100 mg daily.  Mood currently stable.       Post-menopausal    Orders:    DXA bone density spine hip and pelvis; Future          Depression Screening and Follow-up Plan: Patient's depression screening was positive with a PHQ-9 score of 12.   Patient with underlying depression and was advised to continue current medications as prescribed.   Falls Plan of Care: balance, strength, and gait training instructions were provided. Recommended assistive device to help with gait and balance. Medications that increase falls were reviewed.  "      History of Present Illness     URI   This is a new problem. The current episode started in the past 7 days. The problem has been gradually worsening. There has been no fever. Associated symptoms include coughing, rhinorrhea and wheezing. Pertinent negatives include no abdominal pain, chest pain, congestion, diarrhea, headaches, sneezing or sore throat. She has tried nothing for the symptoms. The treatment provided no relief.     Review of Systems   Constitutional:  Negative for chills and fever.   HENT:  Positive for rhinorrhea. Negative for congestion, sneezing and sore throat.    Respiratory:  Positive for cough and wheezing. Negative for shortness of breath.    Cardiovascular:  Negative for chest pain and palpitations.   Gastrointestinal:  Negative for abdominal pain, constipation and diarrhea.   Neurological:  Negative for dizziness and headaches.       Objective   /70 (BP Location: Left arm, Patient Position: Sitting, Cuff Size: Large)   Pulse 84   Temp 97.9 °F (36.6 °C) (Temporal)   Ht 5' 3\" (1.6 m)   Wt 81.2 kg (179 lb)   SpO2 95%   BMI 31.71 kg/m²      Physical Exam  Vitals reviewed.   Constitutional:       Appearance: Normal appearance.   HENT:      Head: Normocephalic and atraumatic.      Nose: Congestion present. No rhinorrhea.      Mouth/Throat:      Mouth: Mucous membranes are moist.      Pharynx: No oropharyngeal exudate or posterior oropharyngeal erythema.   Eyes:      Extraocular Movements: Extraocular movements intact.   Cardiovascular:      Rate and Rhythm: Normal rate and regular rhythm.      Heart sounds: Normal heart sounds.   Pulmonary:      Breath sounds: Wheezing present.   Musculoskeletal:      Cervical back: Neck supple. No tenderness.   Neurological:      General: No focal deficit present.      Mental Status: She is alert and oriented to person, place, and time.   Psychiatric:         Mood and Affect: Mood normal.         Behavior: Behavior normal.         "

## 2025-01-20 NOTE — ASSESSMENT & PLAN NOTE
Following with neurology, patient at this time would like second opinion or to see MS specialist, referral had previously been given.

## 2025-02-04 ENCOUNTER — TELEPHONE (OUTPATIENT)
Dept: OBGYN CLINIC | Facility: HOSPITAL | Age: 65
End: 2025-02-04

## 2025-02-04 DIAGNOSIS — M17.11 PRIMARY OSTEOARTHRITIS OF RIGHT KNEE: Primary | ICD-10-CM

## 2025-02-04 NOTE — TELEPHONE ENCOUNTER
Preoperative Elective Admission Assessment- spoke with patient     EKG/LAB/MRSA SWAB/CXR date: going 02/17    Living Situation:    Who does pt live with: spouse  What kind of home: ranch  How do they enter the home: garage  How many levels in home: 1  # of steps to enter home: 3  # of steps to second floor: N/A  Are there handrails: Yes  Are there landings: No  Sleeping arrangement: first/entry floor  Where is Bathroom: first floor   Where is the tub or shower: first floor-, stall shower with handrail- handicap accessible with seat   Toilet height? Concerns for low toilets- no, raised handicap toilet with hand bar   Dogs or ther pets:3 dogs and a cat      First Floor Setup:   Is there a bathroom: Yes  Where would pt sleep: bed     DME: ordering RW; advised to bring dos   We discussed clearing pathways in the home and making sure there is accessibly to use the walker, for example, removing throw rugs.      Patient's Current Level of Function: Ambulates with walker    Post-op Caregiver: spouse- patient is not comfortable due to hx to go home after surgery, is requesting in home rehab   Caregiver Name and phone number for Inpatient discharge needs: Anamaria Karthikeyan [phone]- best to call in evenings   Currently receive any HHC/aides/community supports: No     Post-op Transport: spouse  To/from hospital: spouse  To/from PT 2-3x/week: spouse- requesting in patient rehab   Uses community transport now: No     Outpatient Physical Therapy Site:  Site: TBD- wants in patient rehab   pre and post-op appts scheduled? No     Medication Management: pillbox  [facility] for [facility] Medications[id number] [place] - [id number] [[id number]]   Blood Management Vitamin Regimen: patient has an order for vitamin C and multi vitamin only- will confirm with surgeon   Post-op anticoagulant: to be determined by surgical team postoperatively  Has Bactroban for 5 days preop: Yes  Educated on Preoperative  Bathing Instructions, and use of Soap for 5 days before surgery.      DC Plan: patient wants in home rehab       Barriers to DC identified preoperatively: caregiver support    BMI: 31.71    Patient Education:  Pt educated on post-op pain, early mobilization (POD0), LOS goals, OP PT goals, and preoperative bathing. Patient educated that our goal is to appropriately discharge patient based off their post-op function while striving to maintain maximal independence. The goal is to discharge patient to home and for them to attend outpatient physical therapy.    Assigned to care team? Yes    SW referral: YES

## 2025-02-04 NOTE — TELEPHONE ENCOUNTER
Called the patient regarding the surgery soap. I let her know that the soap should be in the bag that she was given as well as the instructions when she was scheduled in the office. I asked her to stop by the office to pick it up  if she does not have it.

## 2025-02-06 ENCOUNTER — PATIENT OUTREACH (OUTPATIENT)
Dept: OBGYN CLINIC | Facility: HOSPITAL | Age: 65
End: 2025-02-06

## 2025-02-06 NOTE — PROGRESS NOTES
ALEAH received a new referral in regard to pt scheduled for arthroplasty of right knee on 03/11/2205. ALEAH reviewed NN notes prior to contacting pt. Pt lives with her spouse in a ranch style home. Pt ambulates with a walker and is independent with ADLs. Pt shared she is not comfortable with DC home after surgery. Pt requesting STR. Pt spouse will take pt to and from surgery and to OP PT.   [facility] contacted pt today and introduced self and role. Pt today did again express she would like to DC to a STR rehab after surgery. Pt educated this is determined after surgery based on medical criteria. Pt expressed understanding. Pt did share today if she is DC to home, her spouse will provide support as needed. He would do what needs to be done. Pt spouse will take pt to and from surgery, to OP PT, and any appts postoperatively.   [facility] inquired if pt has any interest in referral to [facility] and at this time, pt is independent with her ADL's. Pt declined referral at this time. At this time, pt has requested STR, but has stated her spouse will provide all support as needed. No other needs noted at this time. [facility] will remain available.

## 2025-02-07 ENCOUNTER — HOSPITAL ENCOUNTER (OUTPATIENT)
Dept: MRI IMAGING | Facility: CLINIC | Age: 65
Discharge: HOME | End: 2025-02-07
Attending: STUDENT IN AN ORGANIZED HEALTH CARE EDUCATION/TRAINING PROGRAM
Payer: COMMERCIAL

## 2025-02-07 DIAGNOSIS — N28.1 RENAL CYST: ICD-10-CM

## 2025-02-07 PROCEDURE — A9585 GADOBUTROL INJECTION: HCPCS | Performed by: STUDENT IN AN ORGANIZED HEALTH CARE EDUCATION/TRAINING PROGRAM

## 2025-02-07 PROCEDURE — 74183 MRI ABD W/O CNTR FLWD CNTR: CPT

## 2025-02-07 RX ORDER — GADOBUTROL 604.72 MG/ML
8 INJECTION INTRAVENOUS
Status: COMPLETED | OUTPATIENT
Start: 2025-02-07 | End: 2025-02-07

## 2025-02-07 RX ADMIN — GADOBUTROL 8 ML: 604.72 INJECTION INTRAVENOUS at 12:34

## 2025-02-09 DIAGNOSIS — G35 MS (MULTIPLE SCLEROSIS) (HCC): ICD-10-CM

## 2025-02-11 RX ORDER — OXYBUTYNIN CHLORIDE 10 MG/1
10 TABLET, EXTENDED RELEASE ORAL
Qty: 90 TABLET | Refills: 1 | Status: SHIPPED | OUTPATIENT
Start: 2025-02-11

## 2025-02-13 ENCOUNTER — RESULTS FOLLOW-UP (OUTPATIENT)
Dept: FAMILY MEDICINE CLINIC | Facility: CLINIC | Age: 65
End: 2025-02-13

## 2025-02-17 ENCOUNTER — CONSULT (OUTPATIENT)
Dept: PULMONOLOGY | Facility: CLINIC | Age: 65
End: 2025-02-17
Payer: COMMERCIAL

## 2025-02-17 VITALS
SYSTOLIC BLOOD PRESSURE: 104 MMHG | WEIGHT: 158 LBS | HEIGHT: 63 IN | DIASTOLIC BLOOD PRESSURE: 76 MMHG | HEART RATE: 84 BPM | OXYGEN SATURATION: 96 % | BODY MASS INDEX: 28 KG/M2 | TEMPERATURE: 96.5 F

## 2025-02-17 DIAGNOSIS — R60.0 LEG EDEMA, RIGHT: ICD-10-CM

## 2025-02-17 DIAGNOSIS — J45.20 MILD INTERMITTENT ASTHMA, UNSPECIFIED WHETHER COMPLICATED: ICD-10-CM

## 2025-02-17 DIAGNOSIS — G35 MULTIPLE SCLEROSIS (HCC): ICD-10-CM

## 2025-02-17 DIAGNOSIS — Z01.811 PREOP PULMONARY/RESPIRATORY EXAM: ICD-10-CM

## 2025-02-17 DIAGNOSIS — J41.1 BRONCHITIS, MUCOPURULENT RECURRENT (HCC): ICD-10-CM

## 2025-02-17 DIAGNOSIS — Z23 NEED FOR PROPHYLACTIC VACCINATION AGAINST STREPTOCOCCUS PNEUMONIAE (PNEUMOCOCCUS): Primary | ICD-10-CM

## 2025-02-17 PROCEDURE — 90677 PCV20 VACCINE IM: CPT

## 2025-02-17 PROCEDURE — 99204 OFFICE O/P NEW MOD 45 MIN: CPT | Performed by: INTERNAL MEDICINE

## 2025-02-17 PROCEDURE — G0009 ADMIN PNEUMOCOCCAL VACCINE: HCPCS

## 2025-02-17 NOTE — ASSESSMENT & PLAN NOTE
Continue as needed albuterol, and as needed Advair during the spring season or whenever she has more wheezing.

## 2025-02-17 NOTE — ASSESSMENT & PLAN NOTE
I believe patient is having recurrent bronchospasm due to seasonal asthma in the spring time.  Not sure if all these events are mucopurulent and require antibiotics.  I explained that to the patient and asked her to try using her Advair twice daily during that time and maybe she will need a short course of prednisone for few days, not all the time she will need antibiotics unless she has really thick purulent secretions or fever without viral infection consideration.

## 2025-02-17 NOTE — PROGRESS NOTES
Consultation - Pulmonary Medicine   Catina Hilario 64 y.o. female MRN: [id number]    Physician Requesting Consult: Elise Schmidt,     Reason for Consult: recurrent bronchitis    Bronchitis, mucopurulent recurrent (HCC)  I believe patient is having recurrent bronchospasm due to seasonal asthma in the spring time.  Not sure if all these events are mucopurulent and require antibiotics.  I explained that to the patient and asked her to try using her Advair twice daily during that time and maybe she will need a short course of prednisone for few days, not all the time she will need antibiotics unless she has really thick purulent secretions or fever without viral infection consideration.    Mild intermittent asthma  Continue as needed albuterol, and as needed Advair during the spring season or whenever she has more wheezing.    Preop pulmonary/respiratory exam  From pulmonary standpoint there is no contraindication for the planned surgery and no further intervention needed to optimize her more than currently, continue bronchodilators as needed.  She is at low risk for respiratory complications.    Leg edema, right  Chronic for about 7 years as per patient, no change, no pain, no warmth or erythema.  She had a small area of bruising that she attributed to fall.  Initially before she mention the fall I wanted to do duplex of lower extremity to rule out DVT but she declined.  For now we will continue to monitor clinically and I told her if she has any worsening of this edema or increased bruising or pain to go to the emergency room.  Also she described that pleuritic chest pain but improved currently and probably it was musculoskeletal from falling as well, since she has no pain currently we will continue to monitor clinically.  If she develops worsening pain or shortness of breath she will go to the emergency room.  She verbalized understanding of the plan.    Multiple sclerosis (HCC)  Continue to follow  with neurology.    Need for prophylactic vaccination against Streptococcus pneumoniae (pneumococcus)  Given Prevnar 20 today      Return if symptoms worsen or fail to improve.    Diagnoses and all orders for this visit:    Need for prophylactic vaccination against Streptococcus pneumoniae (pneumococcus)  -     Pneumococcal Conjugate Vaccine 20-valent (Pcv20)    Mild intermittent asthma, unspecified whether complicated    Bronchitis, mucopurulent recurrent (HCC)    Multiple sclerosis (HCC)    Preop pulmonary/respiratory exam    Leg edema, right      ______________________________________________________________________    HPI:    Catina Hilario is a 64 y.o. female who presents for evaluation of recurrent bronchitis.  Patient has history of mild intermittent asthma probably seasonal, every spring she gets an episode of bronchitis with bronchospasm and wheezing and cough since she moved to [place] from [place] 4-5 years ago, she usually gets treated with course of prednisone and antibiotics mainly Cipro.  She denies fever usually with those events.  Patient has mild seasonal allergic rhinitis and she takes antihistamine during the fall also sometimes.  Otherwise she has multiple sclerosis diagnosed many years ago, she follows with neurology and she used to get steroids injections.  Currently she has difficulty with ambulation and she uses a walker and also she has osteoarthritis in her knees, plan to have right knee replacement next month.  She reports multiple falls without injuries at home.  In general she is not very active given her MS and the need for walker but still not completely sedentary.    She has chronic swelling of the right lower extremity since 2018 when she had a fracture.    Over the past few days she developed right-sided lateral chest discomfort, increased with taking deep breath but also was positional.  Has resolved currently.  She denies increased shortness of breath.  She had few  episodes of cough, dry over the past few days and she reports exposure to RSV infection with her grandson who had cough and fever over the past few days as well.    Patient lives at home with her , she has dogs and cats, she has chicken but she has not been in contact for some time due to her ambulatory dysfunction.  She quit smoking in her early 20s.  Did not smoke much at all.  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      Review of Systems:  Review of Systems   Constitutional:  Positive for fatigue.   HENT: Negative.     Eyes: Negative.    Respiratory:  Positive for cough.    Cardiovascular: Negative.    Gastrointestinal: Negative.    Endocrine: Negative.    Genitourinary: Negative.    Musculoskeletal:  Positive for arthralgias.   Skin: Negative.    Allergic/Immunologic: Negative.    Neurological: Negative.    Hematological: Negative.    Psychiatric/Behavioral: Negative.       Aside from what is mentioned in the HPI, the review of systems otherwise negative.    Current Medications:    Current Outpatient Medications:     albuterol (Ventolin HFA) 90 mcg/act inhaler, Inhale 2 puffs every 6 (six) hours as needed for wheezing, Disp: 18 g, Rfl: 1    ascorbic acid (VITAMIN C) 250 mg tablet, Take 2 tablets (500 mg total) by mouth daily Start 30 days prior to surgery, Disp: 60 tablet, Rfl: 1    Dalfampridine ER 10 MG TB12, Take 1 tablet (10 mg total) by mouth 2 (two) times a day, Disp: 180 tablet, Rfl: 3    diclofenac (VOLTAREN) 75 mg EC tablet, Take 1 tablet (75 mg total) by mouth 2 (two) times a day, Disp: 60 tablet, Rfl: 1    Fluticasone-Salmeterol (Advair Diskus) 100-50 mcg/dose inhaler, Inhale 1 puff 2 (two) times a day Rinse mouth after use., Disp: 60 blister, Rfl: 3    IBUPROFEN PO, Take by mouth, Disp: , Rfl:     Incontinence Supplies MISC, Use 3 (three) times a day Pads, Disp: 90 each, Rfl: 3    levalbuterol (Xopenex) 1.25 mg/3 mL nebulizer solution, Take 3 mL (1.25 mg  total) by nebulization 3 (three) times a day, Disp: 270 mL, Rfl: 1    meloxicam (MOBIC) 15 mg tablet, Take 1 tablet (15 mg total) by mouth daily, Disp: 30 tablet, Rfl: 5    modafinil (PROVIGIL) 100 mg tablet, Take 1 tablet (100 mg total) by mouth daily, Disp: 30 tablet, Rfl: 0    Multiple Vitamin (multivitamin) tablet, Take 1 tablet by mouth daily, Disp: 30 tablet, Rfl: 1    mupirocin (BACTROBAN) 2 % ointment, Apply 0.5 g (half a tube) to each nostril twice per day for 5 days prior to procedure, Disp: 10 g, Rfl: 0    oxybutynin (DITROPAN-XL) 10 MG 24 hr tablet, TAKE 1 TABLET BY MOUTH DAILY AT BEDTIME, Disp: 90 tablet, Rfl: 1    sertraline (ZOLOFT) 100 mg tablet, Take 1 tablet (100 mg total) by mouth daily, Disp: 90 tablet, Rfl: 3    Historical Information   Past Medical History:   Diagnosis Date    COPD (chronic obstructive pulmonary disease) (HCC)     Multiple sclerosis (HCC)     Pneumonia 1995    [facility] [place]     Past Surgical History:   Procedure Laterality Date     SECTION      FOOT SURGERY Right     KNEE SURGERY      R meniscus    TRACHEOSTOMY       Social History   Social History     Tobacco Use   Smoking Status Former    Current packs/day: 0.50    Average packs/day: 0.5 packs/day for 5.0 years (2.5 ttl pk-yrs)    Types: Cigarettes    Passive exposure: Past   Smokeless Tobacco Former       Occupational history:  No occupational exposure    Family History:   Family History   Problem Relation Age of Onset    Cancer Mother     Kidney disease Mother     Hypertension Mother     Heart disease Father     Hodgkin's lymphoma Paternal Grandfather          PhysicalExamination:  Vitals:   There were no vitals taken for this visit.    Gen:  Comfortable on room air.  No conversational dyspnea  HEENT:  Conjugate gaze.  sclerae anicteric.  Oropharynx moist  Neck: Trachea is midline. No JVD. No adenopathy  Chest: Equal breath sounds and clear to auscultation bilaterally, no wheezing or crackles  Cardiac: S1-S2  "regular. no murmur  Abdomen:  benign  Extremities: +1 edema of the right lower extremity  Neuro:  Normal speech and mentation      Diagnostic Data:  Labs:  I personally reviewed the most recent laboratory data pertinent to today's visit    Lab Results   Component Value Date    WBC 9.03 03/25/2024    HGB 13.7 03/25/2024    HCT 42.0 03/25/2024    MCV 95 03/25/2024     03/25/2024     Lab Results   Component Value Date    CALCIUM 9.1 03/25/2024    K 4.5 03/25/2024    CO2 29 03/25/2024     03/25/2024    BUN 18 03/25/2024    CREATININE 0.63 03/25/2024     No results found for: \"IGE\"  Lab Results   Component Value Date    ALT 20 03/25/2024    AST 14 03/25/2024    ALKPHOS 58 03/25/2024         Imaging:  I personally reviewed the images on the PAC system pertinent to today's visit  Chest x-ray reviewed in PACS from 2023: Clear lungs    Abdominal CT scan from December 2024 reviewed in PACS: Clear lungs parenchyma at the base bilaterally        Ann Duarte MD  "

## 2025-02-17 NOTE — ASSESSMENT & PLAN NOTE
Chronic for about 7 years as per patient, no change, no pain, no warmth or erythema.  She had a small area of bruising that she attributed to fall.  Initially before she mention the fall I wanted to do duplex of lower extremity to rule out DVT but she declined.  For now we will continue to monitor clinically and I told her if she has any worsening of this edema or increased bruising or pain to go to the emergency room.  Also she described that pleuritic chest pain but improved currently and probably it was musculoskeletal from falling as well, since she has no pain currently we will continue to monitor clinically.  If she develops worsening pain or shortness of breath she will go to the emergency room.  She verbalized understanding of the plan.

## 2025-02-17 NOTE — ASSESSMENT & PLAN NOTE
From pulmonary standpoint there is no contraindication for the planned surgery and no further intervention needed to optimize her more than currently, continue bronchodilators as needed.  She is at low risk for respiratory complications.

## 2025-02-18 ENCOUNTER — TELEPHONE (OUTPATIENT)
Dept: OBGYN CLINIC | Facility: CLINIC | Age: 65
End: 2025-02-18

## 2025-02-18 NOTE — TELEPHONE ENCOUNTER
Called ptGERA, to remind her to get her pre-op testing done so the results are available for her [facility] appt with Dr. Schmidt on 2/24/25.

## 2025-02-21 ENCOUNTER — APPOINTMENT (OUTPATIENT)
Dept: LAB | Facility: HOSPITAL | Age: 65
End: 2025-02-21
Payer: COMMERCIAL

## 2025-02-21 ENCOUNTER — OFFICE VISIT (OUTPATIENT)
Dept: LAB | Facility: HOSPITAL | Age: 65
End: 2025-02-21
Payer: COMMERCIAL

## 2025-02-21 DIAGNOSIS — Z12.11 COLON CANCER SCREENING: ICD-10-CM

## 2025-02-21 DIAGNOSIS — M17.11 PRIMARY OSTEOARTHRITIS OF RIGHT KNEE: ICD-10-CM

## 2025-02-21 DIAGNOSIS — Z13.29 THYROID DISORDER SCREEN: ICD-10-CM

## 2025-02-21 DIAGNOSIS — Z13.1 DIABETES MELLITUS SCREENING: ICD-10-CM

## 2025-02-21 DIAGNOSIS — E78.5 HYPERLIPIDEMIA, UNSPECIFIED HYPERLIPIDEMIA TYPE: ICD-10-CM

## 2025-02-21 DIAGNOSIS — Z01.818 PREOP TESTING: ICD-10-CM

## 2025-02-21 DIAGNOSIS — Z01.818 PRE-OP TESTING: ICD-10-CM

## 2025-02-21 DIAGNOSIS — M25.59 PAIN IN OTHER JOINT: ICD-10-CM

## 2025-02-21 LAB
ABO GROUP BLD: NORMAL
ALBUMIN SERPL BCG-MCNC: 3.9 G/DL (ref 3.5–5)
ALP SERPL-CCNC: 88 U/L (ref 34–104)
ALT SERPL W P-5'-P-CCNC: 16 U/L (ref 7–52)
ANION GAP SERPL CALCULATED.3IONS-SCNC: 7 MMOL/L (ref 4–13)
APTT PPP: 34 SECONDS (ref 23–34)
AST SERPL W P-5'-P-CCNC: 15 U/L (ref 13–39)
ATRIAL RATE: 69 BPM
BASOPHILS # BLD AUTO: 0.08 THOUSANDS/ΜL (ref 0–0.1)
BASOPHILS NFR BLD AUTO: 2 % (ref 0–1)
BILIRUB SERPL-MCNC: 0.52 MG/DL (ref 0.2–1)
BLD GP AB SCN SERPL QL: NEGATIVE
BUN SERPL-MCNC: 15 MG/DL (ref 5–25)
CALCIUM SERPL-MCNC: 8.7 MG/DL (ref 8.4–10.2)
CHLORIDE SERPL-SCNC: 107 MMOL/L (ref 96–108)
CHOLEST SERPL-MCNC: 227 MG/DL (ref ?–200)
CO2 SERPL-SCNC: 26 MMOL/L (ref 21–32)
CREAT SERPL-MCNC: 0.64 MG/DL (ref 0.6–1.3)
CRP SERPL QL: 4.2 MG/L
EOSINOPHIL # BLD AUTO: 0.4 THOUSAND/ΜL (ref 0–0.61)
EOSINOPHIL NFR BLD AUTO: 8 % (ref 0–6)
ERYTHROCYTE [DISTWIDTH] IN BLOOD BY AUTOMATED COUNT: 13.4 % (ref 11.6–15.1)
EST. AVERAGE GLUCOSE BLD GHB EST-MCNC: 111 MG/DL
GFR SERPL CREATININE-BSD FRML MDRD: 93 ML/MIN/1.73SQ M
GLUCOSE P FAST SERPL-MCNC: 92 MG/DL (ref 65–99)
HBA1C MFR BLD: 5.5 %
HCT VFR BLD AUTO: 41.4 % (ref 34.8–46.1)
HDLC SERPL-MCNC: 40 MG/DL
HGB BLD-MCNC: 13.2 G/DL (ref 11.5–15.4)
IMM GRANULOCYTES # BLD AUTO: 0.01 THOUSAND/UL (ref 0–0.2)
IMM GRANULOCYTES NFR BLD AUTO: 0 % (ref 0–2)
INR PPP: 0.95 (ref 0.85–1.19)
LDLC SERPL CALC-MCNC: 149 MG/DL (ref 0–100)
LYMPHOCYTES # BLD AUTO: 0.56 THOUSANDS/ΜL (ref 0.6–4.47)
LYMPHOCYTES NFR BLD AUTO: 12 % (ref 14–44)
MCH RBC QN AUTO: 29.9 PG (ref 26.8–34.3)
MCHC RBC AUTO-ENTMCNC: 31.9 G/DL (ref 31.4–37.4)
MCV RBC AUTO: 94 FL (ref 82–98)
MONOCYTES # BLD AUTO: 0.36 THOUSAND/ΜL (ref 0.17–1.22)
MONOCYTES NFR BLD AUTO: 8 % (ref 4–12)
NEUTROPHILS # BLD AUTO: 3.38 THOUSANDS/ΜL (ref 1.85–7.62)
NEUTS SEG NFR BLD AUTO: 70 % (ref 43–75)
NONHDLC SERPL-MCNC: 187 MG/DL
NRBC BLD AUTO-RTO: 0 /100 WBCS
P AXIS: 20 DEGREES
PLATELET # BLD AUTO: 301 THOUSANDS/UL (ref 149–390)
PMV BLD AUTO: 11.1 FL (ref 8.9–12.7)
POTASSIUM SERPL-SCNC: 4.2 MMOL/L (ref 3.5–5.3)
PR INTERVAL: 136 MS
PROT SERPL-MCNC: 5.9 G/DL (ref 6.4–8.4)
PROTHROMBIN TIME: 13.4 SECONDS (ref 12.3–15)
QRS AXIS: 13 DEGREES
QRSD INTERVAL: 78 MS
QT INTERVAL: 390 MS
QTC INTERVAL: 418 MS
RBC # BLD AUTO: 4.42 MILLION/UL (ref 3.81–5.12)
RH BLD: NEGATIVE
SODIUM SERPL-SCNC: 140 MMOL/L (ref 135–147)
SPECIMEN EXPIRATION DATE: NORMAL
T WAVE AXIS: 30 DEGREES
TRIGL SERPL-MCNC: 191 MG/DL (ref ?–150)
TSH SERPL DL<=0.05 MIU/L-ACNC: 1.82 UIU/ML (ref 0.45–4.5)
VENTRICULAR RATE: 69 BPM
WBC # BLD AUTO: 4.79 THOUSAND/UL (ref 4.31–10.16)

## 2025-02-21 PROCEDURE — 86900 BLOOD TYPING SEROLOGIC ABO: CPT

## 2025-02-21 PROCEDURE — 87081 CULTURE SCREEN ONLY: CPT

## 2025-02-21 PROCEDURE — 36415 COLL VENOUS BLD VENIPUNCTURE: CPT

## 2025-02-21 PROCEDURE — 80053 COMPREHEN METABOLIC PANEL: CPT

## 2025-02-21 PROCEDURE — 86140 C-REACTIVE PROTEIN: CPT

## 2025-02-21 PROCEDURE — 85730 THROMBOPLASTIN TIME PARTIAL: CPT

## 2025-02-21 PROCEDURE — 86850 RBC ANTIBODY SCREEN: CPT

## 2025-02-21 PROCEDURE — 93005 ELECTROCARDIOGRAM TRACING: CPT

## 2025-02-21 PROCEDURE — 80061 LIPID PANEL: CPT

## 2025-02-21 PROCEDURE — 93010 ELECTROCARDIOGRAM REPORT: CPT | Performed by: INTERNAL MEDICINE

## 2025-02-21 PROCEDURE — 85610 PROTHROMBIN TIME: CPT

## 2025-02-21 PROCEDURE — 86901 BLOOD TYPING SEROLOGIC RH(D): CPT

## 2025-02-21 PROCEDURE — 85025 COMPLETE CBC W/AUTO DIFF WBC: CPT

## 2025-02-21 PROCEDURE — 83036 HEMOGLOBIN GLYCOSYLATED A1C: CPT

## 2025-02-21 PROCEDURE — 84443 ASSAY THYROID STIM HORMONE: CPT

## 2025-02-22 LAB — MRSA NOSE QL CULT: NORMAL

## 2025-02-24 ENCOUNTER — OFFICE VISIT (OUTPATIENT)
Dept: OBGYN CLINIC | Facility: CLINIC | Age: 65
End: 2025-02-24
Payer: COMMERCIAL

## 2025-02-24 ENCOUNTER — CONSULT (OUTPATIENT)
Dept: FAMILY MEDICINE CLINIC | Facility: CLINIC | Age: 65
End: 2025-02-24
Payer: COMMERCIAL

## 2025-02-24 VITALS
BODY MASS INDEX: 31.54 KG/M2 | WEIGHT: 178 LBS | OXYGEN SATURATION: 95 % | TEMPERATURE: 97.5 F | HEIGHT: 63 IN | HEART RATE: 78 BPM | DIASTOLIC BLOOD PRESSURE: 78 MMHG | SYSTOLIC BLOOD PRESSURE: 122 MMHG

## 2025-02-24 VITALS — BODY MASS INDEX: 31.54 KG/M2 | WEIGHT: 178 LBS | HEIGHT: 63 IN

## 2025-02-24 DIAGNOSIS — J45.20 MILD INTERMITTENT ASTHMA, UNSPECIFIED WHETHER COMPLICATED: ICD-10-CM

## 2025-02-24 DIAGNOSIS — M17.11 PRIMARY OSTEOARTHRITIS OF RIGHT KNEE: ICD-10-CM

## 2025-02-24 DIAGNOSIS — M17.11 PRIMARY OSTEOARTHRITIS OF RIGHT KNEE: Primary | ICD-10-CM

## 2025-02-24 PROCEDURE — 99214 OFFICE O/P EST MOD 30 MIN: CPT | Performed by: ORTHOPAEDIC SURGERY

## 2025-02-24 PROCEDURE — 99214 OFFICE O/P EST MOD 30 MIN: CPT | Performed by: STUDENT IN AN ORGANIZED HEALTH CARE EDUCATION/TRAINING PROGRAM

## 2025-02-24 PROCEDURE — G2211 COMPLEX E/M VISIT ADD ON: HCPCS | Performed by: STUDENT IN AN ORGANIZED HEALTH CARE EDUCATION/TRAINING PROGRAM

## 2025-02-24 RX ORDER — ALBUTEROL SULFATE 90 UG/1
2 INHALANT RESPIRATORY (INHALATION) EVERY 6 HOURS PRN
Qty: 18 G | Refills: 1 | Status: SHIPPED | OUTPATIENT
Start: 2025-02-24

## 2025-02-24 NOTE — PROGRESS NOTES
"Subjective:     Catina Hilario is a 64 y.o. female who presents to the office today for a preoperative consultation at the request of surgeon Dr. Curiel  who plans on performing Right Total Knee Arthoplasty on March 11, 2025. This consultation is requested for the specific conditions prompting preoperative evaluation (i.e. because of potential affect on operative risk). Planned anesthesia: none. The patient has the following known anesthesia issues:  None . Patients bleeding risk. Patient does not have objections to receiving blood products if needed.    The following portions of the patient's history were reviewed and updated as appropriate: allergies, current medications, past family history, past medical history, past social history, past surgical history, and problem list.    Review of Systems  Pertinent items are noted in HPI.     Objective:     /78 (BP Location: Left arm, Patient Position: Sitting, Cuff Size: Adult)   Pulse 78   Temp 97.5 °F (36.4 °C) (Temporal)   Ht 5' 3\" (1.6 m)   Wt 80.7 kg (178 lb)   SpO2 95%   BMI 31.53 kg/m²   General appearance: alert and oriented, in no acute distress  Head: Normocephalic, without obvious abnormality, atraumatic  Throat: lips, mucosa, and tongue normal; teeth and gums normal  Lungs: clear to auscultation bilaterally  Heart: regular rate and rhythm, S1, S2 normal, no murmur, click, rub or gallop  Abdomen: soft, non-tender; bowel sounds normal; no masses,  no organomegaly  Extremities: extremities normal, warm and well-perfused; no cyanosis, clubbing, or edema  Neurologic: Grossly normal    Predictors of intubation difficulty:   Morbid obesity? no   Anatomically abnormal facies? no   Prominent incisors? no   Receding mandible? no    Cardiographics  ECG: normal sinus rhythm, no blocks or conduction defects, no ischemic changes  Echocardiogram: not done      Lab Review   Office Visit on 02/21/2025   Component Date Value   • Ventricular Rate 02/21/2025 69  "   • Atrial Rate 02/21/2025 69    • TX Interval 02/21/2025 136    • QRSD Interval 02/21/2025 78    • QT Interval 02/21/2025 390    • QTC Interval 02/21/2025 418    • P Axis 02/21/2025 20    • QRS Axis 02/21/2025 13    • T Wave [place] 02/21/2025 30    Appointment on 02/21/2025   Component Date Value   • Sodium 02/21/2025 140    • Potassium 02/21/2025 4.2    • Chloride 02/21/2025 107    • CO2 02/21/2025 26    • ANION GAP 02/21/2025 7    • BUN 02/21/2025 15    • Creatinine 02/21/2025 0.64    • Glucose, Fasting 02/21/2025 92    • Calcium 02/21/2025 8.7    • AST 02/21/2025 15    • ALT 02/21/2025 16    • Alkaline Phosphatase 02/21/2025 88    • Total Protein 02/21/2025 5.9 (L)    • Albumin 02/21/2025 3.9    • Total Bilirubin 02/21/2025 0.52    • eGFR 02/21/2025 93    • Hemoglobin A1C 02/21/2025 5.5    • EAG 02/21/2025 111    • TSH 3RD GENERATON 02/21/2025 1.822    • Cholesterol 02/21/2025 227 (H)    • Triglycerides 02/21/2025 191 (H)    • HDL, Direct 02/21/2025 40 (L)    • LDL Calculated 02/21/2025 149 (H)    • Non-HDL-Chol (CHOL-HDL) 02/21/2025 187    • WBC 02/21/2025 4.79    • RBC 02/21/2025 4.42    • Hemoglobin 02/21/2025 13.2    • Hematocrit 02/21/2025 41.4    • MCV 02/21/2025 94    • MCH 02/21/2025 29.9    • MCHC 02/21/2025 31.9    • RDW 02/21/2025 13.4    • MPV 02/21/2025 11.1    • Platelets 02/21/2025 301    • nRBC 02/21/2025 0    • Segmented % 02/21/2025 70    • Immature Grans % 02/21/2025 0    • Lymphocytes % 02/21/2025 12 (L)    • Monocytes % 02/21/2025 8    • Eosinophils Relative 02/21/2025 8 (H)    • Basophils Relative 02/21/2025 2 (H)    • Absolute Neutrophils 02/21/2025 3.38    • Absolute Immature Grans 02/21/2025 0.01    • Absolute Lymphocytes 02/21/2025 0.56 (L)    • Absolute Monocytes 02/21/2025 0.36    • Eosinophils Absolute 02/21/2025 0.40    • Basophils Absolute 02/21/2025 0.08    • Protime 02/21/2025 13.4    • INR 02/21/2025 0.95    • PTT 02/21/2025 34    • MRSA Culture Only 02/21/2025 No Methicillin  Resistant Staphlyococcus aureus (MRSA) isolated    • CRP 02/21/2025 4.2 (H)    • ABO Grouping 02/21/2025 A    • Rh Factor 02/21/2025 Negative    • Antibody Screen 02/21/2025 Negative    • Specimen Expiration Date 02/21/2025 [id number]    Telephone on 02/04/2025   Component Date Value   • Supplier Name 02/04/2025 [employer]    • Supplier Phone Number 02/04/2025 [phone]    • Order Status 02/04/2025 Delivery Successful    • Delivery Request Date 02/04/2025 02/04/2025    • Date Delivered  02/04/2025 02/12/2025    • Item Description 02/04/2025 Wheeled Walker, Adult         Assessment:     64 y.o. female with planned surgery as above.    Known risk factors for perioperative complications: Chronic pulmonary disease    Difficulty with intubation is not anticipated.    Cardiac Risk Estimation: 2        Plan:    Primary osteoarthritis of right knee  Patient is medically optimized for surgical procedure.  Preop blood work as well as EKG has been reviewed and is within normal limits.    Given history of MS and recurrent lung issues would recommend incentive spirometer and close monitoring of respiratory status in PACU and beyond.       Elise Schmidt DO

## 2025-02-24 NOTE — ASSESSMENT & PLAN NOTE
Patient is medically optimized for surgical procedure.  Preop blood work as well as EKG has been reviewed and is within normal limits.    Given history of MS and recurrent lung issues would recommend incentive spirometer and close monitoring of respiratory status in PACU and beyond.

## 2025-02-24 NOTE — PROGRESS NOTES
Name: Catina Hilario      : 1960      MRN: [id number]  Encounter Provider: Kartik Curiel DO  Encounter Date: 2025   Encounter department: [facility]  :  Assessment & Plan  Primary osteoarthritis of right knee           Right knee osteoarthritis, worsening pain, scheduled 2025 for Right total knee arthroplasty  X-rays of Right knee reviewed and discussed   In depth conversation had with patient regarding nonoperative and surgical management  Continued nonoperative treatment by means of OTC topical and oral analgesics, activity modification, outpatient physical therapy, injection therapy, and bracing.   Surgical management by means of Right total knee arthroplasty. Patient currently scheduled 2025.   Risks of surgery, including but not limited to, infection, iatrogenic fracture, periprosthetic fracture, aseptic loosening, persistent pain, wound healing complications, gait dysfunction, nerve injury, blood vessel injury, DVT/PE, loss of life or limb, postoperative stiffness, need for subsequent surgery including revision surgery, numbness/tingling in lower extremity and/or over surgical incision, bleeding complications requiring blood transfusion, and anesthesia complications  Educated patient about fall risk and risk of periprosthetic fractures and need for subsequent procedures including revision surgery. Advised patient total knee replacement can indirectly help with balance to decrease risk of pain and giving away of the knee, but advised patient her MS diagnosis and balance issues secondary to foot drop would not be alleviated from a total knee arthroplasty.   Educated patient and family about primary indication to decrease pain and secondarily to improve function. Advised patient her range of motion is limited pre operatively and increased chance of post operative stiffness.   Again, educated patient post operative crepitus and subjective  "clicking can be persistent post operatively. Difficulty with kneeling is common s/p surgical intervention.   Discussed procedure, anesthesia, overnight hospital stay and working with PT/OT while inpatient, pain management, outpatient PT, return to unrestricted activity  Patient understands and has elected to proceed forward with Right total knee arthroplasty.   Patient cleared from PCP perspective. Patient also has Pulmonology pre op clearance without contraindication for proceeding with surgery  Secondary to muliple falls, previous diagnosis of MS, will require inpatient status s/p surgical intervention and likely discharge to rehab facility  Follow up post operatively         History of the Present Illness   History of Present Illness   HPI   Catina Hilario is a 64 y.o. female with Right knee osteoarthritis. Patient currently scheduled for Right total knee 03/11/2025. Today, patient reports stiffness in the knee is most bothersome, with associated pain. She has a \"tearing sensation\" in the front of the knee with range of motion. She admits the lower extremity edema has been present for years. She admits to a fall this week, despite using her walker. Patient has \"not walked independently for 8 years\".         Review of Systems     Review of Systems   Constitutional:  Negative for chills and fever.   HENT:  Negative for congestion.    Respiratory:  Negative for cough, chest tightness and shortness of breath.    Cardiovascular:  Negative for chest pain and palpitations.   Gastrointestinal:  Negative for abdominal pain.   Endocrine: Negative for cold intolerance and heat intolerance.   Neurological:  Negative for syncope.   Psychiatric/Behavioral:  Negative for confusion.        Physical Exam   Objective   Ht 5' 3\" (1.6 m)   Wt 80.7 kg (178 lb)   BMI 31.53 kg/m²        Right Knee  Range of motion from 2-0 to 105 degrees without pain.    There is no effusion.    There is tenderness over the lateral > medial joint " line.    The patient is able to perform a straight leg raise.    Varus stress testing reveals no pain or instability at 0 and 30 degrees   Valgus stress testing reveals no pain or instability at 0 and 30 degrees  The patient is neurovascular intact distally.      Data Review     I have personally reviewed pertinent films in PACS, and my interpretation follows.    X-rays taken 6/17/2024 of Right knee demonstrate tricompartmental osteoarthritis, most significant in the lateral compartment with joint space loss, subchondral sclerosis, and osteophytosis.     MRI of the Right knee obtained 6/27/24 demonstrates complex tear lateral meniscus. Tricompartmental osteoarthritis.    Labs reviewed with patient including CBC, CMP, CRP and HGBA1c.     Social History     Tobacco Use    Smoking status: Former     Current packs/day: 0.50     Average packs/day: 0.5 packs/day for 5.0 years (2.5 ttl pk-yrs)     Types: Cigarettes     Passive exposure: Past    Smokeless tobacco: Former   Vaping Use    Vaping status: Former    Substances: THC, CBD   Substance Use Topics    Alcohol use: Never    Drug use: Not Currently     Types: Marijuana, Other           Procedures  None     Kasandra Hinojosa PA-C

## 2025-02-24 NOTE — TELEPHONE ENCOUNTER
Called pt to remind her to get the pre-op CXR done. The LABS and EKG were done but they did not do CXR.

## 2025-02-25 ENCOUNTER — APPOINTMENT (OUTPATIENT)
Dept: RADIOLOGY | Facility: CLINIC | Age: 65
End: 2025-02-25
Payer: COMMERCIAL

## 2025-02-25 ENCOUNTER — RESULTS FOLLOW-UP (OUTPATIENT)
Dept: FAMILY MEDICINE CLINIC | Facility: CLINIC | Age: 65
End: 2025-02-25

## 2025-02-25 DIAGNOSIS — M17.11 PRIMARY OSTEOARTHRITIS OF RIGHT KNEE: ICD-10-CM

## 2025-02-25 PROCEDURE — 71046 X-RAY EXAM CHEST 2 VIEWS: CPT

## 2025-02-26 ENCOUNTER — TELEPHONE (OUTPATIENT)
Age: 65
End: 2025-02-26

## 2025-02-26 VITALS — WEIGHT: 170 LBS | BODY MASS INDEX: 28.32 KG/M2 | HEIGHT: 65 IN

## 2025-02-26 NOTE — PRE-PROCEDURE INSTRUCTIONS
Pre-Surgery Instructions:   Medication Instructions    albuterol (Ventolin HFA) 90 mcg/act inhaler Uses PRN- OK to take day of surgery    ascorbic acid (VITAMIN C) 250 mg tablet Hold day of surgery.    Dalfampridine ER 10 MG TB12 Take day of surgery.    diclofenac (VOLTAREN) 75 mg EC tablet Stop taking 7 days prior to surgery.    Fluticasone-Salmeterol (Advair Diskus) 100-50 mcg/dose inhaler Take day of surgery.    IBUPROFEN PO Stop taking 7 days prior to surgery.    Multiple Vitamin (multivitamin) tablet Hold day of surgery.    oxybutynin (DITROPAN-XL) 10 MG 24 hr tablet Take night before surgery    Ozanimod HCl (ZEPOSIA PO) Take day of surgery.    sertraline (ZOLOFT) 100 mg tablet Take day of surgery.    Medication instructions for day of surgery reviewed. Please take all instructed medications with only a sip of water.       You will receive a call one business day prior to surgery with an arrival time and hospital directions. If your surgery is scheduled on a Monday, the hospital will be calling you on the Friday prior to your surgery. If you have not heard from anyone by 8pm, please call the hospital supervisor through the hospital  at [phone]. ([place] [phone] or [place] [phone]).    Do not eat or drink anything after midnight the night before your surgery, including candy, mints, lifesavers, or chewing gum. Do not drink alcohol 24hrs before your surgery. Try not to smoke at least 24hrs before your surgery.       Follow the pre surgery showering instructions as listed in the “My Surgical Experience Booklet” or otherwise provided by your surgeon's office. Do not use a blade to shave the surgical area 1 week before surgery. It is okay to use a clean electric clippers up to 24 hours before surgery. Do not apply any lotions, creams, including makeup, cologne, deodorant, or perfumes after showering on the day of your surgery. Do not use dry shampoo, hair spray, hair gel, or any type of hair  products.     No contact lenses, eye make-up, or artificial eyelashes. Remove nail polish, including gel polish, and any artificial, gel, or acrylic nails if possible. Remove all jewelry including rings and body piercing jewelry.     Wear causal clothing that is easy to take on and off. Consider your type of surgery.    Keep any valuables, jewelry, piercings at home. Please bring any specially ordered equipment (sling, braces) if indicated.    Arrange for a responsible person to drive you to and from the hospital on the day of your surgery. Please confirm the visitor policy for the day of your procedure when you receive your phone call with an arrival time.     Call the surgeon's office with any new illnesses, exposures, or additional questions prior to surgery.    Please reference your “My Surgical Experience Booklet” for additional information to prepare for your upcoming surgery.

## 2025-02-28 DIAGNOSIS — R06.02 SOB (SHORTNESS OF BREATH): ICD-10-CM

## 2025-03-03 RX ORDER — FLUTICASONE PROPIONATE AND SALMETEROL 100; 50 UG/1; UG/1
1 POWDER RESPIRATORY (INHALATION) 2 TIMES DAILY
Qty: 60 BLISTER | Refills: 0 | Status: SHIPPED | OUTPATIENT
Start: 2025-03-03 | End: 2025-03-04 | Stop reason: SDUPTHER

## 2025-03-04 ENCOUNTER — NURSE TRIAGE (OUTPATIENT)
Age: 65
End: 2025-03-04

## 2025-03-04 DIAGNOSIS — R06.02 SOB (SHORTNESS OF BREATH): ICD-10-CM

## 2025-03-04 RX ORDER — FLUTICASONE PROPIONATE AND SALMETEROL 100; 50 UG/1; UG/1
1 POWDER RESPIRATORY (INHALATION) 2 TIMES DAILY
Qty: 60 BLISTER | Refills: 0 | Status: SHIPPED | OUTPATIENT
Start: 2025-03-04

## 2025-03-04 NOTE — TELEPHONE ENCOUNTER
"Reason for Disposition   Prescription prescribed recently is not at pharmacy and triager has access to patient's EMR and prescription is recorded in the EMR    Answer Assessment - Initial Assessment Questions  1. NAME of MEDICINE: \"What medicine(s) are you calling about?\"      Fluticasone-Salmeterol (Advair Diskus) 100-50 mcg/dose inhaler    2. QUESTION: \"What is your question?\" (e.g., double dose of medicine, side effect)      Send to another pharmacy  3. PRESCRIBER: \"Who prescribed the medicine?\" Reason: if prescribed by specialist, call should be referred to that group.      Dr Schmidt    Protocols used: Medication Question Call-Adult-[place]    "

## 2025-03-05 ENCOUNTER — TELEPHONE (OUTPATIENT)
Age: 65
End: 2025-03-05

## 2025-03-05 NOTE — TELEPHONE ENCOUNTER
Caller: Self    Doctor: Dr. Curiel    Reason for call: Patient calling to cancel surgery for now    Call back#: [phone]

## 2025-04-03 ENCOUNTER — PATIENT MESSAGE (OUTPATIENT)
Age: 65
End: 2025-04-03

## 2025-04-03 DIAGNOSIS — G35 MS (MULTIPLE SCLEROSIS) (HCC): Primary | ICD-10-CM

## 2025-04-14 ENCOUNTER — TELEPHONE (OUTPATIENT)
Age: 65
End: 2025-04-14

## 2025-04-14 NOTE — TELEPHONE ENCOUNTER
Caller: Catina     Doctor: Dr. Curiel     Reason for call: Patient would like to reschedule her knee replacement surgery. Patient would like to reschedule for the end of June- mid July time frame. Please return call.     Call back#: [phone]   [FreeTextEntry1] : This is a 77 year old male diagnosed with prostate cancer in October 2021 referred by Dr. Lozano. \par \par He first presented to Dr. Carr in March 2021 with an elevated PSA of 6.29 ng/mL. Urinary frequency and weak stream noted at this time. Repeat PSA performed on 4/8/21 was 7.92 ng/mL. Biopsy was recommended at this time, however patient requested to have an MRI performed. \par \par Prostate MRI performed on 5/31/21 showed a 50 cc prostate. Lesion noted midline, posterior medial, midgland to apex, peripheral zone, measuring 29 x 16 x 19 mm. Tumor bulges, deforms, or obscures capsule- extracapsular extension.  Right-sided neurovascular bundle involvement. No seminal vesicle invasion. \par \par Patient chose active surveillance and to repeat PSA in 4 months. \par \par PSA on 10/19/21 was 18.00 ng/mL. He chose to review his options at this time. He then saw Dr. Johnson and was told on examination he had a "rock hard" prostate. \par \par PSA on 12/6/21 was 21.34 ng/mL.  PSA on 2/25/22 was 26.4.\par \par He had a prostate biopsy on 2/10/22 with Dr. Johnson. Pathology showed adenocarcinoma in 12 out of 12 cores. Shanthi score of 4+4=8 in 8 cores and 4+5=9 in 4 cores. Maximum core involvement 90%. \par \par CT A/P performed 3/4/22 showed no evidence of metastatic disease. However there is an indeterminate small left renal mass and right adrenal nodule versus nodular thickening. Bone scan performed 3/8/22 was negative for metastatic disease. \par \par He is receiving his first Firmagon injection with Dr. Lozano. Urinary frequency and nocturia noted (every 3 hours). Urinary retention noted at times. He is currently undergoing RT.\par \par He presents for an OTV 9/26 fx. Urinary frequency noted. Chronic constipation. He is scheduled for a Firmagon injection today with Dr. Lozano.

## 2025-04-14 NOTE — TELEPHONE ENCOUNTER
Returned pts call, GERA, telling her that we need to schedule her to come back into the office to see Dr. Curiel. I left the office number,[phone], for her to call to schedule an appointment.

## 2025-04-17 NOTE — TELEPHONE ENCOUNTER
Called the pt, GERA, asking her to call [phone], to schedule an appointment with Dr. Curiel.

## 2025-04-18 ENCOUNTER — TELEPHONE (OUTPATIENT)
Dept: FAMILY MEDICINE CLINIC | Facility: CLINIC | Age: 65
End: 2025-04-18

## 2025-04-18 NOTE — TELEPHONE ENCOUNTER
Patient Pre Op Clearance Appointment change to PCP 4/23/25 12:20pm.   Normal vision: sees adequately in most situations; can see medication labels, newsprint

## 2025-04-23 ENCOUNTER — OFFICE VISIT (OUTPATIENT)
Dept: FAMILY MEDICINE CLINIC | Facility: CLINIC | Age: 65
End: 2025-04-23
Payer: MEDICARE

## 2025-04-23 VITALS
DIASTOLIC BLOOD PRESSURE: 76 MMHG | HEART RATE: 74 BPM | SYSTOLIC BLOOD PRESSURE: 112 MMHG | OXYGEN SATURATION: 95 % | BODY MASS INDEX: 29.66 KG/M2 | HEIGHT: 65 IN | RESPIRATION RATE: 18 BRPM | WEIGHT: 178 LBS

## 2025-04-23 DIAGNOSIS — Z01.818 PREOPERATIVE CLEARANCE: ICD-10-CM

## 2025-04-23 DIAGNOSIS — E55.9 VITAMIN D DEFICIENCY: ICD-10-CM

## 2025-04-23 DIAGNOSIS — H26.33 CATARACT OF BOTH EYES DUE TO DRUG: ICD-10-CM

## 2025-04-23 DIAGNOSIS — Z00.00 MEDICARE ANNUAL WELLNESS VISIT, SUBSEQUENT: Primary | ICD-10-CM

## 2025-04-23 DIAGNOSIS — H55.82 DEFICIENT SMOOTH PURSUIT EYE MOVEMENTS: ICD-10-CM

## 2025-04-23 PROBLEM — H26.9 CATARACT OF BOTH EYES: Status: ACTIVE | Noted: 2025-04-23

## 2025-04-23 PROCEDURE — G0439 PPPS, SUBSEQ VISIT: HCPCS | Performed by: STUDENT IN AN ORGANIZED HEALTH CARE EDUCATION/TRAINING PROGRAM

## 2025-04-23 PROCEDURE — 99214 OFFICE O/P EST MOD 30 MIN: CPT | Performed by: STUDENT IN AN ORGANIZED HEALTH CARE EDUCATION/TRAINING PROGRAM

## 2025-04-23 PROCEDURE — G2211 COMPLEX E/M VISIT ADD ON: HCPCS | Performed by: STUDENT IN AN ORGANIZED HEALTH CARE EDUCATION/TRAINING PROGRAM

## 2025-04-23 NOTE — ASSESSMENT & PLAN NOTE
Left eye will be completed on 4/29, Right 5/13.  Patient is medically optimized for low risk procedure under IV conscious sedation/monitored anesthesia care pending blood work.  She has no remote cardiac history, no history of CVA or diabetes.  She has been instructed to discontinue all vitamins and NSAIDs 1 week prior to surgery.

## 2025-04-23 NOTE — PROGRESS NOTES
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  Patient is medically optimized for low risk procedure under IV conscious sedation/monitored anesthesia care pending blood work.  She has no remote cardiac history, no history of CVA or diabetes.  She has been instructed to discontinue all vitamins and NSAIDs 1 week prior to surgery.       Vitamin D deficiency    Orders:  •  Protime-INR; Future    Deficient smooth pursuit eye movements    Orders:  •  Protime-INR; Future    Medicare annual wellness visit, subsequent  Medicare annual wellness visit completed.  Patient has completed fit test, will drop off to lab tomorrow when going to do blood work.  She is due for osteoporosis screening and DEXA has already been ordered we have offered to schedule but patient declines and would like to schedule on her own.  Her mammogram is up-to-date last completed in 2025 and urinary incontinence screening and follow-up plan has been completed today.         Depression Screening and Follow-up Plan: Patient's depression screening was positive with a PHQ-9 score of 11.   Depression likely due to other medical condition. Will treat underlying condition.     Urinary Incontinence Plan of Care: counseling topics discussed: practice Kegel (pelvic floor strengthening) exercises, use restroom every 2 hours, limit alcohol, caffeine, spicy foods, and acidic foods, limiting fluid intake 3-4 hours before bed, weight loss and preventing constipation.       Preventive health issues were discussed with patient, and age appropriate screening tests were  ordered as noted in patient's After Visit Summary. Personalized health advice and appropriate referrals for health education or preventive services given if needed, as noted in patient's After Visit Summary.    History of Present Illness     Patient presents today for annual wellness visit.  No acute concerns or complaints at this time.       Patient Care Team:  Elise Schmidt DO as PCP - General (Family Medicine)  Elise Schmidt,  as PCP - [facility]CP-[facility] ([facility])    Review of Systems   Constitutional:  Negative for chills and fever.   HENT:  Negative for ear pain and sore throat.    Eyes:  Negative for pain and visual disturbance.   Respiratory:  Negative for cough and shortness of breath.    Cardiovascular:  Negative for chest pain and palpitations.   Gastrointestinal:  Negative for abdominal pain and vomiting.   Genitourinary:  Negative for dysuria and hematuria.   Musculoskeletal:  Negative for arthralgias and back pain.        Bilateral knee pain and difficulty with walking   Skin:  Negative for color change and rash.   Neurological:  Negative for seizures and syncope.   All other systems reviewed and are negative.    Medical History Reviewed by provider this encounter:       Annual Wellness Visit Questionnaire   Catina is here for her Subsequent Wellness visit.     Health Risk Assessment:   Patient rates overall health as good. Patient feels that their physical health rating is slightly better. Patient is dissatisfied with their life. Eyesight was rated as much worse. Hearing was rated as same. Patient feels that their emotional and mental health rating is same. Patients states they are never, rarely angry. Patient states they are often unusually tired/fatigued. Pain experienced in the last 7 days has been none. Patient states that she has experienced no weight loss or gain in last 6 months.     Depression Screening:   PHQ-9 Score: 11      Fall Risk Screening:   In the past year, patient  has experienced: no history of falling in past year      Urinary Incontinence Screening:   Patient has not leaked urine accidently in the last six months.     Home Safety:  Patient has trouble with stairs inside or outside of their home. Patient has working smoke alarms and has working carbon monoxide detector. Home safety hazards include: none.     Nutrition:   Current diet is Regular.     Medications:   Patient is not currently taking any over-the-counter supplements. Patient is able to manage medications.     Activities of Daily Living (ADLs)/Instrumental Activities of Daily Living (IADLs):   Walk and transfer into and out of bed and chair?: Yes  Dress and groom yourself?: Yes    Bathe or shower yourself?: Yes    Feed yourself? Yes  Do your laundry/housekeeping?: Yes  Manage your money, pay your bills and track your expenses?: Yes  Make your own meals?: Yes    Do your own shopping?: Yes    Previous Hospitalizations:   Any hospitalizations or ED visits within the last 12 months?: No      Advance Care Planning:   Living will: No      Preventive Screenings      Cardiovascular Screening:    General: History Lipid Disorder and Screening Current      Diabetes Screening:     General: Screening Current      Colorectal Cancer Screening:     General: Screening Current      Breast Cancer Screening:     General: Screening Current      Cervical Cancer Screening:    General: Screening Not Indicated      Osteoporosis Screening:    General: Risks and Benefits Discussed    Due for: DXA Axial      Lung Cancer Screening:     General: Screening Not Indicated      Hepatitis C Screening:    General: Screening Current    Immunizations:  - Immunizations due: Zoster (Shingrix)    Screening, Brief Intervention, and Referral to Treatment (SBIRT)     Screening  Typical number of drinks in a day: 0  Typical number of drinks in a week: 0  Interpretation: Low risk drinking behavior.    Single Item Drug Screening:  How often have you used an  "illegal drug (including marijuana) or a prescription medication for non-medical reasons in the past year? never    Single Item Drug Screen Score: 0  Interpretation: Negative screen for possible drug use disorder    Social Drivers of Health     Financial Resource Strain: Low Risk  (3/21/2023)    Overall Financial Resource Strain (CARDIA)    • Difficulty of Paying Living Expenses: Not hard at all   Food Insecurity: No Food Insecurity (4/23/2025)    Hunger Vital Sign    • Worried About Running Out of Food in the Last Year: Never true    • Ran Out of Food in the Last Year: Never true   Transportation Needs: No Transportation Needs (4/23/2025)    PRAPARE - Transportation    • Lack of Transportation (Medical): No    • Lack of Transportation (Non-Medical): No   Housing Stability: Low Risk  (4/23/2025)    Housing Stability Vital Sign    • Unable to Pay for Housing in the Last Year: No    • Number of Times Moved in the Last Year: 0    • Homeless in the Last Year: No   Utilities: Not At Risk (4/23/2025)    [facility] Utilities    • Threatened with loss of utilities: No     No results found.    Objective   /76 (BP Location: Left arm, Patient Position: Sitting, Cuff Size: Large)   Pulse 74   Resp 18   Ht 5' 4.5\" (1.638 m)   Wt 80.7 kg (178 lb)   SpO2 95%   BMI 30.08 kg/m²     Physical Exam  Vitals reviewed.   Constitutional:       General: She is not in acute distress.     Appearance: Normal appearance. She is well-developed.   HENT:      Head: Normocephalic and atraumatic.      Nose: No congestion or rhinorrhea.   Eyes:      Extraocular Movements: Extraocular movements intact.      Conjunctiva/sclera: Conjunctivae normal.   Neck:      Comments: Surgical scar at base of neck  Cardiovascular:      Rate and Rhythm: Normal rate and regular rhythm.      Heart sounds: No murmur heard.  Pulmonary:      Effort: Pulmonary effort is normal. No respiratory distress.      Breath sounds: Normal breath sounds.   Abdominal:      " Palpations: Abdomen is soft.      Tenderness: There is no abdominal tenderness.   Musculoskeletal:         General: No swelling.      Cervical back: Neck supple.   Skin:     General: Skin is warm and dry.      Capillary Refill: Capillary refill takes less than 2 seconds.   Neurological:      General: No focal deficit present.      Mental Status: She is alert and oriented to person, place, and time.   Psychiatric:         Mood and Affect: Mood normal.         Behavior: Behavior normal.

## 2025-04-23 NOTE — ASSESSMENT & PLAN NOTE
Medicare annual wellness visit completed.  Patient has completed fit test, will drop off to lab tomorrow when going to do blood work.  She is due for osteoporosis screening and DEXA has already been ordered we have offered to schedule but patient declines and would like to schedule on her own.  Her mammogram is up-to-date last completed in January 2025 and urinary incontinence screening and follow-up plan has been completed today.

## 2025-04-23 NOTE — PATIENT INSTRUCTIONS
Medicare Preventive Visit Patient Instructions  Thank you for completing your Welcome to Medicare Visit or Medicare Annual Wellness Visit today. Your next wellness visit will be due in one year (4/24/2026).  The screening/preventive services that you may require over the next 5-10 years are detailed below. Some tests may not apply to you based off risk factors and/or age. Screening tests ordered at today's visit but not completed yet may show as past due. Also, please note that scanned in results may not display below.  Preventive Screenings:  Service Recommendations Previous Testing/Comments   Colorectal Cancer Screening  * Colonoscopy    * Fecal Occult Blood Test (FOBT)/Fecal Immunochemical Test (FIT)  * Fecal DNA/Cologuard Test  * Flexible Sigmoidoscopy Age: 45-75 years old   Colonoscopy: every 10 years (may be performed more frequently if at higher risk)  OR  FOBT/FIT: every 1 year  OR  Cologuard: every 3 years  OR  Sigmoidoscopy: every 5 years  Screening may be recommended earlier than age 45 if at higher risk for colorectal cancer. Also, an individualized decision between you and your healthcare provider will decide whether screening between the ages of 76-85 would be appropriate. Colonoscopy: Not on file  FOBT/FIT: 02/21/2025  Cologuard: Not on file  Sigmoidoscopy: Not on file    Screening Current     Breast Cancer Screening Age: 40+ years old  Frequency: every 1-2 years  Not required if history of left and right mastectomy Mammogram: 01/08/2025    Screening Current   Cervical Cancer Screening Between the ages of 21-29, pap smear recommended once every 3 years.   Between the ages of 30-65, can perform pap smear with HPV co-testing every 5 years.   Recommendations may differ for women with a history of total hysterectomy, cervical cancer, or abnormal pap smears in past. Pap Smear: 04/04/2023    Screening Not Indicated   Hepatitis C Screening Once for adults born between 1945 and 1965  More frequently in  patients at high risk for Hepatitis C Hep C Antibody: 03/20/2023    Screening Current   Diabetes Screening 1-2 times per year if you're at risk for diabetes or have pre-diabetes Fasting glucose: 92 mg/dL (2/21/2025)  A1C: 5.5 % (2/21/2025)  Screening Current   Cholesterol Screening Once every 5 years if you don't have a lipid disorder. May order more often based on risk factors. Lipid panel: 02/21/2025    Screening Not Indicated  History Lipid Disorder     Other Preventive Screenings Covered by Medicare:  Abdominal Aortic Aneurysm (AAA) Screening: covered once if your at risk. You're considered to be at risk if you have a family history of AAA.  Lung Cancer Screening: covers low dose CT scan once per year if you meet all of the following conditions: (1) Age 55-77; (2) No signs or symptoms of lung cancer; (3) Current smoker or have quit smoking within the last 15 years; (4) You have a tobacco smoking history of at least 20 pack years (packs per day multiplied by number of years you smoked); (5) You get a written order from a healthcare provider.  Glaucoma Screening: covered annually if you're considered high risk: (1) You have diabetes OR (2) Family history of glaucoma OR (3)  aged 50 and older OR (4)  American aged 65 and older  Osteoporosis Screening: covered every 2 years if you meet one of the following conditions: (1) You're estrogen deficient and at risk for osteoporosis based off medical history and other findings; (2) Have a vertebral abnormality; (3) On glucocorticoid therapy for more than 3 months; (4) Have primary hyperparathyroidism; (5) On osteoporosis medications and need to assess response to drug therapy.   Last bone density test (DXA Scan): Not on file.  HIV Screening: covered annually if you're between the age of 15-65. Also covered annually if you are younger than 15 and older than 65 with risk factors for HIV infection. For pregnant patients, it is covered up to 3 times per  pregnancy.    Immunizations:  Immunization Recommendations   Influenza Vaccine Annual influenza vaccination during flu season is recommended for all persons aged >= 6 months who do not have contraindications   Pneumococcal Vaccine   * Pneumococcal conjugate vaccine = PCV13 (Prevnar 13), PCV15 (Vaxneuvance), PCV20 (Prevnar 20)  * Pneumococcal polysaccharide vaccine = PPSV23 (Pneumovax) Adults 19-63 yo with certain risk factors or if 65+ yo  If never received any pneumonia vaccine: recommend Prevnar 20 (PCV20)  Give PCV20 if previously received 1 dose of PCV13 or PPSV23   Hepatitis B Vaccine 3 dose series if at intermediate or high risk (ex: diabetes, end stage renal disease, liver disease)   Respiratory syncytial virus (RSV) Vaccine - COVERED BY MEDICARE PART D  * RSVPreF3 (Arexvy) CDC recommends that adults 60 years of age and older may receive a single dose of RSV vaccine using shared clinical decision-making (SCDM)   Tetanus (Td) Vaccine - COST NOT COVERED BY MEDICARE PART B Following completion of primary series, a booster dose should be given every 10 years to maintain immunity against tetanus. Td may also be given as tetanus wound prophylaxis.   Tdap Vaccine - COST NOT COVERED BY MEDICARE PART B Recommended at least once for all adults. For pregnant patients, recommended with each pregnancy.   Shingles Vaccine (Shingrix) - COST NOT COVERED BY MEDICARE PART B  2 shot series recommended in those 19 years and older who have or will have weakened immune systems or those 50 years and older     Health Maintenance Due:      Topic Date Due   • Colorectal Cancer Screening  03/15/2024   • Breast Cancer Screening: Mammogram  01/08/2026   • HIV Screening  Completed   • Hepatitis C Screening  Completed     Immunizations Due:  There are no preventive care reminders to display for this patient.  Advance Directives   What are advance directives?  Advance directives are legal documents that state your wishes and plans for  medical care. These plans are made ahead of time in case you lose your ability to make decisions for yourself. Advance directives can apply to any medical decision, such as the treatments you want, and if you want to donate organs.   What are the types of advance directives?  There are many types of advance directives, and each state has rules about how to use them. You may choose a combination of any of the following:  Living will:  This is a written record of the treatment you want. You can also choose which treatments you do not want, which to limit, and which to stop at a certain time. This includes surgery, medicine, IV fluid, and tube feedings.   Durable power of  for healthcare (DPAHC):  This is a written record that states who you want to make healthcare choices for you when you are unable to make them for yourself. This person, called a proxy, is usually a family member or a friend. You may choose more than 1 proxy.  Do not resuscitate (DNR) order:  A DNR order is used in case your heart stops beating or you stop breathing. It is a request not to have certain forms of treatment, such as CPR. A DNR order may be included in other types of advance directives.  Medical directive:  This covers the care that you want if you are in a coma, near death, or unable to make decisions for yourself. You can list the treatments you want for each condition. Treatment may include pain medicine, surgery, blood transfusions, dialysis, IV or tube feedings, and a ventilator (breathing machine).  Values history:  This document has questions about your views, beliefs, and how you feel and think about life. This information can help others choose the care that you would choose.  Why are advance directives important?  An advance directive helps you control your care. Although spoken wishes may be used, it is better to have your wishes written down. Spoken wishes can be misunderstood, or not followed. Treatments may be given  even if you do not want them. An advance directive may make it easier for your family to make difficult choices about your care.   Urinary Incontinence   Urinary incontinence (UI)  is when you lose control of your bladder. UI develops because your bladder cannot store or empty urine properly. The 3 most common types of UI are stress incontinence, urge incontinence, or both.  Medicines:   May be given to help strengthen your bladder control. Report any side effects of medication to your healthcare provider.  Do pelvic muscle exercises often:  Your pelvic muscles help you stop urinating. Squeeze these muscles tight for 5 seconds, then relax for 5 seconds. Gradually work up to squeezing for 10 seconds. Do 3 sets of 15 repetitions a day, or as directed. This will help strengthen your pelvic muscles and improve bladder control.  Train your bladder:  Go to the bathroom at set times, such as every 2 hours, even if you do not feel the urge to go. You can also try to hold your urine when you feel the urge to go. For example, hold your urine for 5 minutes when you feel the urge to go. As that becomes easier, hold your urine for 10 minutes.   Self-care:   Keep a UI record.  Write down how often you leak urine and how much you leak. Make a note of what you were doing when you leaked urine.  Drink liquids as directed. You may need to limit the amount of liquid you drink to help control your urine leakage. Do not drink any liquid right before you go to bed. Limit or do not have drinks that contain caffeine or alcohol.   Prevent constipation.  Eat a variety of high-fiber foods. Good examples are high-fiber cereals, beans, vegetables, and whole-grain breads. Walking is the best way to trigger your intestines to have a bowel movement.  Exercise regularly and maintain a healthy weight.  Weight loss and exercise will decrease pressure on your bladder and help you control your leakage.   Use a catheter as directed  to help empty your  bladder. A catheter is a tiny, plastic tube that is put into your bladder to drain your urine.   Go to behavior therapy as directed.  Behavior therapy may be used to help you learn to control your urge to urinate.    Weight Management   Why it is important to manage your weight:  Being overweight increases your risk of health conditions such as heart disease, high blood pressure, type 2 diabetes, and certain types of cancer. It can also increase your risk for osteoarthritis, sleep apnea, and other respiratory problems. Aim for a slow, steady weight loss. Even a small amount of weight loss can lower your risk of health problems.  How to lose weight safely:  A safe and healthy way to lose weight is to eat fewer calories and get regular exercise. You can lose up about 1 pound a week by decreasing the number of calories you eat by 500 calories each day.   Healthy meal plan for weight management:  A healthy meal plan includes a variety of foods, contains fewer calories, and helps you stay healthy. A healthy meal plan includes the following:  Eat whole-grain foods more often.  A healthy meal plan should contain fiber. Fiber is the part of grains, fruits, and vegetables that is not broken down by your body. Whole-grain foods are healthy and provide extra fiber in your diet. Some examples of whole-grain foods are whole-wheat breads and pastas, oatmeal, brown rice, and bulgur.  Eat a variety of vegetables every day.  Include dark, leafy greens such as spinach, kale, amador greens, and mustard greens. Eat yellow and orange vegetables such as carrots, sweet potatoes, and winter squash.   Eat a variety of fruits every day.  Choose fresh or canned fruit (canned in its own juice or light syrup) instead of juice. Fruit juice has very little or no fiber.  Eat low-fat dairy foods.  Drink fat-free (skim) milk or 1% milk. Eat fat-free yogurt and low-fat cottage cheese. Try low-fat cheeses such as mozzarella and other reduced-fat  cheeses.  Choose meat and other protein foods that are low in fat.  Choose beans or other legumes such as split peas or lentils. Choose fish, skinless poultry (chicken or turkey), or lean cuts of red meat (beef or pork). Before you cook meat or poultry, cut off any visible fat.   Use less fat and oil.  Try baking foods instead of frying them. Add less fat, such as margarine, sour cream, regular salad dressing and mayonnaise to foods. Eat fewer high-fat foods. Some examples of high-fat foods include french fries, doughnuts, ice cream, and cakes.  Eat fewer sweets.  Limit foods and drinks that are high in sugar. This includes candy, cookies, regular soda, and sweetened drinks.  Exercise:  Exercise at least 30 minutes per day on most days of the week. Some examples of exercise include walking, biking, dancing, and swimming. You can also fit in more physical activity by taking the stairs instead of the elevator or parking farther away from stores. Ask your healthcare provider about the best exercise plan for you.      © Copyright [employer] 2018 Information is for End User's use only and may not be sold, redistributed or otherwise used for commercial purposes. All illustrations and images included in CareNotes® are the copyrighted property of [employer] or [employer]

## 2025-04-23 NOTE — PROGRESS NOTES
"Subjective:     Catina Hilario is a 64 y.o. female with past medical history of multiple sclerosis, asthma, GERD, osteopenia and hyperlipidemia who presents to the office today for a preoperative consultation at the request of surgeon Dr. Johnathan Maher who plans on performing Bilteral Cataract Surgery on April 29, 2025. This consultation is requested for the specific conditions prompting preoperative evaluation (i.e. because of potential affect on operative risk). Planned anesthesia:  IV conscious sedation/monitored anesthesia care . The patient has the following known anesthesia issues:  None . Patients bleeding risk: no recent abnormal bleeding.     The following portions of the patient's history were reviewed and updated as appropriate: allergies, current medications, past family history, past medical history, past social history, past surgical history, and problem list.    Review of Systems  Pertinent items are noted in HPI.     Objective:     /76 (BP Location: Left arm, Patient Position: Sitting, Cuff Size: Large)   Pulse 74   Resp 18   Ht 5' 4.5\" (1.638 m)   Wt 80.7 kg (178 lb)   SpO2 95%   BMI 30.08 kg/m²     General appearance: alert and oriented, in no acute distress  Throat: lips, mucosa, and tongue normal; teeth and gums normal  Lungs: clear to auscultation bilaterally  Heart: regular rate and rhythm, S1, S2 normal, no murmur, click, rub or gallop  Abdomen: soft, non-tender; bowel sounds normal; no masses,  no organomegaly  Extremities: extremities normal, warm and well-perfused; no cyanosis, clubbing, or edema  Lymph nodes: Cervical, supraclavicular, and axillary nodes normal.  Neurologic: Grossly normal    Predictors of intubation difficulty:   Morbid obesity? no   Anatomically abnormal facies? no   Prominent incisors? no    Cardiographics  ECG:  Not done    Assessment:     64 y.o. female with planned surgery as above.        Cardiac Risk Estimation: 2      Plan:    Cataract of both " eyes  Left eye will be completed on 4/29, Right 5/13.  Patient is medically optimized for low risk procedure under IV conscious sedation/monitored anesthesia care pending blood work.  She has no remote cardiac history, no history of CVA or diabetes.  She has been instructed to discontinue all vitamins and NSAIDs 1 week prior to surgery.         Medicare annual wellness visit, subsequent  Medicare annual wellness visit completed.  Patient has completed fit test, will drop off to lab tomorrow when going to do blood work.  She is due for osteoporosis screening and DEXA has already been ordered we have offered to schedule but patient declines and would like to schedule on her own.  Her mammogram is up-to-date last completed in January 2025 and urinary incontinence screening and follow-up plan has been completed today.         Vitamin D deficiency    Orders:  •  Protime-INR; Future        Elise Schmidt , DO

## 2025-04-24 ENCOUNTER — APPOINTMENT (OUTPATIENT)
Dept: LAB | Facility: CLINIC | Age: 65
End: 2025-04-24
Payer: MEDICARE

## 2025-04-24 DIAGNOSIS — E55.9 VITAMIN D DEFICIENCY: ICD-10-CM

## 2025-04-24 DIAGNOSIS — H55.82 DEFICIENT SMOOTH PURSUIT EYE MOVEMENTS: ICD-10-CM

## 2025-04-24 DIAGNOSIS — Z01.818 PREOPERATIVE CLEARANCE: ICD-10-CM

## 2025-04-24 LAB
ALBUMIN SERPL BCG-MCNC: 4.2 G/DL (ref 3.5–5)
ALP SERPL-CCNC: 77 U/L (ref 34–104)
ALT SERPL W P-5'-P-CCNC: 19 U/L (ref 7–52)
ANION GAP SERPL CALCULATED.3IONS-SCNC: 9 MMOL/L (ref 4–13)
AST SERPL W P-5'-P-CCNC: 16 U/L (ref 13–39)
BASOPHILS # BLD AUTO: 0.08 THOUSANDS/ÂΜL (ref 0–0.1)
BASOPHILS NFR BLD AUTO: 1 % (ref 0–1)
BILIRUB SERPL-MCNC: 0.5 MG/DL (ref 0.2–1)
BUN SERPL-MCNC: 15 MG/DL (ref 5–25)
CALCIUM SERPL-MCNC: 9.4 MG/DL (ref 8.4–10.2)
CHLORIDE SERPL-SCNC: 106 MMOL/L (ref 96–108)
CO2 SERPL-SCNC: 28 MMOL/L (ref 21–32)
CREAT SERPL-MCNC: 0.69 MG/DL (ref 0.6–1.3)
EOSINOPHIL # BLD AUTO: 0.39 THOUSAND/ÂΜL (ref 0–0.61)
EOSINOPHIL NFR BLD AUTO: 7 % (ref 0–6)
ERYTHROCYTE [DISTWIDTH] IN BLOOD BY AUTOMATED COUNT: 13.5 % (ref 11.6–15.1)
GFR SERPL CREATININE-BSD FRML MDRD: 91 ML/MIN/1.73SQ M
GLUCOSE P FAST SERPL-MCNC: 73 MG/DL (ref 65–99)
HCT VFR BLD AUTO: 44.2 % (ref 34.8–46.1)
HEMOCCULT STL QL IA: NEGATIVE
HGB BLD-MCNC: 13.4 G/DL (ref 11.5–15.4)
IMM GRANULOCYTES # BLD AUTO: 0.01 THOUSAND/UL (ref 0–0.2)
IMM GRANULOCYTES NFR BLD AUTO: 0 % (ref 0–2)
INR PPP: 0.9 (ref 0.85–1.19)
LYMPHOCYTES # BLD AUTO: 0.76 THOUSANDS/ÂΜL (ref 0.6–4.47)
LYMPHOCYTES NFR BLD AUTO: 13 % (ref 14–44)
MCH RBC QN AUTO: 30.2 PG (ref 26.8–34.3)
MCHC RBC AUTO-ENTMCNC: 30.3 G/DL (ref 31.4–37.4)
MCV RBC AUTO: 100 FL (ref 82–98)
MONOCYTES # BLD AUTO: 0.43 THOUSAND/ÂΜL (ref 0.17–1.22)
MONOCYTES NFR BLD AUTO: 7 % (ref 4–12)
NEUTROPHILS # BLD AUTO: 4.17 THOUSANDS/ÂΜL (ref 1.85–7.62)
NEUTS SEG NFR BLD AUTO: 72 % (ref 43–75)
NRBC BLD AUTO-RTO: 0 /100 WBCS
PLATELET # BLD AUTO: 281 THOUSANDS/UL (ref 149–390)
PMV BLD AUTO: 11.5 FL (ref 8.9–12.7)
POTASSIUM SERPL-SCNC: 4.3 MMOL/L (ref 3.5–5.3)
PROT SERPL-MCNC: 6.3 G/DL (ref 6.4–8.4)
PROTHROMBIN TIME: 12.5 SECONDS (ref 12.3–15)
RBC # BLD AUTO: 4.44 MILLION/UL (ref 3.81–5.12)
SODIUM SERPL-SCNC: 143 MMOL/L (ref 135–147)
WBC # BLD AUTO: 5.84 THOUSAND/UL (ref 4.31–10.16)

## 2025-04-24 PROCEDURE — 85610 PROTHROMBIN TIME: CPT

## 2025-04-24 PROCEDURE — G0328 FECAL BLOOD SCRN IMMUNOASSAY: HCPCS

## 2025-04-24 PROCEDURE — 36415 COLL VENOUS BLD VENIPUNCTURE: CPT

## 2025-04-24 PROCEDURE — 80053 COMPREHEN METABOLIC PANEL: CPT

## 2025-04-24 PROCEDURE — 85025 COMPLETE CBC W/AUTO DIFF WBC: CPT

## 2025-04-28 ENCOUNTER — TELEPHONE (OUTPATIENT)
Age: 65
End: 2025-04-28

## 2025-04-28 ENCOUNTER — RESULTS FOLLOW-UP (OUTPATIENT)
Dept: FAMILY MEDICINE CLINIC | Facility: CLINIC | Age: 65
End: 2025-04-28

## 2025-04-28 ENCOUNTER — OFFICE VISIT (OUTPATIENT)
Dept: OBGYN CLINIC | Facility: CLINIC | Age: 65
End: 2025-04-28
Payer: MEDICARE

## 2025-04-28 VITALS — HEIGHT: 65 IN | BODY MASS INDEX: 29.32 KG/M2 | WEIGHT: 176 LBS

## 2025-04-28 DIAGNOSIS — M17.11 PRIMARY OSTEOARTHRITIS OF RIGHT KNEE: Primary | ICD-10-CM

## 2025-04-28 PROCEDURE — 99213 OFFICE O/P EST LOW 20 MIN: CPT | Performed by: ORTHOPAEDIC SURGERY

## 2025-04-28 NOTE — PROGRESS NOTES
Name: Catina Hilario      : 1960      MRN: [id number]  Encounter Provider: Kartik Curiel DO  Encounter Date: 2025   Encounter department: [facility] [place]  :  Assessment & Plan  Primary osteoarthritis of right knee           Right knee osteoarthritis  X-rays of Right knee reviewed and discussed   In depth conversation had with patient regarding nonoperative and surgical management  Continued nonoperative treatment by means of OTC topical and oral analgesics, activity modification, outpatient physical therapy, injection therapy, and bracing.   Surgical management by means of Right total knee arthroplasty.  Risks of surgery, including but not limited to, infection, iatrogenic fracture, periprosthetic fracture, aseptic loosening, persistent pain, wound healing complications, gait dysfunction, nerve injury, blood vessel injury, DVT/PE, loss of life or limb, postoperative stiffness, need for subsequent surgery including revision surgery, numbness/tingling in lower extremity and/or over surgical incision, bleeding complications requiring blood transfusion, and anesthesia complications   Discussed procedure, anesthesia, overnight hospital stay and working with PT/OT while inpatient, pain management, outpatient PT, return to unrestricted activity  Again, educated patient about fall risk and which increases risk of periprosthetic fractures and need for subsequent procedures including revision surgery. Advised patient total knee replacement can indirectly help with balance to decrease risk of pain and giving away of the knee, but advised patient her MS diagnosis and balance issues secondary to foot drop would not be alleviated from a total knee arthroplasty.   At this time, recommended continued nonoperative management with repeat CSI in 1 week as she is having cataract surgery tomorrow. Advised patient to discuss with her ophthalmologist about getting CSI next week  "before her second cataract surgery.  Advised patient this may improve her pain, especially with use of new AFO. Patient aware any surgical management will be postponed for at least 3 months.   Continue to use AFO brace and rolling walker for ambulation and increased function. Continue to monitor improvement of knee pain when ambulating appropriately with new brace.  Tentative follow up 1 week for Right knee CSI       History of the Present Illness   History of Present Illness   HPI   Catina Hilario is a 64 y.o. female with Right knee osteoarthritis. Patient was previously scheduled for Right total knee in March, but wanted to postpone until the summer. Today, patient reports she just got an AFO for her Right foot and she thinks it is working well and making her stand straight. She has upcoming cataract surgery tomorrow and another procedure in the future. She is considering to move forward with total knee arthroplasty, but reports she is afraid to have the procedure. Last CSI performed 04/20/2024 and visco injection 08/09/2024.         Review of Systems     Review of Systems   Constitutional:  Negative for chills and fever.   HENT:  Negative for congestion.    Respiratory:  Negative for cough, chest tightness and shortness of breath.    Cardiovascular:  Negative for chest pain and palpitations.   Gastrointestinal:  Negative for abdominal pain.   Endocrine: Negative for cold intolerance and heat intolerance.   Neurological:  Negative for syncope.   Psychiatric/Behavioral:  Negative for confusion.        Physical Exam   Objective   Ht 5' 4.5\" (1.638 m)   Wt 79.8 kg (176 lb)   BMI 29.74 kg/m²        Right Knee  Range of motion from 0 to 120 degrees.    There is small effusion.    There is tenderness over the lateral joint line.    The patient is able to perform a straight leg raise.    Varus stress testing reveals no pain or instability at 0 and 30 degrees   Valgus stress testing reveals no pain or instability at " 0 and 30 degrees  The patient is neurovascular intact distally.      Data Review     I have personally reviewed pertinent films in PACS, and my interpretation follows.    X-rays taken 12/05/2024 of Right knee demonstrate tricompartmental osteoarthritis, most severe in the lateral compartment with joint space loss, subchondral sclerosis, and osteophytosis. No fracture or dislocation noted.      MRI of the Right knee obtained 6/27/24 demonstrates degenerative complex tear lateral meniscus. Tricompartmental osteoarthritis, most severe lateral compartment with areas of full thickness cartilage fissuring along weightbearing surfaces.    Lab Results   Component Value Date/Time    HGBA1C 5.5 02/21/2025 02:48 PM    CRP 4.2 (H) 02/21/2025 02:48 PM       Social History     Tobacco Use    Smoking status: Former     Average packs/day: 0.5 packs/day for 5.0 years (2.5 ttl pk-yrs)     Types: Cigarettes     Start date: 2/21/1985     Passive exposure: Past    Smokeless tobacco: Never   Vaping Use    Vaping status: Never Used   Substance Use Topics    Alcohol use: Never    Drug use: Not Currently     Types: Marijuana, Other           Procedures  None     Kasandra Hinojosa PA-C

## 2025-04-28 NOTE — TELEPHONE ENCOUNTER
Bijal from [facility], they are asking if a medication list could be sent to them and they asked if the patient is on any anti coagulant and if so they would like to know why.     Best contact: [phone]  Best Fax: [phone]      Please advise, thank you

## 2025-04-28 NOTE — TELEPHONE ENCOUNTER
Received a call from Halley- from [facility] ( Dr. Johnathan Maher's office) calling to inquire about patients Pre-op clearance. Halley states they have not received it and patient is scheduled for surgery tomorrow.       Please fax clearance to: [phone]      Halley's direct line is [phone]

## 2025-04-29 ENCOUNTER — TELEPHONE (OUTPATIENT)
Dept: FAMILY MEDICINE CLINIC | Facility: CLINIC | Age: 65
End: 2025-04-29

## 2025-05-09 ENCOUNTER — TELEPHONE (OUTPATIENT)
Age: 65
End: 2025-05-09

## 2025-05-09 NOTE — TELEPHONE ENCOUNTER
Caller: patient    Doctor: Dr. Curiel    Reason for call: pt called stating she is not interest in any more knee injections and would like to have sx that the doctor discuss with her at her last appt    Call back#: [phone]

## 2025-05-19 ENCOUNTER — OFFICE VISIT (OUTPATIENT)
Dept: OBGYN CLINIC | Facility: CLINIC | Age: 65
End: 2025-05-19
Payer: MEDICARE

## 2025-05-19 VITALS — HEIGHT: 65 IN | BODY MASS INDEX: 29.16 KG/M2 | WEIGHT: 175 LBS

## 2025-05-19 DIAGNOSIS — M17.11 PRIMARY OSTEOARTHRITIS OF RIGHT KNEE: Primary | ICD-10-CM

## 2025-05-19 PROCEDURE — 20610 DRAIN/INJ JOINT/BURSA W/O US: CPT | Performed by: ORTHOPAEDIC SURGERY

## 2025-05-19 PROCEDURE — 99214 OFFICE O/P EST MOD 30 MIN: CPT | Performed by: ORTHOPAEDIC SURGERY

## 2025-05-19 RX ORDER — CYCLOSPORINE 0.5 MG/ML
EMULSION OPHTHALMIC
COMMUNITY
Start: 2025-05-14

## 2025-05-19 RX ADMIN — METHYLPREDNISOLONE ACETATE 2 ML: 40 INJECTION, SUSPENSION INTRA-ARTICULAR; INTRALESIONAL; INTRAMUSCULAR; SOFT TISSUE at 15:15

## 2025-05-19 RX ADMIN — LIDOCAINE HYDROCHLORIDE 2 ML: 10 INJECTION, SOLUTION INFILTRATION; PERINEURAL at 15:15

## 2025-05-19 RX ADMIN — BUPIVACAINE HYDROCHLORIDE 2 ML: 2.5 INJECTION, SOLUTION INFILTRATION; PERINEURAL at 15:15

## 2025-05-19 NOTE — PROGRESS NOTES
Name: Catina Hilario      : 1960      MRN: [id number]  Encounter Provider: Kartik Curiel DO  Encounter Date: 2025   Encounter department: [facility] [place]  :  Assessment & Plan  Primary osteoarthritis of right knee  Orders:    Ambulatory Referral to Physical Therapy; Future    Large joint arthrocentesis: R knee    Right knee osteoarthritis  X-rays reviewed and discussed with patient.  Continue nonoperative treatments were discussed in the form of oral analgesics, activity modification, formal physical therapy, and injection therapy.  Surgical intervention was discussed in the form of right total knee arthroplasty.  Risks and benefits were discussed in detail.  Patient would like to continue with nonoperative treatment at this time due to persistent balance issues secondary to MS which are affecting her worsen her current pain.  Provided PT script in office today to focus on strength and range of motion, and gait training.    The patient was offered a right knee corticosteroid injection.  Risks and benefits were discussed in detail.  The patient elected to proceed forward and tolerated the procedure well.  Patient can repeat cortisone injections no sooner than 3 months, will consider hyaluronic acid injection if persistent pain continues.   Surgical intervention will be considered in the future if non operative treatment options are exhausted.   Follow up 3 months.    History of the Present Illness     History of Present Illness   HPI   Catina Hilario is a 64 y.o. female with Right knee osteoarthritis. She was previously scheduled for right total knee in March, however patient postponed the surgery until summer time. At previous appointment on  patient had scheduled cataract surgery the following day, was advised to discuss CSI with ophthalmologist in regards of cortisone injections post surgical procedure. She reports with  stating her most  "recent surgery with ophthalmologist for second eye was one week ago and surgeon stated she would not be effected with injection after a couple days from surgery. She reports today wanting to proceed with cortisone injection as her lateral joint line pain continues. She is fearful with moving forward with total knee arthroplasty at this point as her instability from MS is worsening. She is currently using a walker to assist with ambulation.     Review of Systems     Review of Systems   Constitutional:  Negative for chills and fever.   HENT:  Negative for ear pain and sore throat.    Eyes:  Negative for pain and visual disturbance.   Respiratory:  Negative for cough and shortness of breath.    Cardiovascular:  Negative for chest pain and palpitations.   Gastrointestinal:  Negative for abdominal pain and vomiting.   Genitourinary:  Negative for dysuria and hematuria.   Musculoskeletal:  Positive for arthralgias. Negative for back pain.   Skin:  Negative for color change and rash.   Neurological:  Negative for seizures and syncope.   All other systems reviewed and are negative.      Physical Exam     Objective   Ht 5' 4.5\" (1.638 m)   Wt 79.4 kg (175 lb)   BMI 29.57 kg/m²      Right Knee  Range of motion from 0 to 100.    There is trace effusion.    There is  tenderness over the lateral joint line  The patient is able to perform a straight leg raise with 4+/5 quad strength    The patient is neurovascular intact distally.      Data Review     I have personally reviewed pertinent films in PACS, and my interpretation follows.    X-rays of her right knee from 12/5/2024 independently reviewed display no fracture or dislocation.  Tricompartmental degenerative changes noted with moderate to severe lateral joint space narrowing and osteophyte formation present.      Lab Results   Component Value Date/Time    HGBA1C 5.5 02/21/2025 02:48 PM    CRP 4.2 (H) 02/21/2025 02:48 PM     Social History[1]    Large joint arthrocentesis: " "R knee    Performed by: Kartik Curiel DO  Authorized by: Kartik Curiel DO    Universal Protocol:  Consent: Verbal consent obtained  Risks and benefits: risks, benefits and alternatives were discussed  Consent given by: patient  Time out: Immediately prior to procedure a \"time out\" was called to verify the correct patient, procedure, equipment, support staff and site/side marked as required.  Patient understanding: patient states understanding of the procedure being performed  Site marked: the operative site was marked  Patient identity confirmed: verbally with patient  Supporting Documentation  Indications: pain     Is this a Visco injection? NoProcedure Details  Location: knee - R knee  Preparation: Patient was prepped and draped in the usual sterile fashion  Needle size: 22 G  Ultrasound guidance: no  Approach: anterolateral  Medications administered: 2 mL bupivacaine 0.25 %; 2 mL lidocaine 1 %; 2 mL methylPREDNISolone acetate 40 mg/mL    Patient tolerance: patient tolerated the procedure well with no immediate complications  Dressing:  Sterile dressing applied          Scribe Attestation      I,:  Alexsandra Lizama am acting as a scribe while in the presence of the attending physician.:       I,:  Kartik Curiel DO personally performed the services described in this documentation    as scribed in my presence.:                 [1]   Social History  Tobacco Use    Smoking status: Former     Average packs/day: 0.5 packs/day for 5.0 years (2.5 ttl pk-yrs)     Types: Cigarettes     Start date: 2/21/1985     Passive exposure: Past    Smokeless tobacco: Never   Vaping Use    Vaping status: Never Used   Substance Use Topics    Alcohol use: Never    Drug use: Not Currently     Types: Marijuana, Other     "

## 2025-05-19 NOTE — ASSESSMENT & PLAN NOTE
Orders:    Ambulatory Referral to Physical Therapy; Future    Large joint arthrocentesis: R knee

## 2025-05-21 ENCOUNTER — HOSPITAL ENCOUNTER (OUTPATIENT)
Dept: MRI IMAGING | Facility: CLINIC | Age: 65
Discharge: HOME | End: 2025-05-21
Attending: PSYCHIATRY & NEUROLOGY
Payer: MEDICARE

## 2025-05-21 DIAGNOSIS — G35 MS (MULTIPLE SCLEROSIS) (HCC): ICD-10-CM

## 2025-05-21 PROCEDURE — A9585 GADOBUTROL INJECTION: HCPCS | Performed by: PSYCHIATRY & NEUROLOGY

## 2025-05-21 PROCEDURE — 70553 MRI BRAIN STEM W/O & W/DYE: CPT

## 2025-05-21 PROCEDURE — 72156 MRI NECK SPINE W/O & W/DYE: CPT

## 2025-05-21 RX ORDER — BUPIVACAINE HYDROCHLORIDE 2.5 MG/ML
2 INJECTION, SOLUTION INFILTRATION; PERINEURAL
Status: COMPLETED | OUTPATIENT
Start: 2025-05-19 | End: 2025-05-19

## 2025-05-21 RX ORDER — LIDOCAINE HYDROCHLORIDE 10 MG/ML
2 INJECTION, SOLUTION INFILTRATION; PERINEURAL
Status: COMPLETED | OUTPATIENT
Start: 2025-05-19 | End: 2025-05-19

## 2025-05-21 RX ORDER — METHYLPREDNISOLONE ACETATE 40 MG/ML
2 INJECTION, SUSPENSION INTRA-ARTICULAR; INTRALESIONAL; INTRAMUSCULAR; SOFT TISSUE
Status: COMPLETED | OUTPATIENT
Start: 2025-05-19 | End: 2025-05-19

## 2025-05-21 RX ORDER — GADOBUTROL 604.72 MG/ML
8 INJECTION INTRAVENOUS
Status: COMPLETED | OUTPATIENT
Start: 2025-05-21 | End: 2025-05-21

## 2025-05-21 RX ADMIN — GADOBUTROL 8 ML: 604.72 INJECTION INTRAVENOUS at 13:05

## 2025-05-23 PROBLEM — Z00.00 MEDICARE ANNUAL WELLNESS VISIT, SUBSEQUENT: Status: RESOLVED | Noted: 2023-03-06 | Resolved: 2025-05-23

## 2025-05-27 ENCOUNTER — RESULTS FOLLOW-UP (OUTPATIENT)
Age: 65
End: 2025-05-27

## 2025-05-27 ENCOUNTER — TELEPHONE (OUTPATIENT)
Age: 65
End: 2025-05-27

## 2025-05-27 NOTE — TELEPHONE ENCOUNTER
Patient called in to verify up coming appt.     I verified that pt is scheduled 5/29/25 at 11:30 am with Dr. Cornell- [place] office.     Pt then asked to be transferred to [employer] Phone # [phone] option #5 to reset her [employer].     I updated phone # for pt. New Mobile # [phone]

## 2025-05-29 ENCOUNTER — TELEPHONE (OUTPATIENT)
Age: 65
End: 2025-05-29

## 2025-05-29 NOTE — TELEPHONE ENCOUNTER
Patient arrived (11:50 am) 20 minutes late to appointment. I explained to patient that we would need to reschedule her appointment due to providers schedule being full this afternoon. Patient became irritated and upset. She stated that she tried to call to let us know she was going to be late, but received no answer when calling the main line phone number. I apologized again to patient about the inconcenience and again offered to reschedule appointment. Patient just got more irritated and then stated that she will find a new provider.

## 2025-05-29 NOTE — TELEPHONE ENCOUNTER
Patient called in to advise she is running late for her 11:30 appointment with Dr. Cornell.     While on the phone Patient stated she just arrived, 11:49 am.

## 2025-06-03 ENCOUNTER — EVALUATION (OUTPATIENT)
Dept: PHYSICAL THERAPY | Facility: CLINIC | Age: 65
End: 2025-06-03
Attending: ORTHOPAEDIC SURGERY
Payer: MEDICARE

## 2025-06-03 DIAGNOSIS — M17.11 PRIMARY OSTEOARTHRITIS OF RIGHT KNEE: Primary | ICD-10-CM

## 2025-06-03 PROCEDURE — 97110 THERAPEUTIC EXERCISES: CPT | Performed by: PHYSICAL THERAPIST

## 2025-06-03 PROCEDURE — 97112 NEUROMUSCULAR REEDUCATION: CPT | Performed by: PHYSICAL THERAPIST

## 2025-06-03 PROCEDURE — 97161 PT EVAL LOW COMPLEX 20 MIN: CPT | Performed by: PHYSICAL THERAPIST

## 2025-06-03 NOTE — PROGRESS NOTES
PT Evaluation     Today's date: 6/3/2025  Patient name: Catina Hilario  : 1960  MRN: [id number]  Referring provider: Kartik Curiel DO  Dx:   Encounter Diagnosis     ICD-10-CM    1. Primary osteoarthritis of right knee  M17.11           Start Time: 1630  Stop Time: 1715  Total time in clinic (min): 45 minutes    Assessment  Impairments: abnormal gait, abnormal or restricted ROM, activity intolerance, impaired physical strength, lacks appropriate home exercise program, pain with function and activity limitations  Functional limitations: self-care/ADLs, household activities, stair negotiation, and ambulation    Assessment details: Pt is a 63 y/o female presenting to physical therapy with R knee pain. Upon examination, pt presents with impairments of decreased strength, decreased ROM, and increased pain of pt's R knee and hip resulting in limitations of self-care/ADLs, household activities, stair negotiation, and ambulation. Pt plan of care will focus on improving strength and ROM of pt's R knee and hip as well as decreasing pain and improving tolerance to functional activities. Pt would benefit from skilled physical therapy to improve pt's overall functional mobility and quality of life.         Understanding of Dx/Px/POC: good     Prognosis: fair    Goals  STG - 4 weeks  1. Pt will have a subjective decrease in pain of pt's R knee by 2 levels or more during performance of household activities  2. Pt will demonstrate an improvement by 10 degrees or more of pt's R knee flexion to improve pt's ability to household activities    LTG - 8 weeks  1. Pt will demonstrate 4/5 gross strength or better of pt's R knee and hip in all planes to facilitate performance of stair negotiation  2. Pt's FOTO score will improve by 10 points or better to facilitate a return to pt's prior level of function.      Plan  Patient would benefit from: PT eval and skilled physical therapy  Planned modality interventions:  cryotherapy, low level laser therapy and unattended electrical stimulation    Planned therapy interventions: activity modification, ADL training, behavior modification, balance, functional ROM exercises, graded exercise, home exercise program, flexibility, IASTM, joint mobilization, kinesiology taping, manual therapy, massage, Degroot taping, neuromuscular re-education, postural training, patient/caregiver education, self care, strengthening, stretching, therapeutic activities and therapeutic exercise    Frequency: 2x week  Duration in weeks: 8  Plan of Care beginning date: 2025  Plan of Care expiration date: 2025      Subjective Evaluation    History of Present Illness  Mechanism of injury: Pt is a 63 y/o female presenting to physical therapy with R knee pain. Pt reports having chronic issues for many years where she has a surgical history of meniscal clean up. Pt reports she started to have significant pain in the R knee about a year ago. Pt reports going to see Dr. Montelongo where she was referred to Dr. Curiel. Pt reports receiving multiple injections which did not provide adequate relief. Pt reports she was planning on the R knee replacement but didn't against at this time. Pt reports having increased pain in her R knee with standing on her R knee, walking, stair negotiation, and bending. Pt reports she does locking and giving out of her R knee. Pt reports she will have decreased pain with voltaren and marijuanna. Pt reports she is currently using walker and AFO to walk.   Patient Goals  Patient goals for therapy: decreased pain, increased motion and increased strength    Pain  Current pain ratin  At best pain ratin  At worst pain rating: 10  Location: R knee  Quality: sharp and radiating  Relieving factors: rest and medications  Aggravating factors: walking, standing and stair climbing  Progression: worsening        Objective     Tenderness     Right Knee   Tenderness in the lateral joint  "line, lateral patella and medial joint line.     Active Range of Motion   Left Knee   Normal active range of motion    Right Knee   Flexion: 110 degrees   Extension: 0 degrees     Strength/Myotome Testing     Left Hip   Planes of Motion   Flexion: 4+  Extension: 4+  Abduction: 4+  Adduction: 4+  External rotation: 4+  Internal rotation: 4+    Right Hip   Planes of Motion   Flexion: 4-  Extension: 4-  Abduction: 4-  Adduction: 4-  External rotation: 4-  Internal rotation: 4-    Left Knee   Flexion: 4+  Extension: 4+    Right Knee   Flexion: 4-  Extension: 4-    Left Ankle/Foot   Dorsiflexion: 4+  Plantar flexion: 4+    Right Ankle/Foot   Dorsiflexion: 1  Plantar flexion: 4             Precautions: Hx of MS, foot drop    POC expires MC 30 days Auth Expiration date PT/OT + Visit Limit?   7/28 7/3 N/A BOMN                 Visit/Unit Tracking  AUTH Status:  Date 6/3              N/A Used 1               Remaining                  FOTO      Manuals 6/3            PROM R knee MATT            Patella mobilizations MATT                                      Neuro Re-Ed             Education on POC, diagnosis, and HEP 5'            Quad sets 1x10 5\"            SLR 1x10                                                                Ther Ex             LAQ 1x10            Heel slides 1x10                                                                                          Ther Activity                                       Gait Training                                       Modalities                                              "

## 2025-06-05 ENCOUNTER — OFFICE VISIT (OUTPATIENT)
Dept: PHYSICAL THERAPY | Facility: CLINIC | Age: 65
End: 2025-06-05
Attending: ORTHOPAEDIC SURGERY
Payer: MEDICARE

## 2025-06-05 DIAGNOSIS — M17.11 PRIMARY OSTEOARTHRITIS OF RIGHT KNEE: Primary | ICD-10-CM

## 2025-06-05 PROCEDURE — 97112 NEUROMUSCULAR REEDUCATION: CPT | Performed by: PHYSICAL THERAPIST

## 2025-06-05 PROCEDURE — 97140 MANUAL THERAPY 1/> REGIONS: CPT | Performed by: PHYSICAL THERAPIST

## 2025-06-05 PROCEDURE — 97110 THERAPEUTIC EXERCISES: CPT | Performed by: PHYSICAL THERAPIST

## 2025-06-05 NOTE — PROGRESS NOTES
"Daily Note     Today's date: 2025  Patient name: Catina Hilario  : 1960  MRN: [id number]  Referring provider: Kartik Curiel DO  Dx:   Encounter Diagnosis     ICD-10-CM    1. Primary osteoarthritis of right knee  M17.11           Start Time: 1546  Stop Time: 1639  Total time in clinic (min): 53 minutes    Subjective: Pt reports doing well after her initial evaluation with no increased pain of her R knee.       Objective: See treatment diary below      Assessment: Tolerated treatment well. Patient demonstrated fatigue post treatment, exhibited good technique with therapeutic exercises, and would benefit from continued PT. Pt was able to progress today with inclusion of tband hamstring curl into pt's exercise program. Pt required min verbal cues to facilitate performance of proper technique. Assess pt response to treatment at next visit.      Plan: Continue per plan of care.      Precautions: Hx of MS, foot drop    POC expires MC 30 days Auth Expiration date PT/OT + Visit Limit?   7/28 7/3 N/A BOMN                 Visit/Unit Tracking  AUTH Status:  Date 6/3 6/5             N/A Used 1 2              Remaining                  FOTO      Manuals 6/3 6/5           PROM R knee MATT MATT           Patella mobilizations MATT MATT           Tibiofemoral mobilizations in sitting  MATT                        Neuro Re-Ed             Pt education 5' 5'           Quad sets 1x10 5\" 2x10 5\"           SLR 1x10 2x10           Hamstring isometric  1x10 5\"                                                  Ther Ex             LAQ 1x10 3x10           Heel slides c strap 1x10 2x10 5\"           Nustep for LE endurance  10' lvl 2-3           Seated tband hamstring curl  2x10 grn           Seated knee flexion stretch c strap  2x10 5\"                                                  Ther Activity                                       Gait Training                                       Modalities                                        "

## 2025-06-09 ENCOUNTER — APPOINTMENT (OUTPATIENT)
Dept: PHYSICAL THERAPY | Facility: CLINIC | Age: 65
End: 2025-06-09
Attending: ORTHOPAEDIC SURGERY
Payer: MEDICARE

## 2025-06-10 ENCOUNTER — OFFICE VISIT (OUTPATIENT)
Dept: PHYSICAL THERAPY | Facility: CLINIC | Age: 65
End: 2025-06-10
Attending: ORTHOPAEDIC SURGERY
Payer: MEDICARE

## 2025-06-10 DIAGNOSIS — M17.11 PRIMARY OSTEOARTHRITIS OF RIGHT KNEE: Primary | ICD-10-CM

## 2025-06-10 PROCEDURE — 97112 NEUROMUSCULAR REEDUCATION: CPT | Performed by: PHYSICAL THERAPIST

## 2025-06-10 PROCEDURE — 97110 THERAPEUTIC EXERCISES: CPT | Performed by: PHYSICAL THERAPIST

## 2025-06-10 NOTE — PROGRESS NOTES
"Daily Note     Today's date: 6/10/2025  Patient name: Catina Hilario  : 1960  MRN: [id number]  Referring provider: Kartik Curiel DO  Dx:   Encounter Diagnosis     ICD-10-CM    1. Primary osteoarthritis of right knee  M17.11           Start Time: 1145  Stop Time: 1231  Total time in clinic (min): 46 minutes    Subjective: Pt reports having increased pain and soreness in her R knee after her last therapy session.      Objective: See treatment diary below      Assessment: Tolerated treatment well. Patient demonstrated fatigue post treatment, exhibited good technique with therapeutic exercises, and would benefit from continued PT. Pt was able to progress today with inclusion of hip adduction squeeze into pt's exercise program. Pt required min verbal cues to facilitate performance of proper technique. Assess pt response to treatment at next visit.      Plan: Continue per plan of care.      Precautions: Hx of MS, foot drop    POC expires MC 30 days Auth Expiration date PT/OT + Visit Limit?   7/28 7/3 N/A BOMN                 Visit/Unit Tracking  AUTH Status:  Date 6/3 6/5 6/10            N/A Used 1 2 3             Remaining                  FOTO      Manuals 6/3 6/5 6/10          PROM R knee MATT MATT MATT          Patella mobilizations MATT MATT MATT          Tibiofemoral mobilizations in sitting  MATT MATT                       Neuro Re-Ed             Pt education 5' 5' 5'          Quad sets 1x10 5\" 2x10 5\" 2x10 5\"          SLR 1x10 2x10 3x10          Hamstring isometric  1x10 5\" 1x10 5\"          Hip add sque   3x10 5\"                                    Ther Ex             LAQ 1x10 3x10 3x10          Heel slides c strap 1x10 2x10 5\" 3x10 5\"          Nustep for LE endurance  10' lvl 2-3 8' lvl 3          Seated tband hamstring curl  2x10 grn 2x10 blue          Seated knee flexion stretch c strap  2x10 5\" 2x10 5\"                                                 Ther Activity                                       Gait " Training                                       Modalities

## 2025-06-12 ENCOUNTER — OFFICE VISIT (OUTPATIENT)
Dept: PHYSICAL THERAPY | Facility: CLINIC | Age: 65
End: 2025-06-12
Attending: ORTHOPAEDIC SURGERY
Payer: MEDICARE

## 2025-06-12 DIAGNOSIS — M17.11 PRIMARY OSTEOARTHRITIS OF RIGHT KNEE: Primary | ICD-10-CM

## 2025-06-12 PROCEDURE — 97110 THERAPEUTIC EXERCISES: CPT

## 2025-06-12 PROCEDURE — 97112 NEUROMUSCULAR REEDUCATION: CPT

## 2025-06-12 PROCEDURE — 97140 MANUAL THERAPY 1/> REGIONS: CPT

## 2025-06-12 NOTE — PROGRESS NOTES
"Daily Note     Today's date: 2025  Patient name: Catina Hilairo  : 1960  MRN: [id number]  Referring provider: Kartik Curiel DO  Dx:   Encounter Diagnosis     ICD-10-CM    1. Primary osteoarthritis of right knee  M17.11           Start Time: 1145  Stop Time: 1231  Total time in clinic (min): 46 minutes    Subjective: Pt reports she walked over from the eye doctor, its going better today       Objective: See treatment diary below      Assessment: Tolerated treatment well. Pt demonstrates good effort throughout session. Minimal cueing given to facilitate proper exercise performance and technique. With focus, she is able to maintain appropriate R LE control with heel slides, SLRs, and LAQs. Continue to progress as tolerated.  Patient demonstrated fatigue post treatment, exhibited good technique with therapeutic exercises, and would benefit from continued PT      Plan: Continue per plan of care.      Precautions: Hx of MS, foot drop    POC expires MC 30 days Auth Expiration date PT/OT + Visit Limit?   7/28 7/3 N/A BOMN                 Visit/Unit Tracking  AUTH Status:  Date 6/3 6/5 6/10 6/12           N/A Used 1 2 3 4            Remaining                  FOTO      Manuals 6/3 6/5 6/10 6/12         PROM R knee MATT MATT MATT KT         Patella mobilizations MATT MATT MATT KT         Tibiofemoral mobilizations in sitting  MATT MATT Distraction KT         Hamstring st    KT         Neuro Re-Ed             Pt education 5' 5' 5' 5'         Quad sets 1x10 5\" 2x10 5\" 2x10 5\" 2x10 5\"         SLR 1x10 2x10 3x10 3x10         Hamstring isometric  1x10 5\" 1x10 5\"          Hip add sque   3x10 5\" 3x10 5\"                                   Ther Ex             LAQ 1x10 3x10 3x10 3x10         Heel slides c strap 1x10 2x10 5\" 3x10 5\" 2x10 5\"         Nustep for LE endurance  10' lvl 2-3 8' lvl 3 8' lvl 3         Seated tband hamstring curl  2x10 grn 2x10 blue 2x10 blue         Seated knee flexion stretch c strap  2x10 5\" 2x10 5\" " "2x10 5\"                                                Ther Activity                                       Gait Training                                       Modalities                                                       "

## 2025-06-13 DIAGNOSIS — R06.02 SOB (SHORTNESS OF BREATH): ICD-10-CM

## 2025-06-16 ENCOUNTER — APPOINTMENT (OUTPATIENT)
Dept: PHYSICAL THERAPY | Facility: CLINIC | Age: 65
End: 2025-06-16
Attending: ORTHOPAEDIC SURGERY
Payer: MEDICARE

## 2025-06-16 ENCOUNTER — TELEPHONE (OUTPATIENT)
Dept: FAMILY MEDICINE CLINIC | Facility: CLINIC | Age: 65
End: 2025-06-16

## 2025-06-16 DIAGNOSIS — R06.02 SOB (SHORTNESS OF BREATH): ICD-10-CM

## 2025-06-16 RX ORDER — FLUTICASONE PROPIONATE AND SALMETEROL XINAFOATE 45; 21 UG/1; UG/1
AEROSOL, METERED RESPIRATORY (INHALATION)
Refills: 0 | OUTPATIENT
Start: 2025-06-16

## 2025-06-16 RX ORDER — FLUTICASONE PROPIONATE AND SALMETEROL 100; 50 UG/1; UG/1
1 POWDER RESPIRATORY (INHALATION) 2 TIMES DAILY
Qty: 60 BLISTER | Refills: 0 | Status: SHIPPED | OUTPATIENT
Start: 2025-06-16

## 2025-06-16 NOTE — TELEPHONE ENCOUNTER
PA for Fluticasone-Salmeterol (Advair Diskus) 100-50 mcg SUBMITTED            via    [x]SS    [x]PA sent as URGENT    All office notes, labs and other pertaining documents and studies sent. Clinical questions answered. Awaiting determination from insurance company.     Turnaround time for your insurance to make a decision on your Prior Authorization can take 7-21 business days.

## 2025-06-18 NOTE — TELEPHONE ENCOUNTER
PA for Fluticasone-Salmeterol (Advair Diskus) 100-50 mcg  APPROVED     Date(s) approved           Patient advised by          [x]Attendifyt Message  []Phone call   []LMOM  []L/M to call office as no active Communication consent on file  []Unable to leave detailed message as VM not approved on Communication consent       Pharmacy advised by    [x]Fax  []Phone call  []Secure Chat      Approval letter scanned into Media Yes

## 2025-06-20 ENCOUNTER — OFFICE VISIT (OUTPATIENT)
Dept: PHYSICAL THERAPY | Facility: CLINIC | Age: 65
End: 2025-06-20
Attending: ORTHOPAEDIC SURGERY
Payer: MEDICARE

## 2025-06-20 DIAGNOSIS — M17.11 PRIMARY OSTEOARTHRITIS OF RIGHT KNEE: Primary | ICD-10-CM

## 2025-06-20 PROCEDURE — 97110 THERAPEUTIC EXERCISES: CPT | Performed by: PHYSICAL THERAPIST

## 2025-06-20 PROCEDURE — 97112 NEUROMUSCULAR REEDUCATION: CPT | Performed by: PHYSICAL THERAPIST

## 2025-06-20 PROCEDURE — 97140 MANUAL THERAPY 1/> REGIONS: CPT | Performed by: PHYSICAL THERAPIST

## 2025-06-20 NOTE — PROGRESS NOTES
"Daily Note     Today's date: 2025  Patient name: Catina Hilario  : 1960  MRN: [id number]  Referring provider: Kartik Curiel DO  Dx:   Encounter Diagnosis     ICD-10-CM    1. Primary osteoarthritis of right knee  M17.11           Start Time: 1145  Stop Time: 1230  Total time in clinic (min): 45 minutes    Subjective: Pt reports she is having more fatigue and continues to have pain in her R knee.       Objective: See treatment diary below      Assessment: Tolerated treatment well. Patient demonstrated fatigue post treatment, exhibited good technique with therapeutic exercises, and would benefit from continued PT. Pt was able to progress today by increasing resistance for her LAQ exercise. Pt required min verbal cues to facilitate performance of proper technique. Assess pt response to treatment at next visit.       Plan: Continue per plan of care.      Precautions: Hx of MS, foot drop    POC expires MC 30 days Auth Expiration date PT/OT + Visit Limit?   7/28 7/3 N/A BOMN                 Visit/Unit Tracking  AUTH Status:  Date 6/3 6/5 6/10 6/12 6/20          N/A Used 1 2 3 4 5           Remaining                  FOTO, do NV      Manuals 6/3 6/5 6/10 6/12 6/20        PROM R knee MATT MATT MATT KT MATT        Patella mobilizations MATT MATT MATT KT MATT        Tibiofemoral mobilizations in sitting  MATT MATT Distraction KT MATT        Hamstring st    KT         Neuro Re-Ed             Pt education 5' 5' 5' 5' 5'        Quad sets 1x10 5\" 2x10 5\" 2x10 5\" 2x10 5\" 2x10 5\"        SLR 1x10 2x10 3x10 3x10 3x10        Hamstring isometric  1x10 5\" 1x10 5\"          Hip add sque   3x10 5\" 3x10 5\" 3x10 5\"                                  Ther Ex             LAQ 1x10 3x10 3x10 3x10 3x10 3#        Heel slides c strap 1x10 2x10 5\" 3x10 5\" 2x10 5\" 2x10 5\"        Nustep for LE endurance  10' lvl 2-3 8' lvl 3 8' lvl 3 NV        Seated tband hamstring curl  2x10 grn 2x10 blue 2x10 blue 2x10 blue        Seated knee flexion stretch c strap  " "2x10 5\" 2x10 5\" 2x10 5\" 2x10 5\"                                               Ther Activity                                       Gait Training                                       Modalities                                                         "

## 2025-06-23 ENCOUNTER — OFFICE VISIT (OUTPATIENT)
Dept: PHYSICAL THERAPY | Facility: CLINIC | Age: 65
End: 2025-06-23
Attending: ORTHOPAEDIC SURGERY
Payer: MEDICARE

## 2025-06-23 DIAGNOSIS — M17.11 PRIMARY OSTEOARTHRITIS OF RIGHT KNEE: Primary | ICD-10-CM

## 2025-06-23 PROCEDURE — 97110 THERAPEUTIC EXERCISES: CPT | Performed by: PHYSICAL THERAPIST

## 2025-06-23 PROCEDURE — 97140 MANUAL THERAPY 1/> REGIONS: CPT | Performed by: PHYSICAL THERAPIST

## 2025-06-23 PROCEDURE — 97112 NEUROMUSCULAR REEDUCATION: CPT | Performed by: PHYSICAL THERAPIST

## 2025-06-23 NOTE — PROGRESS NOTES
"Daily Note     Today's date: 2025  Patient name: Catina Hilario  : 1960  MRN: [id number]  Referring provider: Kartik Curiel DO  Dx:   Encounter Diagnosis     ICD-10-CM    1. Primary osteoarthritis of right knee  M17.11           Start Time: 1100  Stop Time: 1145  Total time in clinic (min): 45 minutes    Subjective: Pt reports she continues to have pain in her R knee with walking and standing.       Objective: See treatment diary below      Assessment: Tolerated treatment well. Patient demonstrated fatigue post treatment, exhibited good technique with therapeutic exercises, and would benefit from continued PT. Pt was able to progress today by able to increase resistance for her LAQ exercises. Pt required min verbal cues to facilitate performance of proper technique. Assess pt response to treatment at next visit.      Plan: Continue per plan of care.      Precautions: Hx of MS, foot drop    POC expires MC 30 days Auth Expiration date PT/OT + Visit Limit?   7/28 7/3 N/A BOMN                 Visit/Unit Tracking  AUTH Status:  Date 6/3 6/5 6/10 6/12 6/20 6/23 FOTO         N/A Used 1 2 3 4 5 6          Remaining                  FOTO, done on       Manuals 6/3 6/5 6/10 6/12 6/20 6/23       PROM R knee MATT MATT MATT KT MATT MATT       Patella mobilizations MATT MATT MATT KT MATT MATT       Tibiofemoral mobilizations in sitting  MATT MATT Distraction KT MATT MATT       Hamstring st    KT         Neuro Re-Ed             Pt education 5' 5' 5' 5' 5' 5'       Quad sets 1x10 5\" 2x10 5\" 2x10 5\" 2x10 5\" 2x10 5\" 2x10 5\"       SLR 1x10 2x10 3x10 3x10 3x10 3x10       Hamstring isometric  1x10 5\" 1x10 5\"          Hip add sque   3x10 5\" 3x10 5\" 3x10 5\" 3x10 5\"                                 Ther Ex             LAQ 1x10 3x10 3x10 3x10 3x10 3# 3x10 4#       Heel slides c strap 1x10 2x10 5\" 3x10 5\" 2x10 5\" 2x10 5\" 2x10 5\"       Nustep for LE endurance  10' lvl 2-3 8' lvl 3 8' lvl 3 NV 8' lvl 3       Seated tband hamstring curl  2x10 grn " "2x10 blue 2x10 blue 2x10 blue 2x10 blue       Seated knee flexion stretch c strap  2x10 5\" 2x10 5\" 2x10 5\" 2x10 5\" 2x10 5\"                                              Ther Activity                                       Gait Training                                       Modalities                                                           "

## 2025-06-26 ENCOUNTER — OFFICE VISIT (OUTPATIENT)
Dept: PHYSICAL THERAPY | Facility: CLINIC | Age: 65
End: 2025-06-26
Attending: ORTHOPAEDIC SURGERY
Payer: MEDICARE

## 2025-06-26 DIAGNOSIS — M17.11 PRIMARY OSTEOARTHRITIS OF RIGHT KNEE: Primary | ICD-10-CM

## 2025-06-26 PROCEDURE — 97110 THERAPEUTIC EXERCISES: CPT

## 2025-06-26 PROCEDURE — 97140 MANUAL THERAPY 1/> REGIONS: CPT

## 2025-06-26 PROCEDURE — 97112 NEUROMUSCULAR REEDUCATION: CPT

## 2025-06-26 NOTE — PROGRESS NOTES
"Daily Note     Today's date: 2025  Patient name: Catina Hilario  : 1960  MRN: [id number]  Referring provider: Kartik Curiel DO  Dx:   Encounter Diagnosis     ICD-10-CM    1. Primary osteoarthritis of right knee  M17.11           Start Time: 1100  Stop Time: 1145  Total time in clinic (min): 45 minutes    Subjective: Pt reports she wants to walk with the cane today.       Objective: See treatment diary below      Assessment: Tolerated treatment well. Pt demonstrates good effort throughout session. Minimal cueing given to facilitate proper exercise performance and technique. Added ambulation in the gym with her SPC, she was very unsteady with this and demonstrated intermittent toe drag. She struggles with SLRs in supine this visit.  Patient demonstrated fatigue post treatment, exhibited good technique with therapeutic exercises, and would benefit from continued PT      Plan: Continue per plan of care.      Precautions: Hx of MS, foot drop    POC expires MC 30 days Auth Expiration date PT/OT + Visit Limit?   7/28 7/3 N/A BOMN                 Visit/Unit Tracking  AUTH Status:  Date 6/3 6/5 6/10 6/12 6/20 6/23 FOTO         N/A Used 1 2 3 4 5 6 7         Remaining                  FOTO, done on       Manuals 6/3 6/5 6/10 6/12 6/20 6/23 6/26      PROM R knee MATT MATT MATT KT MATT MATT KT      Patella mobilizations MATT MATT MATT KT MATT MATT KT ROM      Tibiofemoral mobilizations in sitting  MATT MATT Distraction KT MATT MATT       Hamstring st    KT         Neuro Re-Ed             Pt education 5' 5' 5' 5' 5' 5' 5'      Quad sets 1x10 5\" 2x10 5\" 2x10 5\" 2x10 5\" 2x10 5\" 2x10 5\" 2x10 5\"      SLR 1x10 2x10 3x10 3x10 3x10 3x10 2x10      Hamstring isometric  1x10 5\" 1x10 5\"          Hip add sque   3x10 5\" 3x10 5\" 3x10 5\" 3x10 5\" 3x10 5\"                                Ther Ex             LAQ 1x10 3x10 3x10 3x10 3x10 3# 3x10 4# 3x10 5#      Heel slides c strap 1x10 2x10 5\" 3x10 5\" 2x10 5\" 2x10 5\" 2x10 5\" 2x10 5\"      Nustep " "for LE endurance  10' lvl 2-3 8' lvl 3 8' lvl 3 NV 8' lvl 3 9' lvl3      Seated tband hamstring curl  2x10 grn 2x10 blue 2x10 blue 2x10 blue 2x10 blue 2x10 blue      Seated knee flexion stretch c strap  2x10 5\" 2x10 5\" 2x10 5\" 2x10 5\" 2x10 5\" 2x10 5\"                                             Ther Activity                                       Gait Training             Amb c SPC and gaitbelt       1 lap across gym                   Modalities                                                             "

## 2025-06-30 ENCOUNTER — APPOINTMENT (OUTPATIENT)
Dept: PHYSICAL THERAPY | Facility: CLINIC | Age: 65
End: 2025-06-30
Attending: ORTHOPAEDIC SURGERY
Payer: MEDICARE

## 2025-07-03 ENCOUNTER — APPOINTMENT (OUTPATIENT)
Dept: PHYSICAL THERAPY | Facility: CLINIC | Age: 65
End: 2025-07-03
Attending: ORTHOPAEDIC SURGERY
Payer: MEDICARE

## 2025-07-07 ENCOUNTER — APPOINTMENT (OUTPATIENT)
Dept: PHYSICAL THERAPY | Facility: CLINIC | Age: 65
End: 2025-07-07
Attending: ORTHOPAEDIC SURGERY
Payer: MEDICARE

## 2025-07-07 ENCOUNTER — TELEPHONE (OUTPATIENT)
Age: 65
End: 2025-07-07

## 2025-07-07 NOTE — TELEPHONE ENCOUNTER
Left message on voicemail to confirm patients appointment tomorrow.  Informed patient of the date, time and location. Advised to call office at [phone] if they need to reschedule their visit.

## 2025-07-10 ENCOUNTER — APPOINTMENT (OUTPATIENT)
Dept: PHYSICAL THERAPY | Facility: CLINIC | Age: 65
End: 2025-07-10
Attending: ORTHOPAEDIC SURGERY
Payer: MEDICARE

## 2025-07-14 ENCOUNTER — OFFICE VISIT (OUTPATIENT)
Dept: PHYSICAL THERAPY | Facility: CLINIC | Age: 65
End: 2025-07-14
Attending: ORTHOPAEDIC SURGERY
Payer: MEDICARE

## 2025-07-14 DIAGNOSIS — M17.11 PRIMARY OSTEOARTHRITIS OF RIGHT KNEE: Primary | ICD-10-CM

## 2025-07-14 PROCEDURE — 97112 NEUROMUSCULAR REEDUCATION: CPT | Performed by: PHYSICAL THERAPIST

## 2025-07-14 PROCEDURE — 97110 THERAPEUTIC EXERCISES: CPT | Performed by: PHYSICAL THERAPIST

## 2025-07-14 PROCEDURE — 97140 MANUAL THERAPY 1/> REGIONS: CPT | Performed by: PHYSICAL THERAPIST

## 2025-07-14 NOTE — PROGRESS NOTES
PT Re-Evaluation     Today's date: 2025  Patient name: Catina Hilario  : 1960  MRN: [id number]  Referring provider: Kartik Curiel DO  Dx:   Encounter Diagnosis     ICD-10-CM    1. Primary osteoarthritis of right knee  M17.11           Start Time: 1100  Stop Time: 1145  Total time in clinic (min): 45 minutes    Assessment  Impairments: abnormal gait, abnormal or restricted ROM, activity intolerance, impaired physical strength, lacks appropriate home exercise program, pain with function and activity limitations  Functional limitations: self-care/ADLs, household activities, stair negotiation, and ambulation    Assessment details: Pt is a 63 y/o female presenting to physical therapy with R knee pain. Upon re-examination, pt has improved strength and ROM of pt's R knee and hip as well as decreased pain of the same regions. Pt's FOTO score has improved, demonstrating an improved ability to perform functional activities. However, pt continues to have deficits in strength and ROM of pt's R knee and hip as well as pain and difficulty with functional activities. Pt plan of care will focus on improving the previously mentioned deficits and limitations. Pt would benefit from skilled physical therapy to improve pt's overall functional mobility and quality of life.               Understanding of Dx/Px/POC: good     Prognosis: fair    Goals  STG - 4 weeks  1. Pt will have a subjective decrease in pain of pt's R knee by 2 levels or more during performance of household activities Ongoing  2. Pt will demonstrate an improvement by 10 degrees or more of pt's R knee flexion to improve pt's ability to household activities MET    LTG - 8 weeks  1. Pt will demonstrate 4/5 gross strength or better of pt's R knee and hip in all planes to facilitate performance of stair negotiation Ongoing  2. Pt's FOTO score will improve by 10 points or better to facilitate a return to pt's prior level of function.  Ongoing      Plan  Patient would benefit from: PT eval and skilled physical therapy  Planned modality interventions: cryotherapy, low level laser therapy and unattended electrical stimulation    Planned therapy interventions: activity modification, ADL training, behavior modification, balance, functional ROM exercises, graded exercise, home exercise program, flexibility, IASTM, joint mobilization, kinesiology taping, manual therapy, massage, Degroot taping, neuromuscular re-education, postural training, patient/caregiver education, self care, strengthening, stretching, therapeutic activities and therapeutic exercise    Frequency: 2x week  Duration in weeks: 8  Plan of Care beginning date: 2025  Plan of Care expiration date: 2025      Subjective Evaluation    History of Present Illness  Mechanism of injury: Pt is a 65 y/o female presenting to physical therapy with R knee pain. Pt reports having a slight reduction in pain of her R knee when at rest. Pt reports also having improvements in bending and straightening of her R knee since starting therapy. Pt reports she is now performing SLR with greater ease and feels her R quad is getting stronger. However, pt reports her R knee has been giving out on her when she has been walking. Pt reports she also continues to have significant pain in her R knee with any WB activities. Pt reports she continues to have difficulty with performing household activities and ADLs because of her knee. Pt reports she has slightly improved since starting therapy.   Patient Goals  Patient goals for therapy: decreased pain, increased motion and increased strength    Pain  Current pain ratin  At best pain ratin  At worst pain ratin  Location: R knee  Quality: sharp and radiating  Relieving factors: rest and medications  Aggravating factors: walking, standing and stair climbing  Progression: worsening        Objective     Tenderness     Right Knee   Tenderness in the  "lateral joint line, lateral patella and medial joint line.     Active Range of Motion   Left Knee   Normal active range of motion    Right Knee   Flexion: 110 degrees   Extension: 0 degrees     Strength/Myotome Testing     Left Hip   Planes of Motion   Flexion: 4+  Extension: 4+  Abduction: 4+  Adduction: 4+  External rotation: 4+  Internal rotation: 4+    Right Hip   Planes of Motion   Flexion: 4-  Extension: 4-  Abduction: 4  Adduction: 4  External rotation: 4-  Internal rotation: 4    Left Knee   Flexion: 4+  Extension: 4+    Right Knee   Flexion: 4  Extension: 4-    Left Ankle/Foot   Dorsiflexion: 4+  Plantar flexion: 4+    Right Ankle/Foot   Dorsiflexion: 1  Plantar flexion: 4             Precautions: Hx of MS, foot drop    POC expires MC 30 days Auth Expiration date PT/OT + Visit Limit?   9/8 8/14 N/A BOMN                 Visit/Unit Tracking  AUTH Status:  Date 6/3 6/5 6/10 6/12 6/20 6/23 FOTO 6/26 7/14       N/A Used 1 2 3 4 5 6 7 8        Remaining                  FOTO, done on 6/23      Manuals 6/3 6/5 6/10 6/12 6/20 6/23 6/26 7/14     PROM R knee MATT MATT MATT KT MATT MATT KT MATT     Patella mobilizations MATT MATT MATT KT MATT MATT KT ROM MATT     Tibiofemoral mobilizations in sitting  MATT MATT Distraction KT MATT MATT       Hamstring st    KT         Neuro Re-Ed             Pt education 5' 5' 5' 5' 5' 5' 5' 5'     Quad sets 1x10 5\" 2x10 5\" 2x10 5\" 2x10 5\" 2x10 5\" 2x10 5\" 2x10 5\" 2x10 5\"     SLR 1x10 2x10 3x10 3x10 3x10 3x10 2x10 2x10     Hamstring isometric  1x10 5\" 1x10 5\"          Hip add sque   3x10 5\" 3x10 5\" 3x10 5\" 3x10 5\" 3x10 5\" 3x10 5\"                               Ther Ex             LAQ 1x10 3x10 3x10 3x10 3x10 3# 3x10 4# 3x10 5# 3x10 5#     Heel slides c strap 1x10 2x10 5\" 3x10 5\" 2x10 5\" 2x10 5\" 2x10 5\" 2x10 5\" 2x10 5\"     Nustep for LE endurance  10' lvl 2-3 8' lvl 3 8' lvl 3 NV 8' lvl 3 9' lvl3 9' lvl 3     Seated tband hamstring curl  2x10 grn 2x10 blue 2x10 blue 2x10 blue 2x10 blue 2x10 blue 2x10 blue     Seated " "knee flexion stretch c strap  2x10 5\" 2x10 5\" 2x10 5\" 2x10 5\" 2x10 5\" 2x10 5\" 2x10 5\"                                            Ther Activity                                       Gait Training             Amb c SPC and gaitbelt       1 lap across gym 1 lap across gym                  Modalities                                                "

## 2025-07-17 ENCOUNTER — APPOINTMENT (OUTPATIENT)
Dept: PHYSICAL THERAPY | Facility: CLINIC | Age: 65
End: 2025-07-17
Attending: ORTHOPAEDIC SURGERY
Payer: MEDICARE

## 2025-07-21 ENCOUNTER — OFFICE VISIT (OUTPATIENT)
Dept: PHYSICAL THERAPY | Facility: CLINIC | Age: 65
End: 2025-07-21
Attending: ORTHOPAEDIC SURGERY
Payer: MEDICARE

## 2025-07-21 DIAGNOSIS — M17.11 PRIMARY OSTEOARTHRITIS OF RIGHT KNEE: Primary | ICD-10-CM

## 2025-07-21 PROCEDURE — 97116 GAIT TRAINING THERAPY: CPT | Performed by: PHYSICAL THERAPIST

## 2025-07-21 PROCEDURE — 97112 NEUROMUSCULAR REEDUCATION: CPT | Performed by: PHYSICAL THERAPIST

## 2025-07-21 PROCEDURE — 97110 THERAPEUTIC EXERCISES: CPT | Performed by: PHYSICAL THERAPIST

## 2025-07-21 NOTE — PROGRESS NOTES
"Daily Note     Today's date: 2025  Patient name: Catina Hilario  : 1960  MRN: [id number]  Referring provider: Kartik Curiel DO  Dx:   Encounter Diagnosis     ICD-10-CM    1. Primary osteoarthritis of right knee  M17.11           Start Time: 1100  Stop Time: 1145  Total time in clinic (min): 45 minutes    Subjective: Pt reports having increased pain in her R knee over the weekend causing her difficulty with walking.       Objective: See treatment diary below      Assessment: Tolerated treatment well. Patient demonstrated fatigue post treatment, exhibited good technique with therapeutic exercises, and would benefit from continued PT. Pt was able to progress today with inclusion of SL leg press into pt's exercise program. Pt required min verbal cues to facilitate performance of proper technique. Assess pt response to treatment at next visit.      Plan: Continue per plan of care.      Precautions: Hx of MS, foot drop    POC expires MC 30 days Auth Expiration date PT/OT + Visit Limit?    N/A BOMN                 Visit/Unit Tracking  AUTH Status:  Date 6/3 6/5 6/10 6/12 6/20 6/23 FOTO       N/A Used 1 2 3 4 5 6 7 8 9       Remaining                  FOTO, done on       Manuals 6/3 6/5 6/10 6/12 6/20 6/23 6/26 7/14 7/21    PROM R knee MATT MATT MATT KT MATT MATT KT MATT     Patella mobilizations MATT MATT MATT KT MATT MATT KT ROM MATT     Tibiofemoral mobilizations in sitting  MATT MATT Distraction KT MATT MATT       Hamstring st    KT         Neuro Re-Ed             Pt education 5' 5' 5' 5' 5' 5' 5' 5' 5'    Quad sets 1x10 5\" 2x10 5\" 2x10 5\" 2x10 5\" 2x10 5\" 2x10 5\" 2x10 5\" 2x10 5\"     SLR 1x10 2x10 3x10 3x10 3x10 3x10 2x10 2x10 2x10    Hamstring isometric  1x10 5\" 1x10 5\"          Hip add sque   3x10 5\" 3x10 5\" 3x10 5\" 3x10 5\" 3x10 5\" 3x10 5\" 3x10 5\"                              Ther Ex             LAQ 1x10 3x10 3x10 3x10 3x10 3# 3x10 4# 3x10 5# 3x10 5# 3x10 5#    Heel slides c strap 1x10 2x10 5\" 3x10 5\" " "2x10 5\" 2x10 5\" 2x10 5\" 2x10 5\" 2x10 5\"     Nustep for LE endurance  10' lvl 2-3 8' lvl 3 8' lvl 3 NV 8' lvl 3 9' lvl3 9' lvl 3 9' lvl 3    Seated tband hamstring curl  2x10 grn 2x10 blue 2x10 blue 2x10 blue 2x10 blue 2x10 blue 2x10 blue 2x10 blue    Seated knee flexion stretch c strap  2x10 5\" 2x10 5\" 2x10 5\" 2x10 5\" 2x10 5\" 2x10 5\" 2x10 5\" 2x10 5\"    SL leg press         2x10 25#                              Ther Activity                                       Gait Training             Amb c SPC and gaitbelt       1 lap across gym 1 lap across gym 1 lap across gym                 Modalities                                                     "

## 2025-07-24 ENCOUNTER — APPOINTMENT (OUTPATIENT)
Dept: PHYSICAL THERAPY | Facility: CLINIC | Age: 65
End: 2025-07-24
Attending: ORTHOPAEDIC SURGERY
Payer: MEDICARE

## 2025-07-24 ENCOUNTER — TELEPHONE (OUTPATIENT)
Age: 65
End: 2025-07-24

## 2025-07-24 ENCOUNTER — OFFICE VISIT (OUTPATIENT)
Dept: FAMILY MEDICINE CLINIC | Facility: CLINIC | Age: 65
End: 2025-07-24
Payer: MEDICARE

## 2025-07-24 VITALS
DIASTOLIC BLOOD PRESSURE: 60 MMHG | SYSTOLIC BLOOD PRESSURE: 100 MMHG | WEIGHT: 179 LBS | HEIGHT: 65 IN | HEART RATE: 83 BPM | TEMPERATURE: 97.9 F | BODY MASS INDEX: 29.82 KG/M2 | OXYGEN SATURATION: 97 %

## 2025-07-24 DIAGNOSIS — R30.0 DYSURIA: Primary | ICD-10-CM

## 2025-07-24 DIAGNOSIS — N30.01 ACUTE CYSTITIS WITH HEMATURIA: ICD-10-CM

## 2025-07-24 LAB
SL AMB  POCT GLUCOSE, UA: ABNORMAL
SL AMB LEUKOCYTE ESTERASE,UA: ABNORMAL
SL AMB POCT BILIRUBIN,UA: ABNORMAL
SL AMB POCT BLOOD,UA: ABNORMAL
SL AMB POCT CLARITY,UA: ABNORMAL
SL AMB POCT COLOR,UA: ABNORMAL
SL AMB POCT KETONES,UA: ABNORMAL
SL AMB POCT NITRITE,UA: ABNORMAL
SL AMB POCT PH,UA: 6
SL AMB POCT SPECIFIC GRAVITY,UA: 1.01
SL AMB POCT URINE PROTEIN: ABNORMAL
SL AMB POCT UROBILINOGEN: 0.2

## 2025-07-24 PROCEDURE — 87086 URINE CULTURE/COLONY COUNT: CPT | Performed by: STUDENT IN AN ORGANIZED HEALTH CARE EDUCATION/TRAINING PROGRAM

## 2025-07-24 PROCEDURE — 81002 URINALYSIS NONAUTO W/O SCOPE: CPT | Performed by: STUDENT IN AN ORGANIZED HEALTH CARE EDUCATION/TRAINING PROGRAM

## 2025-07-24 PROCEDURE — 87186 SC STD MICRODIL/AGAR DIL: CPT | Performed by: STUDENT IN AN ORGANIZED HEALTH CARE EDUCATION/TRAINING PROGRAM

## 2025-07-24 PROCEDURE — 99213 OFFICE O/P EST LOW 20 MIN: CPT | Performed by: STUDENT IN AN ORGANIZED HEALTH CARE EDUCATION/TRAINING PROGRAM

## 2025-07-24 PROCEDURE — G2211 COMPLEX E/M VISIT ADD ON: HCPCS | Performed by: STUDENT IN AN ORGANIZED HEALTH CARE EDUCATION/TRAINING PROGRAM

## 2025-07-24 PROCEDURE — 87077 CULTURE AEROBIC IDENTIFY: CPT | Performed by: STUDENT IN AN ORGANIZED HEALTH CARE EDUCATION/TRAINING PROGRAM

## 2025-07-24 RX ORDER — CIPROFLOXACIN 500 MG/1
500 TABLET, FILM COATED ORAL EVERY 12 HOURS SCHEDULED
Qty: 20 TABLET | Refills: 0 | Status: SHIPPED | OUTPATIENT
Start: 2025-07-24 | End: 2025-08-03

## 2025-07-24 RX ORDER — SACCHAROMYCES BOULARDII 250 MG
250 CAPSULE ORAL 2 TIMES DAILY
Qty: 60 CAPSULE | Refills: 0 | Status: SHIPPED | OUTPATIENT
Start: 2025-07-24

## 2025-07-24 NOTE — TELEPHONE ENCOUNTER
Patient c/o abdominal pain, painful urination and urgency.  Patient scheduled with PCP today at 12:40 pm. She will bring along insurance cards and photo ID to appointment as well

## 2025-07-24 NOTE — PROGRESS NOTES
"Name: Catina Hilario      : 1960      MRN: [id number]  Encounter Provider: Elise Schmidt DO  Encounter Date: 2025   Encounter department: [facility] PRIMARY CARE  :  Assessment & Plan  Dysuria    Orders:  •  POCT urine dip    Acute cystitis with hematuria  POCT UA showing Small Bili, Moderate Blood. Large leukocytes.   Historically patient has had best outcomes with Cipro for UTI, at this time we will start Cipro 500 mg twice daily x 10 days.  Will send urine off for culture and will follow-up pending results.  Orders:  •  ciprofloxacin (CIPRO) 500 mg tablet; Take 1 tablet (500 mg total) by mouth every 12 (twelve) hours for 10 days  •  saccharomyces boulardii (FLORASTOR) 250 mg capsule; Take 1 capsule (250 mg total) by mouth 2 (two) times a day           History of Present Illness   Difficulty Urinating   This is a new problem. The current episode started 1 to 4 weeks ago. The problem has been gradually worsening. The quality of the pain is described as burning. The pain is mild. There has been no fever. Associated symptoms include flank pain and frequency. Pertinent negatives include no chills, discharge, hesitancy or nausea. She has tried nothing for the symptoms. The treatment provided no relief. Her past medical history is significant for recurrent UTIs.     Review of Systems   Constitutional:  Negative for chills.   HENT:  Negative for congestion and rhinorrhea.    Respiratory:  Negative for cough and shortness of breath.    Cardiovascular:  Negative for chest pain and palpitations.   Gastrointestinal:  Negative for abdominal pain, constipation, diarrhea and nausea.   Genitourinary:  Positive for dysuria, flank pain and frequency. Negative for hesitancy.   Neurological:  Negative for dizziness and headaches.       Objective   /60 (BP Location: Left arm, Patient Position: Sitting, Cuff Size: Large)   Pulse 83   Temp 97.9 °F (36.6 °C) (Temporal)   Ht 5' 4.5\" " (1.638 m)   Wt 81.2 kg (179 lb)   SpO2 97%   BMI 30.25 kg/m²      Physical Exam  Vitals reviewed.   Constitutional:       Appearance: She is obese.   HENT:      Head: Normocephalic and atraumatic.     Eyes:      Extraocular Movements: Extraocular movements intact.       Cardiovascular:      Rate and Rhythm: Regular rhythm.      Heart sounds: Normal heart sounds.   Pulmonary:      Effort: Pulmonary effort is normal.      Breath sounds: Normal breath sounds.   Abdominal:      General: Bowel sounds are normal.      Tenderness: There is abdominal tenderness in the suprapubic area. There is no left CVA tenderness.     Neurological:      General: No focal deficit present.      Mental Status: She is alert.     Psychiatric:         Mood and Affect: Mood normal.         Behavior: Behavior normal.

## 2025-07-26 LAB — BACTERIA UR CULT: ABNORMAL

## 2025-07-28 ENCOUNTER — APPOINTMENT (OUTPATIENT)
Dept: PHYSICAL THERAPY | Facility: CLINIC | Age: 65
End: 2025-07-28
Attending: ORTHOPAEDIC SURGERY
Payer: MEDICARE

## 2025-07-28 ENCOUNTER — PREP FOR PROCEDURE (OUTPATIENT)
Dept: OBGYN CLINIC | Facility: CLINIC | Age: 65
End: 2025-07-28

## 2025-07-28 ENCOUNTER — OFFICE VISIT (OUTPATIENT)
Dept: OBGYN CLINIC | Facility: CLINIC | Age: 65
End: 2025-07-28
Payer: MEDICARE

## 2025-07-28 VITALS — WEIGHT: 179 LBS | BODY MASS INDEX: 30.25 KG/M2

## 2025-07-28 DIAGNOSIS — M17.11 PRIMARY OSTEOARTHRITIS OF RIGHT KNEE: Primary | ICD-10-CM

## 2025-07-28 DIAGNOSIS — M79.606 GENERALIZED PAIN OF KNEE REGION: ICD-10-CM

## 2025-07-28 PROCEDURE — 99214 OFFICE O/P EST MOD 30 MIN: CPT | Performed by: ORTHOPAEDIC SURGERY

## 2025-07-28 RX ORDER — ASCORBIC ACID 500 MG
500 TABLET ORAL 2 TIMES DAILY
Qty: 60 TABLET | Status: CANCELLED
Start: 2025-07-28

## 2025-07-28 RX ORDER — FOLIC ACID 1 MG/1
1 TABLET ORAL DAILY
Qty: 30 TABLET | Status: CANCELLED
Start: 2025-07-28

## 2025-07-28 RX ORDER — MUPIROCIN 2 %
OINTMENT (GRAM) TOPICAL
Qty: 15 G | Refills: 1 | Status: CANCELLED | OUTPATIENT
Start: 2025-07-28

## 2025-07-28 RX ORDER — MULTIVIT-MIN/IRON FUM/FOLIC AC 7.5 MG-4
1 TABLET ORAL DAILY
Qty: 30 TABLET | Status: CANCELLED
Start: 2025-07-28

## 2025-07-30 RX ORDER — VITAMIN B COMPLEX
1000 TABLET ORAL DAILY
Qty: 30 TABLET | Refills: 0 | Status: SHIPPED | OUTPATIENT
Start: 2025-07-30

## 2025-07-30 RX ORDER — MULTIVIT-MIN/IRON FUM/FOLIC AC 7.5 MG-4
1 TABLET ORAL DAILY
Qty: 1 TABLET | Refills: 1 | Status: SHIPPED | OUTPATIENT
Start: 2025-07-30 | End: 2025-08-29

## 2025-07-30 RX ORDER — MUPIROCIN 2 %
OINTMENT (GRAM) TOPICAL 2 TIMES DAILY
Qty: 15 G | Refills: 0 | Status: SHIPPED | OUTPATIENT
Start: 2025-07-30 | End: 2025-08-04

## 2025-07-30 RX ORDER — SODIUM CHLORIDE, SODIUM LACTATE, POTASSIUM CHLORIDE, CALCIUM CHLORIDE 600; 310; 30; 20 MG/100ML; MG/100ML; MG/100ML; MG/100ML
20 INJECTION, SOLUTION INTRAVENOUS CONTINUOUS
OUTPATIENT
Start: 2025-07-30

## 2025-07-30 RX ORDER — ZINC SULFATE 50(220)MG
220 CAPSULE ORAL DAILY
Qty: 30 CAPSULE | Refills: 0 | Status: SHIPPED | OUTPATIENT
Start: 2025-07-30

## 2025-07-30 RX ORDER — CHLORHEXIDINE GLUCONATE 40 MG/ML
SOLUTION TOPICAL DAILY PRN
OUTPATIENT
Start: 2025-07-30

## 2025-07-30 RX ORDER — CHLORHEXIDINE GLUCONATE ORAL RINSE 1.2 MG/ML
15 SOLUTION DENTAL ONCE
OUTPATIENT
Start: 2025-07-30

## 2025-07-31 ENCOUNTER — OFFICE VISIT (OUTPATIENT)
Dept: PHYSICAL THERAPY | Facility: CLINIC | Age: 65
End: 2025-07-31
Attending: ORTHOPAEDIC SURGERY
Payer: MEDICARE

## 2025-07-31 DIAGNOSIS — M17.11 PRIMARY OSTEOARTHRITIS OF RIGHT KNEE: Primary | ICD-10-CM

## 2025-07-31 PROCEDURE — 97116 GAIT TRAINING THERAPY: CPT | Performed by: PHYSICAL THERAPIST

## 2025-07-31 PROCEDURE — 97112 NEUROMUSCULAR REEDUCATION: CPT | Performed by: PHYSICAL THERAPIST

## 2025-07-31 PROCEDURE — 97110 THERAPEUTIC EXERCISES: CPT | Performed by: PHYSICAL THERAPIST

## 2025-08-04 ENCOUNTER — OFFICE VISIT (OUTPATIENT)
Dept: PHYSICAL THERAPY | Facility: CLINIC | Age: 65
End: 2025-08-04
Attending: ORTHOPAEDIC SURGERY
Payer: MEDICARE

## 2025-08-04 DIAGNOSIS — M17.11 PRIMARY OSTEOARTHRITIS OF RIGHT KNEE: Primary | ICD-10-CM

## 2025-08-04 PROCEDURE — 97116 GAIT TRAINING THERAPY: CPT | Performed by: PHYSICAL THERAPIST

## 2025-08-04 PROCEDURE — 97110 THERAPEUTIC EXERCISES: CPT | Performed by: PHYSICAL THERAPIST

## 2025-08-04 PROCEDURE — 97112 NEUROMUSCULAR REEDUCATION: CPT | Performed by: PHYSICAL THERAPIST

## 2025-08-08 ENCOUNTER — OFFICE VISIT (OUTPATIENT)
Dept: PHYSICAL THERAPY | Facility: CLINIC | Age: 65
End: 2025-08-08
Attending: ORTHOPAEDIC SURGERY
Payer: MEDICARE

## 2025-08-08 DIAGNOSIS — M17.11 PRIMARY OSTEOARTHRITIS OF RIGHT KNEE: Primary | ICD-10-CM

## 2025-08-08 PROCEDURE — 97116 GAIT TRAINING THERAPY: CPT | Performed by: PHYSICAL THERAPIST

## 2025-08-08 PROCEDURE — 97112 NEUROMUSCULAR REEDUCATION: CPT | Performed by: PHYSICAL THERAPIST

## 2025-08-08 PROCEDURE — 97110 THERAPEUTIC EXERCISES: CPT | Performed by: PHYSICAL THERAPIST

## 2025-08-14 ENCOUNTER — OFFICE VISIT (OUTPATIENT)
Dept: PHYSICAL THERAPY | Facility: CLINIC | Age: 65
End: 2025-08-14
Attending: ORTHOPAEDIC SURGERY
Payer: MEDICARE

## 2025-08-18 ENCOUNTER — OFFICE VISIT (OUTPATIENT)
Dept: PHYSICAL THERAPY | Facility: CLINIC | Age: 65
End: 2025-08-18
Attending: ORTHOPAEDIC SURGERY
Payer: MEDICARE

## 2025-08-18 DIAGNOSIS — M17.11 PRIMARY OSTEOARTHRITIS OF RIGHT KNEE: Primary | ICD-10-CM

## 2025-08-18 PROCEDURE — 97112 NEUROMUSCULAR REEDUCATION: CPT | Performed by: PHYSICAL THERAPIST

## 2025-08-18 PROCEDURE — 97110 THERAPEUTIC EXERCISES: CPT | Performed by: PHYSICAL THERAPIST

## 2025-08-20 ENCOUNTER — TELEPHONE (OUTPATIENT)
Dept: OBGYN CLINIC | Facility: HOSPITAL | Age: 65
End: 2025-08-20

## 2025-08-21 ENCOUNTER — OFFICE VISIT (OUTPATIENT)
Dept: PHYSICAL THERAPY | Facility: CLINIC | Age: 65
End: 2025-08-21
Attending: ORTHOPAEDIC SURGERY
Payer: MEDICARE

## 2025-08-21 DIAGNOSIS — M17.11 PRIMARY OSTEOARTHRITIS OF RIGHT KNEE: Primary | ICD-10-CM

## 2025-08-21 PROCEDURE — 97112 NEUROMUSCULAR REEDUCATION: CPT | Performed by: PHYSICAL THERAPIST

## 2025-08-21 PROCEDURE — 97110 THERAPEUTIC EXERCISES: CPT | Performed by: PHYSICAL THERAPIST
